# Patient Record
Sex: FEMALE | Race: WHITE | NOT HISPANIC OR LATINO | Employment: OTHER | URBAN - METROPOLITAN AREA
[De-identification: names, ages, dates, MRNs, and addresses within clinical notes are randomized per-mention and may not be internally consistent; named-entity substitution may affect disease eponyms.]

---

## 2023-03-06 DIAGNOSIS — K04.7 ABSCESSED TOOTH: Primary | ICD-10-CM

## 2023-03-07 RX ORDER — AMOXICILLIN 875 MG/1
875 TABLET, FILM COATED ORAL 2 TIMES DAILY
Qty: 20 TABLET | Refills: 0 | Status: SHIPPED | OUTPATIENT
Start: 2023-03-07 | End: 2023-03-17

## 2023-03-13 ENCOUNTER — TELEPHONE (OUTPATIENT)
Dept: PRIMARY CARE | Facility: CLINIC | Age: 73
End: 2023-03-13
Payer: MEDICARE

## 2023-03-13 PROBLEM — L20.9 ATOPIC DERMATITIS: Status: ACTIVE | Noted: 2023-03-13

## 2023-03-13 PROBLEM — E11.42 DIABETIC POLYNEUROPATHY ASSOCIATED WITH TYPE 2 DIABETES MELLITUS (MULTI): Status: ACTIVE | Noted: 2023-03-13

## 2023-03-13 PROBLEM — E11.51 DIABETES MELLITUS WITH PERIPHERAL VASCULAR DISEASE (MULTI): Status: RESOLVED | Noted: 2023-03-13 | Resolved: 2023-03-13

## 2023-03-13 PROBLEM — J30.9 ALLERGIC RHINITIS: Status: ACTIVE | Noted: 2023-03-13

## 2023-03-13 PROBLEM — J44.9 COPD (CHRONIC OBSTRUCTIVE PULMONARY DISEASE) (MULTI): Status: ACTIVE | Noted: 2023-03-13

## 2023-03-13 PROBLEM — E66.3 OVERWEIGHT: Status: ACTIVE | Noted: 2023-03-13

## 2023-03-13 PROBLEM — H40.9 GLAUCOMA: Status: ACTIVE | Noted: 2023-03-13

## 2023-03-13 PROBLEM — R60.9 EDEMA: Status: ACTIVE | Noted: 2023-03-13

## 2023-03-13 PROBLEM — M79.89 RIGHT LEG SWELLING: Status: ACTIVE | Noted: 2023-03-13

## 2023-03-13 PROBLEM — I45.10 RBBB: Status: RESOLVED | Noted: 2023-03-13 | Resolved: 2023-03-13

## 2023-03-13 PROBLEM — E78.2 MIXED HYPERLIPIDEMIA: Status: ACTIVE | Noted: 2023-03-13

## 2023-03-13 PROBLEM — E78.2 MIXED HYPERLIPIDEMIA: Status: RESOLVED | Noted: 2023-03-13 | Resolved: 2023-03-13

## 2023-03-13 PROBLEM — N18.31 STAGE 3A CHRONIC KIDNEY DISEASE (MULTI): Status: ACTIVE | Noted: 2023-03-13

## 2023-03-13 PROBLEM — R94.39 ABNORMAL STRESS ECG WITH TREADMILL: Status: ACTIVE | Noted: 2023-03-13

## 2023-03-13 PROBLEM — I65.23 BILATERAL CAROTID ARTERY STENOSIS: Status: ACTIVE | Noted: 2023-03-13

## 2023-03-13 PROBLEM — F17.200 CURRENT EVERY DAY SMOKER: Status: ACTIVE | Noted: 2023-03-13

## 2023-03-13 PROBLEM — N89.8 VAGINAL ITCHING: Status: ACTIVE | Noted: 2023-03-13

## 2023-03-13 PROBLEM — E11.29 DIABETES MELLITUS WITH KIDNEY COMPLICATION (MULTI): Status: ACTIVE | Noted: 2023-03-13

## 2023-03-13 PROBLEM — R91.8 PULMONARY NODULES: Status: ACTIVE | Noted: 2023-03-13

## 2023-03-13 PROBLEM — L29.9 PRURITUS: Status: ACTIVE | Noted: 2023-03-13

## 2023-03-13 PROBLEM — I10 ESSENTIAL HYPERTENSION: Status: ACTIVE | Noted: 2023-03-13

## 2023-03-13 PROBLEM — R32 FEMALE INCONTINENCE: Status: ACTIVE | Noted: 2023-03-13

## 2023-03-13 PROBLEM — I25.10 CAD (CORONARY ARTERY DISEASE): Status: ACTIVE | Noted: 2023-03-13

## 2023-03-13 PROBLEM — I73.9 PERIPHERAL VASCULAR DISEASE (CMS-HCC): Status: ACTIVE | Noted: 2023-03-13

## 2023-03-13 PROBLEM — I48.91 ATRIAL FIBRILLATION (MULTI): Status: ACTIVE | Noted: 2023-03-13

## 2023-03-13 PROBLEM — I70.219 EXTREMITY ATHEROSCLEROSIS WITH INTERMITTENT CLAUDICATION (CMS-HCC): Status: ACTIVE | Noted: 2023-03-13

## 2023-03-13 PROBLEM — I45.10 RBBB: Status: ACTIVE | Noted: 2023-03-13

## 2023-03-13 PROBLEM — L85.3 XEROSIS OF SKIN: Status: ACTIVE | Noted: 2023-03-13

## 2023-03-13 PROBLEM — K21.9 GERD (GASTROESOPHAGEAL REFLUX DISEASE): Status: ACTIVE | Noted: 2023-03-13

## 2023-03-13 PROBLEM — I77.9 CAROTID ARTERY DISEASE (CMS-HCC): Status: ACTIVE | Noted: 2023-03-13

## 2023-03-13 PROBLEM — E78.5 HYPERLIPIDEMIA: Status: ACTIVE | Noted: 2023-03-13

## 2023-03-13 PROBLEM — E55.9 VITAMIN D DEFICIENCY: Status: ACTIVE | Noted: 2023-03-13

## 2023-03-13 PROBLEM — E11.65 TYPE 2 DIABETES MELLITUS WITH HYPERGLYCEMIA, WITHOUT LONG-TERM CURRENT USE OF INSULIN (MULTI): Status: ACTIVE | Noted: 2023-03-13

## 2023-03-13 PROBLEM — E11.51 DIABETES MELLITUS WITH PERIPHERAL VASCULAR DISEASE (MULTI): Status: ACTIVE | Noted: 2023-03-13

## 2023-03-13 PROBLEM — L82.0 SEBORRHEIC KERATOSES, INFLAMED: Status: ACTIVE | Noted: 2023-03-13

## 2023-03-13 PROBLEM — I44.7 LBBB (LEFT BUNDLE BRANCH BLOCK): Status: ACTIVE | Noted: 2023-03-13

## 2023-03-13 PROBLEM — I10 HYPERTENSION: Status: ACTIVE | Noted: 2023-03-13

## 2023-03-13 PROBLEM — R31.9 HEMATURIA: Status: ACTIVE | Noted: 2023-03-13

## 2023-03-13 RX ORDER — CILOSTAZOL 100 MG/1
100 TABLET ORAL 2 TIMES DAILY
COMMUNITY
End: 2023-11-03 | Stop reason: SDUPTHER

## 2023-03-13 RX ORDER — OXYBUTYNIN CHLORIDE 10 MG/1
1 TABLET, EXTENDED RELEASE ORAL DAILY
COMMUNITY
Start: 2019-02-28 | End: 2023-04-26 | Stop reason: SDUPTHER

## 2023-03-13 RX ORDER — ICOSAPENT ETHYL 1 G/1
2 CAPSULE ORAL 2 TIMES DAILY
COMMUNITY
Start: 2020-02-07 | End: 2023-12-19 | Stop reason: SDUPTHER

## 2023-03-13 RX ORDER — APIXABAN 5 MG/1
5 TABLET, FILM COATED ORAL 2 TIMES DAILY
COMMUNITY
End: 2023-11-03 | Stop reason: SDUPTHER

## 2023-03-13 RX ORDER — FLUTICASONE PROPIONATE 50 MCG
1 SPRAY, SUSPENSION (ML) NASAL DAILY PRN
COMMUNITY
End: 2024-05-30 | Stop reason: HOSPADM

## 2023-03-13 RX ORDER — ALBUTEROL SULFATE 90 UG/1
AEROSOL, METERED RESPIRATORY (INHALATION)
COMMUNITY
End: 2023-04-26 | Stop reason: SDUPTHER

## 2023-03-13 RX ORDER — UMECLIDINIUM BROMIDE AND VILANTEROL TRIFENATATE 62.5; 25 UG/1; UG/1
1 POWDER RESPIRATORY (INHALATION) DAILY
COMMUNITY
End: 2023-08-07 | Stop reason: SDUPTHER

## 2023-03-13 RX ORDER — QUINAPRIL 20 MG/1
20 TABLET ORAL DAILY
COMMUNITY
Start: 2017-11-20 | End: 2023-04-26

## 2023-03-13 RX ORDER — PIOGLITAZONEHYDROCHLORIDE 30 MG/1
1 TABLET ORAL DAILY
COMMUNITY
Start: 2020-05-08 | End: 2023-11-03 | Stop reason: SDUPTHER

## 2023-03-13 RX ORDER — ACETAMINOPHEN 500 MG
1 TABLET ORAL DAILY
COMMUNITY
Start: 2017-11-20 | End: 2024-05-29 | Stop reason: HOSPADM

## 2023-03-13 RX ORDER — ATORVASTATIN CALCIUM 40 MG/1
1 TABLET, FILM COATED ORAL DAILY
COMMUNITY
Start: 2023-02-09 | End: 2023-04-26 | Stop reason: SDUPTHER

## 2023-03-13 RX ORDER — SPIRONOLACTONE 25 MG/1
0.5 TABLET ORAL DAILY
COMMUNITY
Start: 2017-11-20 | End: 2023-10-06

## 2023-03-13 RX ORDER — GLYBURIDE 5 MG/1
2 TABLET ORAL 2 TIMES DAILY
COMMUNITY
Start: 2017-11-20 | End: 2023-04-26 | Stop reason: SDUPTHER

## 2023-03-13 RX ORDER — FUROSEMIDE 20 MG/1
20 TABLET ORAL DAILY PRN
COMMUNITY
Start: 2023-02-05 | End: 2023-11-03 | Stop reason: SDUPTHER

## 2023-03-13 RX ORDER — METOPROLOL SUCCINATE 50 MG/1
1 TABLET, EXTENDED RELEASE ORAL 2 TIMES DAILY
COMMUNITY
Start: 2023-01-24 | End: 2023-07-20 | Stop reason: SDUPTHER

## 2023-03-13 NOTE — TELEPHONE ENCOUNTER
----- Message from Alona Corea MD sent at 3/7/2023  8:25 AM EST -----  Prescription was sent to the pharmacy.   ----- Message -----  From: Alona Oliver LPN  Sent: 3/6/2023   3:11 PM EST  To: Alona Corea MD    States has an abscessed tooth she would like an antibiotic called in

## 2023-03-22 ENCOUNTER — OFFICE VISIT (OUTPATIENT)
Dept: PRIMARY CARE | Facility: CLINIC | Age: 73
End: 2023-03-22
Payer: MEDICARE

## 2023-03-22 VITALS
BODY MASS INDEX: 29.43 KG/M2 | WEIGHT: 172.4 LBS | DIASTOLIC BLOOD PRESSURE: 76 MMHG | SYSTOLIC BLOOD PRESSURE: 130 MMHG | HEART RATE: 84 BPM | HEIGHT: 64 IN | RESPIRATION RATE: 18 BRPM | TEMPERATURE: 98 F

## 2023-03-22 DIAGNOSIS — E11.22 TYPE 2 DIABETES MELLITUS WITH STAGE 3B CHRONIC KIDNEY DISEASE, WITHOUT LONG-TERM CURRENT USE OF INSULIN (MULTI): ICD-10-CM

## 2023-03-22 DIAGNOSIS — N18.31 STAGE 3A CHRONIC KIDNEY DISEASE (MULTI): ICD-10-CM

## 2023-03-22 DIAGNOSIS — N18.32 TYPE 2 DIABETES MELLITUS WITH STAGE 3B CHRONIC KIDNEY DISEASE, WITHOUT LONG-TERM CURRENT USE OF INSULIN (MULTI): ICD-10-CM

## 2023-03-22 DIAGNOSIS — E11.51 TYPE 2 DIABETES MELLITUS WITH DIABETIC PERIPHERAL ANGIOPATHY WITHOUT GANGRENE, WITHOUT LONG-TERM CURRENT USE OF INSULIN (MULTI): ICD-10-CM

## 2023-03-22 DIAGNOSIS — I48.92 ATRIAL FLUTTER, UNSPECIFIED TYPE (MULTI): ICD-10-CM

## 2023-03-22 DIAGNOSIS — I50.42 CHRONIC COMBINED SYSTOLIC (CONGESTIVE) AND DIASTOLIC (CONGESTIVE) HEART FAILURE (MULTI): ICD-10-CM

## 2023-03-22 DIAGNOSIS — J44.1 CHRONIC OBSTRUCTIVE PULMONARY DISEASE WITH (ACUTE) EXACERBATION (MULTI): ICD-10-CM

## 2023-03-22 DIAGNOSIS — N95.0 POSTMENOPAUSAL VAGINAL BLEEDING: Primary | ICD-10-CM

## 2023-03-22 DIAGNOSIS — J96.22 ACUTE AND CHRONIC RESPIRATORY FAILURE WITH HYPERCAPNIA (MULTI): ICD-10-CM

## 2023-03-22 PROCEDURE — 1160F RVW MEDS BY RX/DR IN RCRD: CPT | Performed by: FAMILY MEDICINE

## 2023-03-22 PROCEDURE — 3078F DIAST BP <80 MM HG: CPT | Performed by: FAMILY MEDICINE

## 2023-03-22 PROCEDURE — 3075F SYST BP GE 130 - 139MM HG: CPT | Performed by: FAMILY MEDICINE

## 2023-03-22 PROCEDURE — 99213 OFFICE O/P EST LOW 20 MIN: CPT | Performed by: FAMILY MEDICINE

## 2023-03-22 PROCEDURE — 4010F ACE/ARB THERAPY RXD/TAKEN: CPT | Performed by: FAMILY MEDICINE

## 2023-03-22 PROCEDURE — 3066F NEPHROPATHY DOC TX: CPT | Performed by: FAMILY MEDICINE

## 2023-03-22 PROCEDURE — 1157F ADVNC CARE PLAN IN RCRD: CPT | Performed by: FAMILY MEDICINE

## 2023-03-22 PROCEDURE — 1159F MED LIST DOCD IN RCRD: CPT | Performed by: FAMILY MEDICINE

## 2023-03-22 ASSESSMENT — PATIENT HEALTH QUESTIONNAIRE - PHQ9
2. FEELING DOWN, DEPRESSED OR HOPELESS: NOT AT ALL
1. LITTLE INTEREST OR PLEASURE IN DOING THINGS: NOT AT ALL
SUM OF ALL RESPONSES TO PHQ9 QUESTIONS 1 & 2: 0

## 2023-03-22 ASSESSMENT — ENCOUNTER SYMPTOMS: ABDOMINAL PAIN: 0

## 2023-03-22 NOTE — PROGRESS NOTES
"Subjective   Patient ID: Mitzi Pro is a 72 y.o. female who presents for Vaginal Bleeding (Has been going on for a couple weeks, intermittent spot during the day and heavier at night with some clots. No abdominal pain or bloating.   ).    Notices more bleeding at night when lays down    Vaginal Bleeding  This is a new problem. The current episode started 1 to 4 weeks ago. The problem occurs 2 to 4 times per day. The problem has been unchanged. Pertinent negatives include no abdominal pain or rash.        Review of Systems   Gastrointestinal:  Negative for abdominal pain.   Genitourinary:  Positive for vaginal bleeding.   Skin:  Negative for rash.       Objective   Pulse 84   Temp 36.7 °C (98 °F)   Resp 18   Ht 1.632 m (5' 4.25\")   Wt 78.2 kg (172 lb 6.4 oz)   BMI 29.36 kg/m²     Physical Exam  Genitourinary:     General: Normal vulva.      Labia:         Left: No lesion.       Vagina: Bleeding present.      Comments: Uanble to see cervix due to bleeding      Assessment/Plan          "

## 2023-03-27 ENCOUNTER — TELEPHONE (OUTPATIENT)
Dept: PRIMARY CARE | Facility: CLINIC | Age: 73
End: 2023-03-27
Payer: MEDICARE

## 2023-03-27 NOTE — TELEPHONE ENCOUNTER
Left message would like to stop taking eliquis and restart aspirin. They think that is what is causing her to bleed.  Please advise

## 2023-04-26 ENCOUNTER — OFFICE VISIT (OUTPATIENT)
Dept: PRIMARY CARE | Facility: CLINIC | Age: 73
End: 2023-04-26
Payer: MEDICARE

## 2023-04-26 VITALS
SYSTOLIC BLOOD PRESSURE: 92 MMHG | OXYGEN SATURATION: 87 % | DIASTOLIC BLOOD PRESSURE: 38 MMHG | RESPIRATION RATE: 20 BRPM | HEART RATE: 80 BPM | HEIGHT: 64 IN | WEIGHT: 172.8 LBS | TEMPERATURE: 97.3 F | BODY MASS INDEX: 29.5 KG/M2

## 2023-04-26 DIAGNOSIS — I48.91 ATRIAL FIBRILLATION, UNSPECIFIED TYPE (MULTI): ICD-10-CM

## 2023-04-26 DIAGNOSIS — D50.0 IRON DEFICIENCY ANEMIA DUE TO CHRONIC BLOOD LOSS: ICD-10-CM

## 2023-04-26 DIAGNOSIS — D68.318: ICD-10-CM

## 2023-04-26 DIAGNOSIS — E11.42 DIABETIC POLYNEUROPATHY ASSOCIATED WITH TYPE 2 DIABETES MELLITUS (MULTI): ICD-10-CM

## 2023-04-26 DIAGNOSIS — R32 URINARY INCONTINENCE, UNSPECIFIED TYPE: ICD-10-CM

## 2023-04-26 DIAGNOSIS — J44.9 CHRONIC OBSTRUCTIVE PULMONARY DISEASE, UNSPECIFIED COPD TYPE (MULTI): Primary | ICD-10-CM

## 2023-04-26 DIAGNOSIS — J96.22 ACUTE AND CHRONIC RESPIRATORY FAILURE WITH HYPERCAPNIA (MULTI): ICD-10-CM

## 2023-04-26 DIAGNOSIS — I10 ESSENTIAL HYPERTENSION: ICD-10-CM

## 2023-04-26 DIAGNOSIS — E11.51 DIABETES MELLITUS WITH PERIPHERAL VASCULAR DISEASE (MULTI): ICD-10-CM

## 2023-04-26 DIAGNOSIS — I70.219 EXTREMITY ATHEROSCLEROSIS WITH INTERMITTENT CLAUDICATION (CMS-HCC): ICD-10-CM

## 2023-04-26 DIAGNOSIS — I73.9 PERIPHERAL VASCULAR DISEASE (CMS-HCC): ICD-10-CM

## 2023-04-26 PROCEDURE — 3078F DIAST BP <80 MM HG: CPT | Performed by: FAMILY MEDICINE

## 2023-04-26 PROCEDURE — 1157F ADVNC CARE PLAN IN RCRD: CPT | Performed by: FAMILY MEDICINE

## 2023-04-26 PROCEDURE — 99214 OFFICE O/P EST MOD 30 MIN: CPT | Performed by: FAMILY MEDICINE

## 2023-04-26 PROCEDURE — 1159F MED LIST DOCD IN RCRD: CPT | Performed by: FAMILY MEDICINE

## 2023-04-26 PROCEDURE — 1160F RVW MEDS BY RX/DR IN RCRD: CPT | Performed by: FAMILY MEDICINE

## 2023-04-26 PROCEDURE — 3066F NEPHROPATHY DOC TX: CPT | Performed by: FAMILY MEDICINE

## 2023-04-26 PROCEDURE — 3074F SYST BP LT 130 MM HG: CPT | Performed by: FAMILY MEDICINE

## 2023-04-26 PROCEDURE — 4010F ACE/ARB THERAPY RXD/TAKEN: CPT | Performed by: FAMILY MEDICINE

## 2023-04-26 RX ORDER — ATORVASTATIN CALCIUM 40 MG/1
40 TABLET, FILM COATED ORAL DAILY
Qty: 90 TABLET | Refills: 1 | Status: SHIPPED | OUTPATIENT
Start: 2023-04-26 | End: 2023-11-03 | Stop reason: SDUPTHER

## 2023-04-26 RX ORDER — LISINOPRIL 10 MG/1
20 TABLET ORAL DAILY
Qty: 180 TABLET | Refills: 1 | Status: SHIPPED | OUTPATIENT
Start: 2023-04-26 | End: 2023-11-03 | Stop reason: SDUPTHER

## 2023-04-26 RX ORDER — ALBUTEROL SULFATE 90 UG/1
2 AEROSOL, METERED RESPIRATORY (INHALATION) EVERY 4 HOURS PRN
Qty: 54 G | Refills: 3 | Status: SHIPPED | OUTPATIENT
Start: 2023-04-26 | End: 2023-12-19 | Stop reason: SDUPTHER

## 2023-04-26 RX ORDER — GLYBURIDE 5 MG/1
10 TABLET ORAL 2 TIMES DAILY
Qty: 360 TABLET | Refills: 1 | Status: SHIPPED | OUTPATIENT
Start: 2023-04-26 | End: 2023-12-15 | Stop reason: SDUPTHER

## 2023-04-26 RX ORDER — OXYBUTYNIN CHLORIDE 10 MG/1
10 TABLET, EXTENDED RELEASE ORAL DAILY
Qty: 90 TABLET | Refills: 3 | Status: SHIPPED | OUTPATIENT
Start: 2023-04-26 | End: 2023-11-03 | Stop reason: SDUPTHER

## 2023-04-26 NOTE — PROGRESS NOTES
"Subjective   Patient ID: Mitzi Pro is a 72 y.o. female who presents for Follow-up (Legs feel weak, still going to rehab.  Wants portable oxygen.).    Has oxygen at home but needs portable oxygen to be under 10 pounds   Has a heart monitor on   Not bleeding anymore   incontinence improved some since hysterectomy         Review of Systems    Objective   BP (!) 92/38 (BP Location: Right arm, Patient Position: Sitting, BP Cuff Size: Adult)   Pulse 80   Temp 36.3 °C (97.3 °F) (Temporal)   Resp 20   Ht 1.626 m (5' 4\")   Wt 78.4 kg (172 lb 12.8 oz)   SpO2 (!) 87%   BMI 29.66 kg/m²     Physical Exam  Vitals and nursing note reviewed.   Constitutional:       General: She is not in acute distress.     Appearance: She is not ill-appearing.   HENT:      Head: Normocephalic and atraumatic.      Mouth/Throat:      Mouth: Mucous membranes are moist.   Eyes:      Conjunctiva/sclera: Conjunctivae normal.   Cardiovascular:      Rate and Rhythm: Normal rate and regular rhythm.      Heart sounds: Normal heart sounds.   Pulmonary:      Effort: Pulmonary effort is normal.      Breath sounds: Normal breath sounds.   Skin:     General: Skin is warm.   Neurological:      Mental Status: She is alert.   Psychiatric:         Mood and Affect: Mood normal.         Thought Content: Thought content normal.         Judgment: Judgment normal.         Assessment/Plan   Problem List Items Addressed This Visit          Nervous    Diabetic polyneuropathy associated with type 2 diabetes mellitus (CMS/HCC)       Respiratory    COPD (chronic obstructive pulmonary disease) (CMS/[facility]) - Primary    Relevant Medications    albuterol 90 mcg/actuation inhaler    Acute and chronic respiratory failure with hypercapnia (CMS/HCC)       Circulatory    Atrial fibrillation (CMS/[facility])    Essential hypertension    Relevant Medications    lisinopril 10 mg tablet    Extremity atherosclerosis with intermittent claudication (CMS/HCC)    Peripheral vascular " disease (CMS/HCC)    Diabetes mellitus with peripheral vascular disease (CMS/HCC)    Relevant Medications    SITagliptin phosphate (Januvia) 100 mg tablet    glyBURIDE (Diabeta) 5 mg tablet    atorvastatin (Lipitor) 40 mg tablet       Hematologic    Hemorrhagic disorder due to circulating anticoagulants (CMS/HCC)     better          Other Visit Diagnoses       Urinary incontinence, unspecified type        Relevant Medications    oxybutynin XL (Ditropan-XL) 10 mg 24 hr tablet    Iron deficiency anemia due to chronic blood loss        Relevant Orders    Follow Up In [facility] - PCP

## 2023-05-31 ENCOUNTER — OFFICE VISIT (OUTPATIENT)
Dept: PRIMARY CARE | Facility: CLINIC | Age: 73
End: 2023-05-31
Payer: MEDICARE

## 2023-05-31 ENCOUNTER — LAB (OUTPATIENT)
Dept: LAB | Facility: LAB | Age: 73
End: 2023-05-31
Payer: MEDICARE

## 2023-05-31 VITALS
TEMPERATURE: 97.4 F | RESPIRATION RATE: 20 BRPM | OXYGEN SATURATION: 92 % | HEART RATE: 82 BPM | SYSTOLIC BLOOD PRESSURE: 108 MMHG | DIASTOLIC BLOOD PRESSURE: 40 MMHG | WEIGHT: 169.1 LBS | BODY MASS INDEX: 29.03 KG/M2

## 2023-05-31 DIAGNOSIS — E11.65 TYPE 2 DIABETES MELLITUS WITH HYPERGLYCEMIA, WITHOUT LONG-TERM CURRENT USE OF INSULIN (MULTI): ICD-10-CM

## 2023-05-31 DIAGNOSIS — I10 ESSENTIAL HYPERTENSION: ICD-10-CM

## 2023-05-31 DIAGNOSIS — Z00.00 ROUTINE GENERAL MEDICAL EXAMINATION AT A HEALTH CARE FACILITY: ICD-10-CM

## 2023-05-31 DIAGNOSIS — D50.0 IRON DEFICIENCY ANEMIA DUE TO CHRONIC BLOOD LOSS: ICD-10-CM

## 2023-05-31 DIAGNOSIS — I10 ESSENTIAL HYPERTENSION: Primary | ICD-10-CM

## 2023-05-31 LAB
ALANINE AMINOTRANSFERASE (SGPT) (U/L) IN SER/PLAS: 9 U/L (ref 7–45)
ALBUMIN (G/DL) IN SER/PLAS: 3.9 G/DL (ref 3.4–5)
ALKALINE PHOSPHATASE (U/L) IN SER/PLAS: 51 U/L (ref 33–136)
ANION GAP IN SER/PLAS: 13 MMOL/L (ref 10–20)
ASPARTATE AMINOTRANSFERASE (SGOT) (U/L) IN SER/PLAS: 14 U/L (ref 9–39)
BILIRUBIN TOTAL (MG/DL) IN SER/PLAS: 0.5 MG/DL (ref 0–1.2)
CALCIUM (MG/DL) IN SER/PLAS: 9.1 MG/DL (ref 8.6–10.3)
CARBON DIOXIDE, TOTAL (MMOL/L) IN SER/PLAS: 28 MMOL/L (ref 21–32)
CHLORIDE (MMOL/L) IN SER/PLAS: 99 MMOL/L (ref 98–107)
CREATININE (MG/DL) IN SER/PLAS: 1.26 MG/DL (ref 0.5–1.05)
ERYTHROCYTE DISTRIBUTION WIDTH (RATIO) BY AUTOMATED COUNT: 17.1 % (ref 11.5–14.5)
ERYTHROCYTE MEAN CORPUSCULAR HEMOGLOBIN CONCENTRATION (G/DL) BY AUTOMATED: 30.1 G/DL (ref 32–36)
ERYTHROCYTE MEAN CORPUSCULAR VOLUME (FL) BY AUTOMATED COUNT: 99 FL (ref 80–100)
ERYTHROCYTES (10*6/UL) IN BLOOD BY AUTOMATED COUNT: 3.32 X10E12/L (ref 4–5.2)
ESTIMATED AVERAGE GLUCOSE FOR HBA1C: 131 MG/DL
GFR FEMALE: 45 ML/MIN/1.73M2
GLUCOSE (MG/DL) IN SER/PLAS: 238 MG/DL (ref 74–99)
HEMATOCRIT (%) IN BLOOD BY AUTOMATED COUNT: 32.9 % (ref 36–46)
HEMOGLOBIN (G/DL) IN BLOOD: 9.9 G/DL (ref 12–16)
HEMOGLOBIN (PG) IN RETICULOCYTES: 34 PG (ref 28–38)
HEMOGLOBIN A1C/HEMOGLOBIN TOTAL IN BLOOD: 6.2 %
IRON (UG/DL) IN SER/PLAS: 58 UG/DL (ref 35–150)
IRON BINDING CAPACITY (UG/DL) IN SER/PLAS: 384 UG/DL (ref 240–445)
IRON SATURATION (%) IN SER/PLAS: 15 % (ref 25–45)
LEUKOCYTES (10*3/UL) IN BLOOD BY AUTOMATED COUNT: 6.3 X10E9/L (ref 4.4–11.3)
PLATELETS (10*3/UL) IN BLOOD AUTOMATED COUNT: 238 X10E9/L (ref 150–450)
POTASSIUM (MMOL/L) IN SER/PLAS: 4.1 MMOL/L (ref 3.5–5.3)
PROTEIN TOTAL: 6.6 G/DL (ref 6.4–8.2)
RETICULOCYTES (10*3/UL) IN BLOOD: 0.08 X10E12/L (ref 0.02–0.11)
RETICULOCYTES/100 ERYTHROCYTES IN BLOOD BY AUTOMATED COUNT: 2.4 % (ref 0.5–2)
SODIUM (MMOL/L) IN SER/PLAS: 136 MMOL/L (ref 136–145)
UREA NITROGEN (MG/DL) IN SER/PLAS: 19 MG/DL (ref 6–23)

## 2023-05-31 PROCEDURE — 85027 COMPLETE CBC AUTOMATED: CPT

## 2023-05-31 PROCEDURE — 82728 ASSAY OF FERRITIN: CPT

## 2023-05-31 PROCEDURE — 1160F RVW MEDS BY RX/DR IN RCRD: CPT | Performed by: FAMILY MEDICINE

## 2023-05-31 PROCEDURE — 3074F SYST BP LT 130 MM HG: CPT | Performed by: FAMILY MEDICINE

## 2023-05-31 PROCEDURE — 83036 HEMOGLOBIN GLYCOSYLATED A1C: CPT

## 2023-05-31 PROCEDURE — 85045 AUTOMATED RETICULOCYTE COUNT: CPT

## 2023-05-31 PROCEDURE — 36415 COLL VENOUS BLD VENIPUNCTURE: CPT

## 2023-05-31 PROCEDURE — 4010F ACE/ARB THERAPY RXD/TAKEN: CPT | Performed by: FAMILY MEDICINE

## 2023-05-31 PROCEDURE — 80053 COMPREHEN METABOLIC PANEL: CPT

## 2023-05-31 PROCEDURE — 1157F ADVNC CARE PLAN IN RCRD: CPT | Performed by: FAMILY MEDICINE

## 2023-05-31 PROCEDURE — 99214 OFFICE O/P EST MOD 30 MIN: CPT | Performed by: FAMILY MEDICINE

## 2023-05-31 PROCEDURE — 83540 ASSAY OF IRON: CPT

## 2023-05-31 PROCEDURE — 3078F DIAST BP <80 MM HG: CPT | Performed by: FAMILY MEDICINE

## 2023-05-31 PROCEDURE — 3066F NEPHROPATHY DOC TX: CPT | Performed by: FAMILY MEDICINE

## 2023-05-31 PROCEDURE — 1159F MED LIST DOCD IN RCRD: CPT | Performed by: FAMILY MEDICINE

## 2023-05-31 PROCEDURE — 83550 IRON BINDING TEST: CPT

## 2023-05-31 RX ORDER — SIMVASTATIN 80 MG/1
80 TABLET, FILM COATED ORAL NIGHTLY
COMMUNITY
End: 2024-03-14 | Stop reason: WASHOUT

## 2023-05-31 ASSESSMENT — PATIENT HEALTH QUESTIONNAIRE - PHQ9: 1. LITTLE INTEREST OR PLEASURE IN DOING THINGS: NOT AT ALL

## 2023-05-31 NOTE — PROGRESS NOTES
Subjective   Patient ID: Mitzi Pro is a 72 y.o. female who presents for Follow-up (Feeling better).    Been itching more recently feeling better   Needs to see optometrist     Diabetes  She presents for her follow-up diabetic visit. She has type 2 diabetes mellitus. Eye exam is not current.        Review of Systems    Objective   BP (!) 108/40 (BP Location: Right arm, Patient Position: Sitting, BP Cuff Size: Adult)   Pulse 82   Temp 36.3 °C (97.4 °F) (Temporal)   Resp 20   Wt 76.7 kg (169 lb 1.6 oz)   SpO2 92%   BMI 29.03 kg/m²     Physical Exam  Vitals and nursing note reviewed.   Constitutional:       General: She is not in acute distress.     Appearance: She is not ill-appearing.   HENT:      Head: Normocephalic and atraumatic.      Mouth/Throat:      Mouth: Mucous membranes are moist.   Eyes:      Conjunctiva/sclera: Conjunctivae normal.   Cardiovascular:      Rate and Rhythm: Normal rate and regular rhythm.      Heart sounds: Normal heart sounds.   Pulmonary:      Effort: Pulmonary effort is normal.      Breath sounds: Normal breath sounds.   Skin:     General: Skin is warm.   Neurological:      Mental Status: She is alert.   Psychiatric:         Mood and Affect: Mood normal.         Thought Content: Thought content normal.         Judgment: Judgment normal.         Assessment/Plan   Problem List Items Addressed This Visit          Circulatory    Essential hypertension - Primary    Relevant Orders    Comprehensive Metabolic Panel       Endocrine/Metabolic    Type 2 diabetes mellitus with hyperglycemia, without long-term current use of insulin (CMS/[facility])    Relevant Orders    Hemoglobin A1C     Other Visit Diagnoses       Iron deficiency anemia due to chronic blood loss        Relevant Orders    CBC    Ferritin    Reticulocytes    Iron and TIBC

## 2023-06-01 LAB — FERRITIN (UG/LL) IN SER/PLAS: 32 UG/L (ref 8–150)

## 2023-07-19 RX ORDER — METOPROLOL SUCCINATE 50 MG/1
TABLET, EXTENDED RELEASE ORAL
Qty: 180 TABLET | OUTPATIENT
Start: 2023-07-19

## 2023-07-20 DIAGNOSIS — I10 ESSENTIAL HYPERTENSION: Primary | ICD-10-CM

## 2023-07-20 RX ORDER — METOPROLOL SUCCINATE 50 MG/1
50 TABLET, EXTENDED RELEASE ORAL 2 TIMES DAILY
Qty: 180 TABLET | Refills: 1 | Status: SHIPPED | OUTPATIENT
Start: 2023-07-20 | End: 2023-10-04 | Stop reason: ALTCHOICE

## 2023-08-07 DIAGNOSIS — J44.9 CHRONIC OBSTRUCTIVE PULMONARY DISEASE, UNSPECIFIED COPD TYPE (MULTI): Primary | ICD-10-CM

## 2023-08-07 RX ORDER — UMECLIDINIUM BROMIDE AND VILANTEROL TRIFENATATE 62.5; 25 UG/1; UG/1
1 POWDER RESPIRATORY (INHALATION) DAILY
Qty: 14 EACH | Refills: 3 | Status: SHIPPED | OUTPATIENT
Start: 2023-08-07 | End: 2023-11-03 | Stop reason: SDUPTHER

## 2023-10-04 ENCOUNTER — OFFICE VISIT (OUTPATIENT)
Dept: PRIMARY CARE | Facility: CLINIC | Age: 73
End: 2023-10-04
Payer: MEDICARE

## 2023-10-04 ENCOUNTER — LAB (OUTPATIENT)
Dept: LAB | Facility: LAB | Age: 73
End: 2023-10-04
Payer: MEDICARE

## 2023-10-04 VITALS
TEMPERATURE: 97.2 F | HEIGHT: 64 IN | WEIGHT: 173.3 LBS | DIASTOLIC BLOOD PRESSURE: 46 MMHG | SYSTOLIC BLOOD PRESSURE: 116 MMHG | RESPIRATION RATE: 22 BRPM | HEART RATE: 88 BPM | BODY MASS INDEX: 29.59 KG/M2

## 2023-10-04 DIAGNOSIS — Z00.00 ROUTINE GENERAL MEDICAL EXAMINATION AT A HEALTH CARE FACILITY: ICD-10-CM

## 2023-10-04 DIAGNOSIS — Z23 IMMUNIZATION DUE: Primary | ICD-10-CM

## 2023-10-04 DIAGNOSIS — E11.42 DIABETIC POLYNEUROPATHY ASSOCIATED WITH TYPE 2 DIABETES MELLITUS (MULTI): ICD-10-CM

## 2023-10-04 DIAGNOSIS — D64.9 ANEMIA, UNSPECIFIED TYPE: ICD-10-CM

## 2023-10-04 DIAGNOSIS — Z12.11 COLON CANCER SCREENING: ICD-10-CM

## 2023-10-04 DIAGNOSIS — N18.31 STAGE 3A CHRONIC KIDNEY DISEASE (MULTI): ICD-10-CM

## 2023-10-04 DIAGNOSIS — Z00.00 ROUTINE GENERAL MEDICAL EXAMINATION AT HEALTH CARE FACILITY: ICD-10-CM

## 2023-10-04 LAB
25(OH)D3 SERPL-MCNC: 56 NG/ML (ref 30–100)
ALBUMIN SERPL BCP-MCNC: 3.8 G/DL (ref 3.4–5)
ALP SERPL-CCNC: 52 U/L (ref 33–136)
ALT SERPL W P-5'-P-CCNC: 11 U/L (ref 7–45)
ANION GAP SERPL CALC-SCNC: 11 MMOL/L (ref 10–20)
AST SERPL W P-5'-P-CCNC: 17 U/L (ref 9–39)
BILIRUB SERPL-MCNC: 0.5 MG/DL (ref 0–1.2)
BUN SERPL-MCNC: 22 MG/DL (ref 6–23)
CALCIUM SERPL-MCNC: 9.1 MG/DL (ref 8.6–10.3)
CHLORIDE SERPL-SCNC: 99 MMOL/L (ref 98–107)
CO2 SERPL-SCNC: 30 MMOL/L (ref 21–32)
CREAT SERPL-MCNC: 1.29 MG/DL (ref 0.5–1.05)
ERYTHROCYTE [DISTWIDTH] IN BLOOD BY AUTOMATED COUNT: 14.4 % (ref 11.5–14.5)
GFR SERPL CREATININE-BSD FRML MDRD: 43 ML/MIN/1.73M*2
GLUCOSE SERPL-MCNC: 193 MG/DL (ref 74–99)
HCT VFR BLD AUTO: 39.5 % (ref 36–46)
HGB BLD-MCNC: 12.1 G/DL (ref 12–16)
IRON SATN MFR SERPL: 31 % (ref 25–45)
IRON SERPL-MCNC: 112 UG/DL (ref 35–150)
MCH RBC QN AUTO: 31.2 PG (ref 26–34)
MCHC RBC AUTO-ENTMCNC: 30.6 G/DL (ref 32–36)
MCV RBC AUTO: 102 FL (ref 80–100)
NRBC BLD-RTO: 0 /100 WBCS (ref 0–0)
PLATELET # BLD AUTO: 216 X10*3/UL (ref 150–450)
PMV BLD AUTO: 11.3 FL (ref 7.5–11.5)
POC HEMOGLOBIN A1C: 6.5 % (ref 4.2–6.5)
POTASSIUM SERPL-SCNC: 4.2 MMOL/L (ref 3.5–5.3)
PROT SERPL-MCNC: 6.5 G/DL (ref 6.4–8.2)
RBC # BLD AUTO: 3.88 X10*6/UL (ref 4–5.2)
SODIUM SERPL-SCNC: 136 MMOL/L (ref 136–145)
TIBC SERPL-MCNC: 360 UG/DL (ref 240–445)
UIBC SERPL-MCNC: 248 UG/DL (ref 110–370)
VIT B12 SERPL-MCNC: 221 PG/ML (ref 211–911)
WBC # BLD AUTO: 5.5 X10*3/UL (ref 4.4–11.3)

## 2023-10-04 PROCEDURE — 83036 HEMOGLOBIN GLYCOSYLATED A1C: CPT | Performed by: FAMILY MEDICINE

## 2023-10-04 PROCEDURE — 1160F RVW MEDS BY RX/DR IN RCRD: CPT | Performed by: FAMILY MEDICINE

## 2023-10-04 PROCEDURE — 3074F SYST BP LT 130 MM HG: CPT | Performed by: FAMILY MEDICINE

## 2023-10-04 PROCEDURE — 80053 COMPREHEN METABOLIC PANEL: CPT

## 2023-10-04 PROCEDURE — 90662 IIV NO PRSV INCREASED AG IM: CPT | Performed by: FAMILY MEDICINE

## 2023-10-04 PROCEDURE — G0439 PPPS, SUBSEQ VISIT: HCPCS | Performed by: FAMILY MEDICINE

## 2023-10-04 PROCEDURE — 1159F MED LIST DOCD IN RCRD: CPT | Performed by: FAMILY MEDICINE

## 2023-10-04 PROCEDURE — 3044F HG A1C LEVEL LT 7.0%: CPT | Performed by: FAMILY MEDICINE

## 2023-10-04 PROCEDURE — 3066F NEPHROPATHY DOC TX: CPT | Performed by: FAMILY MEDICINE

## 2023-10-04 PROCEDURE — 3078F DIAST BP <80 MM HG: CPT | Performed by: FAMILY MEDICINE

## 2023-10-04 PROCEDURE — G0008 ADMIN INFLUENZA VIRUS VAC: HCPCS | Performed by: FAMILY MEDICINE

## 2023-10-04 PROCEDURE — 82728 ASSAY OF FERRITIN: CPT

## 2023-10-04 PROCEDURE — 36415 COLL VENOUS BLD VENIPUNCTURE: CPT

## 2023-10-04 PROCEDURE — 83540 ASSAY OF IRON: CPT

## 2023-10-04 PROCEDURE — 85027 COMPLETE CBC AUTOMATED: CPT

## 2023-10-04 PROCEDURE — 83550 IRON BINDING TEST: CPT

## 2023-10-04 PROCEDURE — 82306 VITAMIN D 25 HYDROXY: CPT

## 2023-10-04 PROCEDURE — 82607 VITAMIN B-12: CPT

## 2023-10-04 PROCEDURE — 4010F ACE/ARB THERAPY RXD/TAKEN: CPT | Performed by: FAMILY MEDICINE

## 2023-10-04 PROCEDURE — 1170F FXNL STATUS ASSESSED: CPT | Performed by: FAMILY MEDICINE

## 2023-10-04 ASSESSMENT — ACTIVITIES OF DAILY LIVING (ADL)
TAKING_MEDICATION: INDEPENDENT
DOING_HOUSEWORK: INDEPENDENT
BATHING: INDEPENDENT
MANAGING_FINANCES: INDEPENDENT
DRESSING: INDEPENDENT
GROCERY_SHOPPING: INDEPENDENT

## 2023-10-04 ASSESSMENT — PATIENT HEALTH QUESTIONNAIRE - PHQ9
SUM OF ALL RESPONSES TO PHQ9 QUESTIONS 1 & 2: 0
1. LITTLE INTEREST OR PLEASURE IN DOING THINGS: NOT AT ALL
1. LITTLE INTEREST OR PLEASURE IN DOING THINGS: NOT AT ALL
SUM OF ALL RESPONSES TO PHQ9 QUESTIONS 1 AND 2: 0
2. FEELING DOWN, DEPRESSED OR HOPELESS: NOT AT ALL
2. FEELING DOWN, DEPRESSED OR HOPELESS: NOT AT ALL

## 2023-10-04 ASSESSMENT — ENCOUNTER SYMPTOMS
LOSS OF SENSATION IN FEET: 0
OCCASIONAL FEELINGS OF UNSTEADINESS: 0
DEPRESSION: 0

## 2023-10-04 NOTE — PROGRESS NOTES
"Subjective   Patient ID: Mitzi Pro is a 73 y.o. female who presents for Annual Exam (medicare).    Subjective  Reason for Visit: Mitzi Pro is an 73 y.o. female here for a Medicare Wellness visit.     Past Medical, Surgical, and Family History reviewed and updated in chart.    Reviewed all medications by prescribing practitioner or clinical pharmacist (such as prescriptions, OTCs, herbal therapies and supplements) and documented in the medical record.  Declines pneumonia shot, mammogram and lung cancer screening     Patient Care Team:  Alona Corea MD as PCP - General (Family Medicine)  Alona Corea MD as PCP - [employer] PCP             Review of Systems    Objective   BP (!) 116/46   Pulse 88   Temp 36.2 °C (97.2 °F) (Temporal)   Resp 22   Ht 1.626 m (5' 4\")   Wt 78.6 kg (173 lb 4.8 oz)   BMI 29.75 kg/m²     Physical Exam  Vitals and nursing note reviewed.   Constitutional:       General: She is not in acute distress.     Appearance: She is not ill-appearing.   HENT:      Head: Normocephalic and atraumatic.      Mouth/Throat:      Mouth: Mucous membranes are moist.   Eyes:      Conjunctiva/sclera: Conjunctivae normal.   Cardiovascular:      Rate and Rhythm: Normal rate and regular rhythm.      Heart sounds: Normal heart sounds.   Pulmonary:      Effort: Pulmonary effort is normal.      Breath sounds: Normal breath sounds.   Skin:     General: Skin is warm.   Neurological:      Mental Status: She is alert.   Psychiatric:         Mood and Affect: Mood normal.         Thought Content: Thought content normal.         Judgment: Judgment normal.         Assessment/Plan   Problem List Items Addressed This Visit             ICD-10-CM    Diabetic polyneuropathy associated with type 2 diabetes mellitus (CMS/[facility]) E11.42    Relevant Orders    POCT glycosylated hemoglobin (Hb A1C) manually resulted (Completed)    Stage 3a chronic kidney disease (CMS/[facility]) N18.31    Relevant Orders "    Comprehensive Metabolic Panel    Vitamin D 25-Hydroxy,Total (for eval of Vitamin D levels)     Other Visit Diagnoses         Codes    Immunization due    -  Primary Z23    Relevant Orders    Flu vaccine, high dose seasonal, preservative free (Completed)    Routine general medical examination at a health care facility     Z00.00    Relevant Orders    Follow Up In [facility] - PCP - Medicare Annual    Anemia, unspecified type     D64.9    Relevant Orders    CBC    Iron and TIBC    Ferritin    Vitamin B12    Colon cancer screening     Z12.11    Relevant Orders    Cologuard® colon cancer screening    Routine general medical examination at health care facility     Z00.00

## 2023-10-05 DIAGNOSIS — I10 ESSENTIAL HYPERTENSION: Primary | ICD-10-CM

## 2023-10-05 LAB — FERRITIN SERPL-MCNC: 43 NG/ML (ref 8–150)

## 2023-10-06 RX ORDER — SPIRONOLACTONE 25 MG/1
TABLET ORAL DAILY
Qty: 45 TABLET | Refills: 5 | Status: SHIPPED | OUTPATIENT
Start: 2023-10-06 | End: 2024-05-06 | Stop reason: HOSPADM

## 2023-10-11 LAB — NONINV COLON CA DNA+OCC BLD SCRN STL QL: NORMAL

## 2023-10-23 ENCOUNTER — TELEPHONE (OUTPATIENT)
Dept: PRIMARY CARE | Facility: CLINIC | Age: 73
End: 2023-10-23
Payer: MEDICARE

## 2023-10-23 DIAGNOSIS — R19.5 POSITIVE COLORECTAL CANCER SCREENING USING COLOGUARD TEST: Primary | ICD-10-CM

## 2023-10-23 LAB — NONINV COLON CA DNA+OCC BLD SCRN STL QL: POSITIVE

## 2023-10-27 RX ORDER — METOPROLOL SUCCINATE 50 MG/1
50 TABLET, EXTENDED RELEASE ORAL 2 TIMES DAILY
Qty: 180 TABLET | OUTPATIENT
Start: 2023-10-27

## 2023-11-02 DIAGNOSIS — I10 ESSENTIAL HYPERTENSION: Primary | ICD-10-CM

## 2023-11-02 RX ORDER — METOPROLOL SUCCINATE 50 MG/1
50 TABLET, EXTENDED RELEASE ORAL 2 TIMES DAILY
COMMUNITY
Start: 2023-01-24 | End: 2023-11-02 | Stop reason: SDUPTHER

## 2023-11-03 DIAGNOSIS — I73.9 PERIPHERAL VASCULAR DISEASE (CMS-HCC): ICD-10-CM

## 2023-11-03 DIAGNOSIS — I48.91 ATRIAL FIBRILLATION, UNSPECIFIED TYPE (MULTI): Primary | ICD-10-CM

## 2023-11-03 DIAGNOSIS — R32 URINARY INCONTINENCE, UNSPECIFIED TYPE: ICD-10-CM

## 2023-11-03 DIAGNOSIS — J44.9 CHRONIC OBSTRUCTIVE PULMONARY DISEASE, UNSPECIFIED COPD TYPE (MULTI): ICD-10-CM

## 2023-11-03 DIAGNOSIS — I10 ESSENTIAL HYPERTENSION: ICD-10-CM

## 2023-11-03 DIAGNOSIS — E11.51 DIABETES MELLITUS WITH PERIPHERAL VASCULAR DISEASE (MULTI): ICD-10-CM

## 2023-11-03 RX ORDER — PIOGLITAZONEHYDROCHLORIDE 30 MG/1
30 TABLET ORAL DAILY
Qty: 90 TABLET | Refills: 3 | Status: SHIPPED | OUTPATIENT
Start: 2023-11-03 | End: 2024-02-16 | Stop reason: SDUPTHER

## 2023-11-03 RX ORDER — ATORVASTATIN CALCIUM 40 MG/1
40 TABLET, FILM COATED ORAL DAILY
Qty: 90 TABLET | Refills: 3 | Status: SHIPPED | OUTPATIENT
Start: 2023-11-03 | End: 2024-05-29 | Stop reason: HOSPADM

## 2023-11-03 RX ORDER — LISINOPRIL 10 MG/1
20 TABLET ORAL DAILY
Qty: 90 TABLET | Refills: 3 | Status: SHIPPED | OUTPATIENT
Start: 2023-11-03 | End: 2024-05-06 | Stop reason: HOSPADM

## 2023-11-03 RX ORDER — METOPROLOL SUCCINATE 50 MG/1
50 TABLET, EXTENDED RELEASE ORAL 2 TIMES DAILY
Qty: 180 TABLET | Refills: 3 | Status: SHIPPED | OUTPATIENT
Start: 2023-11-03 | End: 2023-11-15 | Stop reason: SDUPTHER

## 2023-11-03 RX ORDER — UMECLIDINIUM BROMIDE AND VILANTEROL TRIFENATATE 62.5; 25 UG/1; UG/1
1 POWDER RESPIRATORY (INHALATION) DAILY
Qty: 90 EACH | Refills: 3 | Status: SHIPPED | OUTPATIENT
Start: 2023-11-03 | End: 2024-05-30 | Stop reason: HOSPADM

## 2023-11-03 RX ORDER — METOPROLOL SUCCINATE 50 MG/1
50 TABLET, EXTENDED RELEASE ORAL 2 TIMES DAILY
Qty: 180 TABLET | Refills: 1 | Status: SHIPPED | OUTPATIENT
Start: 2023-11-03 | End: 2023-11-03 | Stop reason: SDUPTHER

## 2023-11-03 RX ORDER — APIXABAN 5 MG/1
5 TABLET, FILM COATED ORAL 2 TIMES DAILY
Qty: 180 TABLET | Refills: 3 | Status: SHIPPED | OUTPATIENT
Start: 2023-11-03

## 2023-11-03 RX ORDER — OXYBUTYNIN CHLORIDE 10 MG/1
10 TABLET, EXTENDED RELEASE ORAL DAILY
Qty: 90 TABLET | Refills: 3 | Status: SHIPPED | OUTPATIENT
Start: 2023-11-03 | End: 2024-11-02

## 2023-11-03 RX ORDER — FUROSEMIDE 20 MG/1
20 TABLET ORAL DAILY PRN
Qty: 90 TABLET | Refills: 3 | Status: SHIPPED | OUTPATIENT
Start: 2023-11-03 | End: 2024-05-29 | Stop reason: HOSPADM

## 2023-11-03 RX ORDER — CILOSTAZOL 100 MG/1
100 TABLET ORAL 2 TIMES DAILY
Qty: 180 TABLET | Refills: 3 | Status: SHIPPED | OUTPATIENT
Start: 2023-11-03 | End: 2024-05-30 | Stop reason: HOSPADM

## 2023-11-15 ENCOUNTER — TELEPHONE (OUTPATIENT)
Dept: PRIMARY CARE | Facility: CLINIC | Age: 73
End: 2023-11-15
Payer: MEDICARE

## 2023-11-15 DIAGNOSIS — I10 ESSENTIAL HYPERTENSION: ICD-10-CM

## 2023-11-15 RX ORDER — METOPROLOL SUCCINATE 50 MG/1
50 TABLET, EXTENDED RELEASE ORAL 2 TIMES DAILY
Qty: 180 TABLET | Refills: 3 | Status: SHIPPED | OUTPATIENT
Start: 2023-11-15 | End: 2024-05-06 | Stop reason: HOSPADM

## 2023-11-27 DIAGNOSIS — I73.9 PERIPHERAL VASCULAR DISEASE (CMS-HCC): Primary | ICD-10-CM

## 2023-12-06 ENCOUNTER — TELEPHONE (OUTPATIENT)
Dept: GASTROENTEROLOGY | Facility: CLINIC | Age: 73
End: 2023-12-06
Payer: MEDICARE

## 2023-12-06 NOTE — TELEPHONE ENCOUNTER
HARRIS FOR PATIENT. SHE WAS SCHEDULED VERITO DIXON FOR A COLON ON 12/29/23. SHE IS 73 AND REQUIRES AN OFFICE VISIT. SHE HAS CHF, COPD AND IS ON A BLOOD THINNER. CANCELED COLON AND ASK HER TO SCHEDULE AN APPT

## 2023-12-15 DIAGNOSIS — E11.51 DIABETES MELLITUS WITH PERIPHERAL VASCULAR DISEASE (MULTI): ICD-10-CM

## 2023-12-15 RX ORDER — GLYBURIDE 5 MG/1
10 TABLET ORAL 2 TIMES DAILY
Qty: 120 TABLET | Refills: 1 | Status: SHIPPED | OUTPATIENT
Start: 2023-12-15 | End: 2023-12-19 | Stop reason: SDUPTHER

## 2023-12-19 DIAGNOSIS — J44.9 CHRONIC OBSTRUCTIVE PULMONARY DISEASE, UNSPECIFIED COPD TYPE (MULTI): ICD-10-CM

## 2023-12-19 DIAGNOSIS — E11.51 DIABETES MELLITUS WITH PERIPHERAL VASCULAR DISEASE (MULTI): ICD-10-CM

## 2023-12-19 DIAGNOSIS — E78.5 DYSLIPIDEMIA: ICD-10-CM

## 2023-12-19 RX ORDER — ICOSAPENT ETHYL 1 G/1
2 CAPSULE ORAL 2 TIMES DAILY
Qty: 540 CAPSULE | Refills: 3 | Status: SHIPPED | OUTPATIENT
Start: 2023-12-19 | End: 2024-05-29 | Stop reason: HOSPADM

## 2023-12-19 RX ORDER — GLYBURIDE 5 MG/1
10 TABLET ORAL 2 TIMES DAILY
Qty: 540 TABLET | Refills: 3 | Status: SHIPPED | OUTPATIENT
Start: 2023-12-19 | End: 2024-05-29 | Stop reason: HOSPADM

## 2023-12-19 RX ORDER — ALBUTEROL SULFATE 90 UG/1
2 AEROSOL, METERED RESPIRATORY (INHALATION) EVERY 4 HOURS PRN
Qty: 54 G | Refills: 3 | Status: SHIPPED | OUTPATIENT
Start: 2023-12-19 | End: 2023-12-29

## 2023-12-28 DIAGNOSIS — J44.9 CHRONIC OBSTRUCTIVE PULMONARY DISEASE, UNSPECIFIED COPD TYPE (MULTI): ICD-10-CM

## 2023-12-29 RX ORDER — ALBUTEROL SULFATE 90 UG/1
AEROSOL, METERED RESPIRATORY (INHALATION)
Qty: 54 G | Refills: 3 | Status: SHIPPED | OUTPATIENT
Start: 2023-12-29 | End: 2024-05-30 | Stop reason: HOSPADM

## 2024-01-24 ENCOUNTER — TELEPHONE (OUTPATIENT)
Dept: PRIMARY CARE | Facility: CLINIC | Age: 74
End: 2024-01-24
Payer: MEDICARE

## 2024-01-24 NOTE — TELEPHONE ENCOUNTER
Pt left  stating she was in the hospital for 5 days.  She is out now and is now eligible for oxygen- she wants you to call [facility] today to order her oxygen to be delivered today.

## 2024-01-25 NOTE — TELEPHONE ENCOUNTER
Pt left vm in the afternoon stating that the process has already been started for the oxygen, it could take 2 weeks and she is not sure if she can wait that long.

## 2024-01-25 NOTE — TELEPHONE ENCOUNTER
1/25/24: Pt is requesting Rx's be sent to new pharmacy- other pharmacy is telling her she is not covered. Pt is requesting a phone call back.

## 2024-01-29 DIAGNOSIS — J44.9 CHRONIC OBSTRUCTIVE PULMONARY DISEASE, UNSPECIFIED COPD TYPE (MULTI): Primary | ICD-10-CM

## 2024-01-29 DIAGNOSIS — E11.51 DIABETES MELLITUS WITH PERIPHERAL VASCULAR DISEASE (MULTI): ICD-10-CM

## 2024-02-08 ENCOUNTER — TELEPHONE (OUTPATIENT)
Dept: CARDIOLOGY | Facility: CLINIC | Age: 74
End: 2024-02-08
Payer: MEDICARE

## 2024-02-08 NOTE — TELEPHONE ENCOUNTER
Called pt to schedule apt with DR. Duncan. Thee soonest apt is in April as of now but we can see If pt is willing to go to [place] for sooner OV if needed.

## 2024-02-08 NOTE — TELEPHONE ENCOUNTER
"PT calls to report complaint of A-fib \"acting up\"  PT reports that beginning today she has had 3 episodes of a-fib AEB by c/o weakness of yuly arms and heart pounding. Episodes last approx 1-2 minutes at most.  She called her insurance nurse who directed her to call cardiology.  She has taken all her medications today as prescribed.  She is asking if she needs to adjust her medication or what she should do.  She reports that she currently feels fine as these are just sporadic.   "

## 2024-02-09 ENCOUNTER — TELEPHONE (OUTPATIENT)
Dept: PRIMARY CARE | Facility: CLINIC | Age: 74
End: 2024-02-09
Payer: MEDICARE

## 2024-02-09 DIAGNOSIS — E11.51 DIABETES MELLITUS WITH PERIPHERAL VASCULAR DISEASE (MULTI): ICD-10-CM

## 2024-02-16 RX ORDER — PIOGLITAZONEHYDROCHLORIDE 30 MG/1
30 TABLET ORAL DAILY
Qty: 90 TABLET | Refills: 1 | Status: SHIPPED | OUTPATIENT
Start: 2024-02-16 | End: 2024-05-29 | Stop reason: HOSPADM

## 2024-02-21 ENCOUNTER — TELEPHONE (OUTPATIENT)
Dept: PRIMARY CARE | Facility: CLINIC | Age: 74
End: 2024-02-21
Payer: MEDICARE

## 2024-02-21 DIAGNOSIS — E11.65 TYPE 2 DIABETES MELLITUS WITH HYPERGLYCEMIA, WITHOUT LONG-TERM CURRENT USE OF INSULIN (MULTI): ICD-10-CM

## 2024-02-22 DIAGNOSIS — E11.65 TYPE 2 DIABETES MELLITUS WITH HYPERGLYCEMIA, WITHOUT LONG-TERM CURRENT USE OF INSULIN (MULTI): ICD-10-CM

## 2024-02-22 RX ORDER — INSULIN PUMP SYRINGE, 3 ML
1 EACH MISCELLANEOUS AS NEEDED
Qty: 1 EACH | Refills: 0 | Status: SHIPPED | OUTPATIENT
Start: 2024-02-22 | End: 2024-02-22

## 2024-02-22 RX ORDER — LANCETS 28 GAUGE
EACH MISCELLANEOUS
Qty: 100 EACH | Refills: 3 | Status: SHIPPED | OUTPATIENT
Start: 2024-02-22 | End: 2025-02-21

## 2024-02-22 RX ORDER — LANCETS 30 GAUGE
EACH MISCELLANEOUS
Qty: 1 EACH | Refills: 0 | Status: SHIPPED | OUTPATIENT
Start: 2024-02-22

## 2024-02-27 DIAGNOSIS — I50.42 CHRONIC COMBINED SYSTOLIC (CONGESTIVE) AND DIASTOLIC (CONGESTIVE) HEART FAILURE (MULTI): Primary | ICD-10-CM

## 2024-03-13 NOTE — PROGRESS NOTES
Patient ID: Mitzi Pro is a 73 y.o. female who presents for Congestive Heart Failure    Referring Provider: Ge Zacarias DO  PCP: CLAIRE Vasquez-CNP    Pt is here for First appointment. Last visit with PCP: 10.4.23, next visit with new PCP on 4-16-24    Verbal consent to manage patient's drug therapy was obtained from patient. They were informed they may decline to participate or withdraw from participation in pharmacy services at any time. Patient is agreeable to detailed voice messages if unable to be reached.     Does patient see cardiology? Yes, appt 4.11.24  Who are the patient's medications managed by? Cardiology       Subjective   Treatment Adherence:   Patient did take medications today.     Preferred pharmacy: [facility]  Can patient afford prescribed medications: Yes, doesn't pay anything for medications       HPI  CHF ASSESSMENT  Symptom/Staging:  -Most recent ejection fraction: 35-40%  -NYHA Stage: III  -Weight changes?: No  -Home BP? Does not check       This appointment   SOB with rest None   SOB with ADL's None   SOB with walking All the time   LE swelling None   Tires easily Sometimes   Sleeps on 3 pillows   PND None   Recent chest discomfort None   Dizziness with standing None   Syncope since last visit No   Recent palpitations Yes - off and on, go away after 30 seconds, occurs randomly       Guideline-Directed Medical Therapy:  -ARNI: No   -If no, then ACEi/ARB?: Yes, describe: lisinopril 20mg daily  -Beta Blocker: Yes, describe: metoprolol succinate 50mg BID  -MRA: Yes, describe: spironolactone 25mg daily  -SGLT2i: No  -furosemide 20mg PRN     Secondary Prevention:  -The ASCVD Risk score (Donis EDOUARD, et al., 2019) failed to calculate for the following reasons:    The patient has a prior MI or stroke diagnosis   -Aspirin 81mg? no  -Statin?: Yes, describe: atorvastatin 40mg daily      Review of Systems    Objective     BP Readings from Last 4 Encounters:   10/04/23 (!)  116/46   05/31/23 (!) 108/40   04/26/23 (!) 92/38   03/24/23 120/60      .[id number][14      Labs  Lab Results   Component Value Date    BILITOT 0.5 10/04/2023    CALCIUM 9.1 10/04/2023    CO2 30 10/04/2023    CL 99 10/04/2023    CREATININE 1.29 (H) 10/04/2023    GLUCOSE 193 (H) 10/04/2023    ALKPHOS 52 10/04/2023    K 4.2 10/04/2023    PROT 6.5 10/04/2023     10/04/2023    AST 17 10/04/2023    ALT 11 10/04/2023    BUN 22 10/04/2023    ANIONGAP 11 10/04/2023    MG 2.01 04/11/2023    PHOS 3.4 04/07/2023     11/30/2022    ALBUMIN 3.8 10/04/2023    GFRF 45 (A) 05/31/2023    GFRMALE CANCELED 03/31/2023     Lab Results   Component Value Date    TRIG 147 04/06/2022    CHOL 129 04/06/2022    HDL 47.0 04/06/2022     Lab Results   Component Value Date    HGBA1C 6.5 10/04/2023       Current Outpatient Medications   Medication Instructions    albuterol 90 mcg/actuation inhaler INHALE 2 PUFFS EVERY 4 HOURS AS NEEDED FOR WHEEZING (FILL WITH THESE DIRECTIONS.)    atorvastatin (LIPITOR) 40 mg, oral, Daily    blood sugar diagnostic strip 1 strip, miscellaneous, Daily    cholecalciferol (Vitamin D-3) 5,000 Units tablet 1 tablet, oral, Daily    cilostazol (PLETAL) 100 mg, oral, 2 times daily    Eliquis 5 mg, oral, 2 times daily    fluticasone (Flonase) 50 mcg/actuation nasal spray 2 sprays, Each Nostril, Daily    FreeStyle lancets 28 gauge 1 daily    furosemide (LASIX) 20 mg, oral, Daily PRN    glyBURIDE (DIABETA) 10 mg, oral, 2 times daily    icosapent ethyL (VASCEPA) 2 g, oral, 2 times daily    lisinopril 20 mg, oral, Daily    metoprolol succinate XL (TOPROL-XL) 50 mg, oral, 2 times daily    OneTouch Verio Flex meter misc 1 EACH IF NEED TO TEST GLUCOSE ONCE DAILY    oxybutynin XL (DITROPAN-XL) 10 mg, oral, Daily    pioglitazone (ACTOS) 30 mg, oral, Daily    simvastatin (ZOCOR) 80 mg, oral, Nightly    SITagliptin phosphate (JANUVIA) 100 mg, oral, Daily    spironolactone (Aldactone) 25 mg tablet oral, Daily     "umeclidinium-vilanteroL (Anoro Ellipta) 62.5-25 mcg/actuation blister with device 1 puff, inhalation, Daily        Assessment/Plan   Problem List Items Addressed This Visit             ICD-10-CM    Chronic combined systolic (congestive) and diastolic (congestive) heart failure (CMS/HCC) I50.42     Managed by cardiology. Appears to be stable on current regimen as she denies weight gain and LE edema. Does endorse HUGHES.     Long discussion with patient regarding benefits of adding 4th \"pillar' of Farxiga or Jardiance to her regimen in addition to changing Lisinopril to Entresto. Patient does note she has a history of recurrent UTI - should be considered when deciding to use SGLT2.    Patient does not have any cost concerns - says all medication are covered with $0 coapy at this time.'    Patient would like to discuss these medication options with PCP and cardiologist. No changes made today.     CONTINUE:  Lisinopril 20mg daily  Metoprolol succinate 50mg BID  Spironolactone 25mg daily  Furosemide 20mg PRN            Discussed [facility]. Declines at this time [facility]    Labs ordered:  none     Referrals:  none     Follow-up: declined by patient     Patient was provided with PharmD phone number and encouraged to reach out with any questions or concerns in the future. Also instructed she could ask PCP for another referral in the future.    Time spent with pt: Total length of time 25 (minutes) of the encounter and more than 50% was spent counseling the patient.      Judy Mcclelland PharmD, MARIOLA  Clinical Pharmacist  Pharmacy Services  [phone]    Continue all meds under the continuation of care with the referring provider and clinical pharmacy team.    Verbal consent to manage patient's drug therapy was obtained from the patient. They were informed they may decline to participate or withdraw from participation in pharmacy services at any time.  "

## 2024-03-14 ENCOUNTER — TELEMEDICINE (OUTPATIENT)
Dept: PHARMACY | Facility: HOSPITAL | Age: 74
End: 2024-03-14
Payer: MEDICARE

## 2024-03-14 DIAGNOSIS — I50.42 CHRONIC COMBINED SYSTOLIC (CONGESTIVE) AND DIASTOLIC (CONGESTIVE) HEART FAILURE (MULTI): ICD-10-CM

## 2024-03-15 NOTE — ASSESSMENT & PLAN NOTE
"Managed by cardiology. Appears to be stable on current regimen as she denies weight gain and LE edema. Does endorse HUGHES.     Long discussion with patient regarding benefits of adding 4th \"pillar' of Farxiga or Jardiance to her regimen in addition to changing Lisinopril to Entresto. Patient does note she has a history of recurrent UTI - should be considered when deciding to use SGLT2.    Patient does not have any cost concerns - says all medication are covered with $0 coapy at this time.'    Patient would like to discuss these medication options with PCP and cardiologist. No changes made today.     CONTINUE:  Lisinopril 20mg daily  Metoprolol succinate 50mg BID  Spironolactone 25mg daily  Furosemide 20mg PRN  "

## 2024-03-20 NOTE — PROGRESS NOTES
I reviewed the progress note and agree with the resident’s findings and plans as written. Case discussed with resident.    Trevor Fisher, PharmD         12

## 2024-04-10 ENCOUNTER — APPOINTMENT (OUTPATIENT)
Dept: PRIMARY CARE | Facility: CLINIC | Age: 74
End: 2024-04-10
Payer: MEDICARE

## 2024-04-11 ENCOUNTER — APPOINTMENT (OUTPATIENT)
Dept: CARDIOLOGY | Facility: CLINIC | Age: 74
End: 2024-04-11
Payer: MEDICARE

## 2024-04-16 ENCOUNTER — OFFICE VISIT (OUTPATIENT)
Dept: PRIMARY CARE | Facility: CLINIC | Age: 74
End: 2024-04-16
Payer: MEDICARE

## 2024-04-16 VITALS
RESPIRATION RATE: 18 BRPM | OXYGEN SATURATION: 98 % | BODY MASS INDEX: 29.09 KG/M2 | HEIGHT: 64 IN | DIASTOLIC BLOOD PRESSURE: 62 MMHG | SYSTOLIC BLOOD PRESSURE: 118 MMHG | WEIGHT: 170.4 LBS | HEART RATE: 78 BPM

## 2024-04-16 DIAGNOSIS — I10 ESSENTIAL HYPERTENSION: ICD-10-CM

## 2024-04-16 DIAGNOSIS — E78.5 HYPERLIPIDEMIA, UNSPECIFIED HYPERLIPIDEMIA TYPE: ICD-10-CM

## 2024-04-16 DIAGNOSIS — E11.65 TYPE 2 DIABETES MELLITUS WITH HYPERGLYCEMIA, WITHOUT LONG-TERM CURRENT USE OF INSULIN (MULTI): ICD-10-CM

## 2024-04-16 DIAGNOSIS — J44.9 CHRONIC OBSTRUCTIVE PULMONARY DISEASE, UNSPECIFIED COPD TYPE (MULTI): Primary | ICD-10-CM

## 2024-04-16 DIAGNOSIS — Z00.00 ROUTINE GENERAL MEDICAL EXAMINATION AT A HEALTH CARE FACILITY: ICD-10-CM

## 2024-04-16 DIAGNOSIS — I50.42 CHRONIC COMBINED SYSTOLIC (CONGESTIVE) AND DIASTOLIC (CONGESTIVE) HEART FAILURE (MULTI): ICD-10-CM

## 2024-04-16 PROCEDURE — 1123F ACP DISCUSS/DSCN MKR DOCD: CPT | Performed by: NURSE PRACTITIONER

## 2024-04-16 PROCEDURE — 3078F DIAST BP <80 MM HG: CPT | Performed by: NURSE PRACTITIONER

## 2024-04-16 PROCEDURE — 1157F ADVNC CARE PLAN IN RCRD: CPT | Performed by: NURSE PRACTITIONER

## 2024-04-16 PROCEDURE — 3074F SYST BP LT 130 MM HG: CPT | Performed by: NURSE PRACTITIONER

## 2024-04-16 PROCEDURE — 1170F FXNL STATUS ASSESSED: CPT | Performed by: NURSE PRACTITIONER

## 2024-04-16 PROCEDURE — 1159F MED LIST DOCD IN RCRD: CPT | Performed by: NURSE PRACTITIONER

## 2024-04-16 PROCEDURE — 4010F ACE/ARB THERAPY RXD/TAKEN: CPT | Performed by: NURSE PRACTITIONER

## 2024-04-16 PROCEDURE — G0439 PPPS, SUBSEQ VISIT: HCPCS | Performed by: NURSE PRACTITIONER

## 2024-04-16 PROCEDURE — 1158F ADVNC CARE PLAN TLK DOCD: CPT | Performed by: NURSE PRACTITIONER

## 2024-04-16 RX ORDER — BLOOD-GLUCOSE,RECEIVER,CONT
EACH MISCELLANEOUS
Qty: 1 EACH | Refills: 0 | Status: SHIPPED | OUTPATIENT
Start: 2024-04-16

## 2024-04-16 RX ORDER — PREDNISONE 20 MG/1
20 TABLET ORAL
Status: ON HOLD | COMMUNITY
Start: 2024-01-26 | End: 2024-04-30 | Stop reason: ENTERED-IN-ERROR

## 2024-04-16 RX ORDER — BLOOD-GLUCOSE SENSOR
EACH MISCELLANEOUS
Qty: 3 EACH | Refills: 3 | Status: SHIPPED | OUTPATIENT
Start: 2024-04-16

## 2024-04-16 ASSESSMENT — ACTIVITIES OF DAILY LIVING (ADL)
DOING_HOUSEWORK: INDEPENDENT
MANAGING_FINANCES: INDEPENDENT
DRESSING: INDEPENDENT
BATHING: INDEPENDENT
TAKING_MEDICATION: INDEPENDENT
GROCERY_SHOPPING: INDEPENDENT

## 2024-04-16 ASSESSMENT — ENCOUNTER SYMPTOMS
AGITATION: 0
NAUSEA: 0
NERVOUS/ANXIOUS: 0
SLEEP DISTURBANCE: 0
WEAKNESS: 0
SHORTNESS OF BREATH: 0
DIARRHEA: 0
VOMITING: 0
CONSTIPATION: 0
FEVER: 0
FATIGUE: 0
COUGH: 0

## 2024-04-16 ASSESSMENT — PATIENT HEALTH QUESTIONNAIRE - PHQ9
1. LITTLE INTEREST OR PLEASURE IN DOING THINGS: NOT AT ALL
2. FEELING DOWN, DEPRESSED OR HOPELESS: NOT AT ALL
SUM OF ALL RESPONSES TO PHQ9 QUESTIONS 1 AND 2: 0

## 2024-04-16 NOTE — PROGRESS NOTES
"Subjective   Patient ID: Mitzi Pro is a 73 y.o. female who presents for Medicare Annual Wellness Visit Initial.    She had a referral to the pharmacist, recommended Entresto instead of just lisinopril, also recommended adding a SGLT-2. She follows with Cardiology and will defer to him for those orders. She is currently refusing any blood work.  She is a current smoker and does not want to quit, she has tried 9 times  She uses a wheeled walker because of her SOB secondary to COPD and HF, she is requesting a light weight wheeled walker that folds for travel.         Review of Systems   Constitutional:  Negative for fatigue and fever.   Respiratory:  Negative for cough and shortness of breath.    Cardiovascular:  Negative for chest pain and leg swelling.   Gastrointestinal:  Negative for constipation, diarrhea, nausea and vomiting.   Skin:  Negative for pallor and rash.   Neurological:  Negative for weakness.   Psychiatric/Behavioral:  Negative for agitation, behavioral problems and sleep disturbance. The patient is not nervous/anxious.        Objective   /62   Pulse 78   Resp 18   Ht 1.626 m (5' 4\")   Wt 77.3 kg (170 lb 6.4 oz)   SpO2 98%   BMI 29.25 kg/m²     Physical Exam  Vitals and nursing note reviewed.   Constitutional:       General: She is not in acute distress.  HENT:      Head: Normocephalic and atraumatic.   Eyes:      Pupils: Pupils are equal, round, and reactive to light.   Cardiovascular:      Rate and Rhythm: Normal rate and regular rhythm.   Pulmonary:      Effort: Pulmonary effort is normal.      Breath sounds: Normal breath sounds.   Skin:     General: Skin is warm and dry.   Neurological:      Mental Status: She is alert.   Psychiatric:         Mood and Affect: Mood normal.         Assessment/Plan   Problem List Items Addressed This Visit             ICD-10-CM    COPD (chronic obstructive pulmonary disease) (Multi) - Primary J44.9    Relevant Orders    Wheeled Folding Walker    " Essential hypertension I10    Type 2 diabetes mellitus with hyperglycemia, without long-term current use of insulin (Multi) E11.65    Relevant Medications    Dexcom G7 Sensor device    Dexcom G7  misc    Other Relevant Orders    Comprehensive Metabolic Panel    Hemoglobin A1C    Hyperlipidemia E78.5    Relevant Orders    Lipid Panel    Chronic combined systolic (congestive) and diastolic (congestive) heart failure (Multi) I50.42    Relevant Orders    Wheeled Folding Walker     Other Visit Diagnoses         Codes    Routine general medical examination at a health care facility     Z00.00

## 2024-04-30 ENCOUNTER — APPOINTMENT (OUTPATIENT)
Dept: RADIOLOGY | Facility: HOSPITAL | Age: 74
DRG: 280 | End: 2024-04-30
Payer: MEDICARE

## 2024-04-30 ENCOUNTER — APPOINTMENT (OUTPATIENT)
Dept: CARDIOLOGY | Facility: HOSPITAL | Age: 74
DRG: 280 | End: 2024-04-30
Payer: MEDICARE

## 2024-04-30 ENCOUNTER — HOSPITAL ENCOUNTER (INPATIENT)
Facility: HOSPITAL | Age: 74
LOS: 6 days | Discharge: HOME HEALTH | DRG: 280 | End: 2024-05-06
Attending: EMERGENCY MEDICINE | Admitting: INTERNAL MEDICINE
Payer: MEDICARE

## 2024-04-30 DIAGNOSIS — R94.39 ABNORMAL STRESS ECG WITH TREADMILL: ICD-10-CM

## 2024-04-30 DIAGNOSIS — E11.51 DIABETES MELLITUS WITH PERIPHERAL VASCULAR DISEASE (MULTI): ICD-10-CM

## 2024-04-30 DIAGNOSIS — N30.01 ACUTE CYSTITIS WITH HEMATURIA: ICD-10-CM

## 2024-04-30 DIAGNOSIS — I48.91 ATRIAL FIBRILLATION WITH RVR (MULTI): Primary | ICD-10-CM

## 2024-04-30 DIAGNOSIS — J44.9 CHRONIC OBSTRUCTIVE PULMONARY DISEASE, UNSPECIFIED COPD TYPE (MULTI): ICD-10-CM

## 2024-04-30 DIAGNOSIS — R79.89 ELEVATED TROPONIN: ICD-10-CM

## 2024-04-30 DIAGNOSIS — I95.0 IDIOPATHIC HYPOTENSION: ICD-10-CM

## 2024-04-30 DIAGNOSIS — I50.42 CHRONIC COMBINED SYSTOLIC (CONGESTIVE) AND DIASTOLIC (CONGESTIVE) HEART FAILURE (MULTI): ICD-10-CM

## 2024-04-30 PROBLEM — N30.90 CYSTITIS: Status: ACTIVE | Noted: 2024-04-30

## 2024-04-30 LAB
ABO GROUP (TYPE) IN BLOOD: NORMAL
ALBUMIN SERPL BCP-MCNC: 3.2 G/DL (ref 3.4–5)
ALBUMIN SERPL BCP-MCNC: 3.7 G/DL (ref 3.4–5)
ALP SERPL-CCNC: 49 U/L (ref 33–136)
ALP SERPL-CCNC: 53 U/L (ref 33–136)
ALT SERPL W P-5'-P-CCNC: 10 U/L (ref 7–45)
ALT SERPL W P-5'-P-CCNC: 9 U/L (ref 7–45)
ANION GAP SERPL CALC-SCNC: 11 MMOL/L (ref 10–20)
ANION GAP SERPL CALC-SCNC: 14 MMOL/L (ref 10–20)
ANTIBODY SCREEN: NORMAL
AORTIC VALVE PEAK VELOCITY: 1.38 M/S
APPEARANCE UR: ABNORMAL
AST SERPL W P-5'-P-CCNC: 15 U/L (ref 9–39)
AST SERPL W P-5'-P-CCNC: 18 U/L (ref 9–39)
AV PEAK GRADIENT: 7.6 MMHG
AVA (PEAK VEL): 1.47 CM2
BACTERIA #/AREA URNS AUTO: ABNORMAL /HPF
BASOPHILS # BLD AUTO: 0.03 X10*3/UL (ref 0–0.1)
BASOPHILS NFR BLD AUTO: 0.3 %
BILIRUB SERPL-MCNC: 0.5 MG/DL (ref 0–1.2)
BILIRUB SERPL-MCNC: 0.8 MG/DL (ref 0–1.2)
BILIRUB UR STRIP.AUTO-MCNC: NEGATIVE MG/DL
BUN SERPL-MCNC: 31 MG/DL (ref 6–23)
BUN SERPL-MCNC: 34 MG/DL (ref 6–23)
CALCIUM SERPL-MCNC: 8.1 MG/DL (ref 8.6–10.3)
CALCIUM SERPL-MCNC: 8.9 MG/DL (ref 8.6–10.3)
CARDIAC TROPONIN I PNL SERPL HS: 322 NG/L (ref 0–13)
CARDIAC TROPONIN I PNL SERPL HS: 354 NG/L (ref 0–13)
CHLORIDE SERPL-SCNC: 103 MMOL/L (ref 98–107)
CHLORIDE SERPL-SCNC: 97 MMOL/L (ref 98–107)
CO2 SERPL-SCNC: 28 MMOL/L (ref 21–32)
CO2 SERPL-SCNC: 29 MMOL/L (ref 21–32)
COLOR UR: ABNORMAL
CREAT SERPL-MCNC: 1.43 MG/DL (ref 0.5–1.05)
CREAT SERPL-MCNC: 1.63 MG/DL (ref 0.5–1.05)
EGFRCR SERPLBLD CKD-EPI 2021: 33 ML/MIN/1.73M*2
EGFRCR SERPLBLD CKD-EPI 2021: 38 ML/MIN/1.73M*2
EJECTION FRACTION APICAL 4 CHAMBER: 39.4
EOSINOPHIL # BLD AUTO: 0.08 X10*3/UL (ref 0–0.4)
EOSINOPHIL NFR BLD AUTO: 0.7 %
ERYTHROCYTE [DISTWIDTH] IN BLOOD BY AUTOMATED COUNT: 14.4 % (ref 11.5–14.5)
ERYTHROCYTE [DISTWIDTH] IN BLOOD BY AUTOMATED COUNT: 14.5 % (ref 11.5–14.5)
GLUCOSE SERPL-MCNC: 132 MG/DL (ref 74–99)
GLUCOSE SERPL-MCNC: 175 MG/DL (ref 74–99)
GLUCOSE UR STRIP.AUTO-MCNC: NORMAL MG/DL
HCT VFR BLD AUTO: 35.9 % (ref 36–46)
HCT VFR BLD AUTO: 39.5 % (ref 36–46)
HGB BLD-MCNC: 11.3 G/DL (ref 12–16)
HGB BLD-MCNC: 12.6 G/DL (ref 12–16)
HOLD SPECIMEN: NORMAL
IMM GRANULOCYTES # BLD AUTO: 0.04 X10*3/UL (ref 0–0.5)
IMM GRANULOCYTES NFR BLD AUTO: 0.3 % (ref 0–0.9)
INR PPP: 1.8 (ref 0.9–1.1)
KETONES UR STRIP.AUTO-MCNC: NEGATIVE MG/DL
LEFT VENTRICLE INTERNAL DIMENSION DIASTOLE: 6.5 CM (ref 3.5–6)
LEFT VENTRICULAR OUTFLOW TRACT DIAMETER: 1.7 CM
LEUKOCYTE ESTERASE UR QL STRIP.AUTO: ABNORMAL
LV EJECTION FRACTION BIPLANE: 34 %
LYMPHOCYTES # BLD AUTO: 2.17 X10*3/UL (ref 0.8–3)
LYMPHOCYTES NFR BLD AUTO: 18.7 %
MCH RBC QN AUTO: 31.6 PG (ref 26–34)
MCH RBC QN AUTO: 31.8 PG (ref 26–34)
MCHC RBC AUTO-ENTMCNC: 31.5 G/DL (ref 32–36)
MCHC RBC AUTO-ENTMCNC: 31.9 G/DL (ref 32–36)
MCV RBC AUTO: 101 FL (ref 80–100)
MCV RBC AUTO: 99 FL (ref 80–100)
MITRAL VALVE E/A RATIO: 1.54
MITRAL VALVE E/E' RATIO: 23
MONOCYTES # BLD AUTO: 0.92 X10*3/UL (ref 0.05–0.8)
MONOCYTES NFR BLD AUTO: 7.9 %
NEUTROPHILS # BLD AUTO: 8.39 X10*3/UL (ref 1.6–5.5)
NEUTROPHILS NFR BLD AUTO: 72.1 %
NITRITE UR QL STRIP.AUTO: NEGATIVE
NRBC BLD-RTO: 0 /100 WBCS (ref 0–0)
NRBC BLD-RTO: 0 /100 WBCS (ref 0–0)
PH UR STRIP.AUTO: 6.5 [PH]
PLATELET # BLD AUTO: 219 X10*3/UL (ref 150–450)
PLATELET # BLD AUTO: 244 X10*3/UL (ref 150–450)
POTASSIUM SERPL-SCNC: 4 MMOL/L (ref 3.5–5.3)
POTASSIUM SERPL-SCNC: 4.4 MMOL/L (ref 3.5–5.3)
PROT SERPL-MCNC: 5.5 G/DL (ref 6.4–8.2)
PROT SERPL-MCNC: 6.2 G/DL (ref 6.4–8.2)
PROT UR STRIP.AUTO-MCNC: NEGATIVE MG/DL
PROTHROMBIN TIME: 19.9 SECONDS (ref 9.8–12.8)
RBC # BLD AUTO: 3.55 X10*6/UL (ref 4–5.2)
RBC # BLD AUTO: 3.99 X10*6/UL (ref 4–5.2)
RBC # UR STRIP.AUTO: ABNORMAL /UL
RBC #/AREA URNS AUTO: >20 /HPF
RH FACTOR (ANTIGEN D): NORMAL
RIGHT VENTRICLE FREE WALL PEAK S': 8.73 CM/S
RIGHT VENTRICLE PEAK SYSTOLIC PRESSURE: 43.7 MMHG
SODIUM SERPL-SCNC: 135 MMOL/L (ref 136–145)
SODIUM SERPL-SCNC: 139 MMOL/L (ref 136–145)
SP GR UR STRIP.AUTO: 1.01
SQUAMOUS #/AREA URNS AUTO: ABNORMAL /HPF
TRICUSPID ANNULAR PLANE SYSTOLIC EXCURSION: 1.4 CM
UROBILINOGEN UR STRIP.AUTO-MCNC: NORMAL MG/DL
WBC # BLD AUTO: 11.6 X10*3/UL (ref 4.4–11.3)
WBC # BLD AUTO: 9.5 X10*3/UL (ref 4.4–11.3)
WBC #/AREA URNS AUTO: >50 /HPF

## 2024-04-30 PROCEDURE — 2500000005 HC RX 250 GENERAL PHARMACY W/O HCPCS

## 2024-04-30 PROCEDURE — 87086 URINE CULTURE/COLONY COUNT: CPT | Mod: STJLAB | Performed by: INTERNAL MEDICINE

## 2024-04-30 PROCEDURE — 84484 ASSAY OF TROPONIN QUANT: CPT | Performed by: EMERGENCY MEDICINE

## 2024-04-30 PROCEDURE — 93005 ELECTROCARDIOGRAM TRACING: CPT

## 2024-04-30 PROCEDURE — 80053 COMPREHEN METABOLIC PANEL: CPT | Performed by: EMERGENCY MEDICINE

## 2024-04-30 PROCEDURE — 71045 X-RAY EXAM CHEST 1 VIEW: CPT | Mod: FOREIGN READ | Performed by: RADIOLOGY

## 2024-04-30 PROCEDURE — 2060000001 HC INTERMEDIATE ICU ROOM DAILY

## 2024-04-30 PROCEDURE — 2500000001 HC RX 250 WO HCPCS SELF ADMINISTERED DRUGS (ALT 637 FOR MEDICARE OP): Performed by: INTERNAL MEDICINE

## 2024-04-30 PROCEDURE — 71045 X-RAY EXAM CHEST 1 VIEW: CPT

## 2024-04-30 PROCEDURE — 2500000004 HC RX 250 GENERAL PHARMACY W/ HCPCS (ALT 636 FOR OP/ED)

## 2024-04-30 PROCEDURE — 36415 COLL VENOUS BLD VENIPUNCTURE: CPT | Performed by: EMERGENCY MEDICINE

## 2024-04-30 PROCEDURE — 80053 COMPREHEN METABOLIC PANEL: CPT | Mod: 91,MUE | Performed by: INTERNAL MEDICINE

## 2024-04-30 PROCEDURE — 2500000004 HC RX 250 GENERAL PHARMACY W/ HCPCS (ALT 636 FOR OP/ED): Performed by: EMERGENCY MEDICINE

## 2024-04-30 PROCEDURE — 2500000002 HC RX 250 W HCPCS SELF ADMINISTERED DRUGS (ALT 637 FOR MEDICARE OP, ALT 636 FOR OP/ED): Performed by: INTERNAL MEDICINE

## 2024-04-30 PROCEDURE — 85610 PROTHROMBIN TIME: CPT | Performed by: EMERGENCY MEDICINE

## 2024-04-30 PROCEDURE — 96365 THER/PROPH/DIAG IV INF INIT: CPT

## 2024-04-30 PROCEDURE — 85027 COMPLETE CBC AUTOMATED: CPT | Performed by: INTERNAL MEDICINE

## 2024-04-30 PROCEDURE — 94640 AIRWAY INHALATION TREATMENT: CPT

## 2024-04-30 PROCEDURE — 93306 TTE W/DOPPLER COMPLETE: CPT

## 2024-04-30 PROCEDURE — 97161 PT EVAL LOW COMPLEX 20 MIN: CPT | Mod: GP

## 2024-04-30 PROCEDURE — 86901 BLOOD TYPING SEROLOGIC RH(D): CPT | Performed by: EMERGENCY MEDICINE

## 2024-04-30 PROCEDURE — 2500000006 HC RX 250 W HCPCS SELF ADMINISTERED DRUGS (ALT 637 FOR ALL PAYERS): Performed by: INTERNAL MEDICINE

## 2024-04-30 PROCEDURE — 97165 OT EVAL LOW COMPLEX 30 MIN: CPT | Mod: GO

## 2024-04-30 PROCEDURE — 99223 1ST HOSP IP/OBS HIGH 75: CPT | Performed by: INTERNAL MEDICINE

## 2024-04-30 PROCEDURE — 81001 URINALYSIS AUTO W/SCOPE: CPT | Performed by: EMERGENCY MEDICINE

## 2024-04-30 PROCEDURE — 99285 EMERGENCY DEPT VISIT HI MDM: CPT | Performed by: EMERGENCY MEDICINE

## 2024-04-30 PROCEDURE — 2500000005 HC RX 250 GENERAL PHARMACY W/O HCPCS: Performed by: EMERGENCY MEDICINE

## 2024-04-30 PROCEDURE — 2500000004 HC RX 250 GENERAL PHARMACY W/ HCPCS (ALT 636 FOR OP/ED): Performed by: INTERNAL MEDICINE

## 2024-04-30 PROCEDURE — 85025 COMPLETE CBC W/AUTO DIFF WBC: CPT | Performed by: EMERGENCY MEDICINE

## 2024-04-30 PROCEDURE — 96375 TX/PRO/DX INJ NEW DRUG ADDON: CPT

## 2024-04-30 PROCEDURE — 99285 EMERGENCY DEPT VISIT HI MDM: CPT | Mod: 25

## 2024-04-30 PROCEDURE — 93010 ELECTROCARDIOGRAM REPORT: CPT | Performed by: EMERGENCY MEDICINE

## 2024-04-30 PROCEDURE — 2500000005 HC RX 250 GENERAL PHARMACY W/O HCPCS: Performed by: INTERNAL MEDICINE

## 2024-04-30 RX ORDER — METOPROLOL SUCCINATE 50 MG/1
50 TABLET, EXTENDED RELEASE ORAL 2 TIMES DAILY
Status: DISCONTINUED | OUTPATIENT
Start: 2024-04-30 | End: 2024-05-02

## 2024-04-30 RX ORDER — PANTOPRAZOLE SODIUM 40 MG/1
40 TABLET, DELAYED RELEASE ORAL
Status: DISCONTINUED | OUTPATIENT
Start: 2024-04-30 | End: 2024-05-06 | Stop reason: HOSPADM

## 2024-04-30 RX ORDER — ONDANSETRON 4 MG/1
4 TABLET, ORALLY DISINTEGRATING ORAL EVERY 8 HOURS PRN
Status: DISCONTINUED | OUTPATIENT
Start: 2024-04-30 | End: 2024-05-06 | Stop reason: HOSPADM

## 2024-04-30 RX ORDER — METOCLOPRAMIDE HYDROCHLORIDE 5 MG/ML
5 INJECTION INTRAMUSCULAR; INTRAVENOUS EVERY 6 HOURS PRN
Status: DISCONTINUED | OUTPATIENT
Start: 2024-04-30 | End: 2024-05-06 | Stop reason: HOSPADM

## 2024-04-30 RX ORDER — GLIPIZIDE 10 MG/1
10 TABLET ORAL
Status: DISCONTINUED | OUTPATIENT
Start: 2024-04-30 | End: 2024-05-06 | Stop reason: HOSPADM

## 2024-04-30 RX ORDER — ACETAMINOPHEN 325 MG/1
650 TABLET ORAL EVERY 4 HOURS PRN
Status: DISCONTINUED | OUTPATIENT
Start: 2024-04-30 | End: 2024-05-06 | Stop reason: HOSPADM

## 2024-04-30 RX ORDER — ACETAMINOPHEN 160 MG/5ML
650 SOLUTION ORAL EVERY 4 HOURS PRN
Status: DISCONTINUED | OUTPATIENT
Start: 2024-04-30 | End: 2024-05-06 | Stop reason: HOSPADM

## 2024-04-30 RX ORDER — ACETAMINOPHEN 650 MG/1
650 SUPPOSITORY RECTAL EVERY 4 HOURS PRN
Status: DISCONTINUED | OUTPATIENT
Start: 2024-04-30 | End: 2024-05-06 | Stop reason: HOSPADM

## 2024-04-30 RX ORDER — OXYBUTYNIN CHLORIDE 5 MG/1
5 TABLET ORAL 2 TIMES DAILY
Status: DISCONTINUED | OUTPATIENT
Start: 2024-04-30 | End: 2024-05-06 | Stop reason: HOSPADM

## 2024-04-30 RX ORDER — DILTIAZEM HYDROCHLORIDE 5 MG/ML
10 INJECTION INTRAVENOUS ONCE
Status: COMPLETED | OUTPATIENT
Start: 2024-04-30 | End: 2024-04-30

## 2024-04-30 RX ORDER — PANTOPRAZOLE SODIUM 40 MG/10ML
40 INJECTION, POWDER, LYOPHILIZED, FOR SOLUTION INTRAVENOUS
Status: DISCONTINUED | OUTPATIENT
Start: 2024-04-30 | End: 2024-05-06 | Stop reason: HOSPADM

## 2024-04-30 RX ORDER — CEFTRIAXONE 1 G/50ML
1 INJECTION, SOLUTION INTRAVENOUS ONCE
Status: COMPLETED | OUTPATIENT
Start: 2024-04-30 | End: 2024-04-30

## 2024-04-30 RX ORDER — CILOSTAZOL 100 MG/1
100 TABLET ORAL 2 TIMES DAILY
Status: DISCONTINUED | OUTPATIENT
Start: 2024-04-30 | End: 2024-05-06 | Stop reason: HOSPADM

## 2024-04-30 RX ORDER — ICOSAPENT ETHYL 1 G/1
2 CAPSULE ORAL 2 TIMES DAILY
Status: DISCONTINUED | OUTPATIENT
Start: 2024-04-30 | End: 2024-05-06 | Stop reason: HOSPADM

## 2024-04-30 RX ORDER — IPRATROPIUM BROMIDE 0.5 MG/2.5ML
0.5 SOLUTION RESPIRATORY (INHALATION) 3 TIMES DAILY
Status: DISCONTINUED | OUTPATIENT
Start: 2024-05-01 | End: 2024-05-01

## 2024-04-30 RX ORDER — FORMOTEROL FUMARATE DIHYDRATE 20 UG/2ML
20 SOLUTION RESPIRATORY (INHALATION)
Status: DISCONTINUED | OUTPATIENT
Start: 2024-04-30 | End: 2024-05-06 | Stop reason: HOSPADM

## 2024-04-30 RX ORDER — ATORVASTATIN CALCIUM 40 MG/1
40 TABLET, FILM COATED ORAL NIGHTLY
Status: DISCONTINUED | OUTPATIENT
Start: 2024-04-30 | End: 2024-05-06 | Stop reason: HOSPADM

## 2024-04-30 RX ORDER — FORMOTEROL FUMARATE DIHYDRATE 20 UG/2ML
20 SOLUTION RESPIRATORY (INHALATION)
Status: DISCONTINUED | OUTPATIENT
Start: 2024-04-30 | End: 2024-04-30

## 2024-04-30 RX ORDER — IPRATROPIUM BROMIDE 0.5 MG/2.5ML
0.5 SOLUTION RESPIRATORY (INHALATION) 4 TIMES DAILY
Status: DISCONTINUED | OUTPATIENT
Start: 2024-04-30 | End: 2024-04-30

## 2024-04-30 RX ORDER — ACETAMINOPHEN 500 MG
5000 TABLET ORAL DAILY
Status: DISCONTINUED | OUTPATIENT
Start: 2024-04-30 | End: 2024-05-06 | Stop reason: HOSPADM

## 2024-04-30 RX ORDER — ALBUTEROL SULFATE 0.83 MG/ML
2.5 SOLUTION RESPIRATORY (INHALATION) EVERY 6 HOURS PRN
Status: DISCONTINUED | OUTPATIENT
Start: 2024-04-30 | End: 2024-04-30

## 2024-04-30 RX ORDER — DILTIAZEM HYDROCHLORIDE 5 MG/ML
INJECTION INTRAVENOUS
Status: COMPLETED
Start: 2024-04-30 | End: 2024-04-30

## 2024-04-30 RX ORDER — TALC
3 POWDER (GRAM) TOPICAL NIGHTLY PRN
Status: DISCONTINUED | OUTPATIENT
Start: 2024-04-30 | End: 2024-05-06 | Stop reason: HOSPADM

## 2024-04-30 RX ORDER — ALBUTEROL SULFATE 90 UG/1
2 AEROSOL, METERED RESPIRATORY (INHALATION) EVERY 6 HOURS PRN
Status: DISCONTINUED | OUTPATIENT
Start: 2024-04-30 | End: 2024-04-30 | Stop reason: ALTCHOICE

## 2024-04-30 RX ORDER — METOCLOPRAMIDE 10 MG/1
5 TABLET ORAL EVERY 6 HOURS PRN
Status: DISCONTINUED | OUTPATIENT
Start: 2024-04-30 | End: 2024-05-06 | Stop reason: HOSPADM

## 2024-04-30 RX ORDER — SODIUM CHLORIDE 9 MG/ML
125 INJECTION, SOLUTION INTRAVENOUS CONTINUOUS
Status: DISCONTINUED | OUTPATIENT
Start: 2024-04-30 | End: 2024-04-30

## 2024-04-30 RX ORDER — POLYETHYLENE GLYCOL 3350 17 G/17G
17 POWDER, FOR SOLUTION ORAL DAILY PRN
Status: DISCONTINUED | OUTPATIENT
Start: 2024-04-30 | End: 2024-05-06 | Stop reason: HOSPADM

## 2024-04-30 RX ORDER — ONDANSETRON HYDROCHLORIDE 2 MG/ML
4 INJECTION, SOLUTION INTRAVENOUS EVERY 8 HOURS PRN
Status: DISCONTINUED | OUTPATIENT
Start: 2024-04-30 | End: 2024-05-06 | Stop reason: HOSPADM

## 2024-04-30 RX ORDER — IPRATROPIUM BROMIDE 0.5 MG/2.5ML
0.5 SOLUTION RESPIRATORY (INHALATION)
Status: DISCONTINUED | OUTPATIENT
Start: 2024-04-30 | End: 2024-04-30

## 2024-04-30 RX ADMIN — DILTIAZEM HYDROCHLORIDE 10 MG: 5 INJECTION INTRAVENOUS at 00:56

## 2024-04-30 RX ADMIN — GLIPIZIDE 10 MG: 10 TABLET ORAL at 18:38

## 2024-04-30 RX ADMIN — ALBUTEROL SULFATE 2 PUFF: 90 POWDER, METERED RESPIRATORY (INHALATION) at 22:16

## 2024-04-30 RX ADMIN — CEFTRIAXONE SODIUM 1 G: 1 INJECTION, SOLUTION INTRAVENOUS at 21:26

## 2024-04-30 RX ADMIN — CILOSTAZOL 100 MG: 100 TABLET ORAL at 21:25

## 2024-04-30 RX ADMIN — Medication 2 L/MIN: at 08:00

## 2024-04-30 RX ADMIN — DILTIAZEM HYDROCHLORIDE 10 MG: 5 INJECTION, SOLUTION INTRAVENOUS at 00:56

## 2024-04-30 RX ADMIN — ICOSAPENT ETHYL 2 G: 1 CAPSULE ORAL at 09:59

## 2024-04-30 RX ADMIN — OXYBUTYNIN CHLORIDE 5 MG: 5 TABLET ORAL at 09:59

## 2024-04-30 RX ADMIN — OXYBUTYNIN CHLORIDE 5 MG: 5 TABLET ORAL at 21:25

## 2024-04-30 RX ADMIN — Medication 2 L/MIN: at 05:15

## 2024-04-30 RX ADMIN — FORMOTEROL FUMARATE 20 MCG: 20 SOLUTION RESPIRATORY (INHALATION) at 21:06

## 2024-04-30 RX ADMIN — APIXABAN 5 MG: 5 TABLET, FILM COATED ORAL at 21:25

## 2024-04-30 RX ADMIN — IPRATROPIUM BROMIDE 0.5 MG: 0.5 SOLUTION RESPIRATORY (INHALATION) at 21:06

## 2024-04-30 RX ADMIN — CILOSTAZOL 100 MG: 100 TABLET ORAL at 09:59

## 2024-04-30 RX ADMIN — Medication 2 L/MIN: at 01:05

## 2024-04-30 RX ADMIN — PERFLUTREN 10 ML OF DILUTION: 6.52 INJECTION, SUSPENSION INTRAVENOUS at 15:20

## 2024-04-30 RX ADMIN — ATORVASTATIN CALCIUM 40 MG: 40 TABLET, FILM COATED ORAL at 21:25

## 2024-04-30 RX ADMIN — ICOSAPENT ETHYL 2 G: 1 CAPSULE ORAL at 21:25

## 2024-04-30 RX ADMIN — PANTOPRAZOLE SODIUM 40 MG: 40 TABLET, DELAYED RELEASE ORAL at 10:05

## 2024-04-30 RX ADMIN — GLIPIZIDE 10 MG: 10 TABLET ORAL at 10:07

## 2024-04-30 RX ADMIN — CEFTRIAXONE SODIUM 1 G: 1 INJECTION, SOLUTION INTRAVENOUS at 02:06

## 2024-04-30 RX ADMIN — IPRATROPIUM BROMIDE 0.5 MG: 0.5 SOLUTION RESPIRATORY (INHALATION) at 16:50

## 2024-04-30 RX ADMIN — SODIUM CHLORIDE 1000 ML: 9 INJECTION, SOLUTION INTRAVENOUS at 03:38

## 2024-04-30 RX ADMIN — CHOLECALCIFEROL TAB 125 MCG (5000 UNIT) 5000 UNITS: 125 TAB at 09:59

## 2024-04-30 RX ADMIN — SODIUM CHLORIDE 1000 ML: 9 INJECTION, SOLUTION INTRAVENOUS at 00:57

## 2024-04-30 RX ADMIN — APIXABAN 5 MG: 5 TABLET, FILM COATED ORAL at 09:59

## 2024-04-30 RX ADMIN — Medication 2 L/MIN: at 20:00

## 2024-04-30 SDOH — SOCIAL STABILITY: SOCIAL INSECURITY: DO YOU FEEL ANYONE HAS EXPLOITED OR TAKEN ADVANTAGE OF YOU FINANCIALLY OR OF YOUR PERSONAL PROPERTY?: NO

## 2024-04-30 SDOH — SOCIAL STABILITY: SOCIAL INSECURITY: DOES ANYONE TRY TO KEEP YOU FROM HAVING/CONTACTING OTHER FRIENDS OR DOING THINGS OUTSIDE YOUR HOME?: NO

## 2024-04-30 SDOH — SOCIAL STABILITY: SOCIAL INSECURITY: HAVE YOU HAD THOUGHTS OF HARMING ANYONE ELSE?: NO

## 2024-04-30 SDOH — SOCIAL STABILITY: SOCIAL INSECURITY: HAS ANYONE EVER THREATENED TO HURT YOUR FAMILY OR YOUR PETS?: NO

## 2024-04-30 SDOH — SOCIAL STABILITY: SOCIAL INSECURITY: DO YOU FEEL UNSAFE GOING BACK TO THE PLACE WHERE YOU ARE LIVING?: NO

## 2024-04-30 SDOH — SOCIAL STABILITY: SOCIAL INSECURITY: ABUSE: ADULT

## 2024-04-30 SDOH — SOCIAL STABILITY: SOCIAL INSECURITY: ARE YOU OR HAVE YOU BEEN THREATENED OR ABUSED PHYSICALLY, EMOTIONALLY, OR SEXUALLY BY ANYONE?: NO

## 2024-04-30 SDOH — SOCIAL STABILITY: SOCIAL INSECURITY: ARE THERE ANY APPARENT SIGNS OF INJURIES/BEHAVIORS THAT COULD BE RELATED TO ABUSE/NEGLECT?: NO

## 2024-04-30 SDOH — SOCIAL STABILITY: SOCIAL INSECURITY: WERE YOU ABLE TO COMPLETE ALL THE BEHAVIORAL HEALTH SCREENINGS?: YES

## 2024-04-30 SDOH — SOCIAL STABILITY: SOCIAL INSECURITY: HAVE YOU HAD ANY THOUGHTS OF HARMING ANYONE ELSE?: NO

## 2024-04-30 ASSESSMENT — COGNITIVE AND FUNCTIONAL STATUS - GENERAL
CLIMB 3 TO 5 STEPS WITH RAILING: A LITTLE
DRESSING REGULAR UPPER BODY CLOTHING: A LITTLE
DAILY ACTIVITIY SCORE: 21
TOILETING: A LITTLE
WALKING IN HOSPITAL ROOM: A LITTLE
MOBILITY SCORE: 20
DRESSING REGULAR LOWER BODY CLOTHING: A LITTLE
DRESSING REGULAR LOWER BODY CLOTHING: A LITTLE
MOBILITY SCORE: 21
PERSONAL GROOMING: A LITTLE
TOILETING: A LITTLE
STANDING UP FROM CHAIR USING ARMS: A LITTLE
MOVING TO AND FROM BED TO CHAIR: A LITTLE
MOVING TO AND FROM BED TO CHAIR: A LITTLE
CLIMB 3 TO 5 STEPS WITH RAILING: A LITTLE
DAILY ACTIVITIY SCORE: 24
WALKING IN HOSPITAL ROOM: A LITTLE
HELP NEEDED FOR BATHING: A LITTLE
PATIENT BASELINE BEDBOUND: NO
DAILY ACTIVITIY SCORE: 19
MOBILITY SCORE: 23
CLIMB 3 TO 5 STEPS WITH RAILING: A LITTLE
HELP NEEDED FOR BATHING: A LITTLE

## 2024-04-30 ASSESSMENT — PAIN SCALES - GENERAL

## 2024-04-30 ASSESSMENT — LIFESTYLE VARIABLES
HOW OFTEN DO YOU HAVE A DRINK CONTAINING ALCOHOL: NEVER
HOW OFTEN DO YOU HAVE 6 OR MORE DRINKS ON ONE OCCASION: NEVER
AUDIT-C TOTAL SCORE: 0
EVER FELT BAD OR GUILTY ABOUT YOUR DRINKING: NO
HAVE PEOPLE ANNOYED YOU BY CRITICIZING YOUR DRINKING: NO
AUDIT-C TOTAL SCORE: 0
TOTAL SCORE: 0
EVER HAD A DRINK FIRST THING IN THE MORNING TO STEADY YOUR NERVES TO GET RID OF A HANGOVER: NO
HOW MANY STANDARD DRINKS CONTAINING ALCOHOL DO YOU HAVE ON A TYPICAL DAY: PATIENT DOES NOT DRINK
SKIP TO QUESTIONS 9-10: 1
HAVE YOU EVER FELT YOU SHOULD CUT DOWN ON YOUR DRINKING: NO

## 2024-04-30 ASSESSMENT — COLUMBIA-SUICIDE SEVERITY RATING SCALE - C-SSRS
1. IN THE PAST MONTH, HAVE YOU WISHED YOU WERE DEAD OR WISHED YOU COULD GO TO SLEEP AND NOT WAKE UP?: NO
6. HAVE YOU EVER DONE ANYTHING, STARTED TO DO ANYTHING, OR PREPARED TO DO ANYTHING TO END YOUR LIFE?: NO
2. HAVE YOU ACTUALLY HAD ANY THOUGHTS OF KILLING YOURSELF?: NO

## 2024-04-30 ASSESSMENT — PATIENT HEALTH QUESTIONNAIRE - PHQ9
SUM OF ALL RESPONSES TO PHQ9 QUESTIONS 1 & 2: 0
1. LITTLE INTEREST OR PLEASURE IN DOING THINGS: NOT AT ALL
2. FEELING DOWN, DEPRESSED OR HOPELESS: NOT AT ALL

## 2024-04-30 ASSESSMENT — ACTIVITIES OF DAILY LIVING (ADL)
ADL_ASSISTANCE: INDEPENDENT
DRESSING YOURSELF: INDEPENDENT
GROOMING: INDEPENDENT
BATHING: INDEPENDENT
ADEQUATE_TO_COMPLETE_ADL: YES
BATHING_ASSISTANCE: STAND BY
PATIENT'S MEMORY ADEQUATE TO SAFELY COMPLETE DAILY ACTIVITIES?: YES
TOILETING: INDEPENDENT
WALKS IN HOME: INDEPENDENT
HEARING - RIGHT EAR: FUNCTIONAL
HEARING - LEFT EAR: FUNCTIONAL
LACK_OF_TRANSPORTATION: NO
FEEDING YOURSELF: INDEPENDENT
JUDGMENT_ADEQUATE_SAFELY_COMPLETE_DAILY_ACTIVITIES: YES
ASSISTIVE_DEVICE: DENTURES LOWER;EYEGLASSES

## 2024-04-30 ASSESSMENT — PAIN - FUNCTIONAL ASSESSMENT
PAIN_FUNCTIONAL_ASSESSMENT: 0-10

## 2024-04-30 ASSESSMENT — ENCOUNTER SYMPTOMS: WEAKNESS: 1

## 2024-04-30 NOTE — PROGRESS NOTES
Physical Therapy    Physical Therapy Evaluation    Patient Name: Mitzi Pro  MRN: [id number]  Today's Date: 4/30/2024   Time Calculation  Start Time: 1204  Stop Time: 1215  Time Calculation (min): 11 min    Assessment/Plan   PT Assessment  PT Assessment Results: Decreased strength, Decreased endurance, Impaired balance, Decreased mobility  Rehab Prognosis: Good  Evaluation/Treatment Tolerance: Patient tolerated treatment well  Medical Staff Made Aware: Yes  End of Session Communication: Bedside nurse  Assessment Comment: Pt presents today below baseline level of function and requires continued PT during hospital stay. Pt requires PT at a low intensity level at discharge to maximize functional mobility and safety.    End of Session Patient Position: Bed, 3 rail up  IP OR SWING BED PT PLAN  Inpatient or Swing Bed: Inpatient  PT Plan  Treatment/Interventions: Bed mobility, Transfer training, Gait training, Balance training, Neuromuscular re-education, Strengthening, Endurance training, Range of motion, Therapeutic exercise, Therapeutic activity, Home exercise program  PT Plan: Skilled PT  PT Frequency: 2 times per week  PT Discharge Recommendations: Low intensity level of continued care  PT Recommended Transfer Status: Assist x1  PT - OK to Discharge: Yes (To next level of care when cleared by medical team   )    Subjective         General Visit Information:  General  Reason for Referral: impaired mobility  Referred By: Rom Valadez MD  Past Medical History Relevant to Rehab: admitted with hematuria, fatigue, and shortness of breath. PM: COPD on baseline supplemental oxygen 2 to 4 L, CAD, CHF, Afib, DM, HTN, idipathic hypotension  Family/Caregiver Present: No  Co-Treatment: OT  Co-Treatment Reason: for safety and discharge planning  Prior to Session Communication: Bedside nurse  Patient Position Received: Bed, 3 rail up  Preferred Learning Style: verbal  General Comment: Pt agreeable to PT, nursing cleared for  treatment.    Home Living:  Home Living  Type of Home: House  Lives With:  (roommate)  Home Adaptive Equipment:  (rollator)  Home Layout: One level, Laundry in basement (13 stairs with rail to BM laundry)  Home Access: Stairs to enter with rails  Entrance Stairs-Number of Steps: 6  Bathroom Shower/Tub: Tub/shower unit    Prior Level of Function:  Prior Function Per Pt/Caregiver Report  ADL Assistance: Independent  Homemaking Assistance:  (independent except has hired assist for cleaning)  Ambulatory Assistance:  (I, mod I with rollator for long distances)  Prior Function Comments: denies any falls in the past 6 months    Precautions:  Precautions  Medical Precautions: Fall precautions, Oxygen therapy device and L/min    Vital Signs:     Objective     Pain:  Pain Assessment  Pain Assessment: 0-10  Pain Score: 0 - No pain    Cognition:  Cognition  Overall Cognitive Status: Within Functional Limits    General Assessments:      Activity Tolerance  Endurance: Endurance does not limit participation in activity                 Static Sitting Balance  Static Sitting-Comment/Number of Minutes: good  Dynamic Sitting Balance  Dynamic Sitting-Comments: good  Static Standing Balance  Static Standing-Comment/Number of Minutes: good  Dynamic Standing Balance  Dynamic Standing-Comments: good-    Functional Assessments:     Bed Mobility  Bed Mobility: Yes  Bed Mobility 1  Bed Mobility 1: Supine to sitting  Level of Assistance 1: Moderate assistance  Bed Mobility 2  Bed Mobility  2: Sitting to supine  Level of Assistance 2: Modified independent  Transfers  Transfer: Yes  Transfer 1  Transfer From 1: Sit to  Transfer to 1: Stand  Transfer Level of Assistance 1: Close supervision  Transfers 2  Transfer From 2: Stand to  Transfer to 2: Sit  Transfer Level of Assistance 2: Close supervision  Ambulation/Gait Training  Ambulation/Gait Training Performed: Yes  Ambulation/Gait Training 1  Assistance 1: Close supervision  Quality of Gait 1:  Inconsistent stride length, Decreased step length  Comments/Distance (ft) 1: 10 feet x 2, pt declines further ambulation          Extremity/Trunk Assessments:        RLE   RLE : Within Functional Limits  LLE   LLE : Within Functional Limits    Outcome Measures:  [facility] Basic Mobility  Turning from your back to your side while in a flat bed without using bedrails: None  Moving from lying on your back to sitting on the side of a flat bed without using bedrails: None  Moving to and from bed to chair (including a wheelchair): A little  Standing up from a chair using your arms (e.g. wheelchair or bedside chair): A little  To walk in hospital room: A little  Climbing 3-5 steps with railing: A little  Basic Mobility - Total Score: 20                            Goals:  Encounter Problems       Encounter Problems (Active)       PT Problem       Pt will ambulate 300 feet mod I using LRAD with no significant gait deviations.         Start:  04/30/24    Expected End:  05/14/24            Pt will tolerate at least 5 minutes of activity maintaining oxygen saturation >90% and with no report of SOB.         Start:  04/30/24    Expected End:  05/14/24            Pt will ascend/descend at least 6 stairs using rail SBA in order to navigate home environment.         Start:  04/30/24    Expected End:  05/14/24            Pt will progress to completing 3 x 20 supine/seated exercises in order to increase strength and improve gait mechanics.         Start:  04/30/24                 Education Documentation  Precautions, taught by Jessica Phelan PT at 4/30/2024  3:40 PM.  Learner: Patient  Readiness: Acceptance  Method: Explanation  Response: Verbalizes Understanding    Body Mechanics, taught by Jessica Phelan PT at 4/30/2024  3:40 PM.  Learner: Patient  Readiness: Acceptance  Method: Explanation  Response: Verbalizes Understanding    Mobility Training, taught by Jessica Phelan PT at 4/30/2024  3:40 PM.  Learner:  Patient  Readiness: Acceptance  Method: Explanation  Response: Verbalizes Understanding    Education Comments  No comments found.

## 2024-04-30 NOTE — ED PROVIDER NOTES
HPI   Chief Complaint   Patient presents with    Blood in Urine     Pt c/o hematuria, fatigue and Sob x 2days, and a sharp pain with urination. Pt states she has a hx of urinary retention. During this triage, pt's hr was in the 160s and pt's triage was stopped so she could be rushed back to a room. MD bedside at this time.        73-year-old female presents with what she describes as 2 days of feeling just very weak and tired.  She has a history of A-fib and was found to have A-fib with RVR with a pulse of around 150.  Blood pressure was in the 80s.  She has been taking her medications.  She took a nap and when she woke up she noticed a couple spots of blood on her pad.  She has history of incontinence.  She denies any pelvic pain or dysuria.  She was admitted in the past for uterine tear.  She denies any symptoms like this.  She reports her blood pressure is usually no better than a little over 100.  She is a little hypoxic here at 88%.  She uses oxygen at home as needed.      History provided by:  Patient                      [place] Coma Scale Score: 15                     Patient History   Past Medical History:   Diagnosis Date    Personal history of malignant neoplasm of ovary     History of ovarian cancer     Past Surgical History:   Procedure Laterality Date    CORONARY ARTERY BYPASS GRAFT  11/20/2017    Coronary Artery Surgery    CT AORTA AND BILATERAL ILIOFEMORAL RUNOFF ANGIOGRAM W AND/OR WO IV CONTRAST  12/21/2021    CT AORTA AND BILATERAL ILIOFEMORAL RUNOFF ANGIOGRAM W AND/OR WO IV CONTRAST 12/21/2021 [facility]    OTHER SURGICAL HISTORY  05/17/2022    Coronary artery bypass graft    OTHER SURGICAL HISTORY  05/17/2022    Salpingo-oophorectomy    OTHER SURGICAL HISTORY  11/08/2019    Bypass     Family History   Problem Relation Name Age of Onset    Other (arteriosclerotic cardiovascular disease) Mother      Other (arteriosclerotic cardiovascular disease) Father      Colon cancer Sister        Social History     Tobacco Use    Smoking status: Every Day     Types: Cigarettes    Smokeless tobacco: Never   Substance Use Topics    Alcohol use: Not Currently    Drug use: Never       Physical Exam   ED Triage Vitals [04/30/24 0034]   Temperature Heart Rate Respirations BP   36.4 °C (97.5 °F) (!) 159 18 105/58      Pulse Ox Temp Source Heart Rate Source Patient Position   94 % Temporal Monitor Sitting      BP Location FiO2 (%)     Right arm --       Physical Exam  Vitals and nursing note reviewed.   Constitutional:       General: She is not in acute distress.     Appearance: Normal appearance. She is not ill-appearing, toxic-appearing or diaphoretic.   HENT:      Head: Normocephalic and atraumatic.   Cardiovascular:      Rate and Rhythm: Tachycardia present. Rhythm irregular.   Pulmonary:      Effort: Pulmonary effort is normal.      Breath sounds: Normal breath sounds.   Abdominal:      Palpations: Abdomen is soft.      Tenderness: There is no abdominal tenderness. There is no guarding or rebound.   Skin:     General: Skin is warm and dry.   Neurological:      General: No focal deficit present.      Mental Status: She is alert and oriented to person, place, and time.      Motor: No weakness.   Psychiatric:         Mood and Affect: Mood normal.         Behavior: Behavior normal.         ED Course & MDM   Diagnoses as of 04/30/24 0310   Atrial fibrillation with RVR (Multi)   Elevated troponin   Acute cystitis with hematuria       Medical Decision Making  73-year-old female presented with complaint of feeling unwell and tired.  Had little blood on her panty liner.  Found to be in A-fib with RVR with hypotension.  10 mg of IV Cardizem corrected her heart rate under 100 and improve her blood pressure.  She felt better.  She was found to have extremely elevated troponin.  She is without chest pain.  Does have a history of coronary disease and CHF.  She has some acute kidney injury.  Found UTI also.  I feel  patient needs to be monitored at least overnight if not observed to ensure her condition is improving.  She is on Eliquis daily and took her medications.  I have not given her an anticoagulation yet.  Will discuss case with admitting hospital group.    Amount and/or Complexity of Data Reviewed  Labs: ordered. Decision-making details documented in ED Course.  Radiology: ordered. Decision-making details documented in ED Course.     Details: Chest x-ray unremarkable.  ECG/medicine tests: ordered and independent interpretation performed. Decision-making details documented in ED Course.     Details: Sinus tachycardia with a heart rate of 158, SD 90, , QTc 398, left axis.  Incomplete right bundle robby block.  No concerning ST changes.    EKG #2-a flutter 2-1, occasional PVC.  No concerning ST changes.  Heart rate 95, , , QTc 467, axis normal.  Discussion of management or test interpretation with external provider(s): Discussed case with our hospitalist.    Risk  Decision regarding hospitalization.        Procedure  Procedures     Obie Fu MD  04/30/24 0310

## 2024-04-30 NOTE — CONSULTS
Consults    I25.1 CAD remote CABG, stent  I42.9 ischemic cardiomyopathy  F17.10 cigarette smoking  I48.91 transient AF in setting of uterine acute bleeding resolved    History Of Present Illness:    Mitzi Pro is a 73 y.o. female presenting with Known ischemic cardiomyopathy sees Dr. Zachary SUTHERLAND.  Remote coronary bypass remote inferior wall myocardial infarction coronary stent ischemic cardiomyopathy EF reported 25% in past currently 34% on current echo cardiology is consulted.  Patient is seen Dr. Valentin here in the past but requested second opinion.    Cardiac bypass 1990s late occlusion both vein grafts percutaneous intervention ramus intermediate vessel risk factors of hypertension dyslipidemia diabetes smoking chronic kidney disease intermittent lower extremity claudication history of atrial fibrillation and typical flutter no history of stroke or TIA past history of bilateral salpingo-oophorectomy for ovarian cancer    Currently admitted with chief complaint of hematuria fatigue dyspnea for the past 2 days complaining of abnormal uterine bleeding with uterine prolapse underwent surgical procedure 1 year ago with uterine mass resected woke up with vaginal bleeding under garments.  On arrival to [facility] was in A-fib RVR but given for 10 mg of IV Cardizem and converted to sinus rhythm with occasional PVCs PACs EKG showed no acute change echocardiogram as described above elevated troponin but negative delta and denied active chest pain    Comorbidities include obesity, COPD on home oxygen, ischemic cardiomyopathy 1 year ago 35 to 40%  Atrial fibrillation and typical flutter previously on anticoagulation before uterine mass known to Dr. Garcia from EP also seen by Dr. Gurdeep Guaman cardiology and has seen Dr. Santoyo in the past was on lisinopril and spironolactone Aldactone Eliquis    Remote history of old anterior MI CABG cardiac cath subsequently showed occluded vein graft to the LAD as well as  RCA in  EF 40-45 in 2022 admitted with atrial fibrillation converting with IV amiodarone.  Intermittently on BiPAP      Family history  1 son uncertain medical history 2 sisters  1 pneumonia 1 from cancer 1 brother alive both biologic parents  father with heart attack history colon cancer and 1 sister      PMH:  CAD - prior CABG. Last cardiac cath  with occluded LAD, widely patent ramus stent, minimal disease of the LCx, occluded RCA with right-to-right collaterals, occluded SVG to RCA, occluded and thrombotic SVG to LAD. LIMA not used as graft. Last stress  with fixed anterior infarct without ischemia  CHF - LVEF 45-50% by echo , 35-40% here.   COPD / home oxygen therapy  Atrial fibrillation  Carotid vascular disease  DM with CKD, PAD, and peripheral neruopathy  Edema  HTN  GERD  Glaucoma  LBBB and RBBB seen in past  Mixed HLD  Vitamin D deficiency  Ovarian cancer?  - little details     PSH: CABG, BSO  SH: former smoker  FH: CAD in father, sister, colon cancer in sister     Past Medical/Surgical History:        Medical History:         Atherosclerosis of right lower extremity with intermittent claudication : Onset Date: 2021         Atherosclerosis of left lower extremity with intermittent claudication : Onset Date: 2021         Peripheral arterial disease: Onset Date: 2021         Right leg swelling: Onset Date: 04-Oct-2021         Left leg swelling: Onset Date: 04-Oct-2021         Ovarian low malignant potential tumor:          Medically complex patient:          Hypertension:          Diabetes:          History of myocardial infarction:          History of anterolateral myocardial infarction:      Assessment and plan  1. Ischemic cardiomyopathy: With a previous EF of about 45 to 50% as noted above and recent admission for possible respiratory failure as well as a CHF exacerbation. At this time will recommend repeat ischemic evaluation with a repeat  cardiac catheterization to rule out any significant obstructive CAD. In the meantime, we will continue with current heart failure medication and make further recommendations based on the recent results of the cardiac catheterization.  2. Hypertension: Blood pressures well controlled on current medications which we will continue.  3. CAD s/p CABG as noted above with occluded grafts and old anterior MI as noted above, will proceed with repeat cardiac catheterization as noted above.  4. Continued smoking: Patient is encouraged to stop smoking      Active Problems  Problems    · Abnormal stress ECG with treadmill (794.39) (R94.39)   · Allergic rhinitis (477.9) (J30.9)   · Atopic dermatitis (691.8) (L20.9)   · Atrial fibrillation (427.31) (I48.91)   · Bilateral carotid artery stenosis (433.10,433.30) (I65.23)   · BMI 29.0-29.9,adult (V85.25) (Z68.29)   · CAD (coronary artery disease) (414.00) (I25.10)   · Carotid artery disease (447.9) (I77.9)   · Colon cancer screening (V76.51) (Z12.11)   · COPD (chronic obstructive pulmonary disease) (496) (J44.9)   · Diabetes mellitus with kidney complication (250.40) (E11.29)   · Diabetes mellitus with peripheral vascular disease (250.70,443.81) (E11.51)   · Diabetic polyneuropathy associated with type 2 diabetes mellitus (250.60,357.2)  (E11.42)   · Edema (782.3) (R60.9)   · Encounter for immunization (V03.89) (Z23)   · Essential hypertension (401.9) (I10)   · Extremity atherosclerosis with intermittent claudication (440.21) (I70.219)   · Female incontinence (625.6) (R32)   · GERD (gastroesophageal reflux disease) (530.81) (K21.9)   · Glaucoma (365.9) (H40.9)   · Hematuria (599.70) (R31.9)   · HTN (hypertension) (401.9) (I10)   · Hyperlipidemia (272.4) (E78.5)   · LBBB (left bundle branch block) (426.3) (I44.7)   · Mixed hyperlipidemia (272.2) (E78.2)   · Overweight (278.02) (E66.3)   · Peripheral vascular disease (443.9) (I73.9)   · Pulmonary nodules (793.19) (R91.8)   · RBBB  (426.4) (I45.10)   · Stage 3a chronic kidney disease (585.3) (N18.31)   · Type 2 diabetes mellitus with hyperglycemia, without long-term current use of insulin  (250.00,790.29) (E11.65)   · Vaginal itching (698.1) (N89.8)   · Vitamin D deficiency (268.9) (E55.9)     Surgical History  Problems    · History of Bypass   · History of Coronary artery bypass graft   · History of Coronary Artery Surgery   · History of Salpingo-oophorectomy     Past Medical History  Problems    · History of ovarian cancer (V10.43) (Z85.43)        Echocardiogram:    ONCLUSIONS:   1. Left ventricular systolic function is moderately decreased with a 34 % estimated ejection fraction.   2. Multiple segmental abnormalities exist. See findings.   3. Spectral Doppler shows an impaired relaxation pattern of left ventricular diastolic filling.   4. There is an elevated left ventricular end diastolic pressure.   5. There is mildly reduced right ventricular systolic function.   6. Moderate mitral valve regurgitation.   7. Mildly elevated RVSP.   8. Aortic valve sclerosis.   9. There are multiple wall motion abnormalities.     QUANTITATIVE DATA SUMMARY:  2D MEASUREMENTS:                            Normal Ranges:  LAs:           4.50 cm    (2.7-4.0cm)  IVSd:          1.00 cm    (0.6-1.1cm)  LVPWd:         0.90 cm    (0.6-1.1cm)  LVIDd:         6.50 cm    (3.9-5.9cm)  LVIDs:         5.40 cm  LV Mass Index: 144.5 g/m2  LV % FS        16.9 %     LA VOLUME:                              Normal Ranges:  LA Area A4C:     21.0 cm2  LA Area A2C:     21.0 cm2  LA Volume Index: 37.0 ml/m2     M-MODE MEASUREMENTS:                   Normal Ranges:  Ao Root: 2.60 cm (2.0-3.7cm)  AoV Exc: 1.40 cm (1.5-2.5cm)     AORTA MEASUREMENTS:                     Normal Ranges:  AoV Exc:   1.40 cm (1.5-2.5cm)  Asc Ao, d: 3.30 cm (2.1-3.4cm)     LV SYSTOLIC FUNCTION BY 2D PLANIMETRY (MOD):                      Normal Ranges:  EF-A4C View: 39.4 % (>=55%)  EF-A2C View: 28.7  %  EF-Biplane:  34.0 %     LV DIASTOLIC FUNCTION:                         Normal Ranges:  MV Peak E:    1.38 m/s (0.7-1.2 m/s)  MV Peak A:    0.90 m/s (0.42-0.7 m/s)  E/A Ratio:    1.54     (1.0-2.2)  MV e'         0.06 m/s (>8.0)  MV lateral e' 0.08 m/s  MV medial e'  0.04 m/s  E/e' Ratio:   23.00    (<8.0)     MITRAL VALVE:                  Normal Ranges:  MV DT: 177 msec (150-240msec)     MITRAL INSUFFICIENCY:                            Normal Ranges:  PISA Radius:  0.4 cm  MR VTI:       142.00 cm  MR Vmax:      407.00 cm/s  MR Alias Subhash: 84.7 cm/s  MR Volume:    29.71 ml  MR Flow Rt:   85.15 ml/s  MR EROA:      0.21 cm2     AORTIC VALVE:                          Normal Ranges:  AoV Vmax:      1.38 m/s (<=1.7m/s)  AoV Peak P.6 mmHg (<20mmHg)  LVOT Max Subhash:  0.89 m/s (<=1.1m/s)  LVOT VTI:      20.00 cm  LVOT Diameter: 1.70 cm  (1.8-2.4cm)  AoV Area,Vmax: 1.47 cm2 (2.5-4.5cm2)        RIGHT VENTRICLE:  RV Basal 3.40 cm  RV Mid   1.80 cm  RV Major 7.6 cm  TAPSE:   14.1 mm  RV s'    0.09 m/s     TRICUSPID VALVE/RVSP:                              Normal Ranges:  Peak TR Velocity: 3.19 m/s  RV Syst Pressure: 43.7 mmHg (< 30mmHg)     PULMONIC VALVE:                         Normal Ranges:  PV Accel Time: 74 msec (>120ms)        [id number] Horacio Armijo MD  Electronically signed on 2024 at 4:34:01 PM      Past Medical History:   Diagnosis Date    Personal history of malignant neoplasm of ovary     History of ovarian cancer       Patient Active Problem List    Diagnosis Date Noted    Atrial fibrillation with RVR (Multi) 2024    Cystitis 2024    Idiopathic hypotension 2024    Hemorrhagic disorder due to circulating anticoagulants (Multi) 2023    Chronic combined systolic (congestive) and diastolic (congestive) heart failure (Multi) 2023    Acute and chronic respiratory failure with hypercapnia (Multi) 2023    Abnormal stress ECG with treadmill 2023    Atopic dermatitis  03/13/2023    Atrial fibrillation (Multi) 03/13/2023    Bilateral carotid artery stenosis 03/13/2023    CAD (coronary artery disease) 03/13/2023    Carotid artery disease (CMS-HCC) 03/13/2023    COPD (chronic obstructive pulmonary disease) (Multi) 03/13/2023    Diabetic polyneuropathy associated with type 2 diabetes mellitus (Multi) 03/13/2023    Edema 03/13/2023    Essential hypertension 03/13/2023    Extremity atherosclerosis with intermittent claudication (CMS-HCC) 03/13/2023    Female incontinence 03/13/2023    GERD (gastroesophageal reflux disease) 03/13/2023    Glaucoma 03/13/2023    Hematuria 03/13/2023    Mixed hyperlipidemia 03/13/2023    RBBB 03/13/2023    Overweight 03/13/2023    Peripheral vascular disease (CMS-HCC) 03/13/2023    Pulmonary nodules 03/13/2023    Right leg swelling 03/13/2023    Stage 3a chronic kidney disease (Multi) 03/13/2023    Diabetes mellitus with peripheral vascular disease (Multi) 03/13/2023    Type 2 diabetes mellitus with hyperglycemia, without long-term current use of insulin (Multi) 03/13/2023    Vaginal itching 03/13/2023    Vitamin D deficiency 03/13/2023    Allergic rhinitis 03/13/2023    Current every day smoker 03/13/2023    Diabetes mellitus with kidney complication (Multi) 03/13/2023    Hypertension 03/13/2023    LBBB (left bundle branch block) 03/13/2023    Hyperlipidemia 03/13/2023    Pruritus 03/13/2023    Seborrheic keratoses, inflamed 03/13/2023    Xerosis of skin 03/13/2023       Family History   Problem Relation Name Age of Onset    Other (arteriosclerotic cardiovascular disease) Mother      Other (arteriosclerotic cardiovascular disease) Father      Colon cancer Sister               Last Recorded Vitals:  Vitals:    04/30/24 1001 04/30/24 1153 04/30/24 1205 04/30/24 1259   BP: 99/56   102/52   BP Location:    Right arm   Patient Position:    Lying   Pulse: 74  66 64   Resp: (!) 31   21   Temp:    36 °C (96.8 °F)   TempSrc:    Temporal   SpO2:    95%   Weight:   76.5 kg (168 lb 10.4 oz)     Height:           Last Labs:  CBC - 4/30/2024:  4:53 AM  9.5 11.3 219    35.9      CMP - 4/30/2024:  4:53 AM  8.1 5.5 15 --- 0.5   _ 3.2 9 49      PTT - No results in last year.  1.8   19.9 _     Troponin I, High Sensitivity   Date/Time Value Ref Range Status   04/30/2024 02:03  (HH) 0 - 13 ng/L Final     Comment:     Previous result verified on 4/30/2024 0129 on specimen/case [id number] called with component [facility] for procedure Troponin I, High Sensitivity, Initial with value 354 ng/L.   04/30/2024 12:54  (HH) 0 - 13 ng/L Final     Troponin I   Date/Time Value Ref Range Status   04/07/2023 11:36 AM 12 0 - 34 ng/L Final     Comment:     .  Less than 99th percentile of normal range cutoff-  Female and children under 18 years old <35 ng/L; Male <54 ng/L: Negative  Repeat testing should be performed if clinically indicated.   .  Female and children under 18 years old  ng/L; Male  ng/L:  Consistent with possible cardiac damage and possible increased clinical   risk. Serial measurements may help to assess extent of myocardial damage.   .  >120 ng/L: Consistent with cardiac damage, increased clinical risk and  myocardial infarction. Serial measurements may help assess extent of   myocardial damage.   .   NOTE: Children less than 1 year old may have higher baseline troponin   levels and results should be interpreted in conjunction with the overall   clinical context.  .  NOTE: Troponin I testing is performed using a different   testing methodology at [facility] than at other   [facility]. Direct result comparisons should only   be made within the same method.     12/06/2022 09:31  (HH) 0 - 13 ng/L Final     Comment:     .  Less than 99th percentile of normal range cutoff-  Female and children under 18 years old <14 ng/L; Male <21 ng/L: Negative  Repeat testing should be performed if clinically indicated.   .  Female and children under 18  years old 14-50 ng/L; Male 21-50 ng/L:  Consistent with possible cardiac damage and possible increased clinical   risk. Serial measurements may help to assess extent of myocardial damage.   .  >50 ng/L: Consistent with cardiac damage, increased clinical risk and  myocardial infarction. Serial measurements may help assess extent of   myocardial damage.   .   NOTE: Children less than 1 year old may have higher baseline troponin   levels and results should be interpreted in conjunction with the overall   clinical context.   .  NOTE: Troponin I testing is performed using a different   testing methodology at [facility] than at other   system hospitals. Direct result comparisons should only   be made within the same method.   22:51 12/06/2022.    Called- RB to Pavithra Mahad, 12/06/2022 22:51     11/29/2022 06:42 PM 26 (H) 0 - 13 ng/L Final     Comment:     .  Less than 99th percentile of normal range cutoff-  Female and children under 18 years old <14 ng/L; Male <21 ng/L: Negative  Repeat testing should be performed if clinically indicated.   .  Female and children under 18 years old 14-50 ng/L; Male 21-50 ng/L:  Consistent with possible cardiac damage and possible increased clinical   risk. Serial measurements may help to assess extent of myocardial damage.   .  >50 ng/L: Consistent with cardiac damage, increased clinical risk and  myocardial infarction. Serial measurements may help assess extent of   myocardial damage.   .   NOTE: Children less than 1 year old may have higher baseline troponin   levels and results should be interpreted in conjunction with the overall   clinical context.   .  NOTE: Troponin I testing is performed using a different   testing methodology at [facility] than at other   system hospitals. Direct result comparisons should only   be made within the same method.       BNP   Date/Time Value Ref Range Status   11/29/2022 01:09  (H) 0 - 99 pg/mL Final     Comment:      .  <100 pg/mL - Heart failure unlikely  100-299 pg/mL - Intermediate probability of acute heart  .               failure exacerbation. Correlate with clinical  .               context and patient history.    >=300 pg/mL - Heart Failure likely. Correlate with clinical  .               context and patient history.  BNP testing is performed using different testing   methodology at [facility] than at other   [facility]. Direct result comparisons should   only be made within the same method.     12/05/2021 01:53  (H) 0 - 99 pg/mL Final     Comment:     .  <100 pg/mL - Heart failure unlikely  100-299 pg/mL - Intermediate probability of acute heart  .               failure exacerbation. Correlate with clinical  .               context and patient history.    >=300 pg/mL - Heart Failure likely. Correlate with clinical  .               context and patient history.  BNP testing is performed using different testing   methodology at [facility] than at other   [facility] hospitals. Direct result comparisons should   only be made within the same method.       POC HEMOGLOBIN A1c   Date/Time Value Ref Range Status   10/04/2023 02:20 PM 6.5 4.2 - 6.5 % Final     Hemoglobin A1C   Date/Time Value Ref Range Status   05/31/2023 01:44 PM 6.2 (A) % Final     Comment:          Diagnosis of Diabetes-Adults   Non-Diabetic: < or = 5.6%   Increased risk for developing diabetes: 5.7-6.4%   Diagnostic of diabetes: > or = 6.5%  .       Monitoring of Diabetes                Age (y)     Therapeutic Goal (%)   Adults:          >18           <7.0   Pediatrics:    13-18           <7.5                   7-12           <8.0                   0- 6            7.5-8.5   American Diabetes Association. Diabetes Care 33(S1), Jan 2010.   03/03/2021 09:28 AM 7.7 % Final     Comment:          Diagnosis of Diabetes-Adults   Non-Diabetic: < or = 5.6%   Increased risk for developing diabetes: 5.7-6.4%   Diagnostic of  "diabetes: > or = 6.5%  .       Monitoring of Diabetes                Age (y)     Therapeutic Goal (%)   Adults:          >18           <7.0   Pediatrics:    13-18           <7.5                   7-12           <8.0                   0- 6            7.5-8.5   American Diabetes Association. Diabetes Care 33(S1), Jan 2010.       VLDL   Date/Time Value Ref Range Status   04/06/2022 10:04 AM 29 0 - 40 mg/dL Final   03/03/2021 09:28 AM 44 (H) 0 - 40 mg/dL Final   05/04/2020 09:14 AM 55 (H) 0 - 40 mg/dL Final      Last I/O:  I/O last 3 completed shifts:  In: 2050 (26.8 mL/kg) [IV Piggyback:2050]  Out: - (0 mL/kg)   Weight: 76.5 kg     Past Cardiology Tests (Last 3 Years):  EKG:  ECG 12 lead 04/30/2024 (Preliminary)      ECG 12 lead 04/30/2024 (Preliminary)    Echo:  Transthoracic Echo (TTE) Complete 04/30/2024    Ejection Fractions:  No results found for: \"EF\"  Cath:  No results found for this or any previous visit from the past 1095 days.    Stress Test:  No results found for this or any previous visit from the past 1095 days.    Cardiac Imaging:  No results found for this or any previous visit from the past 1095 days.      Past Medical History:  She has a past medical history of Personal history of malignant neoplasm of ovary.    Past Surgical History:  She has a past surgical history that includes Coronary artery bypass graft (11/20/2017); Other surgical history (05/17/2022); Other surgical history (05/17/2022); Other surgical history (11/08/2019); and CT angio aorta and bilateral iliofemoral runoff w and or wo IV contrast (12/21/2021).      Social History:  She reports that she has been smoking cigarettes. She has never used smokeless tobacco. She reports that she does not currently use alcohol. She reports that she does not use drugs.    Family History:  Family History   Problem Relation Name Age of Onset    Other (arteriosclerotic cardiovascular disease) Mother      Other (arteriosclerotic cardiovascular disease) " Father      Colon cancer Sister          Allergies:  Metformin and Nitrofurantoin    Inpatient Medications:  Scheduled medications   Medication Dose Route Frequency    apixaban  5 mg oral BID    atorvastatin  40 mg oral Nightly    cefTRIAXone  1 g intravenous Once    cholecalciferol  5,000 Units oral Daily    cilostazol  100 mg oral BID    formoterol  20 mcg nebulization BID    glipiZIDE  10 mg oral BID AC    icosapent ethyL  2 g oral BID    ipratropium  0.5 mg nebulization 4x daily    metoprolol succinate XL  50 mg oral BID    oxybutynin  5 mg oral BID    oxygen   inhalation Continuous - Inhalation    pantoprazole  40 mg oral Daily before breakfast    Or    pantoprazole  40 mg intravenous Daily before breakfast     PRN medications   Medication    acetaminophen    Or    acetaminophen    Or    acetaminophen    acetaminophen    Or    acetaminophen    Or    acetaminophen    albuterol    melatonin    metoclopramide    Or    metoclopramide    ondansetron ODT    Or    ondansetron    polyethylene glycol     Continuous Medications   Medication Dose Last Rate     Outpatient Medications:  Current Outpatient Medications   Medication Instructions    albuterol 90 mcg/actuation inhaler INHALE 2 PUFFS EVERY 4 HOURS AS NEEDED FOR WHEEZING (FILL WITH THESE DIRECTIONS.)    atorvastatin (LIPITOR) 40 mg, oral, Daily    blood sugar diagnostic strip 1 strip, miscellaneous, Daily    cholecalciferol (Vitamin D-3) 5,000 Units tablet 1 tablet, oral, Daily    cilostazol (PLETAL) 100 mg, oral, 2 times daily    Dexcom G7  misc Use as instructed    Dexcom G7 Sensor device As directed, 1 kit    Eliquis 5 mg, oral, 2 times daily    fluticasone (Flonase) 50 mcg/actuation nasal spray 1 spray, Each Nostril, Daily PRN    FreeStyle lancets 28 gauge 1 daily    furosemide (LASIX) 20 mg, oral, Daily PRN    glyBURIDE (DIABETA) 10 mg, oral, 2 times daily    icosapent ethyL (VASCEPA) 2 g, oral, 2 times daily    lisinopril 20 mg, oral, Daily     metoprolol succinate XL (TOPROL-XL) 50 mg, oral, 2 times daily    OneTouch Verio Flex meter misc 1 EACH IF NEED TO TEST GLUCOSE ONCE DAILY    oxybutynin XL (DITROPAN-XL) 10 mg, oral, Daily    pioglitazone (ACTOS) 30 mg, oral, Daily    SITagliptin phosphate (JANUVIA) 100 mg, oral, Daily    spironolactone (Aldactone) 25 mg tablet oral, Daily    umeclidinium-vilanteroL (Anoro Ellipta) 62.5-25 mcg/actuation blister with device 1 puff, inhalation, Daily     Results for orders placed or performed during the hospital encounter of 04/30/24 (from the past 96 hour(s))   ECG 12 lead   Result Value Ref Range    Ventricular Rate 158 BPM    Atrial Rate 158 BPM    UT Interval 90 ms    QRS Duration 116 ms    QT Interval 244 ms    QTC Calculation(Bazett) 395 ms    P Axis 91 degrees    R Axis -67 degrees    T Axis 206 degrees    QRS Count 26 beats    Q Onset 195 ms    P Onset 150 ms    P Offset 187 ms    T Offset 317 ms    QTC Fredericia 337 ms   Comprehensive Metabolic Panel   Result Value Ref Range    Glucose 175 (H) 74 - 99 mg/dL    Sodium 135 (L) 136 - 145 mmol/L    Potassium 4.4 3.5 - 5.3 mmol/L    Chloride 97 (L) 98 - 107 mmol/L    Bicarbonate 28 21 - 32 mmol/L    Anion Gap 14 10 - 20 mmol/L    Urea Nitrogen 34 (H) 6 - 23 mg/dL    Creatinine 1.63 (H) 0.50 - 1.05 mg/dL    eGFR 33 (L) >60 mL/min/1.73m*2    Calcium 8.9 8.6 - 10.3 mg/dL    Albumin 3.7 3.4 - 5.0 g/dL    Alkaline Phosphatase 53 33 - 136 U/L    Total Protein 6.2 (L) 6.4 - 8.2 g/dL    AST 18 9 - 39 U/L    Bilirubin, Total 0.8 0.0 - 1.2 mg/dL    ALT 10 7 - 45 U/L   Protime-INR   Result Value Ref Range    Protime 19.9 (H) 9.8 - 12.8 seconds    INR 1.8 (H) 0.9 - 1.1   CBC with Differential   Result Value Ref Range    WBC 11.6 (H) 4.4 - 11.3 x10*3/uL    nRBC 0.0 0.0 - 0.0 /100 WBCs    RBC 3.99 (L) 4.00 - 5.20 x10*6/uL    Hemoglobin 12.6 12.0 - 16.0 g/dL    Hematocrit 39.5 36.0 - 46.0 %    MCV 99 80 - 100 fL    MCH 31.6 26.0 - 34.0 pg    MCHC 31.9 (L) 32.0 - 36.0 g/dL     RDW 14.5 11.5 - 14.5 %    Platelets 244 150 - 450 x10*3/uL    Neutrophils % 72.1 40.0 - 80.0 %    Immature Granulocytes %, Automated 0.3 0.0 - 0.9 %    Lymphocytes % 18.7 13.0 - 44.0 %    Monocytes % 7.9 2.0 - 10.0 %    Eosinophils % 0.7 0.0 - 6.0 %    Basophils % 0.3 0.0 - 2.0 %    Neutrophils Absolute 8.39 (H) 1.60 - 5.50 x10*3/uL    Immature Granulocytes Absolute, Automated 0.04 0.00 - 0.50 x10*3/uL    Lymphocytes Absolute 2.17 0.80 - 3.00 x10*3/uL    Monocytes Absolute 0.92 (H) 0.05 - 0.80 x10*3/uL    Eosinophils Absolute 0.08 0.00 - 0.40 x10*3/uL    Basophils Absolute 0.03 0.00 - 0.10 x10*3/uL   Type And Screen   Result Value Ref Range    ABO TYPE A     Rh TYPE POS     ANTIBODY SCREEN NEG    Troponin I, High Sensitivity, Initial   Result Value Ref Range    Troponin I, High Sensitivity 354 (HH) 0 - 13 ng/L   Gray Top   Result Value Ref Range    Extra Tube Hold for add-ons.    Urinalysis with Reflex Microscopic   Result Value Ref Range    Color, Urine Light-Brown (N) Light-Yellow, Yellow, Dark-Yellow    Appearance, Urine Turbid (N) Clear    Specific Gravity, Urine 1.006 1.005 - 1.035    pH, Urine 6.5 5.0, 5.5, 6.0, 6.5, 7.0, 7.5, 8.0    Protein, Urine NEGATIVE NEGATIVE, 10 (TRACE), 20 (TRACE) mg/dL    Glucose, Urine Normal Normal mg/dL    Blood, Urine OVER (3+) (A) NEGATIVE    Ketones, Urine NEGATIVE NEGATIVE mg/dL    Bilirubin, Urine NEGATIVE NEGATIVE    Urobilinogen, Urine Normal Normal mg/dL    Nitrite, Urine NEGATIVE NEGATIVE    Leukocyte Esterase, Urine 500 Ian/µL (A) NEGATIVE   Microscopic Only, Urine   Result Value Ref Range    WBC, Urine >50 (A) 1-5, NONE /HPF    RBC, Urine >20 (A) NONE, 1-2, 3-5 /HPF    Squamous Epithelial Cells, Urine 1-9 (SPARSE) Reference range not established. /HPF    Bacteria, Urine 1+ (A) NONE SEEN /HPF   ECG 12 lead   Result Value Ref Range    Ventricular Rate 95 BPM    Atrial Rate 95 BPM    SD Interval 144 ms    QRS Duration 108 ms    QT Interval 372 ms    QTC Calculation(Bazett)  467 ms    P Axis 102 degrees    R Axis 42 degrees    T Axis 116 degrees    QRS Count 15 beats    Q Onset 216 ms    P Onset 144 ms    P Offset 210 ms    T Offset 402 ms    QTC Fredericia 433 ms   Troponin, High Sensitivity, 1 Hour   Result Value Ref Range    Troponin I, High Sensitivity 322 (HH) 0 - 13 ng/L   CBC   Result Value Ref Range    WBC 9.5 4.4 - 11.3 x10*3/uL    nRBC 0.0 0.0 - 0.0 /100 WBCs    RBC 3.55 (L) 4.00 - 5.20 x10*6/uL    Hemoglobin 11.3 (L) 12.0 - 16.0 g/dL    Hematocrit 35.9 (L) 36.0 - 46.0 %     (H) 80 - 100 fL    MCH 31.8 26.0 - 34.0 pg    MCHC 31.5 (L) 32.0 - 36.0 g/dL    RDW 14.4 11.5 - 14.5 %    Platelets 219 150 - 450 x10*3/uL   Comprehensive metabolic panel   Result Value Ref Range    Glucose 132 (H) 74 - 99 mg/dL    Sodium 139 136 - 145 mmol/L    Potassium 4.0 3.5 - 5.3 mmol/L    Chloride 103 98 - 107 mmol/L    Bicarbonate 29 21 - 32 mmol/L    Anion Gap 11 10 - 20 mmol/L    Urea Nitrogen 31 (H) 6 - 23 mg/dL    Creatinine 1.43 (H) 0.50 - 1.05 mg/dL    eGFR 38 (L) >60 mL/min/1.73m*2    Calcium 8.1 (L) 8.6 - 10.3 mg/dL    Albumin 3.2 (L) 3.4 - 5.0 g/dL    Alkaline Phosphatase 49 33 - 136 U/L    Total Protein 5.5 (L) 6.4 - 8.2 g/dL    AST 15 9 - 39 U/L    Bilirubin, Total 0.5 0.0 - 1.2 mg/dL    ALT 9 7 - 45 U/L   Transthoracic Echo (TTE) Complete   Result Value Ref Range    AV pk snehal 1.38 m/s    LV Biplane EF 34 %    LVOT diam 1.70 cm    MV E/A ratio 1.54     MV avg E/e' ratio 23.00     Tricuspid annular plane systolic excursion 1.4 cm    RV free wall pk S' 8.73 cm/s    RVSP 43.7 mmHg    LVIDd 6.50 cm    Aortic Valve Area by Continuity of Peak Velocity 1.47 cm2    AV pk grad 7.6 mmHg    LV A4C EF 39.4              Physical Exam:  Subjective:   Examination:   General Examination:   General Appearance: Well developed, well nourished, in no acute distress.   Head: normocephalic, atraumatic   Eyes: Anicteric sclera. Pupils are equally round and reactive to light.  Extraocular movements are  intact.    Ears: normal   Oral: Cavity: mucosa moist.   Throat: clear.   Neck/Thyroid: neck supple, full range of motion, no cervical lymphadenopathy.   Skin: warm and dry, no suspicious lesions.    Heart: regular rate and rhythm, S1, S2 normal, no murmurs.  Occasional PACs  Lungs: copd but distant breath sounds with no audible wheeze  Abdomen: soft, non-tender, non-distended, bowl sound present, normal.   Extremities: no clubbing, no cyanosis, no edema.   Neuro: non-focal, motor strength normal upper lower extremities, sensory exam intact.       Assessment/Plan     I25.1 CAD remote CABG, stent  I42.9 ischemic cardiomyopathy  F17.10 cigarette smoking  I48.91 transient AF in setting of uterine acute bleeding resolved    Code Status:  Full Code    I spent 45 minutes in the professional and overall care of this patient.        Horacio Armijo MD

## 2024-04-30 NOTE — H&P
History Of Present Illness  Mitzi Pro is a 73 y.o. female presenting with chief complaints of hematuria, fatigue, and shortness of breath over the last 2 days.  Patient does have a notable history for abnormal uterine bleeding with uterine prolapse.  Approximately 1 year ago, she underwent resection for prolapsed uterus, resection of a uterine mass suspected to be fibroids.  States that she laid down to take a brief nap and when she woke up she noticed several spots of blood on her undergarment as well as a rapid heart rate.  Upon arrival to this facility, patient was found to be in atrial fibrillation with RVR.  Was given 10 mg of IV Cardizem with improved ventricular rate control.  Patient also noted to be hypoxic but has baseline O2 requirements of 2 L.  Currently requiring 2 to 4 L of supplemental oxygen to maintain appropriate SpO2 levels.  In addition, patient also noted to have markedly elevated troponins however a negative delta value.  She is denying any chest pain or anginal equivalents.  EKG is without any ischemic changes.  Patient admitted for further management.     Past Medical History  Past Medical History:   Diagnosis Date    Personal history of malignant neoplasm of ovary     History of ovarian cancer       Surgical History  Past Surgical History:   Procedure Laterality Date    CORONARY ARTERY BYPASS GRAFT  11/20/2017    Coronary Artery Surgery    CT AORTA AND BILATERAL ILIOFEMORAL RUNOFF ANGIOGRAM W AND/OR WO IV CONTRAST  12/21/2021    CT AORTA AND BILATERAL ILIOFEMORAL RUNOFF ANGIOGRAM W AND/OR WO IV CONTRAST 12/21/2021 [facility]    OTHER SURGICAL HISTORY  05/17/2022    Coronary artery bypass graft    OTHER SURGICAL HISTORY  05/17/2022    Salpingo-oophorectomy    OTHER SURGICAL HISTORY  11/08/2019    Bypass        Social History  She reports that she has been smoking cigarettes. She has never used smokeless tobacco. She reports that she does not currently use alcohol. She  "reports that she does not use drugs.    Family History  Family History   Problem Relation Name Age of Onset    Other (arteriosclerotic cardiovascular disease) Mother      Other (arteriosclerotic cardiovascular disease) Father      Colon cancer Sister          Allergies  Metformin, Milk, and Nitrofurantoin    Review of Systems   Neurological:  Positive for weakness.        Physical Exam  Vitals and nursing note reviewed.   Constitutional:       Appearance: Normal appearance.   HENT:      Head: Normocephalic and atraumatic.   Eyes:      Extraocular Movements: Extraocular movements intact.      Conjunctiva/sclera: Conjunctivae normal.   Cardiovascular:      Rate and Rhythm: Normal rate and regular rhythm.      Pulses: Normal pulses.   Pulmonary:      Effort: Pulmonary effort is normal.      Breath sounds: Normal breath sounds and air entry.   Abdominal:      General: Bowel sounds are normal.      Palpations: Abdomen is soft.   Musculoskeletal:      Cervical back: Normal range of motion and neck supple.   Skin:     General: Skin is warm and dry.   Neurological:      General: No focal deficit present.      Mental Status: She is alert. Mental status is at baseline.   Psychiatric:         Mood and Affect: Mood normal.         Behavior: Behavior normal. Behavior is cooperative.          Last Recorded Vitals  Blood pressure 101/61, pulse 72, temperature 36.4 °C (97.5 °F), temperature source Temporal, resp. rate 25, height 1.651 m (5' 5\"), weight 76.5 kg (168 lb 10.4 oz), SpO2 97%.    Relevant Results  Scheduled medications  cefTRIAXone, 1 g, intravenous, Once  oxygen, , inhalation, Continuous - Inhalation  pantoprazole, 40 mg, oral, Daily before breakfast   Or  pantoprazole, 40 mg, intravenous, Daily before breakfast      Continuous medications     PRN medications  PRN medications: acetaminophen **OR** acetaminophen **OR** acetaminophen, acetaminophen **OR** acetaminophen **OR** acetaminophen, melatonin, metoclopramide " **OR** metoclopramide, ondansetron ODT **OR** ondansetron, polyethylene glycol  XR chest 1 view    Result Date: 4/30/2024  STUDY: Chest Radiograph;  4/30/2024 at 1:13 AM INDICATION: Chest pain. COMPARISON: XR chest 11/28/2022, XR chest 12/5/2021, XR chest 7/14/2021, XR chest 3/1/2019. ACCESSION NUMBER(S): [id number] ORDERING CLINICIAN: ISIDRO MARSHALL TECHNIQUE:  Frontal chest was obtained at 0113 hours. FINDINGS: CARDIOMEDIASTINAL SILHOUETTE: Cardiomediastinal silhouette is mildly enlarged in size and configuration.  Calcified plaque is seen in the aorta.  LUNGS: Lungs are clear.  Flattening of hemidiaphragms suggests chronic changes of COPD.  ABDOMEN: No remarkable upper abdominal findings.  BONES: No acute osseous changes.    No acute cardia pulmonary disease. Cardiomegaly with suspected mild chronic changes of COPD. Signed by Rojas Lawrence   Results for orders placed or performed during the hospital encounter of 04/30/24 (from the past 24 hour(s))   Comprehensive Metabolic Panel   Result Value Ref Range    Glucose 175 (H) 74 - 99 mg/dL    Sodium 135 (L) 136 - 145 mmol/L    Potassium 4.4 3.5 - 5.3 mmol/L    Chloride 97 (L) 98 - 107 mmol/L    Bicarbonate 28 21 - 32 mmol/L    Anion Gap 14 10 - 20 mmol/L    Urea Nitrogen 34 (H) 6 - 23 mg/dL    Creatinine 1.63 (H) 0.50 - 1.05 mg/dL    eGFR 33 (L) >60 mL/min/1.73m*2    Calcium 8.9 8.6 - 10.3 mg/dL    Albumin 3.7 3.4 - 5.0 g/dL    Alkaline Phosphatase 53 33 - 136 U/L    Total Protein 6.2 (L) 6.4 - 8.2 g/dL    AST 18 9 - 39 U/L    Bilirubin, Total 0.8 0.0 - 1.2 mg/dL    ALT 10 7 - 45 U/L   Protime-INR   Result Value Ref Range    Protime 19.9 (H) 9.8 - 12.8 seconds    INR 1.8 (H) 0.9 - 1.1   CBC with Differential   Result Value Ref Range    WBC 11.6 (H) 4.4 - 11.3 x10*3/uL    nRBC 0.0 0.0 - 0.0 /100 WBCs    RBC 3.99 (L) 4.00 - 5.20 x10*6/uL    Hemoglobin 12.6 12.0 - 16.0 g/dL    Hematocrit 39.5 36.0 - 46.0 %    MCV 99 80 - 100 fL    MCH 31.6 26.0 - 34.0 pg    MCHC 31.9 (L)  32.0 - 36.0 g/dL    RDW 14.5 11.5 - 14.5 %    Platelets 244 150 - 450 x10*3/uL    Neutrophils % 72.1 40.0 - 80.0 %    Immature Granulocytes %, Automated 0.3 0.0 - 0.9 %    Lymphocytes % 18.7 13.0 - 44.0 %    Monocytes % 7.9 2.0 - 10.0 %    Eosinophils % 0.7 0.0 - 6.0 %    Basophils % 0.3 0.0 - 2.0 %    Neutrophils Absolute 8.39 (H) 1.60 - 5.50 x10*3/uL    Immature Granulocytes Absolute, Automated 0.04 0.00 - 0.50 x10*3/uL    Lymphocytes Absolute 2.17 0.80 - 3.00 x10*3/uL    Monocytes Absolute 0.92 (H) 0.05 - 0.80 x10*3/uL    Eosinophils Absolute 0.08 0.00 - 0.40 x10*3/uL    Basophils Absolute 0.03 0.00 - 0.10 x10*3/uL   Type And Screen   Result Value Ref Range    ABO TYPE A     Rh TYPE POS     ANTIBODY SCREEN NEG    Troponin I, High Sensitivity, Initial   Result Value Ref Range    Troponin I, High Sensitivity 354 (HH) 0 - 13 ng/L   Gray Top   Result Value Ref Range    Extra Tube Hold for add-ons.    Urinalysis with Reflex Microscopic   Result Value Ref Range    Color, Urine Light-Brown (N) Light-Yellow, Yellow, Dark-Yellow    Appearance, Urine Turbid (N) Clear    Specific Gravity, Urine 1.006 1.005 - 1.035    pH, Urine 6.5 5.0, 5.5, 6.0, 6.5, 7.0, 7.5, 8.0    Protein, Urine NEGATIVE NEGATIVE, 10 (TRACE), 20 (TRACE) mg/dL    Glucose, Urine Normal Normal mg/dL    Blood, Urine OVER (3+) (A) NEGATIVE    Ketones, Urine NEGATIVE NEGATIVE mg/dL    Bilirubin, Urine NEGATIVE NEGATIVE    Urobilinogen, Urine Normal Normal mg/dL    Nitrite, Urine NEGATIVE NEGATIVE    Leukocyte Esterase, Urine 500 Ian/µL (A) NEGATIVE   Microscopic Only, Urine   Result Value Ref Range    WBC, Urine >50 (A) 1-5, NONE /HPF    RBC, Urine >20 (A) NONE, 1-2, 3-5 /HPF    Squamous Epithelial Cells, Urine 1-9 (SPARSE) Reference range not established. /HPF    Bacteria, Urine 1+ (A) NONE SEEN /HPF   Troponin, High Sensitivity, 1 Hour   Result Value Ref Range    Troponin I, High Sensitivity 322 (HH) 0 - 13 ng/L   CBC   Result Value Ref Range    WBC 9.5 4.4  - 11.3 x10*3/uL    nRBC 0.0 0.0 - 0.0 /100 WBCs    RBC 3.55 (L) 4.00 - 5.20 x10*6/uL    Hemoglobin 11.3 (L) 12.0 - 16.0 g/dL    Hematocrit 35.9 (L) 36.0 - 46.0 %     (H) 80 - 100 fL    MCH 31.8 26.0 - 34.0 pg    MCHC 31.5 (L) 32.0 - 36.0 g/dL    RDW 14.4 11.5 - 14.5 %    Platelets 219 150 - 450 x10*3/uL   Comprehensive metabolic panel   Result Value Ref Range    Glucose 132 (H) 74 - 99 mg/dL    Sodium 139 136 - 145 mmol/L    Potassium 4.0 3.5 - 5.3 mmol/L    Chloride 103 98 - 107 mmol/L    Bicarbonate 29 21 - 32 mmol/L    Anion Gap 11 10 - 20 mmol/L    Urea Nitrogen 31 (H) 6 - 23 mg/dL    Creatinine 1.43 (H) 0.50 - 1.05 mg/dL    eGFR 38 (L) >60 mL/min/1.73m*2    Calcium 8.1 (L) 8.6 - 10.3 mg/dL    Albumin 3.2 (L) 3.4 - 5.0 g/dL    Alkaline Phosphatase 49 33 - 136 U/L    Total Protein 5.5 (L) 6.4 - 8.2 g/dL    AST 15 9 - 39 U/L    Bilirubin, Total 0.5 0.0 - 1.2 mg/dL    ALT 9 7 - 45 U/L          Assessment/Plan   Principal Problem:    Atrial fibrillation with RVR (Multi)  Active Problems:    Type 2 diabetes mellitus with hyperglycemia, without long-term current use of insulin (Multi)    Chronic combined systolic (congestive) and diastolic (congestive) heart failure (Multi)    Cystitis    Idiopathic hypotension      Patient presents to this facility with complaints of increased weakness and shortness of breath.  Found to be hypoxic and in atrial fibrillation with RVR at the time of admission.  Does require chronic oxygen use and is utilizing anywhere from 2 to 4 L greater than her baseline level.  Troponin levels markedly elevated however downtrending and without associated EKG changes.    Patient initially noted to be in atrial fibrillation with rapid ventricular response.  Following 10 mg of IV Cardizem push, patient has had improved rate control however was transiently hypotensive prior to admission.  Patient reports baseline systolic blood pressures approximately 100 and after following a normal saline  fluid bolus, she has been maintaining these pressures regularly.    Will defer additional IV fluids at this time given her history of heart failure and most recent echocardiogram noting 35 to 40% EF.    In the setting of A-fib with RVR as well as patient's troponin elevation, consultation has been placed to the cardiology service.  Appreciate recommendations.    UA also consistent with acute cystitis.  1 g of IV Rocephin every 24 hours pending culture data.    Prolonged conversation regarding consultation with gynecology.  Patient has a history of ovarian cancer.  She has a history of subsequent uterine prolapse and has underwent resection of uterine fibroids.  She is anticoagulated currently.  Although it is very likely that patient's acute cystitis in combination with her blood thinner has produced this hematuria, I did discuss with her concern for at least ruling out uterine bleeding given her history.  Patient verbalized understanding of these recommendations but is declining gynecology consultation at this time.  We did discuss if her bleeding does persist especially after treatment of her UTI, it would be prudent to be seen as quickly as possible by her outpatient physicians.    Home medications to be reviewed and resumed as indicated    Maintain telemetry    PT/OT    Diabetic/low-salt diet with insulin sliding scale coverage     I spent >75 minutes in the professional and overall care of this patient.      Anival Link, DO

## 2024-04-30 NOTE — PROGRESS NOTES
Occupational Therapy    Evaluation    Patient Name: Mitzi Pro  MRN: [id number]  Today's Date: 4/30/2024  Time Calculation  Start Time: 1205  Stop Time: 1214  Time Calculation (min): 9 min    Assessment  IP OT Assessment  End of Session Communication: Bedside nurse  End of Session Patient Position: Bed, 3 rail up, Alarm off, not on at start of session  Plan:  Treatment Interventions: ADL retraining, Functional transfer training, Endurance training, Compensatory technique education  OT Frequency: 2 times per week  OT Discharge Recommendations: Low intensity level of continued care  OT - OK to Discharge: Yes (when medically appropriate)    Subjective   Current Problem:  1. Atrial fibrillation with RVR (Multi)        2. Elevated troponin        3. Acute cystitis with hematuria        4. Chronic combined systolic (congestive) and diastolic (congestive) heart failure (Multi)  Transthoracic Echo (TTE) Complete    Transthoracic Echo (TTE) Complete        General:  General  Reason for Referral: ADL  Referred By: Fariba  Past Medical History Relevant to Rehab: Includes: Ovarian CA, CABG, CAD, CHF, Afib with RVR, Eliquis, resection of a uterine mass suspected to be fibroids, DM2  Co-Treatment: PT  Prior to Session Communication: Bedside nurse  Patient Position Received: Bed, 3 rail up, Alarm off, not on at start of session  General Comment: To  ED 4/30 with hematuria, sharp pain with urination,  fatigue and Sob x 2days. HR in 150s and hypotensive in ED.  Found to have Afib with RVR (corrected with Cardizem), [place], UTI, cystitis and elevated troponin, but no chest pain and EKG is without any ischemic changes.  Precautions:  Medical Precautions: Fall precautions, Oxygen therapy device and L/min  Vital Signs:  Heart Rate: 66  Pain:  Pain Assessment  Pain Assessment: 0-10  Pain Score: 0 - No pain    Objective   Cognition:  Overall Cognitive Status: Within Functional Limits           Home Living:  Home Living Comments: Pt.  lives with a roommate who is 78.  7 steps to enter with handrail.  1st floor set up.  Uses rollator only for long distances, otherwise, no AD use. Independent with ADLs.  Pays for assistance with cleaning.  Does own laundry just 1 time a month, as washer and dryer are in the basement, 13 steps down with no handrails.  Pt. makes or buys her own meals. Tub/shower with no seat or safety bars.  Pt. is interested in safety bars in tub and a handrail to her basement.  No falls.  Drives.        ADL:  Eating Assistance: Independent  Grooming Assistance: Stand by  Bathing Assistance: Stand by  UE Dressing Assistance: Stand by  LE Dressing Assistance: Stand by  Toileting Assistance with Device: Stand by  Functional Assistance: Stand by       Bed Mobility/Transfers: Bed Mobility  Bed Mobility:  (Sup to sit and sit to sup, mod I)    Transfers  Transfer: Yes  Transfer 1  Trials/Comments 1: Sit<>stand at EOB without AD, SBA      Functional Mobility:  Functional Mobility  Functional Mobility Performed:  (Ambulated in room without AD, ~ 12' at SBA level)  Sitting Balance:  Static Sitting Balance  Static Sitting-Comment/Number of Minutes: Normal  Dynamic Sitting Balance  Dynamic Sitting-Comments: Normal  Standing Balance:  Static Standing Balance  Static Standing-Comment/Number of Minutes: Good (-)  Dynamic Standing Balance  Dynamic Standing-Comments: Good (-)/fair (+)       Extremities: RUE   RUE : Within Functional Limits and LUE   LUE: Within Functional Limits    Outcome Measures: [facility] Daily Activity  Putting on and taking off regular lower body clothing: A little  Bathing (including washing, rinsing, drying): A little  Putting on and taking off regular upper body clothing: A little  Toileting, which includes using toilet, bedpan or urinal: A little  Taking care of personal grooming such as brushing teeth: A little  Eating Meals: None  Daily Activity - Total Score: 19      Education Documentation  ADL Training, taught by  Giselle Balderas, OT at 4/30/2024  4:04 PM.  Learner: Patient  Readiness: Acceptance  Method: Explanation  Response: Verbalizes Understanding    Education Comments  No comments found.      Goals:   Encounter Problems       Encounter Problems (Active)       OT Goals       Mod I ADL/IADL functional mobility.       Start:  04/30/24    Expected End:  05/14/24            Mod I LB ADL.        Start:  04/30/24    Expected End:  05/14/24            Mod I sit/stand, bed/chair/commode.       Start:  04/30/24    Expected End:  05/14/24            Good dynamic standing balance for ADL.        Start:  04/30/24    Expected End:  05/14/24

## 2024-04-30 NOTE — CARE PLAN
The clinical goals for the shift include maintain pulse ox >92. The patient met this goal    S: Patient admitted 4/30 for afib rvr   B: History of: CAD, CHF, afib, T2DM, HTN, COPD  A: Patient admitted to room 2115 this shift. Patient A&Ox4, denies complaints of chest pain but does endorse shortness of breath which is baseline due to COPD. Remains in NSR and on 2 L NC. No new complaints this shift   R: Patient to be seen by cardiologist today

## 2024-04-30 NOTE — ED NOTES
Dr. Fu aware of bp dropping again. Fluids ordered and administered.     Kyleigh Pillai RN  04/30/24 [id number]

## 2024-05-01 ENCOUNTER — APPOINTMENT (OUTPATIENT)
Dept: CARDIOLOGY | Facility: HOSPITAL | Age: 74
DRG: 280 | End: 2024-05-01
Payer: MEDICARE

## 2024-05-01 LAB
ALBUMIN SERPL BCP-MCNC: 3.1 G/DL (ref 3.4–5)
ANION GAP SERPL CALC-SCNC: 9 MMOL/L (ref 10–20)
BNP SERPL-MCNC: 490 PG/ML (ref 0–99)
BUN SERPL-MCNC: 26 MG/DL (ref 6–23)
CALCIUM SERPL-MCNC: 8.5 MG/DL (ref 8.6–10.3)
CHLORIDE SERPL-SCNC: 104 MMOL/L (ref 98–107)
CO2 SERPL-SCNC: 31 MMOL/L (ref 21–32)
CREAT SERPL-MCNC: 0.98 MG/DL (ref 0.5–1.05)
EGFRCR SERPLBLD CKD-EPI 2021: 60 ML/MIN/1.73M*2
ERYTHROCYTE [DISTWIDTH] IN BLOOD BY AUTOMATED COUNT: 14.3 % (ref 11.5–14.5)
GLUCOSE BLD MANUAL STRIP-MCNC: 164 MG/DL (ref 74–99)
GLUCOSE BLD MANUAL STRIP-MCNC: 174 MG/DL (ref 74–99)
GLUCOSE BLD MANUAL STRIP-MCNC: 239 MG/DL (ref 74–99)
GLUCOSE SERPL-MCNC: 48 MG/DL (ref 74–99)
HCT VFR BLD AUTO: 33.6 % (ref 36–46)
HGB BLD-MCNC: 10.3 G/DL (ref 12–16)
MAGNESIUM SERPL-MCNC: 1.71 MG/DL (ref 1.6–2.4)
MCH RBC QN AUTO: 31.3 PG (ref 26–34)
MCHC RBC AUTO-ENTMCNC: 30.7 G/DL (ref 32–36)
MCV RBC AUTO: 102 FL (ref 80–100)
NRBC BLD-RTO: 0 /100 WBCS (ref 0–0)
PHOSPHATE SERPL-MCNC: 3.5 MG/DL (ref 2.5–4.9)
PLATELET # BLD AUTO: 203 X10*3/UL (ref 150–450)
POTASSIUM SERPL-SCNC: 3.9 MMOL/L (ref 3.5–5.3)
RBC # BLD AUTO: 3.29 X10*6/UL (ref 4–5.2)
SODIUM SERPL-SCNC: 140 MMOL/L (ref 136–145)
WBC # BLD AUTO: 8.4 X10*3/UL (ref 4.4–11.3)

## 2024-05-01 PROCEDURE — 94640 AIRWAY INHALATION TREATMENT: CPT

## 2024-05-01 PROCEDURE — 2500000005 HC RX 250 GENERAL PHARMACY W/O HCPCS

## 2024-05-01 PROCEDURE — 82947 ASSAY GLUCOSE BLOOD QUANT: CPT

## 2024-05-01 PROCEDURE — 2500000004 HC RX 250 GENERAL PHARMACY W/ HCPCS (ALT 636 FOR OP/ED): Performed by: INTERNAL MEDICINE

## 2024-05-01 PROCEDURE — 2500000006 HC RX 250 W HCPCS SELF ADMINISTERED DRUGS (ALT 637 FOR ALL PAYERS): Performed by: INTERNAL MEDICINE

## 2024-05-01 PROCEDURE — 2500000005 HC RX 250 GENERAL PHARMACY W/O HCPCS: Performed by: INTERNAL MEDICINE

## 2024-05-01 PROCEDURE — 2500000002 HC RX 250 W HCPCS SELF ADMINISTERED DRUGS (ALT 637 FOR MEDICARE OP, ALT 636 FOR OP/ED): Mod: MUE | Performed by: INTERNAL MEDICINE

## 2024-05-01 PROCEDURE — 2500000004 HC RX 250 GENERAL PHARMACY W/ HCPCS (ALT 636 FOR OP/ED)

## 2024-05-01 PROCEDURE — 2500000001 HC RX 250 WO HCPCS SELF ADMINISTERED DRUGS (ALT 637 FOR MEDICARE OP): Performed by: INTERNAL MEDICINE

## 2024-05-01 PROCEDURE — 85027 COMPLETE CBC AUTOMATED: CPT

## 2024-05-01 PROCEDURE — 36415 COLL VENOUS BLD VENIPUNCTURE: CPT

## 2024-05-01 PROCEDURE — 83880 ASSAY OF NATRIURETIC PEPTIDE: CPT

## 2024-05-01 PROCEDURE — 83735 ASSAY OF MAGNESIUM: CPT

## 2024-05-01 PROCEDURE — 80069 RENAL FUNCTION PANEL: CPT

## 2024-05-01 PROCEDURE — 93005 ELECTROCARDIOGRAM TRACING: CPT

## 2024-05-01 PROCEDURE — 2060000001 HC INTERMEDIATE ICU ROOM DAILY

## 2024-05-01 PROCEDURE — 99233 SBSQ HOSP IP/OBS HIGH 50: CPT

## 2024-05-01 RX ORDER — METOPROLOL TARTRATE 1 MG/ML
INJECTION, SOLUTION INTRAVENOUS
Status: COMPLETED
Start: 2024-05-01 | End: 2024-05-01

## 2024-05-01 RX ORDER — IPRATROPIUM BROMIDE AND ALBUTEROL SULFATE 2.5; .5 MG/3ML; MG/3ML
3 SOLUTION RESPIRATORY (INHALATION)
Status: DISCONTINUED | OUTPATIENT
Start: 2024-05-01 | End: 2024-05-04

## 2024-05-01 RX ORDER — DILTIAZEM HCL/D5W 125 MG/125
5-15 PLASTIC BAG, INJECTION (ML) INTRAVENOUS CONTINUOUS
Status: DISCONTINUED | OUTPATIENT
Start: 2024-05-01 | End: 2024-05-04

## 2024-05-01 RX ORDER — DILTIAZEM HCL/D5W 125 MG/125
5-15 PLASTIC BAG, INJECTION (ML) INTRAVENOUS CONTINUOUS
Status: DISCONTINUED | OUTPATIENT
Start: 2024-05-01 | End: 2024-05-01

## 2024-05-01 RX ORDER — DEXTROSE 25 % IN WATER 25 %
25 SYRINGE (ML) INTRAVENOUS ONCE
Status: DISCONTINUED | OUTPATIENT
Start: 2024-05-01 | End: 2024-05-01

## 2024-05-01 RX ORDER — DILTIAZEM HYDROCHLORIDE 5 MG/ML
10 INJECTION INTRAVENOUS ONCE
Status: COMPLETED | OUTPATIENT
Start: 2024-05-01 | End: 2024-05-01

## 2024-05-01 RX ORDER — CEFTRIAXONE 1 G/50ML
1 INJECTION, SOLUTION INTRAVENOUS EVERY 24 HOURS
Status: DISCONTINUED | OUTPATIENT
Start: 2024-05-01 | End: 2024-05-02

## 2024-05-01 RX ORDER — DEXTROSE MONOHYDRATE AND SODIUM CHLORIDE 5; .45 G/100ML; G/100ML
75 INJECTION, SOLUTION INTRAVENOUS CONTINUOUS
Status: DISCONTINUED | OUTPATIENT
Start: 2024-05-01 | End: 2024-05-01

## 2024-05-01 RX ORDER — MAGNESIUM SULFATE HEPTAHYDRATE 40 MG/ML
2 INJECTION, SOLUTION INTRAVENOUS ONCE
Status: COMPLETED | OUTPATIENT
Start: 2024-05-01 | End: 2024-05-01

## 2024-05-01 RX ORDER — METOPROLOL SUCCINATE 50 MG/1
50 TABLET, EXTENDED RELEASE ORAL DAILY
Status: DISCONTINUED | OUTPATIENT
Start: 2024-05-01 | End: 2024-05-04

## 2024-05-01 RX ORDER — DEXTROSE 25 % IN WATER 25 %
2.5 SYRINGE (ML) INTRAVENOUS ONCE
Status: DISCONTINUED | OUTPATIENT
Start: 2024-05-01 | End: 2024-05-01

## 2024-05-01 RX ORDER — DEXTROSE 50 % IN WATER (D50W) INTRAVENOUS SYRINGE
25 ONCE
Status: COMPLETED | OUTPATIENT
Start: 2024-05-01 | End: 2024-05-01

## 2024-05-01 RX ORDER — DEXTROSE 50 % IN WATER (D50W) INTRAVENOUS SYRINGE
Status: COMPLETED
Start: 2024-05-01 | End: 2024-05-01

## 2024-05-01 RX ORDER — DIGOXIN 0.25 MG/ML
500 INJECTION INTRAMUSCULAR; INTRAVENOUS DAILY
Status: DISCONTINUED | OUTPATIENT
Start: 2024-05-01 | End: 2024-05-04

## 2024-05-01 RX ORDER — IBUPROFEN 600 MG/1
600 TABLET ORAL ONCE
Status: COMPLETED | OUTPATIENT
Start: 2024-05-01 | End: 2024-05-01

## 2024-05-01 RX ORDER — METOPROLOL TARTRATE 1 MG/ML
5 INJECTION, SOLUTION INTRAVENOUS ONCE
Status: COMPLETED | OUTPATIENT
Start: 2024-05-01 | End: 2024-05-01

## 2024-05-01 RX ADMIN — Medication 2 L/MIN: at 08:00

## 2024-05-01 RX ADMIN — CHOLECALCIFEROL TAB 125 MCG (5000 UNIT) 5000 UNITS: 125 TAB at 11:55

## 2024-05-01 RX ADMIN — ATORVASTATIN CALCIUM 40 MG: 40 TABLET, FILM COATED ORAL at 20:52

## 2024-05-01 RX ADMIN — PANTOPRAZOLE SODIUM 40 MG: 40 TABLET, DELAYED RELEASE ORAL at 05:16

## 2024-05-01 RX ADMIN — DILTIAZEM HYDROCHLORIDE 10 MG: 5 INJECTION INTRAVENOUS at 05:53

## 2024-05-01 RX ADMIN — OXYBUTYNIN CHLORIDE 5 MG: 5 TABLET ORAL at 11:55

## 2024-05-01 RX ADMIN — OXYBUTYNIN CHLORIDE 5 MG: 5 TABLET ORAL at 20:37

## 2024-05-01 RX ADMIN — DEXTROSE MONOHYDRATE 50 ML: 25 INJECTION, SOLUTION INTRAVENOUS at 05:53

## 2024-05-01 RX ADMIN — IPRATROPIUM BROMIDE 0.5 MG: 0.5 SOLUTION RESPIRATORY (INHALATION) at 09:25

## 2024-05-01 RX ADMIN — CILOSTAZOL 100 MG: 100 TABLET ORAL at 20:37

## 2024-05-01 RX ADMIN — METOPROLOL TARTRATE 5 MG: 1 INJECTION, SOLUTION INTRAVENOUS at 05:43

## 2024-05-01 RX ADMIN — IPRATROPIUM BROMIDE AND ALBUTEROL SULFATE 3 ML: 2.5; .5 SOLUTION RESPIRATORY (INHALATION) at 23:22

## 2024-05-01 RX ADMIN — Medication 15 MG/HR: at 16:59

## 2024-05-01 RX ADMIN — METOPROLOL TARTRATE 5 MG: 5 INJECTION INTRAVENOUS at 05:43

## 2024-05-01 RX ADMIN — Medication 5 MG/HR: at 13:45

## 2024-05-01 RX ADMIN — GLIPIZIDE 10 MG: 10 TABLET ORAL at 05:16

## 2024-05-01 RX ADMIN — FORMOTEROL FUMARATE 20 MCG: 20 SOLUTION RESPIRATORY (INHALATION) at 20:10

## 2024-05-01 RX ADMIN — ICOSAPENT ETHYL 2 G: 1 CAPSULE ORAL at 20:37

## 2024-05-01 RX ADMIN — DEXTROSE 50 % IN WATER (D50W) INTRAVENOUS SYRINGE 50 ML: at 05:53

## 2024-05-01 RX ADMIN — CEFTRIAXONE SODIUM 1 G: 1 INJECTION, SOLUTION INTRAVENOUS at 11:54

## 2024-05-01 RX ADMIN — CILOSTAZOL 100 MG: 100 TABLET ORAL at 11:54

## 2024-05-01 RX ADMIN — IPRATROPIUM BROMIDE 0.5 MG: 0.5 SOLUTION RESPIRATORY (INHALATION) at 20:10

## 2024-05-01 RX ADMIN — IPRATROPIUM BROMIDE 0.5 MG: 0.5 SOLUTION RESPIRATORY (INHALATION) at 14:26

## 2024-05-01 RX ADMIN — Medication 2 L/MIN: at 20:00

## 2024-05-01 RX ADMIN — METOPROLOL SUCCINATE 50 MG: 50 TABLET, EXTENDED RELEASE ORAL at 09:01

## 2024-05-01 RX ADMIN — Medication 5 MG/HR: at 06:04

## 2024-05-01 RX ADMIN — APIXABAN 5 MG: 5 TABLET, FILM COATED ORAL at 11:55

## 2024-05-01 RX ADMIN — ICOSAPENT ETHYL 2 G: 1 CAPSULE ORAL at 11:55

## 2024-05-01 RX ADMIN — METOPROLOL SUCCINATE 50 MG: 50 TABLET, EXTENDED RELEASE ORAL at 20:37

## 2024-05-01 RX ADMIN — Medication 10 MG/HR: at 21:59

## 2024-05-01 RX ADMIN — MAGNESIUM SULFATE HEPTAHYDRATE 2 G: 40 INJECTION, SOLUTION INTRAVENOUS at 06:19

## 2024-05-01 RX ADMIN — FORMOTEROL FUMARATE 20 MCG: 20 SOLUTION RESPIRATORY (INHALATION) at 09:25

## 2024-05-01 RX ADMIN — DIGOXIN 500 MCG: 0.25 INJECTION INTRAMUSCULAR; INTRAVENOUS at 08:39

## 2024-05-01 RX ADMIN — APIXABAN 5 MG: 5 TABLET, FILM COATED ORAL at 20:37

## 2024-05-01 RX ADMIN — IBUPROFEN 600 MG: 600 TABLET, FILM COATED ORAL at 05:16

## 2024-05-01 SDOH — ECONOMIC STABILITY: FOOD INSECURITY: WITHIN THE PAST 12 MONTHS, YOU WORRIED THAT YOUR FOOD WOULD RUN OUT BEFORE YOU GOT MONEY TO BUY MORE.: SOMETIMES TRUE

## 2024-05-01 SDOH — ECONOMIC STABILITY: FOOD INSECURITY: WITHIN THE PAST 12 MONTHS, THE FOOD YOU BOUGHT JUST DIDN'T LAST AND YOU DIDN'T HAVE MONEY TO GET MORE.: SOMETIMES TRUE

## 2024-05-01 SDOH — ECONOMIC STABILITY: INCOME INSECURITY: HOW HARD IS IT FOR YOU TO PAY FOR THE VERY BASICS LIKE FOOD, HOUSING, MEDICAL CARE, AND HEATING?: NOT VERY HARD

## 2024-05-01 SDOH — ECONOMIC STABILITY: INCOME INSECURITY: IN THE PAST 12 MONTHS, HAS THE ELECTRIC, GAS, OIL, OR WATER COMPANY THREATENED TO SHUT OFF SERVICE IN YOUR HOME?: NO

## 2024-05-01 ASSESSMENT — COGNITIVE AND FUNCTIONAL STATUS - GENERAL
STANDING UP FROM CHAIR USING ARMS: A LITTLE
DRESSING REGULAR LOWER BODY CLOTHING: A LITTLE
TOILETING: A LITTLE
TOILETING: A LITTLE
CLIMB 3 TO 5 STEPS WITH RAILING: A LOT
CLIMB 3 TO 5 STEPS WITH RAILING: A LITTLE
MOBILITY SCORE: 18
DAILY ACTIVITIY SCORE: 21
MOVING TO AND FROM BED TO CHAIR: A LITTLE
HELP NEEDED FOR BATHING: A LITTLE
DRESSING REGULAR UPPER BODY CLOTHING: A LITTLE
PERSONAL GROOMING: A LITTLE
MOVING TO AND FROM BED TO CHAIR: A LITTLE
DRESSING REGULAR LOWER BODY CLOTHING: A LITTLE
HELP NEEDED FOR BATHING: A LITTLE
DAILY ACTIVITIY SCORE: 19
MOBILITY SCORE: 21
WALKING IN HOSPITAL ROOM: A LITTLE
WALKING IN HOSPITAL ROOM: A LOT

## 2024-05-01 ASSESSMENT — PAIN SCALES - GENERAL
PAINLEVEL_OUTOF10: 0 - NO PAIN
PAINLEVEL_OUTOF10: 2
PAINLEVEL_OUTOF10: 0 - NO PAIN

## 2024-05-01 ASSESSMENT — PAIN - FUNCTIONAL ASSESSMENT
PAIN_FUNCTIONAL_ASSESSMENT: 0-10

## 2024-05-01 NOTE — PROGRESS NOTES
Mitzi Pro is a 73 y.o. female on day 1 of admission presenting with Atrial fibrillation with RVR (Multi).      Subjective   Patient seen and examined this morning.  Patient went to [facility] RVR early this morning.  Diltiazem drip started.  Gave 1 dose of 500 mcg digoxin.  Patient asymptomatic, notes that she would just feels little bit tired.       Objective     Last Recorded Vitals  BP 90/53   Pulse (!) 112   Temp 36.7 °C (98.1 °F)   Resp (!) 29   Wt 77.1 kg (169 lb 15.6 oz)   SpO2 95%   Intake/Output last 3 Shifts:    Intake/Output Summary (Last 24 hours) at 5/1/2024 1117  Last data filed at 5/1/2024 0521  Gross per 24 hour   Intake 240 ml   Output 350 ml   Net -110 ml       Admission Weight  Weight: 77.1 kg (170 lb) (04/30/24 0034)    Daily Weight  05/01/24 : 77.1 kg (169 lb 15.6 oz)    Image Results  ECG 12 lead  Undetermined rhythm  Septal infarct , age undetermined  ST & T wave abnormality, consider lateral ischemia  Abnormal ECG  When compared with ECG of 30-APR-2024 00:46, (unconfirmed)  Current undetermined rhythm precludes rhythm comparison, needs review  Incomplete right bundle branch block is no longer Present  Septal infarct is now Present  ECG 12 lead  Sinus tachycardia with short W[place] with Fusion complexes  Left axis deviation  Incomplete right bundle branch block  Nonspecific ST and T wave abnormality  Abnormal ECG  When compared with ECG of 08-APR-2023 13:22,  Fusion complexes are now Present  Incomplete right bundle branch block is now Present      Physical Exam    Constitutional: Slightly fatigued, although resting comfortably  CV: Irregularly irregular, tachycardic, no murmurs or gallops appreciated, distal pulses intact   Pulmonary: Breath sounds clear to auscultation bilaterally, no rales, rhonchi, wheezing  GI: Abdomen soft, nondistended, nontender to palpation.  No guarding or rebound tenderness noted.  MSK: Range of motion intact in all 4 extremities  Extremities: No edema  noted  Neuro: ANO x3, no focal neurologic deficits  Psych: Slightly fatigued skin: Warm and dry, no erythema skin lesions noted.     Relevant Results    Results for orders placed or performed during the hospital encounter of 04/30/24 (from the past 24 hour(s))   Transthoracic Echo (TTE) Complete   Result Value Ref Range    AV pk snehal 1.38 m/s    LV Biplane EF 34 %    LVOT diam 1.70 cm    MV E/A ratio 1.54     MV avg E/e' ratio 23.00     Tricuspid annular plane systolic excursion 1.4 cm    RV free wall pk S' 8.73 cm/s    RVSP 43.7 mmHg    LVIDd 6.50 cm    Aortic Valve Area by Continuity of Peak Velocity 1.47 cm2    AV pk grad 7.6 mmHg    LV A4C EF 39.4    Renal function panel   Result Value Ref Range    Glucose 48 (LL) 74 - 99 mg/dL    Sodium 140 136 - 145 mmol/L    Potassium 3.9 3.5 - 5.3 mmol/L    Chloride 104 98 - 107 mmol/L    Bicarbonate 31 21 - 32 mmol/L    Anion Gap 9 (L) 10 - 20 mmol/L    Urea Nitrogen 26 (H) 6 - 23 mg/dL    Creatinine 0.98 0.50 - 1.05 mg/dL    eGFR 60 (L) >60 mL/min/1.73m*2    Calcium 8.5 (L) 8.6 - 10.3 mg/dL    Phosphorus 3.5 2.5 - 4.9 mg/dL    Albumin 3.1 (L) 3.4 - 5.0 g/dL   CBC   Result Value Ref Range    WBC 8.4 4.4 - 11.3 x10*3/uL    nRBC 0.0 0.0 - 0.0 /100 WBCs    RBC 3.29 (L) 4.00 - 5.20 x10*6/uL    Hemoglobin 10.3 (L) 12.0 - 16.0 g/dL    Hematocrit 33.6 (L) 36.0 - 46.0 %     (H) 80 - 100 fL    MCH 31.3 26.0 - 34.0 pg    MCHC 30.7 (L) 32.0 - 36.0 g/dL    RDW 14.3 11.5 - 14.5 %    Platelets 203 150 - 450 x10*3/uL   Magnesium   Result Value Ref Range    Magnesium 1.71 1.60 - 2.40 mg/dL   POCT GLUCOSE   Result Value Ref Range    POCT Glucose 164 (H) 74 - 99 mg/dL   POCT GLUCOSE   Result Value Ref Range    POCT Glucose 174 (H) 74 - 99 mg/dL      ECG 12 lead    Result Date: 5/1/2024  Sinus tachycardia with short VT with Fusion complexes Left axis deviation Incomplete right bundle branch block Nonspecific ST and T wave abnormality Abnormal ECG When compared with ECG of 08-APR-2023  13:22, Fusion complexes are now Present Incomplete right bundle branch block is now Present    ECG 12 lead    Result Date: 5/1/2024  Undetermined rhythm Septal infarct , age undetermined ST & T wave abnormality, consider lateral ischemia Abnormal ECG When compared with ECG of 30-APR-2024 00:46, (unconfirmed) Current undetermined rhythm precludes rhythm comparison, needs review Incomplete right bundle branch block is no longer Present Septal infarct is now Present    Transthoracic Echo (TTE) Complete    Result Date: 4/30/2024            [facility] [id number] [facility] [place] [address]    Tel [phone] Fax [phone] TRANSTHORACIC ECHOCARDIOGRAM REPORT  Patient Name:      MILVIA ZEINAB Garces Physician:    [id number] Horacio Armijo MD Study Date:        4/30/2024             Ordering Provider:    [id number] YANIRA BAXTER MRN/PID:           [id number]              Fellow: Accession#:        [id number]          Nurse:                Leena AMATO Date of Birth/Age: 1950 / 73 years  Sonographer:          Amalia Brewster                                                                RD Gender:            F                     Additional Staff: Height:            165.00 cm             Admit Date: Weight:            76.20 kg              Admission Status:     Inpatient -                                                                Priority                                                                discharge BSA / BMI:         1.84 m2 / 27.99 kg/m2 Department Location:  [facility] CCU Blood Pressure: 99 /56 mmHg Study Type:    TRANSTHORACIC ECHO (TTE) COMPLETE Diagnosis/ICD: 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 Diabetes:          Yes Pertinent History:  HTN, Hyperlipidemia and COPD. Study Detail: The following Echo studies were performed: 2D, M-Mode, Doppler and               color flow. Technically challenging study due to patient lying in               supine position and prominent lung artifact. Definity used as a               contrast agent for endocardial border definition. Total contrast               used for this procedure was 2 mL via IV push.  PHYSICIAN INTERPRETATION: Left Ventricle: Left ventricular systolic function is moderately decreased, with an estimated ejection fraction of 34 %. There are multiple wall motion abnormalities. The left ventricular cavity size is mild to moderately dilated. The left ventricular septal wall thickness is decreased. There is normal left ventricular posterior wall thickness. Spectral Doppler shows an impaired relaxation pattern of left ventricular diastolic filling. There is an elevated left ventricular end diastolic pressure. LV Wall Scoring: The mid and apical anterior septum, mid and apical inferior septum, apical lateral segment, apical anterior segment, and apex are akinetic. The entire inferior wall, basal and mid anterior wall, basal and mid anterolateral wall, basal and mid inferolateral wall, basal anteroseptal segment, and basal inferoseptal segment are hypokinetic. Left Atrium: The left atrium is mildly dilated. Right Ventricle: The right ventricle is normal in size. There is mildly reduced right ventricular systolic function. Right Atrium: The right atrium is normal in size. Aortic Valve: The aortic valve is trileaflet. There is evidence of mildly elevated transaortic gradients consistent with sclerosis of the aortic valve. There is no evidence of aortic valve regurgitation. The peak instantaneous gradient of the aortic valve is 7.6 mmHg. Mitral Valve: The mitral valve is normal in structure. There is mild to moderate mitral annular calcification. There is moderate mitral valve regurgitation. MERNA by LAKISHA  0.2 moderate, posterior MR skyler type IIIA apical tethering. Tricuspid Valve: The tricuspid valve is structurally normal. There is mild tricuspid regurgitation. The Doppler estimated RVSP is mildly elevated at 43.7 mmHg. Pulmonic Valve: The pulmonic valve is structurally normal. There is trace pulmonic valve regurgitation. Pericardium: There is no pericardial effusion noted. Aorta: The aortic root is normal. There is no dilatation of the ascending aorta. There is no dilatation of the aortic root. Pulmonary Artery: The main pulmonary artery is normal in size, and position, with normal bifurcation into the left and right pulmonary arteries. Systemic Veins: The inferior vena cava appears mildly dilated. The hepatic vein appears dilated. There is less than 50% IVC collapse with inspiration.  CONCLUSIONS:  1. Left ventricular systolic function is moderately decreased with a 34 % estimated ejection fraction.  2. Multiple segmental abnormalities exist. See findings.  3. Spectral Doppler shows an impaired relaxation pattern of left ventricular diastolic filling.  4. There is an elevated left ventricular end diastolic pressure.  5. There is mildly reduced right ventricular systolic function.  6. Moderate mitral valve regurgitation.  7. Mildly elevated RVSP.  8. Aortic valve sclerosis.  9. There are multiple wall motion abnormalities. QUANTITATIVE DATA SUMMARY: 2D MEASUREMENTS:                           Normal Ranges: LAs:           4.50 cm    (2.7-4.0cm) IVSd:          1.00 cm    (0.6-1.1cm) LVPWd:         0.90 cm    (0.6-1.1cm) LVIDd:         6.50 cm    (3.9-5.9cm) LVIDs:         5.40 cm LV Mass Index: 144.5 g/m2 LV % FS        16.9 % LA VOLUME:                             Normal Ranges: LA Area A4C:     21.0 cm2 LA Area A2C:     21.0 cm2 LA Volume Index: 37.0 ml/m2 M-MODE MEASUREMENTS:                  Normal Ranges: Ao Root: 2.60 cm (2.0-3.7cm) AoV Exc: 1.40 cm (1.5-2.5cm) AORTA MEASUREMENTS:                     Normal Ranges: AoV Exc:   1.40 cm (1.5-2.5cm) Asc Ao, d: 3.30 cm (2.1-3.4cm) LV SYSTOLIC FUNCTION BY 2D PLANIMETRY (MOD):                     Normal Ranges: EF-A4C View: 39.4 % (>=55%) EF-A2C View: 28.7 % EF-Biplane:  34.0 % LV DIASTOLIC FUNCTION:                        Normal Ranges: MV Peak E:    1.38 m/s (0.7-1.2 m/s) MV Peak A:    0.90 m/s (0.42-0.7 m/s) E/A Ratio:    1.54     (1.0-2.2) MV e'         0.06 m/s (>8.0) MV lateral e' 0.08 m/s MV medial e'  0.04 m/s E/e' Ratio:   23.00    (<8.0) MITRAL VALVE:                 Normal Ranges: MV DT: 177 msec (150-240msec) MITRAL INSUFFICIENCY:                           Normal Ranges: PISA Radius:  0.4 cm MR VTI:       142.00 cm MR Vmax:      407.00 cm/s MR Alias Subhash: 84.7 cm/s MR Volume:    29.71 ml MR Flow Rt:   85.15 ml/s MR EROA:      0.21 cm2 AORTIC VALVE:                         Normal Ranges: AoV Vmax:      1.38 m/s (<=1.7m/s) AoV Peak P.6 mmHg (<20mmHg) LVOT Max Subhash:  0.89 m/s (<=1.1m/s) LVOT VTI:      20.00 cm LVOT Diameter: 1.70 cm  (1.8-2.4cm) AoV Area,Vmax: 1.47 cm2 (2.5-4.5cm2)  RIGHT VENTRICLE: RV Basal 3.40 cm RV Mid   1.80 cm RV Major 7.6 cm TAPSE:   14.1 mm RV s'    0.09 m/s TRICUSPID VALVE/RVSP:                             Normal Ranges: Peak TR Velocity: 3.19 m/s RV Syst Pressure: 43.7 mmHg (< 30mmHg) PULMONIC VALVE:                        Normal Ranges: PV Accel Time: 74 msec (>120ms)  [id number] Horacio Armijo MD Electronically signed on 2024 at 4:34:01 PM  Wall Scoring  ** Final **     XR chest 1 view    Result Date: 2024  STUDY: Chest Radiograph;  2024 at 1:13 AM INDICATION: Chest pain. COMPARISON: XR chest 2022, XR chest 2021, XR chest 2021, XR chest 3/1/2019. ACCESSION NUMBER(S): [id number] ORDERING CLINICIAN: ISIDRO MARSHALL TECHNIQUE:  Frontal chest was obtained at 0113 hours. FINDINGS: CARDIOMEDIASTINAL SILHOUETTE: Cardiomediastinal silhouette is mildly enlarged in size and configuration.  Calcified plaque is seen  in the aorta.  LUNGS: Lungs are clear.  Flattening of hemidiaphragms suggests chronic changes of COPD.  ABDOMEN: No remarkable upper abdominal findings.  BONES: No acute osseous changes.    No acute cardia pulmonary disease. Cardiomegaly with suspected mild chronic changes of COPD. Signed by Rojas Lawrence       Assessment/Plan     Principal Problem:    Atrial fibrillation with RVR (Multi)  Active Problems:    Type 2 diabetes mellitus with hyperglycemia, without long-term current use of insulin (Multi)    Chronic combined systolic (congestive) and diastolic (congestive) heart failure (Multi)    Cystitis    Idiopathic hypotension    73-year-old female with a history of combined systolic/diastolic heart failure (EF 34%), non-insulin-dependent type 2 diabetes, CAD with prior CABG, atrial fibrillation on Eliquis, carotid artery disease, hypertension, GERD, hyperlipidemia, ovarian cancer s/p bilateral oophorectomy, uterine fibroid resections, presented to the hospital with weakness and was admitted for UTI and A-fib RVR.    Atrial fibrillation with RVR  Complaint systolic/diastolic heart failure (EF 34%), ischemic cardiomyopathy  CAD s/p CABG  Troponinemia  -A-fib RVR likely exacerbated by underlying UTI, however given elevated troponin history significant cardiac history and echocardiogram during this admission shows multiple wall motion abnormalities and decreased EF to 34%, further cardiac workup is warranted  -Cardiology consulted, patient n.p.o. and plan for  cardiac catheterization today  -A-fib RVR, heart rates 120-150 this morning.  Currently on diltiazem drip, 1 dose of 500 mcg digoxin given.  Continue to monitor rates.  Can give additional doses of digoxin as needed  Per loading dose.  -Continue home Eliquis, metoprolol succinate 50 mg    Uncomplicated cystitis  -Urine cultures growing greater than 100,000 enteric bacilli  -Continue IV Rocephin (4/30 - present)    Acute kidney injury, prerenal versus ischemic  ATN  -Creatinine improved.  0.98 today, 1.43 yesterday  -Holding home lisinopril and spironolactone  -Serial RFP's    Non-insulin-dependent type 2 diabetes  -Patient hypoglycemic this morning, holding home glyburide  -Hypoglycemic protocol in place  -Continue monitor sugars    Hypertension  -Holding home lisinopril and spironolactone    Hyperlipidemia  -Continue home atorvastatin    Diet: N.p.o.  DVT prophylaxis: Eliquis  Consults: Cardiology  CODE STATUS: Full    Disposition: Admitted for A-fib RVR and UTI.  N.p.o., patient on echocardiac catheterization.  Anticipate patient to remain in the hospital for the next 24-48 hours.    To be discussed with attending,    Renita Landry DO  Internal Medicine, PGY-3

## 2024-05-01 NOTE — NURSING NOTE
5/1/24    0537: Pt in Afib RVR rates 140-160s. Rapid response called. Critical value glucose 48 called in.  5mg IV metop pushed with no improvement in HR. -110s. IV dextrose pushed. Repeat glucose 164. 10mg IV cardizem ordered. HR improved after caerdizem bouls the increased back to 150s. Cardizem gtt started at 5ml/hr and shortly stopped after SBP<100. Instructed per Dr. Link to keep cardizem gtt at 5ml/hr and hold if MAP<65. Glucose 174 at 0630

## 2024-05-01 NOTE — PROGRESS NOTES
05/01/24 1213   Discharge Planning   Living Arrangements Friends  (room mate)   Support Systems None   Assistance Needed uses a rollator   Type of Residence Private residence   Number of Stairs to Enter Residence 7   Number of Stairs Within Residence 0   Do you have animals or pets at home? No   Who is requesting discharge planning? Provider   Home or Post Acute Services None   Patient expects to be discharged to: home   Does the patient need discharge transport arranged? Yes   RoundTrip coordination needed? Yes   Has discharge transport been arranged? No     Met with patient at bedside. Verified address and PCP. Uses [facility] in [place] for medications and [facility] and has no issues obtaining them, takes as directed and knows reasons for taking them. Uses rollator to ambulate, still drives, does get her own groceries but tends to eat out more than eats at home. Does wear O2 continuously at home at 2 liters and uses [employer] for supply. Patient does have some social needs for safety within the home. Will alert SW to patient . Patient states does have a son that lives in area but is no help to her. Patient would prefer to go home on discharge but therapy is pending. Will follow patient for all discharge needs.

## 2024-05-01 NOTE — NURSING NOTE
Cardiac Rehab: Pt seen for qualifying diagnosis to cardiac rehab program, Heart Failure. Explained purpose of cardiac rehab. Pt states she cannot breathe with any activity. Pt already has a [facility] book, does not desire another one. States she will not do cardiac rehab and has refused program in the past. Pt reports smoking 10 cigarettes per day.

## 2024-05-01 NOTE — SIGNIFICANT EVENT
Rapid response called at 5:39 AM for tachycardia with ventricular rates noted to be in excess of 150.  Findings consistent with atrial flutter.  Similar presentation noted at time of admission.  At the time of initial assessment, patient is lying comfortably in bed and denying any significant adverse symptoms.  She states that she does appreciate that her heart is beating faster and states that she can feel it primarily in her arms.  Describes the weakness when this event occurs.    Heart rate: 152  Blood pressure: 107/64  Respiratory rate: 18  SpO2: 97% on 2 L by nasal cannula    On physical exam, patient is resting comfortably and in no acute distress.  Normal HEENT exam findings.  Heart rate is irregular and tachycardic.  Lungs are clear to auscultation bilaterally.  Abdomen is soft, nontender, nondistended.    Findings consistent with patient's known history of atrial fibrillation/flutter and consistent with her presentation at time of admission.  10 mg of IV Cardizem push initially administered with rate reduction into the mid 80s.  However this reduction was not sustained and patient's heart rate slowly trended back upwards.  Cardizem infusion has been started.  Titrate to MAP greater than 65 with goal heart rate between 80 and 100.    2 g of IV magnesium given for magnesium level of 1.71    Patient hypoglycemic on a.m. blood draw.  Blood glucose of 48.  Home sulfonylureas have been held.  25 g of dextrose administered with subsequent blood glucose of 164.  There was concern about further decline given the prolonged half-life of her medication however repeat blood glucose 30 minutes later noted to be 174.    Heart rate while on Cardizem infusion has been ranging from 70-1 20.  Systolic blood pressure of approximately 100 at this time.  Rapid response concluded.  All questions addressed with patient as well as questions addressed with rapid response team.

## 2024-05-02 ENCOUNTER — APPOINTMENT (OUTPATIENT)
Dept: CARDIOLOGY | Facility: HOSPITAL | Age: 74
DRG: 280 | End: 2024-05-02
Payer: MEDICARE

## 2024-05-02 ENCOUNTER — APPOINTMENT (OUTPATIENT)
Dept: RADIOLOGY | Facility: HOSPITAL | Age: 74
DRG: 280 | End: 2024-05-02
Payer: MEDICARE

## 2024-05-02 LAB
ALBUMIN SERPL BCP-MCNC: 3.4 G/DL (ref 3.4–5)
ANION GAP SERPL CALC-SCNC: 11 MMOL/L (ref 10–20)
ATRIAL RATE: 158 BPM
ATRIAL RATE: 328 BPM
ATRIAL RATE: 95 BPM
BACTERIA UR CULT: ABNORMAL
BACTERIA UR CULT: ABNORMAL
BASOPHILS # BLD AUTO: 0.02 X10*3/UL (ref 0–0.1)
BASOPHILS NFR BLD AUTO: 0.1 %
BUN SERPL-MCNC: 20 MG/DL (ref 6–23)
CALCIUM SERPL-MCNC: 8.9 MG/DL (ref 8.6–10.3)
CARDIAC TROPONIN I PNL SERPL HS: 119 NG/L (ref 0–13)
CHLORIDE SERPL-SCNC: 101 MMOL/L (ref 98–107)
CO2 SERPL-SCNC: 29 MMOL/L (ref 21–32)
CREAT SERPL-MCNC: 0.98 MG/DL (ref 0.5–1.05)
EGFRCR SERPLBLD CKD-EPI 2021: 60 ML/MIN/1.73M*2
EOSINOPHIL # BLD AUTO: 0.08 X10*3/UL (ref 0–0.4)
EOSINOPHIL NFR BLD AUTO: 0.6 %
ERYTHROCYTE [DISTWIDTH] IN BLOOD BY AUTOMATED COUNT: 14.1 % (ref 11.5–14.5)
GLUCOSE BLD MANUAL STRIP-MCNC: 160 MG/DL (ref 74–99)
GLUCOSE SERPL-MCNC: 191 MG/DL (ref 74–99)
HCT VFR BLD AUTO: 40 % (ref 36–46)
HGB BLD-MCNC: 12.1 G/DL (ref 12–16)
IMM GRANULOCYTES # BLD AUTO: 0.08 X10*3/UL (ref 0–0.5)
IMM GRANULOCYTES NFR BLD AUTO: 0.6 % (ref 0–0.9)
LYMPHOCYTES # BLD AUTO: 1.03 X10*3/UL (ref 0.8–3)
LYMPHOCYTES NFR BLD AUTO: 7.6 %
MAGNESIUM SERPL-MCNC: 1.91 MG/DL (ref 1.6–2.4)
MCH RBC QN AUTO: 31.2 PG (ref 26–34)
MCHC RBC AUTO-ENTMCNC: 30.3 G/DL (ref 32–36)
MCV RBC AUTO: 103 FL (ref 80–100)
MONOCYTES # BLD AUTO: 1.37 X10*3/UL (ref 0.05–0.8)
MONOCYTES NFR BLD AUTO: 10.1 %
NEUTROPHILS # BLD AUTO: 11.01 X10*3/UL (ref 1.6–5.5)
NEUTROPHILS NFR BLD AUTO: 81 %
NRBC BLD-RTO: 0 /100 WBCS (ref 0–0)
P AXIS: -84 DEGREES
P AXIS: 102 DEGREES
P AXIS: 91 DEGREES
P OFFSET: 187 MS
P OFFSET: 204 MS
P OFFSET: 210 MS
P ONSET: 144 MS
P ONSET: 150 MS
P ONSET: 160 MS
PHOSPHATE SERPL-MCNC: 3.5 MG/DL (ref 2.5–4.9)
PLATELET # BLD AUTO: 255 X10*3/UL (ref 150–450)
POTASSIUM SERPL-SCNC: 4.2 MMOL/L (ref 3.5–5.3)
PR INTERVAL: 144 MS
PR INTERVAL: 90 MS
Q ONSET: 195 MS
Q ONSET: 216 MS
Q ONSET: 216 MS
QRS COUNT: 15 BEATS
QRS COUNT: 26 BEATS
QRS COUNT: 27 BEATS
QRS DURATION: 102 MS
QRS DURATION: 108 MS
QRS DURATION: 116 MS
QT INTERVAL: 244 MS
QT INTERVAL: 250 MS
QT INTERVAL: 372 MS
QTC CALCULATION(BAZETT): 395 MS
QTC CALCULATION(BAZETT): 412 MS
QTC CALCULATION(BAZETT): 467 MS
QTC FREDERICIA: 337 MS
QTC FREDERICIA: 349 MS
QTC FREDERICIA: 433 MS
R AXIS: -67 DEGREES
R AXIS: 130 DEGREES
R AXIS: 42 DEGREES
RBC # BLD AUTO: 3.88 X10*6/UL (ref 4–5.2)
SODIUM SERPL-SCNC: 137 MMOL/L (ref 136–145)
T AXIS: 116 DEGREES
T AXIS: 206 DEGREES
T AXIS: 258 DEGREES
T OFFSET: 317 MS
T OFFSET: 341 MS
T OFFSET: 402 MS
VENTRICULAR RATE: 158 BPM
VENTRICULAR RATE: 164 BPM
VENTRICULAR RATE: 95 BPM
WBC # BLD AUTO: 13.6 X10*3/UL (ref 4.4–11.3)

## 2024-05-02 PROCEDURE — 99233 SBSQ HOSP IP/OBS HIGH 50: CPT

## 2024-05-02 PROCEDURE — 2500000004 HC RX 250 GENERAL PHARMACY W/ HCPCS (ALT 636 FOR OP/ED)

## 2024-05-02 PROCEDURE — 93005 ELECTROCARDIOGRAM TRACING: CPT

## 2024-05-02 PROCEDURE — 71045 X-RAY EXAM CHEST 1 VIEW: CPT

## 2024-05-02 PROCEDURE — 2500000001 HC RX 250 WO HCPCS SELF ADMINISTERED DRUGS (ALT 637 FOR MEDICARE OP): Performed by: INTERNAL MEDICINE

## 2024-05-02 PROCEDURE — 84484 ASSAY OF TROPONIN QUANT: CPT

## 2024-05-02 PROCEDURE — 3430000001 HC RX 343 DIAGNOSTIC RADIOPHARMACEUTICALS: Performed by: INTERNAL MEDICINE

## 2024-05-02 PROCEDURE — 83735 ASSAY OF MAGNESIUM: CPT

## 2024-05-02 PROCEDURE — 36415 COLL VENOUS BLD VENIPUNCTURE: CPT

## 2024-05-02 PROCEDURE — 2500000004 HC RX 250 GENERAL PHARMACY W/ HCPCS (ALT 636 FOR OP/ED): Performed by: INTERNAL MEDICINE

## 2024-05-02 PROCEDURE — 82947 ASSAY GLUCOSE BLOOD QUANT: CPT

## 2024-05-02 PROCEDURE — 2500000005 HC RX 250 GENERAL PHARMACY W/O HCPCS: Performed by: INTERNAL MEDICINE

## 2024-05-02 PROCEDURE — 2500000006 HC RX 250 W HCPCS SELF ADMINISTERED DRUGS (ALT 637 FOR ALL PAYERS): Performed by: INTERNAL MEDICINE

## 2024-05-02 PROCEDURE — 38792 RA TRACER ID OF SENTINL NODE: CPT

## 2024-05-02 PROCEDURE — 93010 ELECTROCARDIOGRAM REPORT: CPT | Performed by: INTERNAL MEDICINE

## 2024-05-02 PROCEDURE — A9502 TC99M TETROFOSMIN: HCPCS | Performed by: INTERNAL MEDICINE

## 2024-05-02 PROCEDURE — 85025 COMPLETE CBC W/AUTO DIFF WBC: CPT | Performed by: INTERNAL MEDICINE

## 2024-05-02 PROCEDURE — 2500000002 HC RX 250 W HCPCS SELF ADMINISTERED DRUGS (ALT 637 FOR MEDICARE OP, ALT 636 FOR OP/ED): Mod: MUE | Performed by: INTERNAL MEDICINE

## 2024-05-02 PROCEDURE — 71045 X-RAY EXAM CHEST 1 VIEW: CPT | Performed by: RADIOLOGY

## 2024-05-02 PROCEDURE — 94640 AIRWAY INHALATION TREATMENT: CPT

## 2024-05-02 PROCEDURE — 2060000001 HC INTERMEDIATE ICU ROOM DAILY

## 2024-05-02 PROCEDURE — 36415 COLL VENOUS BLD VENIPUNCTURE: CPT | Performed by: INTERNAL MEDICINE

## 2024-05-02 PROCEDURE — 84100 ASSAY OF PHOSPHORUS: CPT

## 2024-05-02 PROCEDURE — 2500000002 HC RX 250 W HCPCS SELF ADMINISTERED DRUGS (ALT 637 FOR MEDICARE OP, ALT 636 FOR OP/ED)

## 2024-05-02 RX ORDER — METOPROLOL TARTRATE 50 MG/1
50 TABLET ORAL ONCE
Status: COMPLETED | OUTPATIENT
Start: 2024-05-02 | End: 2024-05-02

## 2024-05-02 RX ORDER — MAGNESIUM SULFATE HEPTAHYDRATE 40 MG/ML
2 INJECTION, SOLUTION INTRAVENOUS ONCE
Status: COMPLETED | OUTPATIENT
Start: 2024-05-02 | End: 2024-05-02

## 2024-05-02 RX ORDER — CEPHALEXIN 500 MG/1
500 CAPSULE ORAL EVERY 8 HOURS SCHEDULED
Status: DISCONTINUED | OUTPATIENT
Start: 2024-05-02 | End: 2024-05-06 | Stop reason: HOSPADM

## 2024-05-02 RX ORDER — FLUTICASONE PROPIONATE 50 MCG
2 SPRAY, SUSPENSION (ML) NASAL DAILY
Status: DISCONTINUED | OUTPATIENT
Start: 2024-05-02 | End: 2024-05-06 | Stop reason: HOSPADM

## 2024-05-02 RX ORDER — LORAZEPAM 0.5 MG/1
0.5 TABLET ORAL EVERY 12 HOURS PRN
Status: DISCONTINUED | OUTPATIENT
Start: 2024-05-02 | End: 2024-05-06 | Stop reason: HOSPADM

## 2024-05-02 RX ORDER — MORPHINE SULFATE 2 MG/ML
2 INJECTION, SOLUTION INTRAMUSCULAR; INTRAVENOUS ONCE
Status: COMPLETED | OUTPATIENT
Start: 2024-05-02 | End: 2024-05-02

## 2024-05-02 RX ORDER — LORAZEPAM 2 MG/ML
0.25 INJECTION INTRAMUSCULAR ONCE
Status: COMPLETED | OUTPATIENT
Start: 2024-05-02 | End: 2024-05-02

## 2024-05-02 RX ORDER — FUROSEMIDE 10 MG/ML
20 INJECTION INTRAMUSCULAR; INTRAVENOUS ONCE
Status: COMPLETED | OUTPATIENT
Start: 2024-05-02 | End: 2024-05-02

## 2024-05-02 RX ORDER — DILTIAZEM HYDROCHLORIDE 5 MG/ML
5 INJECTION INTRAVENOUS ONCE
Status: COMPLETED | OUTPATIENT
Start: 2024-05-02 | End: 2024-05-02

## 2024-05-02 RX ORDER — REGADENOSON 0.08 MG/ML
0.4 INJECTION, SOLUTION INTRAVENOUS
Status: DISCONTINUED | OUTPATIENT
Start: 2024-05-02 | End: 2024-05-06 | Stop reason: HOSPADM

## 2024-05-02 RX ADMIN — FORMOTEROL FUMARATE 20 MCG: 20 SOLUTION RESPIRATORY (INHALATION) at 21:28

## 2024-05-02 RX ADMIN — ICOSAPENT ETHYL 2 G: 1 CAPSULE ORAL at 10:42

## 2024-05-02 RX ADMIN — CEPHALEXIN 500 MG: 500 CAPSULE ORAL at 14:51

## 2024-05-02 RX ADMIN — METHYLPREDNISOLONE SODIUM SUCCINATE 40 MG: 40 INJECTION, POWDER, FOR SOLUTION INTRAMUSCULAR; INTRAVENOUS at 18:34

## 2024-05-02 RX ADMIN — LORAZEPAM 0.25 MG: 2 INJECTION, SOLUTION INTRAMUSCULAR; INTRAVENOUS at 03:24

## 2024-05-02 RX ADMIN — Medication 4 L/MIN: at 08:00

## 2024-05-02 RX ADMIN — CILOSTAZOL 100 MG: 100 TABLET ORAL at 20:10

## 2024-05-02 RX ADMIN — IPRATROPIUM BROMIDE AND ALBUTEROL SULFATE 3 ML: 2.5; .5 SOLUTION RESPIRATORY (INHALATION) at 18:06

## 2024-05-02 RX ADMIN — OXYBUTYNIN CHLORIDE 5 MG: 5 TABLET ORAL at 10:42

## 2024-05-02 RX ADMIN — IPRATROPIUM BROMIDE AND ALBUTEROL SULFATE 3 ML: 2.5; .5 SOLUTION RESPIRATORY (INHALATION) at 13:44

## 2024-05-02 RX ADMIN — Medication 3 L/MIN: at 17:15

## 2024-05-02 RX ADMIN — MORPHINE SULFATE 2 MG: 2 INJECTION, SOLUTION INTRAMUSCULAR; INTRAVENOUS at 18:34

## 2024-05-02 RX ADMIN — CILOSTAZOL 100 MG: 100 TABLET ORAL at 10:43

## 2024-05-02 RX ADMIN — ICOSAPENT ETHYL 2 G: 1 CAPSULE ORAL at 20:10

## 2024-05-02 RX ADMIN — DIGOXIN 500 MCG: 0.25 INJECTION INTRAMUSCULAR; INTRAVENOUS at 10:42

## 2024-05-02 RX ADMIN — MAGNESIUM SULFATE HEPTAHYDRATE 2 G: 40 INJECTION, SOLUTION INTRAVENOUS at 10:43

## 2024-05-02 RX ADMIN — TETROFOSMIN 9.17 MILLICURIE: 0.23 INJECTION, POWDER, LYOPHILIZED, FOR SOLUTION INTRAVENOUS at 08:45

## 2024-05-02 RX ADMIN — Medication 10 MG/HR: at 01:45

## 2024-05-02 RX ADMIN — Medication 5 MG/HR: at 22:20

## 2024-05-02 RX ADMIN — DILTIAZEM HYDROCHLORIDE 5 MG: 5 INJECTION INTRAVENOUS at 22:15

## 2024-05-02 RX ADMIN — CHOLECALCIFEROL TAB 125 MCG (5000 UNIT) 5000 UNITS: 125 TAB at 10:42

## 2024-05-02 RX ADMIN — FUROSEMIDE 20 MG: 10 INJECTION, SOLUTION INTRAMUSCULAR; INTRAVENOUS at 20:09

## 2024-05-02 RX ADMIN — IPRATROPIUM BROMIDE AND ALBUTEROL SULFATE 3 ML: 2.5; .5 SOLUTION RESPIRATORY (INHALATION) at 03:06

## 2024-05-02 RX ADMIN — APIXABAN 5 MG: 5 TABLET, FILM COATED ORAL at 20:10

## 2024-05-02 RX ADMIN — Medication 3 L/MIN: at 20:00

## 2024-05-02 RX ADMIN — FLUTICASONE PROPIONATE 2 SPRAY: 50 SPRAY, METERED NASAL at 14:51

## 2024-05-02 RX ADMIN — CEPHALEXIN 500 MG: 500 CAPSULE ORAL at 21:16

## 2024-05-02 RX ADMIN — APIXABAN 5 MG: 5 TABLET, FILM COATED ORAL at 10:42

## 2024-05-02 RX ADMIN — OXYBUTYNIN CHLORIDE 5 MG: 5 TABLET ORAL at 20:09

## 2024-05-02 RX ADMIN — PANTOPRAZOLE SODIUM 40 MG: 40 TABLET, DELAYED RELEASE ORAL at 05:40

## 2024-05-02 RX ADMIN — ATORVASTATIN CALCIUM 40 MG: 40 TABLET, FILM COATED ORAL at 20:10

## 2024-05-02 RX ADMIN — LORAZEPAM 0.5 MG: 0.5 TABLET ORAL at 17:53

## 2024-05-02 RX ADMIN — Medication 15 MG/HR: at 22:55

## 2024-05-02 RX ADMIN — CEPHALEXIN 500 MG: 500 CAPSULE ORAL at 10:48

## 2024-05-02 RX ADMIN — MAGNESIUM SULFATE HEPTAHYDRATE 2 G: 40 INJECTION, SOLUTION INTRAVENOUS at 18:35

## 2024-05-02 RX ADMIN — METOPROLOL SUCCINATE 50 MG: 50 TABLET, EXTENDED RELEASE ORAL at 06:31

## 2024-05-02 RX ADMIN — Medication 10 MG/HR: at 22:39

## 2024-05-02 RX ADMIN — METOPROLOL TARTRATE 50 MG: 50 TABLET, FILM COATED ORAL at 21:16

## 2024-05-02 RX ADMIN — IPRATROPIUM BROMIDE AND ALBUTEROL SULFATE 3 ML: 2.5; .5 SOLUTION RESPIRATORY (INHALATION) at 21:28

## 2024-05-02 ASSESSMENT — PAIN SCALES - GENERAL
PAINLEVEL_OUTOF10: 0 - NO PAIN

## 2024-05-02 ASSESSMENT — COGNITIVE AND FUNCTIONAL STATUS - GENERAL
MOVING FROM LYING ON BACK TO SITTING ON SIDE OF FLAT BED WITH BEDRAILS: A LITTLE
DAILY ACTIVITIY SCORE: 22
DRESSING REGULAR UPPER BODY CLOTHING: A LITTLE
HELP NEEDED FOR BATHING: A LITTLE
TOILETING: A LITTLE
HELP NEEDED FOR BATHING: A LITTLE
WALKING IN HOSPITAL ROOM: A LITTLE
DRESSING REGULAR LOWER BODY CLOTHING: A LITTLE
PERSONAL GROOMING: A LITTLE
TOILETING: A LITTLE
MOVING TO AND FROM BED TO CHAIR: A LITTLE
MOVING TO AND FROM BED TO CHAIR: A LITTLE
MOBILITY SCORE: 21
MOBILITY SCORE: 17
TURNING FROM BACK TO SIDE WHILE IN FLAT BAD: A LITTLE
WALKING IN HOSPITAL ROOM: A LITTLE
STANDING UP FROM CHAIR USING ARMS: A LITTLE
DAILY ACTIVITIY SCORE: 19
CLIMB 3 TO 5 STEPS WITH RAILING: A LITTLE
CLIMB 3 TO 5 STEPS WITH RAILING: A LOT

## 2024-05-02 ASSESSMENT — PAIN - FUNCTIONAL ASSESSMENT
PAIN_FUNCTIONAL_ASSESSMENT: 0-10

## 2024-05-02 NOTE — DOCUMENTATION CLARIFICATION NOTE
"    PATIENT:               MILVIA ARRIOLA  ACCT #:                  [id number]  MRN:                       [id number]  :                       1950  ADMIT DATE:       2024 12:43 AM  DISCH DATE:  RESPONDING PROVIDER #:        [id number]          PROVIDER RESPONSE TEXT:    Acute Hypoxemic Respiratory Failure    CDI QUERY TEXT:    Clarification    Instruction:    Based on your assessment of the patient and the clinical information, please provide the requested documentation by clicking on the appropriate radio button and enter any additional information if prompted.    Question: Is there a diagnosis indicative of the clinical information    When answering this query, please exercise your independent professional judgment. The fact that a question is being asked, does not imply that any particular answer is desired or expected.    The patient's clinical indicators include:  Clinical Information: 74 y/o F admitted with A.Fib RVR    Clinical Indicators:    90-97 2L, 92-93 3L    ED note  Dr. Fu: \"fatigue and Sob x 2days, and a sharp pain with urination.  found to have A-fib with RVR with a pulse of around 150.  Blood pressure was in the 80s.  She is a little hypoxic here at 88%.  She uses oxygen at home as needed\"    H/P  Dr. Link: \"Patient also noted to be hypoxic but has baseline O2 requirements of 2 L.  Currently requiring 2 to 4 L of supplemental oxygen to maintain appropriate SpO2 levels.  Normal breath sounds\"    Treatment: supplemental oxygen, albuterol inhaler Q6 PRN, perforomist BID, atrovent QID, duoneb QID    Risk Factors: chronic oxygen use, h/o COPD, CHF, ischemic cardiomyopathy  Options provided:  -- Acute Hypoxemic Respiratory Failure  -- Acute on chronic Hypoxemic Respiratory Failure  -- chronic Hypoxemic Respiratory Failure  -- No acute respiratory failure  -- Other - I will add my own diagnosis  -- Refer to Clinical Documentation Reviewer    Query created by: Tanya Degroot on " Pt ambulatory to washroom, gait steady, voided.    5/2/2024 8:12 AM      Electronically signed by:  YANIRA BAXTER MD 5/2/2024 8:36 AM

## 2024-05-02 NOTE — PROGRESS NOTES
Occupational Therapy                 Therapy Communication Note    Patient Name: Mitzi Pro  MRN: [id number]  Today's Date: 5/2/2024     Discipline: Occupational Therapy    Missed Visit Reason: Missed Visit Reason: Patient refused    Missed Time: Attempt    Comment: Pt declined at this time as is not feeling well. Will continue to follow and attempt at next opportunity.

## 2024-05-02 NOTE — NURSING NOTE
Pt remained safe throughout shift. Cardizem gtt turned off dt HR in 50s. Pt converted to NSR near end of shift and PO metoprolol given early per Dr. Link. Pt had two episodes of SOB, orthopnea. O2 increased from 2L to 3L NC. Duonebs given. SOB improved with ativan.

## 2024-05-02 NOTE — PROGRESS NOTES
Physical Therapy                 Therapy Communication Note    Patient Name: Mitzi Pro  MRN: [id number]  Today's Date: 5/2/2024     Discipline: Physical Therapy    Missed Visit Reason: Missed Visit Reason: Patient refused (patient in bed, spo2 at rest is 89% on 3L.  declined therapy stating she feels terrible.  possible Heart cath per nurse and could not tolerate stress test.)    Missed Time: Attempt    Comment:

## 2024-05-02 NOTE — DOCUMENTATION CLARIFICATION NOTE
"    PATIENT:               MILVIA ARRIOLA  ACCT #:                  [id number]  MRN:                       [id number]  :                       1950  ADMIT DATE:       2024 12:43 AM  DISCH DATE:  RESPONDING PROVIDER #:        [id number]          PROVIDER RESPONSE TEXT:    Acute kidney injury with acute tubular necrosis    CDI QUERY TEXT:    Clarification    Instruction:    Based on your assessment of the patient and the clinical information, please provide the requested documentation by clicking on the appropriate radio button and enter any additional information if prompted.    Question: Please further clarify the diagnosis of acute kidney injury as    When answering this query, please exercise your independent professional judgment. The fact that a question is being asked, does not imply that any particular answer is desired or expected.    The patient's clinical indicators include:  Clinical Information: 74 y/o F admitted with A.Fib RVR    Clinical Indicators:    24 00:54: 1.63  24 04:53: 1.43  24 04:21:  0.98  24 04:30:  0.98    ED note  Dr. Fu: \"hematuria, fatigue and Sob x 2days, and a sharp pain with urination.  found to have A-fib with RVR with a pulse of around 150.  Blood pressure was in the 80s.  She is a little hypoxic here at 88%.  She has some acute kidney injury\"    H/P  Dr. Link:\" afib w/rvr, DM2, chronic combined HF, cystitis, idiopathic hypotension\"    MD PN  Dr. Landry/Allyson: \"Acute kidney injury, prerenal versus ischemic ATN-Creatinine improved.  0.98 today, 1.43 yesterday\"    Treatment: daily labs, trend creatinine level, 2L NS bolus, hold lisinopril and spironolactone    Risk Factors: A.Fib RVR, h/o CHF, CKD, IV antibiotics  Options provided:  -- Acute kidney injury with acute tubular necrosis  -- Acute kidney injury without acute tubular necrosis  -- Other - I will add my own diagnosis  -- Refer to Clinical Documentation Reviewer    Query created " by: Tanya Degroot on 5/2/2024 8:00 AM      Electronically signed by:  YANIRA BAXTER MD 5/2/2024 8:36 AM

## 2024-05-02 NOTE — PROGRESS NOTES
Mitzi Pro is a 73 y.o. female on day 2 of admission presenting with Atrial fibrillation with RVR (Multi).      Subjective   Patient seen and examined at bedside's morning.  Patient notes that she was feeling short of breath last night, O2 requirements ranging from 3 L (baseline) to 4 L.  Patient went for stress test this morning however unable to complete due to inability to lay flat due to back pain.  Awaiting further recommendation from cardiology.       Objective     Last Recorded Vitals  /68   Pulse 80   Temp 36.1 °C (97 °F) (Temporal)   Resp (!) 40   Wt 77.8 kg (171 lb 8.3 oz)   SpO2 95%   Intake/Output last 3 Shifts:    Intake/Output Summary (Last 24 hours) at 5/2/2024 1111  Last data filed at 5/2/2024 0406  Gross per 24 hour   Intake 720 ml   Output 200 ml   Net 520 ml       Admission Weight  Weight: 77.1 kg (170 lb) (04/30/24 0034)    Daily Weight  05/02/24 : 77.8 kg (171 lb 8.3 oz)    Image Results  ECG 12 Lead  Atrial flutter with 2:1 AV conduction  Right axis deviation  Anteroseptal infarct (cited on or before 30-APR-2024)  ST & T wave abnormality, consider inferior ischemia  Abnormal ECG  When compared with ECG of 30-APR-2024 01:56, (unconfirmed)  Significant changes have occurred  ECG 12 lead  Undetermined rhythm  Septal infarct , age undetermined  ST & T wave abnormality, consider lateral ischemia  Abnormal ECG  When compared with ECG of 30-APR-2024 00:46, (unconfirmed)  Current undetermined rhythm precludes rhythm comparison, needs review  Incomplete right bundle branch block is no longer Present  Septal infarct is now Present  ECG 12 lead  Sinus tachycardia with short TN with Fusion complexes  Left axis deviation  Incomplete right bundle branch block  Nonspecific ST and T wave abnormality  Abnormal ECG  When compared with ECG of 08-APR-2023 13:22,  Fusion complexes are now Present  Incomplete right bundle branch block is now Present      Physical Exam    Constitutional: Slightly  fatigued, although resting comfortably  CV: Irregularly irregular, tachycardic, no murmurs or gallops appreciated, distal pulses intact   Pulmonary: Breath sounds clear to auscultation bilaterally, no rales, rhonchi, wheezing  GI: Abdomen soft, nondistended, nontender to palpation.  No guarding or rebound tenderness noted.  MSK: Range of motion intact in all 4 extremities  Extremities: No edema noted  Neuro: ANO x3, no focal neurologic deficits  Psych: Slightly fatigued skin: Warm and dry, no erythema skin lesions noted.     Relevant Results    Results for orders placed or performed during the hospital encounter of 04/30/24 (from the past 24 hour(s))   POCT GLUCOSE   Result Value Ref Range    POCT Glucose 239 (H) 74 - 99 mg/dL   Renal function panel   Result Value Ref Range    Glucose 191 (H) 74 - 99 mg/dL    Sodium 137 136 - 145 mmol/L    Potassium 4.2 3.5 - 5.3 mmol/L    Chloride 101 98 - 107 mmol/L    Bicarbonate 29 21 - 32 mmol/L    Anion Gap 11 10 - 20 mmol/L    Urea Nitrogen 20 6 - 23 mg/dL    Creatinine 0.98 0.50 - 1.05 mg/dL    eGFR 60 (L) >60 mL/min/1.73m*2    Calcium 8.9 8.6 - 10.3 mg/dL    Phosphorus 3.5 2.5 - 4.9 mg/dL    Albumin 3.4 3.4 - 5.0 g/dL   Magnesium   Result Value Ref Range    Magnesium 1.91 1.60 - 2.40 mg/dL   CBC and Auto Differential   Result Value Ref Range    WBC 13.6 (H) 4.4 - 11.3 x10*3/uL    nRBC 0.0 0.0 - 0.0 /100 WBCs    RBC 3.88 (L) 4.00 - 5.20 x10*6/uL    Hemoglobin 12.1 12.0 - 16.0 g/dL    Hematocrit 40.0 36.0 - 46.0 %     (H) 80 - 100 fL    MCH 31.2 26.0 - 34.0 pg    MCHC 30.3 (L) 32.0 - 36.0 g/dL    RDW 14.1 11.5 - 14.5 %    Platelets 255 150 - 450 x10*3/uL    Neutrophils % 81.0 40.0 - 80.0 %    Immature Granulocytes %, Automated 0.6 0.0 - 0.9 %    Lymphocytes % 7.6 13.0 - 44.0 %    Monocytes % 10.1 2.0 - 10.0 %    Eosinophils % 0.6 0.0 - 6.0 %    Basophils % 0.1 0.0 - 2.0 %    Neutrophils Absolute 11.01 (H) 1.60 - 5.50 x10*3/uL    Immature Granulocytes Absolute,  Automated 0.08 0.00 - 0.50 x10*3/uL    Lymphocytes Absolute 1.03 0.80 - 3.00 x10*3/uL    Monocytes Absolute 1.37 (H) 0.05 - 0.80 x10*3/uL    Eosinophils Absolute 0.08 0.00 - 0.40 x10*3/uL    Basophils Absolute 0.02 0.00 - 0.10 x10*3/uL      ECG 12 Lead    Result Date: 5/1/2024  Atrial flutter with 2:1 AV conduction Right axis deviation Anteroseptal infarct (cited on or before 30-APR-2024) ST & T wave abnormality, consider inferior ischemia Abnormal ECG When compared with ECG of 30-APR-2024 01:56, (unconfirmed) Significant changes have occurred    Transthoracic Echo (TTE) Complete    Result Date: 4/30/2024            [facility] [place] [address] [facility] [id number]    Tel [phone] Fax [phone] TRANSTHORACIC ECHOCARDIOGRAM REPORT  Patient Name:      MILVIA Garces Physician:    [id number] Horacio Armijo MD Study Date:        4/30/2024             Ordering Provider:    [id number] YANIRA BAXTER MRN/PID:           [id number]              Fellow: Accession#:        [id number]          Nurse:                Leena AMATO Date of Birth/Age: 1950 / 73 years  Sonographer:          Amalia Brewster RDCS Gender:            F                     Additional Staff: Height:            165.00 cm             Admit Date: Weight:            76.20 kg              Admission Status:     Inpatient -                                                                Priority                                                                discharge BSA / BMI:         1.84 m2 / 27.99 kg/m2 Department Location:  [facility] CCU Blood Pressure: 99 /56 mmHg Study Type:    TRANSTHORACIC ECHO (TTE) COMPLETE Diagnosis/ICD: Chronic combined systolic (congestive) and diastolic (congestive)                heart failure (CHF)-I50.42 Indication:    Hx of HFrEF, SOB; Afib RVR with troponin elevation Patient History:  Valve Disorders:   Mitral Regurgitation. Diabetes:          Yes Pertinent History: HTN, Hyperlipidemia and COPD. Study Detail: The following Echo studies were performed: 2D, M-Mode, Doppler and               color flow. Technically challenging study due to patient lying in               supine position and prominent lung artifact. Definity used as a               contrast agent for endocardial border definition. Total contrast               used for this procedure was 2 mL via IV push.  PHYSICIAN INTERPRETATION: Left Ventricle: Left ventricular systolic function is moderately decreased, with an estimated ejection fraction of 34 %. There are multiple wall motion abnormalities. The left ventricular cavity size is mild to moderately dilated. The left ventricular septal wall thickness is decreased. There is normal left ventricular posterior wall thickness. Spectral Doppler shows an impaired relaxation pattern of left ventricular diastolic filling. There is an elevated left ventricular end diastolic pressure. LV Wall Scoring: The mid and apical anterior septum, mid and apical inferior septum, apical lateral segment, apical anterior segment, and apex are akinetic. The entire inferior wall, basal and mid anterior wall, basal and mid anterolateral wall, basal and mid inferolateral wall, basal anteroseptal segment, and basal inferoseptal segment are hypokinetic. Left Atrium: The left atrium is mildly dilated. Right Ventricle: The right ventricle is normal in size. There is mildly reduced right ventricular systolic function. Right Atrium: The right atrium is normal in size. Aortic Valve: The aortic valve is trileaflet. There is evidence of mildly elevated transaortic gradients consistent with sclerosis of the aortic valve. There is no evidence of aortic valve regurgitation. The peak instantaneous gradient of the aortic valve is 7.6 mmHg. Mitral Valve: The mitral valve is normal in structure. There is mild to moderate mitral  annular calcification. There is moderate mitral valve regurgitation. EROA by PISA 0.2 moderate, posterior MR skyler type IIIA apical tethering. Tricuspid Valve: The tricuspid valve is structurally normal. There is mild tricuspid regurgitation. The Doppler estimated RVSP is mildly elevated at 43.7 mmHg. Pulmonic Valve: The pulmonic valve is structurally normal. There is trace pulmonic valve regurgitation. Pericardium: There is no pericardial effusion noted. Aorta: The aortic root is normal. There is no dilatation of the ascending aorta. There is no dilatation of the aortic root. Pulmonary Artery: The main pulmonary artery is normal in size, and position, with normal bifurcation into the left and right pulmonary arteries. Systemic Veins: The inferior vena cava appears mildly dilated. The hepatic vein appears dilated. There is less than 50% IVC collapse with inspiration.  CONCLUSIONS:  1. Left ventricular systolic function is moderately decreased with a 34 % estimated ejection fraction.  2. Multiple segmental abnormalities exist. See findings.  3. Spectral Doppler shows an impaired relaxation pattern of left ventricular diastolic filling.  4. There is an elevated left ventricular end diastolic pressure.  5. There is mildly reduced right ventricular systolic function.  6. Moderate mitral valve regurgitation.  7. Mildly elevated RVSP.  8. Aortic valve sclerosis.  9. There are multiple wall motion abnormalities. QUANTITATIVE DATA SUMMARY: 2D MEASUREMENTS:                           Normal Ranges: LAs:           4.50 cm    (2.7-4.0cm) IVSd:          1.00 cm    (0.6-1.1cm) LVPWd:         0.90 cm    (0.6-1.1cm) LVIDd:         6.50 cm    (3.9-5.9cm) LVIDs:         5.40 cm LV Mass Index: 144.5 g/m2 LV % FS        16.9 % LA VOLUME:                             Normal Ranges: LA Area A4C:     21.0 cm2 LA Area A2C:     21.0 cm2 LA Volume Index: 37.0 ml/m2 M-MODE MEASUREMENTS:                  Normal Ranges: Ao Root: 2.60 cm  (2.0-3.7cm) AoV Exc: 1.40 cm (1.5-2.5cm) AORTA MEASUREMENTS:                    Normal Ranges: AoV Exc:   1.40 cm (1.5-2.5cm) Asc Ao, d: 3.30 cm (2.1-3.4cm) LV SYSTOLIC FUNCTION BY 2D PLANIMETRY (MOD):                     Normal Ranges: EF-A4C View: 39.4 % (>=55%) EF-A2C View: 28.7 % EF-Biplane:  34.0 % LV DIASTOLIC FUNCTION:                        Normal Ranges: MV Peak E:    1.38 m/s (0.7-1.2 m/s) MV Peak A:    0.90 m/s (0.42-0.7 m/s) E/A Ratio:    1.54     (1.0-2.2) MV e'         0.06 m/s (>8.0) MV lateral e' 0.08 m/s MV medial e'  0.04 m/s E/e' Ratio:   23.00    (<8.0) MITRAL VALVE:                 Normal Ranges: MV DT: 177 msec (150-240msec) MITRAL INSUFFICIENCY:                           Normal Ranges: PISA Radius:  0.4 cm MR VTI:       142.00 cm MR Vmax:      407.00 cm/s MR Alias Subhash: 84.7 cm/s MR Volume:    29.71 ml MR Flow Rt:   85.15 ml/s MR EROA:      0.21 cm2 AORTIC VALVE:                         Normal Ranges: AoV Vmax:      1.38 m/s (<=1.7m/s) AoV Peak P.6 mmHg (<20mmHg) LVOT Max Sbuhash:  0.89 m/s (<=1.1m/s) LVOT VTI:      20.00 cm LVOT Diameter: 1.70 cm  (1.8-2.4cm) AoV Area,Vmax: 1.47 cm2 (2.5-4.5cm2)  RIGHT VENTRICLE: RV Basal 3.40 cm RV Mid   1.80 cm RV Major 7.6 cm TAPSE:   14.1 mm RV s'    0.09 m/s TRICUSPID VALVE/RVSP:                             Normal Ranges: Peak TR Velocity: 3.19 m/s RV Syst Pressure: 43.7 mmHg (< 30mmHg) PULMONIC VALVE:                        Normal Ranges: PV Accel Time: 74 msec (>120ms)  [id number] Horacio Armijo MD Electronically signed on 2024 at 4:34:01 PM  Wall Scoring  ** Final **        Assessment/Plan     Principal Problem:    Atrial fibrillation with RVR (Multi)  Active Problems:    Type 2 diabetes mellitus with hyperglycemia, without long-term current use of insulin (Multi)    Chronic combined systolic (congestive) and diastolic (congestive) heart failure (Multi)    Cystitis    Idiopathic hypotension    73-year-old female with a history of combined  systolic/diastolic heart failure (EF 34%), non-insulin-dependent type 2 diabetes, CAD with prior CABG, atrial fibrillation on Eliquis, carotid artery disease, hypertension, GERD, hyperlipidemia, ovarian cancer s/p bilateral oophorectomy, uterine fibroid resections, presented to the hospital with weakness and was admitted for UTI and A-fib RVR.    Atrial fibrillation with RVR  Complaint systolic/diastolic heart failure (EF 34%), ischemic cardiomyopathy  CAD s/p CABG  Troponinemia  -  Rates improved this morning, diltiazem drip stopped.  EP recommended continuing AV curtis blocking agents.  Will continue metoprolol succinate 50 mg daily at this time.  Can consider ablation in the future per EP.  -Patient unable to tolerate stress test this morning.  Awaiting further recommendations from cardiology regarding possible cardiac catheterization later today.  -Patient to remain n.p.o.  -Repeat troponin ordered and pending  -Continue home Eliquis    Uncomplicated cystitis  -Urine cultures growing  Citrobacter and E. coli, susceptibilities given  - switch to oral Keflex today    Acute kidney injury, prerenal versus ischemic ATN-resolved  -creatinine improving (0.98 today)  -Holding home lisinopril and spironolactone, can consider restarting tomorrow if rates are controlled and BP tolerates.  -Serial RFP's    Non-insulin-dependent type 2 diabetes  -Patient hypoglycemic this morning, holding home glyburide  -Hypoglycemic protocol in place  -Continue monitor sugars    Hypertension  -Holding home lisinopril and spironolactone    Hyperlipidemia  -Continue home atorvastatin    Diet: N.p.o.  DVT prophylaxis: Eliquis  Consults: Cardiology,  EP  CODE STATUS: Full    Disposition:  patient to remain in the hospital for the next 24-48 hours, n.p.o.n.p.o.  Pending cardiology recommendations for possible catheterization later today.    To be discussed with attending,    Renita Landry,   Internal Medicine, PGY-3

## 2024-05-02 NOTE — PROGRESS NOTES
Subjective Data:  I25.1 CAD remote CABG, stent  I42.9 ischemic cardiomyopathy  F17.10 cigarette smoking  I48.91 transient AF in setting of uterine acute bleeding resolved    Overnight Events:  rincipal Problem:    Atrial fibrillation with RVR (Multi)  Active Problems:    Type 2 diabetes mellitus with hyperglycemia, without long-term current use of insulin (Multi)    Chronic combined systolic (congestive) and diastolic (congestive) heart failure (Multi)    Cystitis    Idiopathic hypotension       Advanced COPD still actively smoking-minimal improvement in severe dyspnea despite breathing treatments may need pulmonary involvement  Extensive coronary disease remote bypass occluded vein grafts LAD coronary interventions  Paroxysmal A-fib seen by EP at my request converted to sinus rhythm  Dyspnea attempted myocardial perfusion for restratification but unable to lie flat  Regarding coronary angiogram also unable to lie flat no EKG or troponin changes to justify    Presents with late stage chronic obstructive pulmonary disease  Ischemic cardiomyopathy known occluded right coronary artery recent PCI to ramus intermedius  Paroxysmal atrial flutter in the setting of hypertension COPD on beta-blockers  [id number] score of 7 CAD CHF female diabetes  Known coronary disease prevents certain antiarrhythmic use  Heart failure prevents Multaq from being considered high risk for Amio because of lung disease    Ovarian cancer status post bilateral oophorectomy  Uterine fibroid resections  Presented with vaginal bleeding UTI hematuria  Troponin likely supply demand      amily history  1 son uncertain medical history 2 sisters  1 pneumonia 1 from cancer 1 brother alive both biologic parents  father with heart attack history colon cancer and 1 sister        PMH:  CAD - prior CABG. Last cardiac cath  with occluded LAD, widely patent ramus stent, minimal disease of the LCx, occluded RCA with right-to-right  collaterals, occluded SVG to RCA, occluded and thrombotic SVG to LAD. LIMA not used as graft. Last stress  with fixed anterior infarct without ischemia  CHF - LVEF 45-50% by echo 2015, 35-40% here.   COPD / home oxygen therapy  Atrial fibrillation  Carotid vascular disease  DM with CKD, PAD, and peripheral neruopathy  Edema  HTN  GERD  Glaucoma  LBBB and RBBB seen in past  Mixed HLD  Vitamin D deficiency  Ovarian cancer?  - little details     PSH: CABG, BSO  SH: former smoker  FH: CAD in father, sister, colon cancer in sister     Objective Data:  Last Recorded Vitals:  Vitals:    05/02/24 0350 05/02/24 0830 05/02/24 1110 05/02/24 1715   BP: 134/58 141/68 147/69 149/64   BP Location:    Right arm   Pulse: 70 80 78 76   Resp: 26 (!) 40 (!) 33 (!) 33   Temp: 36.2 °C (97.2 °F) 36.1 °C (97 °F) 36.4 °C (97.5 °F) 36.5 °C (97.7 °F)   TempSrc: Temporal Temporal Temporal Temporal   SpO2: 93% 95% 95% 94%   Weight: 77.8 kg (171 lb 8.3 oz)  77.8 kg (171 lb 8.3 oz)    Height:           Last Labs:  CBC - 5/2/2024:  4:30 AM  13.6 12.1 255    40.0      CMP - 5/2/2024:  4:30 AM  8.9 5.5 15 --- 0.5   3.5 3.4 9 49      PTT - No results in last year.  1.8   19.9 _     TROPHS   Date/Time Value Ref Range Status   05/02/2024 11:08  0 - 13 ng/L Final   04/30/2024 02:03  0 - 13 ng/L Final     Comment:     Previous result verified on 4/30/2024 0129 on specimen/case [id number] called with component [facility] for procedure Troponin I, High Sensitivity, Initial with value 354 ng/L.   04/30/2024 12:54  0 - 13 ng/L Final     BNP   Date/Time Value Ref Range Status   05/01/2024 04:21  0 - 99 pg/mL Final   11/29/2022 01:09  0 - 99 pg/mL Final     Comment:     .  <100 pg/mL - Heart failure unlikely  100-299 pg/mL - Intermediate probability of acute heart  .               failure exacerbation. Correlate with clinical  .               context and patient history.    >=300 pg/mL - Heart Failure likely. Correlate with  clinical  .               context and patient history.  BNP testing is performed using different testing   methodology at [facility] than at other   system hospitals. Direct result comparisons should   only be made within the same method.     12/05/2021 01:53  0 - 99 pg/mL Final     Comment:     .  <100 pg/mL - Heart failure unlikely  100-299 pg/mL - Intermediate probability of acute heart  .               failure exacerbation. Correlate with clinical  .               context and patient history.    >=300 pg/mL - Heart Failure likely. Correlate with clinical  .               context and patient history.  BNP testing is performed using different testing   methodology at [facility] than at other   system hospitals. Direct result comparisons should   only be made within the same method.       HGBA1C   Date/Time Value Ref Range Status   10/04/2023 02:20 PM 6.5 4.2 - 6.5 % Final   05/31/2023 01:44 PM 6.2 % Final     Comment:          Diagnosis of Diabetes-Adults   Non-Diabetic: < or = 5.6%   Increased risk for developing diabetes: 5.7-6.4%   Diagnostic of diabetes: > or = 6.5%  .       Monitoring of Diabetes                Age (y)     Therapeutic Goal (%)   Adults:          >18           <7.0   Pediatrics:    13-18           <7.5                   7-12           <8.0                   0- 6            7.5-8.5   American Diabetes Association. Diabetes Care 33(S1), Jan 2010.   03/03/2021 09:28 AM 7.7 % Final     Comment:          Diagnosis of Diabetes-Adults   Non-Diabetic: < or = 5.6%   Increased risk for developing diabetes: 5.7-6.4%   Diagnostic of diabetes: > or = 6.5%  .       Monitoring of Diabetes                Age (y)     Therapeutic Goal (%)   Adults:          >18           <7.0   Pediatrics:    13-18           <7.5                   7-12           <8.0                   0- 6            7.5-8.5   American Diabetes Association. Diabetes Care 33(S1), Jan 2010.       VLDL   Date/Time  "Value Ref Range Status   04/06/2022 10:04 AM 29 0 - 40 mg/dL Final   03/03/2021 09:28 AM 44 0 - 40 mg/dL Final   05/04/2020 09:14 AM 55 0 - 40 mg/dL Final      Last I/O:  I/O last 3 completed shifts:  In: 960 (12.3 mL/kg) [P.O.:960]  Out: 550 (7.1 mL/kg) [Urine:550 (0.2 mL/kg/hr)]  Weight: 77.8 kg     Past Cardiology Tests (Last 3 Years):  EKG:  ECG 12 Lead 05/02/2024 (Preliminary)      ECG 12 Lead 05/01/2024      ECG 12 lead 04/30/2024      ECG 12 lead 04/30/2024    Echo:  Transthoracic Echo (TTE) Complete 04/30/2024    Ejection Fractions:  No results found for: \"EF\"  Cath:  No results found for this or any previous visit from the past 1095 days.    Stress Test:  No results found for this or any previous visit from the past 1095 days.    Cardiac Imaging:  No results found for this or any previous visit from the past 1095 days.      Inpatient Medications:  Scheduled medications   Medication Dose Route Frequency    apixaban  5 mg oral BID    atorvastatin  40 mg oral Nightly    cephalexin  500 mg oral q8h CHAYO    cholecalciferol  5,000 Units oral Daily    cilostazol  100 mg oral BID    digoxin  500 mcg intravenous Daily    fluticasone  2 spray Each Nostril Daily    formoterol  20 mcg nebulization BID    [Held by provider] glipiZIDE  10 mg oral BID AC    icosapent ethyL  2 g oral BID    ipratropium-albuteroL  3 mL nebulization q4h    metoprolol succinate XL  50 mg oral Daily    oxybutynin  5 mg oral BID    oxygen   inhalation Continuous - Inhalation    pantoprazole  40 mg oral Daily before breakfast    Or    pantoprazole  40 mg intravenous Daily before breakfast    regadenoson  0.4 mg intravenous Once in imaging     PRN medications   Medication    acetaminophen    Or    acetaminophen    Or    acetaminophen    acetaminophen    Or    acetaminophen    Or    acetaminophen    LORazepam    melatonin    metoclopramide    Or    metoclopramide    ondansetron ODT    Or    ondansetron    polyethylene glycol     Continuous " Medications   Medication Dose Last Rate    dilTIAZem  5-15 mg/hr Stopped (05/02/24 5587)     Results for orders placed or performed during the hospital encounter of 04/30/24 (from the past 96 hour(s))   ECG 12 lead   Result Value Ref Range    Ventricular Rate 158 BPM    Atrial Rate 158 BPM    VA Interval 90 ms    QRS Duration 116 ms    QT Interval 244 ms    QTC Calculation(Bazett) 395 ms    P Axis 91 degrees    R Axis -67 degrees    T Axis 206 degrees    QRS Count 26 beats    Q Onset 195 ms    P Onset 150 ms    P Offset 187 ms    T Offset 317 ms    QTC Fredericia 337 ms   Comprehensive Metabolic Panel   Result Value Ref Range    Glucose 175 (H) 74 - 99 mg/dL    Sodium 135 (L) 136 - 145 mmol/L    Potassium 4.4 3.5 - 5.3 mmol/L    Chloride 97 (L) 98 - 107 mmol/L    Bicarbonate 28 21 - 32 mmol/L    Anion Gap 14 10 - 20 mmol/L    Urea Nitrogen 34 (H) 6 - 23 mg/dL    Creatinine 1.63 (H) 0.50 - 1.05 mg/dL    eGFR 33 (L) >60 mL/min/1.73m*2    Calcium 8.9 8.6 - 10.3 mg/dL    Albumin 3.7 3.4 - 5.0 g/dL    Alkaline Phosphatase 53 33 - 136 U/L    Total Protein 6.2 (L) 6.4 - 8.2 g/dL    AST 18 9 - 39 U/L    Bilirubin, Total 0.8 0.0 - 1.2 mg/dL    ALT 10 7 - 45 U/L   Protime-INR   Result Value Ref Range    Protime 19.9 (H) 9.8 - 12.8 seconds    INR 1.8 (H) 0.9 - 1.1   CBC with Differential   Result Value Ref Range    WBC 11.6 (H) 4.4 - 11.3 x10*3/uL    nRBC 0.0 0.0 - 0.0 /100 WBCs    RBC 3.99 (L) 4.00 - 5.20 x10*6/uL    Hemoglobin 12.6 12.0 - 16.0 g/dL    Hematocrit 39.5 36.0 - 46.0 %    MCV 99 80 - 100 fL    MCH 31.6 26.0 - 34.0 pg    MCHC 31.9 (L) 32.0 - 36.0 g/dL    RDW 14.5 11.5 - 14.5 %    Platelets 244 150 - 450 x10*3/uL    Neutrophils % 72.1 40.0 - 80.0 %    Immature Granulocytes %, Automated 0.3 0.0 - 0.9 %    Lymphocytes % 18.7 13.0 - 44.0 %    Monocytes % 7.9 2.0 - 10.0 %    Eosinophils % 0.7 0.0 - 6.0 %    Basophils % 0.3 0.0 - 2.0 %    Neutrophils Absolute 8.39 (H) 1.60 - 5.50 x10*3/uL    Immature Granulocytes  Absolute, Automated 0.04 0.00 - 0.50 x10*3/uL    Lymphocytes Absolute 2.17 0.80 - 3.00 x10*3/uL    Monocytes Absolute 0.92 (H) 0.05 - 0.80 x10*3/uL    Eosinophils Absolute 0.08 0.00 - 0.40 x10*3/uL    Basophils Absolute 0.03 0.00 - 0.10 x10*3/uL   Type And Screen   Result Value Ref Range    ABO TYPE A     Rh TYPE POS     ANTIBODY SCREEN NEG    Troponin I, High Sensitivity, Initial   Result Value Ref Range    Troponin I, High Sensitivity 354 (HH) 0 - 13 ng/L   Gray Top   Result Value Ref Range    Extra Tube Hold for add-ons.    Urinalysis with Reflex Microscopic   Result Value Ref Range    Color, Urine Light-Brown (N) Light-Yellow, Yellow, Dark-Yellow    Appearance, Urine Turbid (N) Clear    Specific Gravity, Urine 1.006 1.005 - 1.035    pH, Urine 6.5 5.0, 5.5, 6.0, 6.5, 7.0, 7.5, 8.0    Protein, Urine NEGATIVE NEGATIVE, 10 (TRACE), 20 (TRACE) mg/dL    Glucose, Urine Normal Normal mg/dL    Blood, Urine OVER (3+) (A) NEGATIVE    Ketones, Urine NEGATIVE NEGATIVE mg/dL    Bilirubin, Urine NEGATIVE NEGATIVE    Urobilinogen, Urine Normal Normal mg/dL    Nitrite, Urine NEGATIVE NEGATIVE    Leukocyte Esterase, Urine 500 Ian/µL (A) NEGATIVE   Microscopic Only, Urine   Result Value Ref Range    WBC, Urine >50 (A) 1-5, NONE /HPF    RBC, Urine >20 (A) NONE, 1-2, 3-5 /HPF    Squamous Epithelial Cells, Urine 1-9 (SPARSE) Reference range not established. /HPF    Bacteria, Urine 1+ (A) NONE SEEN /HPF   Urine culture    Specimen: Clean Catch/Voided; Urine   Result Value Ref Range    Urine Culture >100,000 Escherichia coli (A)     Urine Culture >100,000 Citrobacter koseri (A)        Susceptibility    Citrobacter koseri - MICROSCAN     Ampicillin  Resistant      Amoxicillin/Clavulanate  Susceptible      Ampicillin/Sulbactam  Susceptible      Cefazolin  Susceptible      Ciprofloxacin  Susceptible      Gentamicin  Susceptible      Nitrofurantoin  Intermediate      Piperacillin/Tazobactam  Susceptible      Trimethoprim/Sulfamethoxazole   Susceptible     Escherichia coli - MICROSCAN     Ampicillin  Susceptible      Cefazolin  Susceptible      Cefazolin (uncomplicated UTIs only)  Susceptible      Ciprofloxacin  Susceptible      Gentamicin  Susceptible      Nitrofurantoin  Susceptible      Piperacillin/Tazobactam  Susceptible      Trimethoprim/Sulfamethoxazole  Susceptible    ECG 12 lead   Result Value Ref Range    Ventricular Rate 95 BPM    Atrial Rate 95 BPM    TX Interval 144 ms    QRS Duration 108 ms    QT Interval 372 ms    QTC Calculation(Bazett) 467 ms    P Axis 102 degrees    R Axis 42 degrees    T Axis 116 degrees    QRS Count 15 beats    Q Onset 216 ms    P Onset 144 ms    P Offset 210 ms    T Offset 402 ms    QTC Fredericia 433 ms   Troponin, High Sensitivity, 1 Hour   Result Value Ref Range    Troponin I, High Sensitivity 322 (HH) 0 - 13 ng/L   CBC   Result Value Ref Range    WBC 9.5 4.4 - 11.3 x10*3/uL    nRBC 0.0 0.0 - 0.0 /100 WBCs    RBC 3.55 (L) 4.00 - 5.20 x10*6/uL    Hemoglobin 11.3 (L) 12.0 - 16.0 g/dL    Hematocrit 35.9 (L) 36.0 - 46.0 %     (H) 80 - 100 fL    MCH 31.8 26.0 - 34.0 pg    MCHC 31.5 (L) 32.0 - 36.0 g/dL    RDW 14.4 11.5 - 14.5 %    Platelets 219 150 - 450 x10*3/uL   Comprehensive metabolic panel   Result Value Ref Range    Glucose 132 (H) 74 - 99 mg/dL    Sodium 139 136 - 145 mmol/L    Potassium 4.0 3.5 - 5.3 mmol/L    Chloride 103 98 - 107 mmol/L    Bicarbonate 29 21 - 32 mmol/L    Anion Gap 11 10 - 20 mmol/L    Urea Nitrogen 31 (H) 6 - 23 mg/dL    Creatinine 1.43 (H) 0.50 - 1.05 mg/dL    eGFR 38 (L) >60 mL/min/1.73m*2    Calcium 8.1 (L) 8.6 - 10.3 mg/dL    Albumin 3.2 (L) 3.4 - 5.0 g/dL    Alkaline Phosphatase 49 33 - 136 U/L    Total Protein 5.5 (L) 6.4 - 8.2 g/dL    AST 15 9 - 39 U/L    Bilirubin, Total 0.5 0.0 - 1.2 mg/dL    ALT 9 7 - 45 U/L   Transthoracic Echo (TTE) Complete   Result Value Ref Range    AV pk snehal 1.38 m/s    LV Biplane EF 34 %    LVOT diam 1.70 cm    MV E/A ratio 1.54     MV avg E/e'  ratio 23.00     Tricuspid annular plane systolic excursion 1.4 cm    RV free wall pk S' 8.73 cm/s    RVSP 43.7 mmHg    LVIDd 6.50 cm    Aortic Valve Area by Continuity of Peak Velocity 1.47 cm2    AV pk grad 7.6 mmHg    LV A4C EF 39.4    Renal function panel   Result Value Ref Range    Glucose 48 (LL) 74 - 99 mg/dL    Sodium 140 136 - 145 mmol/L    Potassium 3.9 3.5 - 5.3 mmol/L    Chloride 104 98 - 107 mmol/L    Bicarbonate 31 21 - 32 mmol/L    Anion Gap 9 (L) 10 - 20 mmol/L    Urea Nitrogen 26 (H) 6 - 23 mg/dL    Creatinine 0.98 0.50 - 1.05 mg/dL    eGFR 60 (L) >60 mL/min/1.73m*2    Calcium 8.5 (L) 8.6 - 10.3 mg/dL    Phosphorus 3.5 2.5 - 4.9 mg/dL    Albumin 3.1 (L) 3.4 - 5.0 g/dL   CBC   Result Value Ref Range    WBC 8.4 4.4 - 11.3 x10*3/uL    nRBC 0.0 0.0 - 0.0 /100 WBCs    RBC 3.29 (L) 4.00 - 5.20 x10*6/uL    Hemoglobin 10.3 (L) 12.0 - 16.0 g/dL    Hematocrit 33.6 (L) 36.0 - 46.0 %     (H) 80 - 100 fL    MCH 31.3 26.0 - 34.0 pg    MCHC 30.7 (L) 32.0 - 36.0 g/dL    RDW 14.3 11.5 - 14.5 %    Platelets 203 150 - 450 x10*3/uL   Magnesium   Result Value Ref Range    Magnesium 1.71 1.60 - 2.40 mg/dL   B-type natriuretic peptide   Result Value Ref Range     (H) 0 - 99 pg/mL   ECG 12 Lead   Result Value Ref Range    Ventricular Rate 164 BPM    Atrial Rate 328 BPM    QRS Duration 102 ms    QT Interval 250 ms    QTC Calculation(Bazett) 412 ms    P Axis -84 degrees    R Axis 130 degrees    T Axis 258 degrees    QRS Count 27 beats    Q Onset 216 ms    P Onset 160 ms    P Offset 204 ms    T Offset 341 ms    QTC Fredericia 349 ms   POCT GLUCOSE   Result Value Ref Range    POCT Glucose 164 (H) 74 - 99 mg/dL   POCT GLUCOSE   Result Value Ref Range    POCT Glucose 174 (H) 74 - 99 mg/dL   POCT GLUCOSE   Result Value Ref Range    POCT Glucose 239 (H) 74 - 99 mg/dL   Renal function panel   Result Value Ref Range    Glucose 191 (H) 74 - 99 mg/dL    Sodium 137 136 - 145 mmol/L    Potassium 4.2 3.5 - 5.3 mmol/L     Chloride 101 98 - 107 mmol/L    Bicarbonate 29 21 - 32 mmol/L    Anion Gap 11 10 - 20 mmol/L    Urea Nitrogen 20 6 - 23 mg/dL    Creatinine 0.98 0.50 - 1.05 mg/dL    eGFR 60 (L) >60 mL/min/1.73m*2    Calcium 8.9 8.6 - 10.3 mg/dL    Phosphorus 3.5 2.5 - 4.9 mg/dL    Albumin 3.4 3.4 - 5.0 g/dL   Magnesium   Result Value Ref Range    Magnesium 1.91 1.60 - 2.40 mg/dL   CBC and Auto Differential   Result Value Ref Range    WBC 13.6 (H) 4.4 - 11.3 x10*3/uL    nRBC 0.0 0.0 - 0.0 /100 WBCs    RBC 3.88 (L) 4.00 - 5.20 x10*6/uL    Hemoglobin 12.1 12.0 - 16.0 g/dL    Hematocrit 40.0 36.0 - 46.0 %     (H) 80 - 100 fL    MCH 31.2 26.0 - 34.0 pg    MCHC 30.3 (L) 32.0 - 36.0 g/dL    RDW 14.1 11.5 - 14.5 %    Platelets 255 150 - 450 x10*3/uL    Neutrophils % 81.0 40.0 - 80.0 %    Immature Granulocytes %, Automated 0.6 0.0 - 0.9 %    Lymphocytes % 7.6 13.0 - 44.0 %    Monocytes % 10.1 2.0 - 10.0 %    Eosinophils % 0.6 0.0 - 6.0 %    Basophils % 0.1 0.0 - 2.0 %    Neutrophils Absolute 11.01 (H) 1.60 - 5.50 x10*3/uL    Immature Granulocytes Absolute, Automated 0.08 0.00 - 0.50 x10*3/uL    Lymphocytes Absolute 1.03 0.80 - 3.00 x10*3/uL    Monocytes Absolute 1.37 (H) 0.05 - 0.80 x10*3/uL    Eosinophils Absolute 0.08 0.00 - 0.40 x10*3/uL    Basophils Absolute 0.02 0.00 - 0.10 x10*3/uL   Troponin I, High Sensitivity   Result Value Ref Range    Troponin I, High Sensitivity 119 (HH) 0 - 13 ng/L   ECG 12 Lead   Result Value Ref Range    Ventricular Rate 85 BPM    Atrial Rate 85 BPM    DC Interval 162 ms    QRS Duration 104 ms    QT Interval 350 ms    QTC Calculation(Bazett) 416 ms    P Axis 75 degrees    R Axis 104 degrees    T Axis -16 degrees    QRS Count 14 beats    Q Onset 215 ms    P Onset 134 ms    P Offset 191 ms    T Offset 390 ms    QTC Fredericia 393 ms         Physical Exam:  Subjective:   Examination:   General Examination:   General Appearance: Well developed, well nourished, in no acute distress.   Head: normocephalic,  atraumatic   Eyes: Anicteric sclera. Pupils are equally round and reactive to light.  Extraocular movements are intact.    Ears: normal   Oral: Cavity: mucosa moist.   Throat: clear.   Neck/Thyroid: neck supple, full range of motion, no cervical lymphadenopathy.   Skin: warm and dry, no suspicious lesions.    Heart: regular rate and rhythm, S1, S2 normal, no murmurs.  Occasional PACs  Lungs: copd but distant breath sounds with no audible wheeze  Abdomen: soft, non-tender, non-distended, bowl sound present, normal.   Extremities: no clubbing, no cyanosis, no edema.   Neuro: non-focal, motor strength normal upper lower extremities, sensory exam intact.       Assessment/Plan   I25.1 CAD remote CABG, stent  I42.9 ischemic cardiomyopathy  F17.10 cigarette smoking  I48.91 transient AF in setting of uterine acute bleeding resolved      Code Status:  Full Code    I spent 40 minutes in the professional and overall care of this patient.        Horacio Armijo MD

## 2024-05-02 NOTE — CARE PLAN
Problem: Safety - Adult  Goal: Free from fall injury  Outcome: Not Progressing     Problem: Discharge Planning  Goal: Discharge to home or other facility with appropriate resources  Outcome: Not Progressing     Problem: Chronic Conditions and Co-morbidities  Goal: Patient's chronic conditions and co-morbidity symptoms are monitored and maintained or improved  Outcome: Not Progressing     Problem: Diabetes  Goal: Maintain glucose levels >70mg/dl to <250mg/dl throughout shift  Outcome: Not Progressing   The patient's goals for the shift include      The clinical goals for the shift include Pt will maintain NSR throughout shift      Problem: Safety - Adult  Goal: Free from fall injury  Outcome: Not Progressing     Problem: Discharge Planning  Goal: Discharge to home or other facility with appropriate resources  Outcome: Not Progressing     Problem: Chronic Conditions and Co-morbidities  Goal: Patient's chronic conditions and co-morbidity symptoms are monitored and maintained or improved  Outcome: Not Progressing     Problem: Diabetes  Goal: Maintain glucose levels >70mg/dl to <250mg/dl throughout shift  Outcome: Not Progressing

## 2024-05-02 NOTE — CONSULTS
"CARDIOLOGY/ELECTROPHYSIOLOGY CONSULTATION    PATIENT NAME: Mitzi Pro  PATIENT MRN: [id number]  SERVICE DATE:  5/2/2024  SERVICE TIME: 9:37 AM    CONSULTANT: Frank Garcia MD  PRIMARY CARE PHYSICIAN: [facility]  ATTENDING PHYSICIAN: Rom Valadez MD      IMPRESSIONS:  1.  Late stage chronic obstructive pulmonary disease, on home oxygen therapy.  2.  Coronary artery disease, status post remote CABG in the 1990s, with occlusion of both vein grafts.  She is known to have an occluded right coronary artery with good collateralization, and had a more recent percutaneous intervention of her ramus intermedius artery.  She went for a Lexiscan study today, but could not lie flat for imaging.  3.  Paroxysmal atrial flutter, with remote paroxysmal atrial fibrillation as well.  The likely etiology of the flutter is a combination of hypertension, age, COPD, and CAD.  The patient has been on a simple \"rate control strategy\" with beta-blockers.  She has a [id number] score of at least 7 (hypertension, diabetes, CAD, CHF, female gender, 2 points for age of 73), and has been appropriately anticoagulated with Eliquis.  She is unfortunately a poor candidate for most antiarrhythmic agents (flecainide and propafenone contraindicated due to coronary disease, sotalol and amiodarone contraindicated due to COPD, dronedarone contraindicated due to heart failure, and dofetilide relatively contraindicated due to chronic kidney disease).  4.  Recent hematuria with cystitis, on antibiotic therapy.  5.  Multiple other medical problems, including hypertension, type 2 diabetes, hyperlipidemia, remote ovarian cancer, glaucoma, and incomplete left bundle branch block.    RECOMMENDATIONS:  1.  The patient will continue her AV curtis blocking drugs along with Eliquis anticoagulation.  2.  If her respiratory status improves and returns to baseline, the patient may be a candidate for additional therapies for her atrial arrhythmias.  " Since her dominant rhythm appears to be atrial flutter, she would be a reasonable candidate for an attempt at cavotricuspid isthmus ablation from a right-sided approach.  Her late stage lung disease represents a contraindication for an attempt at pulmonary vein isolation, in my opinion.  3.  Other potential options for this patient would include dofetilide therapy, with careful monitoring of her renal function and QT interval, or simple AV junction ablation with permanent pacemaker placement, which would at least eliminate her tachycardia spells.    Thank you for this consultation.  I will plan to discuss the patient's situation with Dr. Duncan, her regular cardiologist.      SIGNATURE: Frank Garcia MD   OFFICE: [phone]    ================================================================    REASON FOR CONSULTATION: Paroxysmal atrial flutter with rapid ventricular responses    HISTORY: Mitzi Pro is a 73 y.o. white female with a complex past cardiac history who presented on 4/30/2024 because of fatigue, worsening dyspnea, and some hematuria.  She was diagnosed with cystitis and is on antibiotics for this.  She was also found to be in atrial flutter with 2:1 AV conduction on admission, briefly converting to sinus rhythm in the emergency department after intravenous diltiazem, but then going back into atrial flutter for much of 5/1/2024.  She is back in sinus rhythm as of the morning of 5/2/2024.  Electrophysiology input was requested regarding management of her atrial arrhythmia problem.    PAST MEDICAL HISTORY: The patient has a history of coronary artery disease, status post remote CABG in the 1990s, with occlusion of both vein grafts to the LAD and RCA.  She reportedly has good collaterals to the RCA from the left coronary system.  She has undergone more recent stenting of a ramus intermedius branch.  She is followed by cardiologist Dr. Ksenia Dnucan and is noted to have a chronic ischemic  cardiomyopathy with an LVEF around 35 to 40%.  She presented in late November 2022 with atrial flutter and 2:1 AV conduction.  We elected to treat her with amiodarone for just 1 month, and she was back in sinus rhythm as of 1/18/2023, and her amiodarone discontinued.  She has a history of hypertension, diabetes, hyperlipidemia, chronic obstructive pulmonary disease (on home oxygen at 2 L/min by nasal cannula nightly), peripheral vascular disease with lower extremity claudication, chronic kidney disease, remote ovarian cancer, glaucoma, and an incomplete left bundle branch block pattern on her EKGs.  When I saw her in late 2022, she was felt to be a poor candidate for virtually all of our standard antiarrhythmic drugs, and I recommended consideration of cavotricuspid isthmus ablation and/for pulmonary vein isolation once her respiratory status had improved back to baseline.  She has not followed up with me on an outpatient basis, however.  There is no reported history of transient ischemic attacks or strokes.  The patient has continued on Eliquis anticoagulation, along with AV curtis blocking drugs but no specific antiarrhythmic agents.    PAST SURGICAL HISTORY: Prior surgeries have included CABG in the 1990s (SVG to LAD, SVG to RCA), and remote bilateral salpingo-oophorectomy for ovarian cancer.  More recently, she had some uterine surgery for fibroids and postmenopausal bleeding.    OUTPATIENT MEDICATIONS:  Medication Sig    albuterol 90 mcg/actuation inhaler INHALE 2 PUFFS EVERY 4 HOURS AS NEEDED FOR WHEEZING (FILL WITH THESE DIRECTIONS.)    atorvastatin (Lipitor) 40 mg tablet Take 1 tablet (40 mg) by mouth once daily.    cholecalciferol (Vitamin D-3) 5,000 Units tablet Take 1 tablet (5,000 Units) by mouth once daily.    cilostazol (Pletal) 100 mg tablet Take 1 tablet (100 mg) by mouth 2 times a day.    Eliquis 5 mg tablet Take 1 tablet (5 mg) by mouth 2 times a day.    fluticasone (Flonase) 50 mcg/actuation nasal  spray Administer 1 spray into each nostril once daily as needed for allergies.    furosemide (Lasix) 20 mg tablet Take 1 tablet (20 mg) by mouth once daily as needed (swelling).    glyBURIDE (Diabeta) 5 mg tablet Take 2 tablets (10 mg) by mouth 2 times a day.    icosapent ethyL (Vascepa) 1 gram capsule Take 2 capsules (2 g) by mouth 2 times a day.    lisinopril 10 mg tablet Take 2 tablets (20 mg) by mouth once daily.    metoprolol succinate XL (Toprol-XL) 50 mg 24 hr tablet Take 1 tablet (50 mg) by mouth 2 times a day.    oxybutynin XL (Ditropan-XL) 10 mg 24 hr tablet Take 1 tablet (10 mg) by mouth once daily.    pioglitazone (Actos) 30 mg tablet Take 1 tablet (30 mg) by mouth once daily.    SITagliptin phosphate (Januvia) 100 mg tablet Take 1 tablet (100 mg) by mouth once daily.    spironolactone (Aldactone) 25 mg tablet take 1/2 tablet by mouth once daily    umeclidinium-vilanteroL (Anoro Ellipta) 62.5-25 mcg/actuation blister with device Inhale 1 puff once daily.     ALLERGIES AND DRUG INTOLERANCES:   Allergen Reactions    Metformin Diarrhea    Nitrofurantoin Myalgia     FAMILY HISTORY: Not contributory    SOCIAL HISTORY: The patient is , but lives with an 80-year-old gentleman who is reportedly in good health.  She has 1 son who lives locally.  She has been a 1 pack/day smoker for over 50 years.  She does not drink alcohol.  She previously worked in the [employer] industry.    COMPLETE REVIEW OF SYSTEMS:  GENERAL: Negative for weight gain or loss  HEENT: Negative for vision or hearing problems  RESPIRATORY: Positive for chronic dyspnea at rest  CARDIAC: Negative for exertional chest discomfort; positive for recent increase in tachypalpitations  VASCULAR: Positive for chronic lower extremity claudication with ambulation  GI: Negative for nausea, vomiting, hematemesis, melena, or hematochezia  MUSCULOSKELETAL: Negative for major arthralgias  ENDOCRINE: Negative for heat or cold intolerance, polyuria or  "polydipsia  HEMATOLOGIC: Negative for bleeding problems  CUTANEOUS: Negative for rashes  NEURO: Negative for seizures; negative for focal neurologic symptoms; negative for near-syncope or syncope    PHYSICAL EXAM:  /58   Pulse 70   Temp 36.2 °C (97.2 °F) (Temporal)   Resp 26   Ht 1.651 m (5' 5\")   Wt 77.8 kg (171 lb 8.3 oz)   SpO2 93%   BMI 28.54 kg/m²   Gen: Chronically ill-appearing elderly woman in no distress; dyspneic at rest; alert and oriented but poor long-term memory  HEENT: Unremarkable  Neck: No jugular venous distention noted  Cardiac: Regular rhythm with distant heart sounds but no murmurs appreciated  Resp: Markedly diminished breath sounds throughout, with no wheezes or rales appreciated  Abd: Non-distended, with no tenderness, no masses, and no organomegaly noted  Ext: Peripheral pulses intact; no peripheral edema  Neuro: Normal gross motor and sensory function; no focal deficits noted  Skin: Leathery skin, particularly on head and neck    EKG (4/30/2024 at 00:46): Atrial flutter with 2:1 AV conduction, left anterior fascicular block, incomplete left bundle branch block  EKG (4/20/2024 at 01:56): Classic counterclockwise atrial flutter with variable AV conduction averaging in the 90s, with PVCs, in the same QRS configuration    ECHOCARDIOGRAM: Anterior scar, with LVEF now 34%    LABS:  Lab Results   Component Value Date    GLUCOSE 191 (H) 05/02/2024    CALCIUM 8.9 05/02/2024     05/02/2024    K 4.2 05/02/2024    CO2 29 05/02/2024     05/02/2024    BUN 20 05/02/2024    CREATININE 0.98 05/02/2024      Lab Results   Component Value Date    WBC 13.6 (H) 05/02/2024    HGB 12.1 05/02/2024    HCT 40.0 05/02/2024     (H) 05/02/2024     05/02/2024        "

## 2024-05-03 ENCOUNTER — APPOINTMENT (OUTPATIENT)
Dept: CARDIOLOGY | Facility: HOSPITAL | Age: 74
DRG: 280 | End: 2024-05-03
Payer: MEDICARE

## 2024-05-03 LAB
ALBUMIN SERPL BCP-MCNC: 3.2 G/DL (ref 3.4–5)
ANION GAP SERPL CALC-SCNC: 12 MMOL/L (ref 10–20)
ATRIAL RATE: 300 BPM
ATRIAL RATE: 308 BPM
BUN SERPL-MCNC: 19 MG/DL (ref 6–23)
CALCIUM SERPL-MCNC: 8.7 MG/DL (ref 8.6–10.3)
CHLORIDE SERPL-SCNC: 99 MMOL/L (ref 98–107)
CO2 SERPL-SCNC: 30 MMOL/L (ref 21–32)
CREAT SERPL-MCNC: 0.86 MG/DL (ref 0.5–1.05)
EGFRCR SERPLBLD CKD-EPI 2021: 71 ML/MIN/1.73M*2
ERYTHROCYTE [DISTWIDTH] IN BLOOD BY AUTOMATED COUNT: 13.5 % (ref 11.5–14.5)
GLUCOSE SERPL-MCNC: 201 MG/DL (ref 74–99)
HCT VFR BLD AUTO: 35.7 % (ref 36–46)
HGB BLD-MCNC: 11 G/DL (ref 12–16)
MAGNESIUM SERPL-MCNC: 2.55 MG/DL (ref 1.6–2.4)
MCH RBC QN AUTO: 31.3 PG (ref 26–34)
MCHC RBC AUTO-ENTMCNC: 30.8 G/DL (ref 32–36)
MCV RBC AUTO: 101 FL (ref 80–100)
NRBC BLD-RTO: 0 /100 WBCS (ref 0–0)
P OFFSET: 197 MS
P OFFSET: 208 MS
P ONSET: 152 MS
P ONSET: 155 MS
PHOSPHATE SERPL-MCNC: 3.8 MG/DL (ref 2.5–4.9)
PLATELET # BLD AUTO: 230 X10*3/UL (ref 150–450)
POTASSIUM SERPL-SCNC: 4.7 MMOL/L (ref 3.5–5.3)
Q ONSET: 215 MS
Q ONSET: 216 MS
QRS COUNT: 18 BEATS
QRS COUNT: 25 BEATS
QRS DURATION: 108 MS
QRS DURATION: 114 MS
QT INTERVAL: 246 MS
QT INTERVAL: 342 MS
QTC CALCULATION(BAZETT): 394 MS
QTC CALCULATION(BAZETT): 458 MS
QTC FREDERICIA: 337 MS
QTC FREDERICIA: 416 MS
R AXIS: 106 DEGREES
R AXIS: 91 DEGREES
RBC # BLD AUTO: 3.52 X10*6/UL (ref 4–5.2)
SODIUM SERPL-SCNC: 136 MMOL/L (ref 136–145)
T AXIS: 264 DEGREES
T AXIS: 270 DEGREES
T OFFSET: 339 MS
T OFFSET: 386 MS
VENTRICULAR RATE: 108 BPM
VENTRICULAR RATE: 154 BPM
WBC # BLD AUTO: 8.4 X10*3/UL (ref 4.4–11.3)

## 2024-05-03 PROCEDURE — 94640 AIRWAY INHALATION TREATMENT: CPT

## 2024-05-03 PROCEDURE — 2060000001 HC INTERMEDIATE ICU ROOM DAILY

## 2024-05-03 PROCEDURE — 85027 COMPLETE CBC AUTOMATED: CPT

## 2024-05-03 PROCEDURE — 2500000006 HC RX 250 W HCPCS SELF ADMINISTERED DRUGS (ALT 637 FOR ALL PAYERS): Performed by: INTERNAL MEDICINE

## 2024-05-03 PROCEDURE — 2500000002 HC RX 250 W HCPCS SELF ADMINISTERED DRUGS (ALT 637 FOR MEDICARE OP, ALT 636 FOR OP/ED): Mod: MUE | Performed by: INTERNAL MEDICINE

## 2024-05-03 PROCEDURE — 2500000004 HC RX 250 GENERAL PHARMACY W/ HCPCS (ALT 636 FOR OP/ED): Performed by: INTERNAL MEDICINE

## 2024-05-03 PROCEDURE — 80069 RENAL FUNCTION PANEL: CPT

## 2024-05-03 PROCEDURE — 93005 ELECTROCARDIOGRAM TRACING: CPT

## 2024-05-03 PROCEDURE — 93010 ELECTROCARDIOGRAM REPORT: CPT | Performed by: INTERNAL MEDICINE

## 2024-05-03 PROCEDURE — 2500000002 HC RX 250 W HCPCS SELF ADMINISTERED DRUGS (ALT 637 FOR MEDICARE OP, ALT 636 FOR OP/ED): Performed by: INTERNAL MEDICINE

## 2024-05-03 PROCEDURE — 99233 SBSQ HOSP IP/OBS HIGH 50: CPT

## 2024-05-03 PROCEDURE — 2500000004 HC RX 250 GENERAL PHARMACY W/ HCPCS (ALT 636 FOR OP/ED)

## 2024-05-03 PROCEDURE — 2500000001 HC RX 250 WO HCPCS SELF ADMINISTERED DRUGS (ALT 637 FOR MEDICARE OP): Performed by: INTERNAL MEDICINE

## 2024-05-03 PROCEDURE — 2500000005 HC RX 250 GENERAL PHARMACY W/O HCPCS: Performed by: INTERNAL MEDICINE

## 2024-05-03 PROCEDURE — 36415 COLL VENOUS BLD VENIPUNCTURE: CPT

## 2024-05-03 PROCEDURE — 83735 ASSAY OF MAGNESIUM: CPT

## 2024-05-03 RX ORDER — LORATADINE 10 MG/1
10 TABLET ORAL DAILY
Status: DISCONTINUED | OUTPATIENT
Start: 2024-05-03 | End: 2024-05-06 | Stop reason: HOSPADM

## 2024-05-03 RX ORDER — FUROSEMIDE 10 MG/ML
20 INJECTION INTRAMUSCULAR; INTRAVENOUS ONCE
Status: COMPLETED | OUTPATIENT
Start: 2024-05-03 | End: 2024-05-03

## 2024-05-03 RX ORDER — PREDNISONE 20 MG/1
40 TABLET ORAL DAILY
Status: DISCONTINUED | OUTPATIENT
Start: 2024-05-04 | End: 2024-05-06 | Stop reason: HOSPADM

## 2024-05-03 RX ADMIN — IPRATROPIUM BROMIDE AND ALBUTEROL SULFATE 3 ML: 2.5; .5 SOLUTION RESPIRATORY (INHALATION) at 21:44

## 2024-05-03 RX ADMIN — FUROSEMIDE 20 MG: 10 INJECTION, SOLUTION INTRAMUSCULAR; INTRAVENOUS at 10:54

## 2024-05-03 RX ADMIN — CILOSTAZOL 100 MG: 100 TABLET ORAL at 12:07

## 2024-05-03 RX ADMIN — Medication 15 MG/HR: at 05:08

## 2024-05-03 RX ADMIN — METHYLPREDNISOLONE SODIUM SUCCINATE 40 MG: 40 INJECTION, POWDER, FOR SOLUTION INTRAMUSCULAR; INTRAVENOUS at 02:04

## 2024-05-03 RX ADMIN — FORMOTEROL FUMARATE 20 MCG: 20 SOLUTION RESPIRATORY (INHALATION) at 21:44

## 2024-05-03 RX ADMIN — APIXABAN 5 MG: 5 TABLET, FILM COATED ORAL at 20:37

## 2024-05-03 RX ADMIN — Medication 3 L/MIN: at 12:09

## 2024-05-03 RX ADMIN — ICOSAPENT ETHYL 2 G: 1 CAPSULE ORAL at 20:37

## 2024-05-03 RX ADMIN — CHOLECALCIFEROL TAB 125 MCG (5000 UNIT) 5000 UNITS: 125 TAB at 10:54

## 2024-05-03 RX ADMIN — DIGOXIN 500 MCG: 0.25 INJECTION INTRAMUSCULAR; INTRAVENOUS at 10:53

## 2024-05-03 RX ADMIN — APIXABAN 5 MG: 5 TABLET, FILM COATED ORAL at 10:54

## 2024-05-03 RX ADMIN — CEPHALEXIN 500 MG: 500 CAPSULE ORAL at 13:24

## 2024-05-03 RX ADMIN — OXYBUTYNIN CHLORIDE 5 MG: 5 TABLET ORAL at 20:37

## 2024-05-03 RX ADMIN — CILOSTAZOL 100 MG: 100 TABLET ORAL at 20:38

## 2024-05-03 RX ADMIN — ATORVASTATIN CALCIUM 40 MG: 40 TABLET, FILM COATED ORAL at 20:37

## 2024-05-03 RX ADMIN — CEPHALEXIN 500 MG: 500 CAPSULE ORAL at 20:38

## 2024-05-03 RX ADMIN — Medication 3 L/MIN: at 21:46

## 2024-05-03 RX ADMIN — SALINE NASAL SPRAY 1 SPRAY: 1.5 SOLUTION NASAL at 13:24

## 2024-05-03 RX ADMIN — PANTOPRAZOLE SODIUM 40 MG: 40 TABLET, DELAYED RELEASE ORAL at 05:42

## 2024-05-03 RX ADMIN — CEPHALEXIN 500 MG: 500 CAPSULE ORAL at 05:42

## 2024-05-03 RX ADMIN — IPRATROPIUM BROMIDE AND ALBUTEROL SULFATE 3 ML: 2.5; .5 SOLUTION RESPIRATORY (INHALATION) at 05:41

## 2024-05-03 RX ADMIN — METHYLPREDNISOLONE SODIUM SUCCINATE 40 MG: 40 INJECTION, POWDER, FOR SOLUTION INTRAMUSCULAR; INTRAVENOUS at 10:53

## 2024-05-03 RX ADMIN — Medication 15 MG/HR: at 12:08

## 2024-05-03 RX ADMIN — ICOSAPENT ETHYL 2 G: 1 CAPSULE ORAL at 10:53

## 2024-05-03 RX ADMIN — OXYBUTYNIN CHLORIDE 5 MG: 5 TABLET ORAL at 12:07

## 2024-05-03 RX ADMIN — IPRATROPIUM BROMIDE AND ALBUTEROL SULFATE 3 ML: 2.5; .5 SOLUTION RESPIRATORY (INHALATION) at 12:08

## 2024-05-03 RX ADMIN — IPRATROPIUM BROMIDE AND ALBUTEROL SULFATE 3 ML: 2.5; .5 SOLUTION RESPIRATORY (INHALATION) at 16:15

## 2024-05-03 RX ADMIN — Medication 3 L/MIN: at 05:41

## 2024-05-03 RX ADMIN — LORATADINE 10 MG: 10 TABLET ORAL at 13:24

## 2024-05-03 RX ADMIN — METOPROLOL SUCCINATE 50 MG: 50 TABLET, EXTENDED RELEASE ORAL at 10:54

## 2024-05-03 ASSESSMENT — COGNITIVE AND FUNCTIONAL STATUS - GENERAL
HELP NEEDED FOR BATHING: A LITTLE
HELP NEEDED FOR BATHING: A LITTLE
DAILY ACTIVITIY SCORE: 22
TOILETING: A LITTLE
MOVING TO AND FROM BED TO CHAIR: A LITTLE
MOVING TO AND FROM BED TO CHAIR: A LITTLE
WALKING IN HOSPITAL ROOM: A LITTLE
CLIMB 3 TO 5 STEPS WITH RAILING: A LITTLE
STANDING UP FROM CHAIR USING ARMS: A LITTLE
CLIMB 3 TO 5 STEPS WITH RAILING: A LITTLE
MOBILITY SCORE: 20
TOILETING: A LITTLE
MOBILITY SCORE: 21
WALKING IN HOSPITAL ROOM: A LITTLE
DAILY ACTIVITIY SCORE: 22

## 2024-05-03 ASSESSMENT — PAIN SCALES - GENERAL
PAINLEVEL_OUTOF10: 0 - NO PAIN

## 2024-05-03 ASSESSMENT — PAIN - FUNCTIONAL ASSESSMENT
PAIN_FUNCTIONAL_ASSESSMENT: 0-10

## 2024-05-03 NOTE — PROGRESS NOTES
Subjective Data:  I25.1 CAD remote CABG, stent  I42.9 ischemic cardiomyopathy  F17.10 cigarette smoking  I48.91 transient AF in setting of uterine acute bleeding resolved    Overnight Events:  Principal Problem:    Atrial fibrillation with RVR (Multi)  Active Problems:    Type 2 diabetes mellitus with hyperglycemia, without long-term current use of insulin (Multi)    Chronic combined systolic (congestive) and diastolic (congestive) heart failure (Multi)    Cystitis    Idiopathic hypotension      This weekend Dr. Arsen SUTHERLAND is on-call for the cardiology section and will be seeing the patient      Started on IV Solu-Medrol and given IV Lasix  Diltiazem drip restarted at 15 EP following  Advanced ischemic cardiomyopathy 34% unable to lie flat for coronary angiogram or nuclear stress test medical management follows with Dr. Pulido in [facility]  Again was seen by me Wednesday evening on call for cardiology I offered to change to Dr. Valentin but she had had an issue in the past and requested second opinion       Advanced COPD still actively smoking-minimal improvement in severe dyspnea despite breathing treatments may need pulmonary involvement  Extensive coronary disease remote bypass occluded vein grafts LAD coronary interventions  Paroxysmal A-fib seen by EP at my request converted to sinus rhythm  Dyspnea attempted myocardial perfusion for restratification but unable to lie flat  Regarding coronary angiogram also unable to lie flat no EKG or troponin changes to justify    Presents with late stage chronic obstructive pulmonary disease  Ischemic cardiomyopathy known occluded right coronary artery recent PCI to ramus intermedius  Paroxysmal atrial flutter in the setting of hypertension COPD on beta-blockers  [id number] score of 7 CAD CHF female diabetes  Known coronary disease prevents certain antiarrhythmic use  Heart failure prevents Multaq from being considered high risk for Amio because of lung disease    Ovarian  cancer status post bilateral oophorectomy  Uterine fibroid resections  Presented with vaginal bleeding UTI hematuria  Troponin likely supply demand      amily history  1 son uncertain medical history 2 sisters  1 pneumonia 1 from cancer 1 brother alive both biologic parents  father with heart attack history colon cancer and 1 sister        PMH:  CAD - prior CABG. Last cardiac cath  with occluded LAD, widely patent ramus stent, minimal disease of the LCx, occluded RCA with right-to-right collaterals, occluded SVG to RCA, occluded and thrombotic SVG to LAD. LIMA not used as graft. Last stress  with fixed anterior infarct without ischemia  CHF - LVEF 45-50% by echo , 35-40% here.   COPD / home oxygen therapy  Atrial fibrillation  Carotid vascular disease  DM with CKD, PAD, and peripheral neruopathy  Edema  HTN  GERD  Glaucoma  LBBB and RBBB seen in past  Mixed HLD  Vitamin D deficiency  Ovarian cancer?  - little details     PSH: CABG, BSO  SH: former smoker  FH: CAD in father, sister, colon cancer in sister     Objective Data:  Last Recorded Vitals:  Vitals:    24 1447 24 1449 24 1451 24 1615   BP: 109/55 111/63 (!) 110/42 (!) 90/49   BP Location: Right arm Right arm Right arm Right arm   Patient Position: Sitting Sitting Sitting Sitting   Pulse: 58 64 60 54   Resp: 25 23 25 23   Temp:    36.5 °C (97.7 °F)   TempSrc:    Temporal   SpO2: (!) 89% 94% 93% 94%   Weight:       Height:           Last Labs:  CBC - 5/3/2024:  4:22 AM  8.4 11.0 230    35.7      CMP - 5/3/2024:  4:22 AM  8.7 5.5 15 --- 0.5   3.8 3.2 9 49      PTT - No results in last year.  1.8   19.9 _     TROPHS   Date/Time Value Ref Range Status   2024 11:08  0 - 13 ng/L Final   2024 02:03  0 - 13 ng/L Final     Comment:     Previous result verified on 2024 0129 on specimen/case [id number] called with component [facility] for procedure Troponin I, High Sensitivity, Initial with value  354 ng/L.   04/30/2024 12:54  0 - 13 ng/L Final     BNP   Date/Time Value Ref Range Status   05/01/2024 04:21  0 - 99 pg/mL Final   11/29/2022 01:09  0 - 99 pg/mL Final     Comment:     .  <100 pg/mL - Heart failure unlikely  100-299 pg/mL - Intermediate probability of acute heart  .               failure exacerbation. Correlate with clinical  .               context and patient history.    >=300 pg/mL - Heart Failure likely. Correlate with clinical  .               context and patient history.  BNP testing is performed using different testing   methodology at [facility] than at other   [facility]. Direct result comparisons should   only be made within the same method.     12/05/2021 01:53  0 - 99 pg/mL Final     Comment:     .  <100 pg/mL - Heart failure unlikely  100-299 pg/mL - Intermediate probability of acute heart  .               failure exacerbation. Correlate with clinical  .               context and patient history.    >=300 pg/mL - Heart Failure likely. Correlate with clinical  .               context and patient history.  BNP testing is performed using different testing   methodology at [facility] than at other   [facility] hospitals. Direct result comparisons should   only be made within the same method.       HGBA1C   Date/Time Value Ref Range Status   10/04/2023 02:20 PM 6.5 4.2 - 6.5 % Final   05/31/2023 01:44 PM 6.2 % Final     Comment:          Diagnosis of Diabetes-Adults   Non-Diabetic: < or = 5.6%   Increased risk for developing diabetes: 5.7-6.4%   Diagnostic of diabetes: > or = 6.5%  .       Monitoring of Diabetes                Age (y)     Therapeutic Goal (%)   Adults:          >18           <7.0   Pediatrics:    13-18           <7.5                   7-12           <8.0                   0- 6            7.5-8.5   American Diabetes Association. Diabetes Care 33(S1), Jan 2010.   03/03/2021 09:28 AM 7.7 % Final     Comment:           "Diagnosis of Diabetes-Adults   Non-Diabetic: < or = 5.6%   Increased risk for developing diabetes: 5.7-6.4%   Diagnostic of diabetes: > or = 6.5%  .       Monitoring of Diabetes                Age (y)     Therapeutic Goal (%)   Adults:          >18           <7.0   Pediatrics:    13-18           <7.5                   7-12           <8.0                   0- 6            7.5-8.5   American Diabetes Association. Diabetes Care 33(S1), Jan 2010.       VLDL   Date/Time Value Ref Range Status   04/06/2022 10:04 AM 29 0 - 40 mg/dL Final   03/03/2021 09:28 AM 44 0 - 40 mg/dL Final   05/04/2020 09:14 AM 55 0 - 40 mg/dL Final      Last I/O:  I/O last 3 completed shifts:  In: 830.4 (10.8 mL/kg) [P.O.:600; I.V.:230.4 (3 mL/kg)]  Out: 1700 (22.1 mL/kg) [Urine:1700 (0.6 mL/kg/hr)]  Weight: 76.8 kg     Past Cardiology Tests (Last 3 Years):  EKG:  ECG 12 Lead 05/02/2024 (Preliminary)      ECG 12 Lead 05/01/2024      ECG 12 lead 04/30/2024      ECG 12 lead 04/30/2024    Echo:  Transthoracic Echo (TTE) Complete 04/30/2024    Ejection Fractions:  No results found for: \"EF\"  Cath:  No results found for this or any previous visit from the past 1095 days.    Stress Test:  No results found for this or any previous visit from the past 1095 days.    Cardiac Imaging:  No results found for this or any previous visit from the past 1095 days.      Inpatient Medications:  Scheduled medications   Medication Dose Route Frequency    apixaban  5 mg oral BID    atorvastatin  40 mg oral Nightly    cephalexin  500 mg oral q8h CHAYO    cholecalciferol  5,000 Units oral Daily    cilostazol  100 mg oral BID    digoxin  500 mcg intravenous Daily    fluticasone  2 spray Each Nostril Daily    formoterol  20 mcg nebulization BID    [Held by provider] glipiZIDE  10 mg oral BID AC    icosapent ethyL  2 g oral BID    ipratropium-albuteroL  3 mL nebulization q4h    loratadine  10 mg oral Daily    metoprolol succinate XL  50 mg oral Daily    oxybutynin  5 mg oral " BID    pantoprazole  40 mg oral Daily before breakfast    Or    pantoprazole  40 mg intravenous Daily before breakfast    [START ON 5/4/2024] predniSONE  40 mg oral Daily    regadenoson  0.4 mg intravenous Once in imaging     PRN medications   Medication    acetaminophen    Or    acetaminophen    Or    acetaminophen    acetaminophen    Or    acetaminophen    Or    acetaminophen    LORazepam    melatonin    metoclopramide    Or    metoclopramide    ondansetron ODT    Or    ondansetron    oxygen    polyethylene glycol    sodium chloride     Continuous Medications   Medication Dose Last Rate    dilTIAZem  5-15 mg/hr 5 mg/hr (05/03/24 1449)     Results for orders placed or performed during the hospital encounter of 04/30/24 (from the past 96 hour(s))   ECG 12 lead   Result Value Ref Range    Ventricular Rate 158 BPM    Atrial Rate 158 BPM    MN Interval 90 ms    QRS Duration 116 ms    QT Interval 244 ms    QTC Calculation(Bazett) 395 ms    P Axis 91 degrees    R Axis -67 degrees    T Axis 206 degrees    QRS Count 26 beats    Q Onset 195 ms    P Onset 150 ms    P Offset 187 ms    T Offset 317 ms    QTC Fredericia 337 ms   Comprehensive Metabolic Panel   Result Value Ref Range    Glucose 175 (H) 74 - 99 mg/dL    Sodium 135 (L) 136 - 145 mmol/L    Potassium 4.4 3.5 - 5.3 mmol/L    Chloride 97 (L) 98 - 107 mmol/L    Bicarbonate 28 21 - 32 mmol/L    Anion Gap 14 10 - 20 mmol/L    Urea Nitrogen 34 (H) 6 - 23 mg/dL    Creatinine 1.63 (H) 0.50 - 1.05 mg/dL    eGFR 33 (L) >60 mL/min/1.73m*2    Calcium 8.9 8.6 - 10.3 mg/dL    Albumin 3.7 3.4 - 5.0 g/dL    Alkaline Phosphatase 53 33 - 136 U/L    Total Protein 6.2 (L) 6.4 - 8.2 g/dL    AST 18 9 - 39 U/L    Bilirubin, Total 0.8 0.0 - 1.2 mg/dL    ALT 10 7 - 45 U/L   Protime-INR   Result Value Ref Range    Protime 19.9 (H) 9.8 - 12.8 seconds    INR 1.8 (H) 0.9 - 1.1   CBC with Differential   Result Value Ref Range    WBC 11.6 (H) 4.4 - 11.3 x10*3/uL    nRBC 0.0 0.0 - 0.0 /100 WBCs     RBC 3.99 (L) 4.00 - 5.20 x10*6/uL    Hemoglobin 12.6 12.0 - 16.0 g/dL    Hematocrit 39.5 36.0 - 46.0 %    MCV 99 80 - 100 fL    MCH 31.6 26.0 - 34.0 pg    MCHC 31.9 (L) 32.0 - 36.0 g/dL    RDW 14.5 11.5 - 14.5 %    Platelets 244 150 - 450 x10*3/uL    Neutrophils % 72.1 40.0 - 80.0 %    Immature Granulocytes %, Automated 0.3 0.0 - 0.9 %    Lymphocytes % 18.7 13.0 - 44.0 %    Monocytes % 7.9 2.0 - 10.0 %    Eosinophils % 0.7 0.0 - 6.0 %    Basophils % 0.3 0.0 - 2.0 %    Neutrophils Absolute 8.39 (H) 1.60 - 5.50 x10*3/uL    Immature Granulocytes Absolute, Automated 0.04 0.00 - 0.50 x10*3/uL    Lymphocytes Absolute 2.17 0.80 - 3.00 x10*3/uL    Monocytes Absolute 0.92 (H) 0.05 - 0.80 x10*3/uL    Eosinophils Absolute 0.08 0.00 - 0.40 x10*3/uL    Basophils Absolute 0.03 0.00 - 0.10 x10*3/uL   Type And Screen   Result Value Ref Range    ABO TYPE A     Rh TYPE POS     ANTIBODY SCREEN NEG    Troponin I, High Sensitivity, Initial   Result Value Ref Range    Troponin I, High Sensitivity 354 (HH) 0 - 13 ng/L   Gray Top   Result Value Ref Range    Extra Tube Hold for add-ons.    Urinalysis with Reflex Microscopic   Result Value Ref Range    Color, Urine Light-Brown (N) Light-Yellow, Yellow, Dark-Yellow    Appearance, Urine Turbid (N) Clear    Specific Gravity, Urine 1.006 1.005 - 1.035    pH, Urine 6.5 5.0, 5.5, 6.0, 6.5, 7.0, 7.5, 8.0    Protein, Urine NEGATIVE NEGATIVE, 10 (TRACE), 20 (TRACE) mg/dL    Glucose, Urine Normal Normal mg/dL    Blood, Urine OVER (3+) (A) NEGATIVE    Ketones, Urine NEGATIVE NEGATIVE mg/dL    Bilirubin, Urine NEGATIVE NEGATIVE    Urobilinogen, Urine Normal Normal mg/dL    Nitrite, Urine NEGATIVE NEGATIVE    Leukocyte Esterase, Urine 500 Ian/µL (A) NEGATIVE   Microscopic Only, Urine   Result Value Ref Range    WBC, Urine >50 (A) 1-5, NONE /HPF    RBC, Urine >20 (A) NONE, 1-2, 3-5 /HPF    Squamous Epithelial Cells, Urine 1-9 (SPARSE) Reference range not established. /HPF    Bacteria, Urine 1+ (A) NONE  SEEN /HPF   Urine culture    Specimen: Clean Catch/Voided; Urine   Result Value Ref Range    Urine Culture >100,000 Escherichia coli (A)     Urine Culture >100,000 Citrobacter koseri (A)        Susceptibility    Citrobacter koseri - MICROSCAN     Ampicillin  Resistant      Amoxicillin/Clavulanate  Susceptible      Ampicillin/Sulbactam  Susceptible      Cefazolin  Susceptible      Ciprofloxacin  Susceptible      Gentamicin  Susceptible      Nitrofurantoin  Intermediate      Piperacillin/Tazobactam  Susceptible      Trimethoprim/Sulfamethoxazole  Susceptible     Escherichia coli - MICROSCAN     Ampicillin  Susceptible      Cefazolin  Susceptible      Cefazolin (uncomplicated UTIs only)  Susceptible      Ciprofloxacin  Susceptible      Gentamicin  Susceptible      Nitrofurantoin  Susceptible      Piperacillin/Tazobactam  Susceptible      Trimethoprim/Sulfamethoxazole  Susceptible    ECG 12 lead   Result Value Ref Range    Ventricular Rate 95 BPM    Atrial Rate 95 BPM    ME Interval 144 ms    QRS Duration 108 ms    QT Interval 372 ms    QTC Calculation(Bazett) 467 ms    P Axis 102 degrees    R Axis 42 degrees    T Axis 116 degrees    QRS Count 15 beats    Q Onset 216 ms    P Onset 144 ms    P Offset 210 ms    T Offset 402 ms    QTC Fredericia 433 ms   Troponin, High Sensitivity, 1 Hour   Result Value Ref Range    Troponin I, High Sensitivity 322 (HH) 0 - 13 ng/L   CBC   Result Value Ref Range    WBC 9.5 4.4 - 11.3 x10*3/uL    nRBC 0.0 0.0 - 0.0 /100 WBCs    RBC 3.55 (L) 4.00 - 5.20 x10*6/uL    Hemoglobin 11.3 (L) 12.0 - 16.0 g/dL    Hematocrit 35.9 (L) 36.0 - 46.0 %     (H) 80 - 100 fL    MCH 31.8 26.0 - 34.0 pg    MCHC 31.5 (L) 32.0 - 36.0 g/dL    RDW 14.4 11.5 - 14.5 %    Platelets 219 150 - 450 x10*3/uL   Comprehensive metabolic panel   Result Value Ref Range    Glucose 132 (H) 74 - 99 mg/dL    Sodium 139 136 - 145 mmol/L    Potassium 4.0 3.5 - 5.3 mmol/L    Chloride 103 98 - 107 mmol/L    Bicarbonate 29 21 -  32 mmol/L    Anion Gap 11 10 - 20 mmol/L    Urea Nitrogen 31 (H) 6 - 23 mg/dL    Creatinine 1.43 (H) 0.50 - 1.05 mg/dL    eGFR 38 (L) >60 mL/min/1.73m*2    Calcium 8.1 (L) 8.6 - 10.3 mg/dL    Albumin 3.2 (L) 3.4 - 5.0 g/dL    Alkaline Phosphatase 49 33 - 136 U/L    Total Protein 5.5 (L) 6.4 - 8.2 g/dL    AST 15 9 - 39 U/L    Bilirubin, Total 0.5 0.0 - 1.2 mg/dL    ALT 9 7 - 45 U/L   Transthoracic Echo (TTE) Complete   Result Value Ref Range    AV pk snehal 1.38 m/s    LV Biplane EF 34 %    LVOT diam 1.70 cm    MV E/A ratio 1.54     MV avg E/e' ratio 23.00     Tricuspid annular plane systolic excursion 1.4 cm    RV free wall pk S' 8.73 cm/s    RVSP 43.7 mmHg    LVIDd 6.50 cm    Aortic Valve Area by Continuity of Peak Velocity 1.47 cm2    AV pk grad 7.6 mmHg    LV A4C EF 39.4    Renal function panel   Result Value Ref Range    Glucose 48 (LL) 74 - 99 mg/dL    Sodium 140 136 - 145 mmol/L    Potassium 3.9 3.5 - 5.3 mmol/L    Chloride 104 98 - 107 mmol/L    Bicarbonate 31 21 - 32 mmol/L    Anion Gap 9 (L) 10 - 20 mmol/L    Urea Nitrogen 26 (H) 6 - 23 mg/dL    Creatinine 0.98 0.50 - 1.05 mg/dL    eGFR 60 (L) >60 mL/min/1.73m*2    Calcium 8.5 (L) 8.6 - 10.3 mg/dL    Phosphorus 3.5 2.5 - 4.9 mg/dL    Albumin 3.1 (L) 3.4 - 5.0 g/dL   CBC   Result Value Ref Range    WBC 8.4 4.4 - 11.3 x10*3/uL    nRBC 0.0 0.0 - 0.0 /100 WBCs    RBC 3.29 (L) 4.00 - 5.20 x10*6/uL    Hemoglobin 10.3 (L) 12.0 - 16.0 g/dL    Hematocrit 33.6 (L) 36.0 - 46.0 %     (H) 80 - 100 fL    MCH 31.3 26.0 - 34.0 pg    MCHC 30.7 (L) 32.0 - 36.0 g/dL    RDW 14.3 11.5 - 14.5 %    Platelets 203 150 - 450 x10*3/uL   Magnesium   Result Value Ref Range    Magnesium 1.71 1.60 - 2.40 mg/dL   B-type natriuretic peptide   Result Value Ref Range     (H) 0 - 99 pg/mL   ECG 12 Lead   Result Value Ref Range    Ventricular Rate 164 BPM    Atrial Rate 328 BPM    QRS Duration 102 ms    QT Interval 250 ms    QTC Calculation(Bazett) 412 ms    P Axis -84 degrees    R  Axis 130 degrees    T Axis 258 degrees    QRS Count 27 beats    Q Onset 216 ms    P Onset 160 ms    P Offset 204 ms    T Offset 341 ms    QTC Fredericia 349 ms   POCT GLUCOSE   Result Value Ref Range    POCT Glucose 164 (H) 74 - 99 mg/dL   POCT GLUCOSE   Result Value Ref Range    POCT Glucose 174 (H) 74 - 99 mg/dL   POCT GLUCOSE   Result Value Ref Range    POCT Glucose 239 (H) 74 - 99 mg/dL   Renal function panel   Result Value Ref Range    Glucose 191 (H) 74 - 99 mg/dL    Sodium 137 136 - 145 mmol/L    Potassium 4.2 3.5 - 5.3 mmol/L    Chloride 101 98 - 107 mmol/L    Bicarbonate 29 21 - 32 mmol/L    Anion Gap 11 10 - 20 mmol/L    Urea Nitrogen 20 6 - 23 mg/dL    Creatinine 0.98 0.50 - 1.05 mg/dL    eGFR 60 (L) >60 mL/min/1.73m*2    Calcium 8.9 8.6 - 10.3 mg/dL    Phosphorus 3.5 2.5 - 4.9 mg/dL    Albumin 3.4 3.4 - 5.0 g/dL   Magnesium   Result Value Ref Range    Magnesium 1.91 1.60 - 2.40 mg/dL   CBC and Auto Differential   Result Value Ref Range    WBC 13.6 (H) 4.4 - 11.3 x10*3/uL    nRBC 0.0 0.0 - 0.0 /100 WBCs    RBC 3.88 (L) 4.00 - 5.20 x10*6/uL    Hemoglobin 12.1 12.0 - 16.0 g/dL    Hematocrit 40.0 36.0 - 46.0 %     (H) 80 - 100 fL    MCH 31.2 26.0 - 34.0 pg    MCHC 30.3 (L) 32.0 - 36.0 g/dL    RDW 14.1 11.5 - 14.5 %    Platelets 255 150 - 450 x10*3/uL    Neutrophils % 81.0 40.0 - 80.0 %    Immature Granulocytes %, Automated 0.6 0.0 - 0.9 %    Lymphocytes % 7.6 13.0 - 44.0 %    Monocytes % 10.1 2.0 - 10.0 %    Eosinophils % 0.6 0.0 - 6.0 %    Basophils % 0.1 0.0 - 2.0 %    Neutrophils Absolute 11.01 (H) 1.60 - 5.50 x10*3/uL    Immature Granulocytes Absolute, Automated 0.08 0.00 - 0.50 x10*3/uL    Lymphocytes Absolute 1.03 0.80 - 3.00 x10*3/uL    Monocytes Absolute 1.37 (H) 0.05 - 0.80 x10*3/uL    Eosinophils Absolute 0.08 0.00 - 0.40 x10*3/uL    Basophils Absolute 0.02 0.00 - 0.10 x10*3/uL   Troponin I, High Sensitivity   Result Value Ref Range    Troponin I, High Sensitivity 119 (HH) 0 - 13 ng/L   ECG  12 Lead   Result Value Ref Range    Ventricular Rate 85 BPM    Atrial Rate 85 BPM    CO Interval 162 ms    QRS Duration 104 ms    QT Interval 350 ms    QTC Calculation(Bazett) 416 ms    P Axis 75 degrees    R Axis 104 degrees    T Axis -16 degrees    QRS Count 14 beats    Q Onset 215 ms    P Onset 134 ms    P Offset 191 ms    T Offset 390 ms    QTC Fredericia 393 ms   ECG 12 Lead   Result Value Ref Range    Ventricular Rate 108 BPM    Atrial Rate 300 BPM    QRS Duration 114 ms    QT Interval 342 ms    QTC Calculation(Bazett) 458 ms    R Axis 106 degrees    T Axis 270 degrees    QRS Count 18 beats    Q Onset 215 ms    P Onset 152 ms    P Offset 197 ms    T Offset 386 ms    QTC Fredericia 416 ms   POCT GLUCOSE   Result Value Ref Range    POCT Glucose 160 (H) 74 - 99 mg/dL   ECG 12 Lead   Result Value Ref Range    Ventricular Rate 154 BPM    Atrial Rate 308 BPM    QRS Duration 108 ms    QT Interval 246 ms    QTC Calculation(Bazett) 394 ms    R Axis 91 degrees    T Axis 264 degrees    QRS Count 25 beats    Q Onset 216 ms    P Onset 155 ms    P Offset 208 ms    T Offset 339 ms    QTC Fredericia 337 ms   CBC   Result Value Ref Range    WBC 8.4 4.4 - 11.3 x10*3/uL    nRBC 0.0 0.0 - 0.0 /100 WBCs    RBC 3.52 (L) 4.00 - 5.20 x10*6/uL    Hemoglobin 11.0 (L) 12.0 - 16.0 g/dL    Hematocrit 35.7 (L) 36.0 - 46.0 %     (H) 80 - 100 fL    MCH 31.3 26.0 - 34.0 pg    MCHC 30.8 (L) 32.0 - 36.0 g/dL    RDW 13.5 11.5 - 14.5 %    Platelets 230 150 - 450 x10*3/uL   Renal function panel   Result Value Ref Range    Glucose 201 (H) 74 - 99 mg/dL    Sodium 136 136 - 145 mmol/L    Potassium 4.7 3.5 - 5.3 mmol/L    Chloride 99 98 - 107 mmol/L    Bicarbonate 30 21 - 32 mmol/L    Anion Gap 12 10 - 20 mmol/L    Urea Nitrogen 19 6 - 23 mg/dL    Creatinine 0.86 0.50 - 1.05 mg/dL    eGFR 71 >60 mL/min/1.73m*2    Calcium 8.7 8.6 - 10.3 mg/dL    Phosphorus 3.8 2.5 - 4.9 mg/dL    Albumin 3.2 (L) 3.4 - 5.0 g/dL   Magnesium   Result Value Ref Range     Magnesium 2.55 (H) 1.60 - 2.40 mg/dL   ECG 12 Lead   Result Value Ref Range    Ventricular Rate 63 BPM    Atrial Rate 63 BPM    PA Interval 150 ms    QRS Duration 112 ms    QT Interval 386 ms    QTC Calculation(Bazett) 395 ms    P Axis -7 degrees    R Axis 80 degrees    T Axis 80 degrees    QRS Count 10 beats    Q Onset 220 ms    P Onset 145 ms    P Offset 175 ms    T Offset 413 ms    QTC Fredericia 392 ms         Physical Exam:  Subjective:   Examination:   General Examination:   General Appearance: Well developed, well nourished, in no acute distress.   Head: normocephalic, atraumatic   Eyes: Anicteric sclera. Pupils are equally round and reactive to light.  Extraocular movements are intact.    Ears: normal   Oral: Cavity: mucosa moist.   Throat: clear.   Neck/Thyroid: neck supple, full range of motion, no cervical lymphadenopathy.   Skin: warm and dry, no suspicious lesions.    Heart: regular rate and rhythm, S1, S2 normal, no murmurs.  Occasional PACs  Lungs: copd but distant breath sounds with no audible wheeze  Abdomen: soft, non-tender, non-distended, bowl sound present, normal.   Extremities: no clubbing, no cyanosis, no edema.   Neuro: non-focal, motor strength normal upper lower extremities, sensory exam intact.       Assessment/Plan   I25.1 CAD remote CABG, stent  I42.9 ischemic cardiomyopathy  F17.10 cigarette smoking  I48.91 transient AF in setting of uterine acute bleeding resolved      Code Status:  Full Code    I spent 40 minutes in the professional and overall care of this patient.        Horacio Armijo MD

## 2024-05-03 NOTE — NURSING NOTE
Pt remained safe throughout shift. Sp02 maintained >90% on 3L NC. Pt reports improvement in SOB after ativan, morphine, lasix. PO metoprolol given once early in shift dt -120s. Cardizem gtt initiated around 2200 dt HR sustaining 150s. Cardizem running at 15ml/hr. Vitals stable. HR 70s-100s.

## 2024-05-03 NOTE — PROGRESS NOTES
Mitzi Pro is a 73 y.o. female on day 3 of admission presenting with Atrial fibrillation with RVR (Multi).      Subjective    Patient seen and examined at bedside morning.  Patient started on IV Solu-Medrol and given dose of IV Lasix last night due to having increased shortness of breath.  Patient is on 4 L O2 nasal cannula.  Notes that her breathing feels slightly better this morning.  Diltiazem drip restarted at 15 Mg per hour, EP following.    Objective     Last Recorded Vitals  /54 (BP Location: Right arm, Patient Position: Lying)   Pulse 74   Temp 36.6 °C (97.9 °F) (Temporal)   Resp (!) 29   Wt 76.8 kg (169 lb 5 oz)   SpO2 91%   Intake/Output last 3 Shifts:    Intake/Output Summary (Last 24 hours) at 5/3/2024 0936  Last data filed at 5/3/2024 0300  Gross per 24 hour   Intake 470.42 ml   Output 1500 ml   Net -1029.58 ml       Admission Weight  Weight: 77.1 kg (170 lb) (04/30/24 0034)    Daily Weight  05/03/24 : 76.8 kg (169 lb 5 oz)    Image Results  ECG 12 Lead  Sinus tachycardia with Premature supraventricular complexes with occasional Premature ventricular complexes  Rightward axis  Anterior infarct (cited on or before 30-APR-2024)  ST & T wave abnormality, consider inferior ischemia  Abnormal ECG  When compared with ECG of 02-MAY-2024 12:13, (unconfirmed)  Significant changes have occurred  ECG 12 Lead  Sinus tachycardia  Rightward axis  ST & T wave abnormality, consider inferior ischemia  Abnormal ECG  When compared with ECG of 02-MAY-2024 21:07, (unconfirmed)  Premature ventricular complexes are no longer Present  Premature supraventricular complexes are no longer Present      Physical Exam    Constitutional: Slightly fatigued, although resting comfortably  CV: Irregularly irregular, tachycardic, no murmurs or gallops appreciated, distal pulses intact   Pulmonary: Mild bibasilar crackles bilaterally, no rales, rhonchi, wheezing  GI: Abdomen soft, nondistended, nontender to palpation.  No  guarding or rebound tenderness noted.  MSK: Range of motion intact in all 4 extremities  Extremities: No edema noted  Neuro: ANO x3, no focal neurologic deficits  Psych: Slightly fatigued   Skin: Warm and dry, no erythema skin lesions noted.     Relevant Results    Results for orders placed or performed during the hospital encounter of 04/30/24 (from the past 24 hour(s))   Troponin I, High Sensitivity   Result Value Ref Range    Troponin I, High Sensitivity 119 (HH) 0 - 13 ng/L   ECG 12 Lead   Result Value Ref Range    Ventricular Rate 85 BPM    Atrial Rate 85 BPM    GA Interval 162 ms    QRS Duration 104 ms    QT Interval 350 ms    QTC Calculation(Bazett) 416 ms    P Axis 75 degrees    R Axis 104 degrees    T Axis -16 degrees    QRS Count 14 beats    Q Onset 215 ms    P Onset 134 ms    P Offset 191 ms    T Offset 390 ms    QTC Fredericia 393 ms   ECG 12 Lead   Result Value Ref Range    Ventricular Rate 108 BPM    Atrial Rate 144 BPM    GA Interval 126 ms    QRS Duration 114 ms    QT Interval 342 ms    QTC Calculation(Bazett) 458 ms    R Axis 106 degrees    T Axis 270 degrees    QRS Count 18 beats    Q Onset 215 ms    P Onset 152 ms    P Offset 197 ms    T Offset 386 ms    QTC Fredericia 416 ms   POCT GLUCOSE   Result Value Ref Range    POCT Glucose 160 (H) 74 - 99 mg/dL   ECG 12 Lead   Result Value Ref Range    Ventricular Rate 154 BPM    Atrial Rate 154 BPM    GA Interval 122 ms    QRS Duration 108 ms    QT Interval 246 ms    QTC Calculation(Bazett) 394 ms    R Axis 91 degrees    T Axis 264 degrees    QRS Count 25 beats    Q Onset 216 ms    P Onset 155 ms    P Offset 208 ms    T Offset 339 ms    QTC Fredericia 337 ms   CBC   Result Value Ref Range    WBC 8.4 4.4 - 11.3 x10*3/uL    nRBC 0.0 0.0 - 0.0 /100 WBCs    RBC 3.52 (L) 4.00 - 5.20 x10*6/uL    Hemoglobin 11.0 (L) 12.0 - 16.0 g/dL    Hematocrit 35.7 (L) 36.0 - 46.0 %     (H) 80 - 100 fL    MCH 31.3 26.0 - 34.0 pg    MCHC 30.8 (L) 32.0 - 36.0 g/dL    RDW  13.5 11.5 - 14.5 %    Platelets 230 150 - 450 x10*3/uL   Renal function panel   Result Value Ref Range    Glucose 201 (H) 74 - 99 mg/dL    Sodium 136 136 - 145 mmol/L    Potassium 4.7 3.5 - 5.3 mmol/L    Chloride 99 98 - 107 mmol/L    Bicarbonate 30 21 - 32 mmol/L    Anion Gap 12 10 - 20 mmol/L    Urea Nitrogen 19 6 - 23 mg/dL    Creatinine 0.86 0.50 - 1.05 mg/dL    eGFR 71 >60 mL/min/1.73m*2    Calcium 8.7 8.6 - 10.3 mg/dL    Phosphorus 3.8 2.5 - 4.9 mg/dL    Albumin 3.2 (L) 3.4 - 5.0 g/dL   Magnesium   Result Value Ref Range    Magnesium 2.55 (H) 1.60 - 2.40 mg/dL      ECG 12 Lead    Result Date: 5/3/2024  Sinus tachycardia with Premature supraventricular complexes with occasional Premature ventricular complexes Rightward axis Anterior infarct (cited on or before 30-APR-2024) ST & T wave abnormality, consider inferior ischemia Abnormal ECG When compared with ECG of 02-MAY-2024 12:13, (unconfirmed) Significant changes have occurred    ECG 12 Lead    Result Date: 5/3/2024  Sinus tachycardia Rightward axis ST & T wave abnormality, consider inferior ischemia Abnormal ECG When compared with ECG of 02-MAY-2024 21:07, (unconfirmed) Premature ventricular complexes are no longer Present Premature supraventricular complexes are no longer Present    XR chest 1 view    Result Date: 5/2/2024  Interpreted By:  Brandon Nazario, STUDY: XR CHEST 1 VIEW   INDICATION: Signs/Symptoms:SOB.   COMPARISON: April 30   ACCESSION NUMBER(S): [id number]   ORDERING CLINICIAN: YANIRA BAXTER   FINDINGS: Interval development of perihilar and basilar interstitial edema with new right effusion and basilar airspace opacity favored to represent more conglomerate edema. Focus of pneumonia not excluded.   Previous cardiomegaly with median sternotomy unchanged.       Interval development of perihilar and basilar interstitial edema with new right effusion and basilar airspace opacity favored to represent more conglomerate edema. Focus of pneumonia not  excluded.   Signed by: Brandon Nazario 5/2/2024 6:55 PM Dictation workstation:   [id number]    ECG 12 Lead    Result Date: 5/2/2024  Sinus rhythm with sinus arrhythmia with occasional Premature ventricular complexes and Fusion complexes Rightward axis Anteroseptal infarct (cited on or before 30-APR-2024) Abnormal ECG When compared with ECG of 01-MAY-2024 05:33, Sinus rhythm has replaced Atrial flutter Vent. rate has decreased BY  79 BPM Serial changes of Anteroseptal infarct Present       Assessment/Plan     Principal Problem:    Atrial fibrillation with RVR (Multi)  Active Problems:    Type 2 diabetes mellitus with hyperglycemia, without long-term current use of insulin (Multi)    Chronic combined systolic (congestive) and diastolic (congestive) heart failure (Multi)    Cystitis    Idiopathic hypotension    73-year-old female with a history of combined systolic/diastolic heart failure (EF 34%), non-insulin-dependent type 2 diabetes, CAD with prior CABG, atrial fibrillation on Eliquis, carotid artery disease, hypertension, GERD, hyperlipidemia, ovarian cancer s/p bilateral oophorectomy, uterine fibroid resections, presented to the hospital with weakness and was admitted for UTI and A-fib RVR.    Atrial fibrillation with RVR  Complaint systolic/diastolic heart failure (EF 34%), ischemic cardiomyopathy  CAD s/p CABG  Troponinemia  History of COPD  -S/p 1 dose IV Lasix 20 mg yesterday, net -1 L.  Patient weaned from 4 L to 2 L O2 nasal cannula this morning.  Restarted on diltiazem drip this morning for A-fib RVR.  -Will administer an additional dose of 20 mg IV Lasix this morning  -Cardiology continuing medical management, troponins did downtrend.  No plan for cardiac catheterization or further stress test at this time.  -EP consulted regarding A-fib/flutter with RVR, may need to increase rate control regimen however will defer to EP at this time.  -Strict I's and O's, daily weights  -Wean O2 as tolerated  -Continue home  Eliquis    Uncomplicated cystitis  -Urine cultures growing  Citrobacter and E. coli, susceptibilities given  -Continue oral Keflex    Acute kidney injury, prerenal versus ischemic ATN-resolved  -creatinine improved  -Holding home lisinopril and spironolactone given recovering renal function and diuresing further today.  -Serial RFP's    Non-insulin-dependent type 2 diabetes  -Patient hypoglycemic this morning, holding home glyburide  -Hypoglycemic protocol in place  -Continue monitor sugars    Hypertension  -Holding home lisinopril and spironolactone    Hyperlipidemia  -Continue home atorvastatin    Diet: N.p.o.  DVT prophylaxis: Eliquis  Consults: Cardiology,  EP  CODE STATUS: Full    Disposition: Patient to remain in IMCU, currently on diltiazem drip.  Likely to remain in the hospital for the next 24-48 hours.    To be discussed with attending,    Renita Landry DO  Internal Medicine, PGY-3

## 2024-05-04 LAB
ALBUMIN SERPL BCP-MCNC: 3.1 G/DL (ref 3.4–5)
ANION GAP SERPL CALC-SCNC: 11 MMOL/L (ref 10–20)
ATRIAL RATE: 63 BPM
BUN SERPL-MCNC: 38 MG/DL (ref 6–23)
CALCIUM SERPL-MCNC: 8.6 MG/DL (ref 8.6–10.3)
CHLORIDE SERPL-SCNC: 95 MMOL/L (ref 98–107)
CO2 SERPL-SCNC: 31 MMOL/L (ref 21–32)
CREAT SERPL-MCNC: 1.31 MG/DL (ref 0.5–1.05)
EGFRCR SERPLBLD CKD-EPI 2021: 43 ML/MIN/1.73M*2
ERYTHROCYTE [DISTWIDTH] IN BLOOD BY AUTOMATED COUNT: 13.5 % (ref 11.5–14.5)
GLUCOSE BLD MANUAL STRIP-MCNC: 236 MG/DL (ref 74–99)
GLUCOSE BLD MANUAL STRIP-MCNC: 298 MG/DL (ref 74–99)
GLUCOSE BLD MANUAL STRIP-MCNC: 413 MG/DL (ref 74–99)
GLUCOSE SERPL-MCNC: 294 MG/DL (ref 74–99)
HCT VFR BLD AUTO: 33.3 % (ref 36–46)
HGB BLD-MCNC: 10.3 G/DL (ref 12–16)
MAGNESIUM SERPL-MCNC: 2.3 MG/DL (ref 1.6–2.4)
MCH RBC QN AUTO: 31.2 PG (ref 26–34)
MCHC RBC AUTO-ENTMCNC: 30.9 G/DL (ref 32–36)
MCV RBC AUTO: 101 FL (ref 80–100)
NRBC BLD-RTO: 0 /100 WBCS (ref 0–0)
P AXIS: -7 DEGREES
P OFFSET: 175 MS
P ONSET: 145 MS
PHOSPHATE SERPL-MCNC: 2.8 MG/DL (ref 2.5–4.9)
PLATELET # BLD AUTO: 255 X10*3/UL (ref 150–450)
POTASSIUM SERPL-SCNC: 4.9 MMOL/L (ref 3.5–5.3)
PR INTERVAL: 150 MS
Q ONSET: 220 MS
QRS COUNT: 10 BEATS
QRS DURATION: 112 MS
QT INTERVAL: 386 MS
QTC CALCULATION(BAZETT): 395 MS
QTC FREDERICIA: 392 MS
R AXIS: 80 DEGREES
RBC # BLD AUTO: 3.3 X10*6/UL (ref 4–5.2)
SODIUM SERPL-SCNC: 132 MMOL/L (ref 136–145)
T AXIS: 80 DEGREES
T OFFSET: 413 MS
VENTRICULAR RATE: 63 BPM
WBC # BLD AUTO: 14.1 X10*3/UL (ref 4.4–11.3)

## 2024-05-04 PROCEDURE — 2500000006 HC RX 250 W HCPCS SELF ADMINISTERED DRUGS (ALT 637 FOR ALL PAYERS): Performed by: INTERNAL MEDICINE

## 2024-05-04 PROCEDURE — 99232 SBSQ HOSP IP/OBS MODERATE 35: CPT | Performed by: INTERNAL MEDICINE

## 2024-05-04 PROCEDURE — 83735 ASSAY OF MAGNESIUM: CPT

## 2024-05-04 PROCEDURE — 2500000002 HC RX 250 W HCPCS SELF ADMINISTERED DRUGS (ALT 637 FOR MEDICARE OP, ALT 636 FOR OP/ED): Performed by: INTERNAL MEDICINE

## 2024-05-04 PROCEDURE — 80069 RENAL FUNCTION PANEL: CPT

## 2024-05-04 PROCEDURE — 85027 COMPLETE CBC AUTOMATED: CPT

## 2024-05-04 PROCEDURE — 82947 ASSAY GLUCOSE BLOOD QUANT: CPT | Mod: 91

## 2024-05-04 PROCEDURE — 2500000001 HC RX 250 WO HCPCS SELF ADMINISTERED DRUGS (ALT 637 FOR MEDICARE OP): Performed by: INTERNAL MEDICINE

## 2024-05-04 PROCEDURE — 2060000001 HC INTERMEDIATE ICU ROOM DAILY

## 2024-05-04 PROCEDURE — 2500000005 HC RX 250 GENERAL PHARMACY W/O HCPCS: Performed by: INTERNAL MEDICINE

## 2024-05-04 PROCEDURE — 2500000002 HC RX 250 W HCPCS SELF ADMINISTERED DRUGS (ALT 637 FOR MEDICARE OP, ALT 636 FOR OP/ED)

## 2024-05-04 PROCEDURE — 2500000004 HC RX 250 GENERAL PHARMACY W/ HCPCS (ALT 636 FOR OP/ED): Performed by: INTERNAL MEDICINE

## 2024-05-04 PROCEDURE — 36415 COLL VENOUS BLD VENIPUNCTURE: CPT

## 2024-05-04 PROCEDURE — 99233 SBSQ HOSP IP/OBS HIGH 50: CPT

## 2024-05-04 PROCEDURE — 94640 AIRWAY INHALATION TREATMENT: CPT

## 2024-05-04 RX ORDER — INSULIN LISPRO 100 [IU]/ML
0-10 INJECTION, SOLUTION INTRAVENOUS; SUBCUTANEOUS
Status: DISCONTINUED | OUTPATIENT
Start: 2024-05-04 | End: 2024-05-06 | Stop reason: HOSPADM

## 2024-05-04 RX ORDER — METOPROLOL SUCCINATE 25 MG/1
25 TABLET, EXTENDED RELEASE ORAL ONCE
Status: COMPLETED | OUTPATIENT
Start: 2024-05-04 | End: 2024-05-04

## 2024-05-04 RX ORDER — IPRATROPIUM BROMIDE AND ALBUTEROL SULFATE 2.5; .5 MG/3ML; MG/3ML
3 SOLUTION RESPIRATORY (INHALATION) 3 TIMES DAILY
Status: DISCONTINUED | OUTPATIENT
Start: 2024-05-04 | End: 2024-05-06 | Stop reason: HOSPADM

## 2024-05-04 RX ORDER — METOPROLOL SUCCINATE 50 MG/1
50 TABLET, EXTENDED RELEASE ORAL DAILY
Status: DISCONTINUED | OUTPATIENT
Start: 2024-05-04 | End: 2024-05-04

## 2024-05-04 RX ORDER — METOPROLOL SUCCINATE 100 MG/1
100 TABLET, EXTENDED RELEASE ORAL DAILY
Status: DISCONTINUED | OUTPATIENT
Start: 2024-05-05 | End: 2024-05-06 | Stop reason: HOSPADM

## 2024-05-04 RX ORDER — METOPROLOL TARTRATE 1 MG/ML
5 INJECTION, SOLUTION INTRAVENOUS ONCE
Status: COMPLETED | OUTPATIENT
Start: 2024-05-04 | End: 2024-05-04

## 2024-05-04 RX ADMIN — METOPROLOL SUCCINATE 75 MG: 50 TABLET, EXTENDED RELEASE ORAL at 10:42

## 2024-05-04 RX ADMIN — APIXABAN 5 MG: 5 TABLET, FILM COATED ORAL at 21:25

## 2024-05-04 RX ADMIN — INSULIN LISPRO 6 UNITS: 100 INJECTION, SOLUTION INTRAVENOUS; SUBCUTANEOUS at 17:13

## 2024-05-04 RX ADMIN — INSULIN LISPRO 10 UNITS: 100 INJECTION, SOLUTION INTRAVENOUS; SUBCUTANEOUS at 13:08

## 2024-05-04 RX ADMIN — CEPHALEXIN 500 MG: 500 CAPSULE ORAL at 21:25

## 2024-05-04 RX ADMIN — ICOSAPENT ETHYL 2 G: 1 CAPSULE ORAL at 10:41

## 2024-05-04 RX ADMIN — Medication 3 L/MIN: at 08:32

## 2024-05-04 RX ADMIN — CILOSTAZOL 100 MG: 100 TABLET ORAL at 21:26

## 2024-05-04 RX ADMIN — PREDNISONE 40 MG: 20 TABLET ORAL at 10:41

## 2024-05-04 RX ADMIN — ICOSAPENT ETHYL 2 G: 1 CAPSULE ORAL at 21:26

## 2024-05-04 RX ADMIN — OXYBUTYNIN CHLORIDE 5 MG: 5 TABLET ORAL at 21:25

## 2024-05-04 RX ADMIN — METOPROLOL TARTRATE 5 MG: 5 INJECTION INTRAVENOUS at 15:36

## 2024-05-04 RX ADMIN — CILOSTAZOL 100 MG: 100 TABLET ORAL at 10:41

## 2024-05-04 RX ADMIN — APIXABAN 5 MG: 5 TABLET, FILM COATED ORAL at 10:42

## 2024-05-04 RX ADMIN — CEPHALEXIN 500 MG: 500 CAPSULE ORAL at 13:08

## 2024-05-04 RX ADMIN — LORATADINE 10 MG: 10 TABLET ORAL at 10:42

## 2024-05-04 RX ADMIN — METOPROLOL SUCCINATE 25 MG: 25 TABLET, EXTENDED RELEASE ORAL at 21:26

## 2024-05-04 RX ADMIN — IPRATROPIUM BROMIDE AND ALBUTEROL SULFATE 3 ML: 2.5; .5 SOLUTION RESPIRATORY (INHALATION) at 08:32

## 2024-05-04 RX ADMIN — PANTOPRAZOLE SODIUM 40 MG: 40 TABLET, DELAYED RELEASE ORAL at 05:35

## 2024-05-04 RX ADMIN — IPRATROPIUM BROMIDE AND ALBUTEROL SULFATE 3 ML: 2.5; .5 SOLUTION RESPIRATORY (INHALATION) at 14:00

## 2024-05-04 RX ADMIN — IPRATROPIUM BROMIDE AND ALBUTEROL SULFATE 3 ML: 2.5; .5 SOLUTION RESPIRATORY (INHALATION) at 20:55

## 2024-05-04 RX ADMIN — FLUTICASONE PROPIONATE 2 SPRAY: 50 SPRAY, METERED NASAL at 10:43

## 2024-05-04 RX ADMIN — OXYBUTYNIN CHLORIDE 5 MG: 5 TABLET ORAL at 10:42

## 2024-05-04 RX ADMIN — CEPHALEXIN 500 MG: 500 CAPSULE ORAL at 05:35

## 2024-05-04 RX ADMIN — FORMOTEROL FUMARATE 20 MCG: 20 SOLUTION RESPIRATORY (INHALATION) at 20:55

## 2024-05-04 RX ADMIN — CHOLECALCIFEROL TAB 125 MCG (5000 UNIT) 5000 UNITS: 125 TAB at 10:42

## 2024-05-04 RX ADMIN — FORMOTEROL FUMARATE 20 MCG: 20 SOLUTION RESPIRATORY (INHALATION) at 08:32

## 2024-05-04 RX ADMIN — ATORVASTATIN CALCIUM 40 MG: 40 TABLET, FILM COATED ORAL at 21:25

## 2024-05-04 ASSESSMENT — COGNITIVE AND FUNCTIONAL STATUS - GENERAL
CLIMB 3 TO 5 STEPS WITH RAILING: A LITTLE
MOBILITY SCORE: 20
HELP NEEDED FOR BATHING: A LITTLE
WALKING IN HOSPITAL ROOM: A LITTLE
TOILETING: A LITTLE
DAILY ACTIVITIY SCORE: 22
STANDING UP FROM CHAIR USING ARMS: A LITTLE
MOVING TO AND FROM BED TO CHAIR: A LITTLE

## 2024-05-04 ASSESSMENT — PAIN - FUNCTIONAL ASSESSMENT
PAIN_FUNCTIONAL_ASSESSMENT: 0-10

## 2024-05-04 ASSESSMENT — PAIN SCALES - GENERAL
PAINLEVEL_OUTOF10: 0 - NO PAIN

## 2024-05-04 NOTE — PROGRESS NOTES
Subjective Data:  She tells me that she is going home tomorrow.  She reports improvement in her shortness of breath.         Objective Data:  Last Recorded Vitals:  Vitals:    05/04/24 0832 05/04/24 0836 05/04/24 1233 05/04/24 1535   BP:  134/58 (!) 101/45 102/50   BP Location:  Right arm Right arm Right arm   Patient Position:  Lying Lying Lying   Pulse:  74 68 102   Resp:  19 22 25   Temp:  36.4 °C (97.5 °F) 36.4 °C (97.5 °F) 36.4 °C (97.5 °F)   TempSrc:  Temporal Temporal Temporal   SpO2: 95% 95%  97%   Weight:       Height:         She is up in a chair, speaking in short sentences, no accessory muscle use.  Very distant breath sounds without rhonchi or wheezes, there are few scattered posterior crepitations which do not sound like congestive heart failure.  Well-healed anterior sternal scar of prior coronary artery bypass grafting is noted, heart sounds are regular with grade 2/6 crescendo decrescendo murmur along the left sternal border, telemetry shows atrial fibrillation with variable ventricular rate, frequently in the low 100s.  Last Labs:  CBC - 5/4/2024:  5:19 AM  14.1 10.3 255    33.3      CMP - 5/4/2024:  5:19 AM  8.6 5.5 15 --- 0.5   2.8 3.1 9 49      PTT - No results in last year.  1.8   19.9 _     TROPHS   Date/Time Value Ref Range Status   05/02/2024 11:08  0 - 13 ng/L Final   04/30/2024 02:03  0 - 13 ng/L Final     Comment:     Previous result verified on 4/30/2024 0129 on specimen/case [id number] called with component [facility] for procedure Troponin I, High Sensitivity, Initial with value 354 ng/L.   04/30/2024 12:54  0 - 13 ng/L Final     BNP   Date/Time Value Ref Range Status   05/01/2024 04:21  0 - 99 pg/mL Final   11/29/2022 01:09  0 - 99 pg/mL Final     Comment:     .  <100 pg/mL - Heart failure unlikely  100-299 pg/mL - Intermediate probability of acute heart  .               failure exacerbation. Correlate with clinical  .               context and patient  history.    >=300 pg/mL - Heart Failure likely. Correlate with clinical  .               context and patient history.  BNP testing is performed using different testing   methodology at [facility] than at other   system hospitals. Direct result comparisons should   only be made within the same method.     12/05/2021 01:53  0 - 99 pg/mL Final     Comment:     .  <100 pg/mL - Heart failure unlikely  100-299 pg/mL - Intermediate probability of acute heart  .               failure exacerbation. Correlate with clinical  .               context and patient history.    >=300 pg/mL - Heart Failure likely. Correlate with clinical  .               context and patient history.  BNP testing is performed using different testing   methodology at [facility] than at other   system hospitals. Direct result comparisons should   only be made within the same method.       HGBA1C   Date/Time Value Ref Range Status   10/04/2023 02:20 PM 6.5 4.2 - 6.5 % Final   05/31/2023 01:44 PM 6.2 % Final     Comment:          Diagnosis of Diabetes-Adults   Non-Diabetic: < or = 5.6%   Increased risk for developing diabetes: 5.7-6.4%   Diagnostic of diabetes: > or = 6.5%  .       Monitoring of Diabetes                Age (y)     Therapeutic Goal (%)   Adults:          >18           <7.0   Pediatrics:    13-18           <7.5                   7-12           <8.0                   0- 6            7.5-8.5   American Diabetes Association. Diabetes Care 33(S1), Jan 2010.   03/03/2021 09:28 AM 7.7 % Final     Comment:          Diagnosis of Diabetes-Adults   Non-Diabetic: < or = 5.6%   Increased risk for developing diabetes: 5.7-6.4%   Diagnostic of diabetes: > or = 6.5%  .       Monitoring of Diabetes                Age (y)     Therapeutic Goal (%)   Adults:          >18           <7.0   Pediatrics:    13-18           <7.5                   7-12           <8.0                   0- 6            7.5-8.5   American Diabetes  Association. Diabetes Care 33(S1), Jan 2010.       VLDL   Date/Time Value Ref Range Status   04/06/2022 10:04 AM 29 0 - 40 mg/dL Final   03/03/2021 09:28 AM 44 0 - 40 mg/dL Final   05/04/2020 09:14 AM 55 0 - 40 mg/dL Final      Last I/O:  I/O last 3 completed shifts:  In: 480 (6 mL/kg) [P.O.:480]  Out: 1300 (16.3 mL/kg) [Urine:1300 (0.5 mL/kg/hr)]  Weight: 79.6 kg       Inpatient Medications:  Scheduled medications   Medication Dose Route Frequency    apixaban  5 mg oral BID    atorvastatin  40 mg oral Nightly    cephalexin  500 mg oral q8h CHAYO    cholecalciferol  5,000 Units oral Daily    cilostazol  100 mg oral BID    fluticasone  2 spray Each Nostril Daily    formoterol  20 mcg nebulization BID    [Held by provider] glipiZIDE  10 mg oral BID AC    icosapent ethyL  2 g oral BID    insulin lispro  0-10 Units subcutaneous TID with meals    ipratropium-albuteroL  3 mL nebulization TID    loratadine  10 mg oral Daily    metoprolol succinate XL  75 mg oral Daily    oxybutynin  5 mg oral BID    pantoprazole  40 mg oral Daily before breakfast    Or    pantoprazole  40 mg intravenous Daily before breakfast    predniSONE  40 mg oral Daily    regadenoson  0.4 mg intravenous Once in imaging     PRN medications   Medication    acetaminophen    Or    acetaminophen    Or    acetaminophen    acetaminophen    Or    acetaminophen    Or    acetaminophen    LORazepam    melatonin    metoclopramide    Or    metoclopramide    ondansetron ODT    Or    ondansetron    oxygen    polyethylene glycol    sodium chloride     Continuous Medications   Medication Dose Last Rate            Assessment/Plan   73-year-old female with a history of combined systolic/diastolic heart failure (EF 34%), non-insulin-dependent type 2 diabetes, CAD with prior CABG, atrial fibrillation on Eliquis, carotid artery disease, hypertension, GERD, hyperlipidemia, ovarian cancer s/p bilateral oophorectomy, uterine fibroid resections, presented to the hospital with  weakness and was admitted for UTI and A-fib RVR.     Atrial fibrillation with RVR,heart rates much improved on current regimen.  Complaint systolic/diastolic heart failure (EF 34%), ischemic cardiomyopathy  CAD s/p CABG  Troponinemia  COPD  Peripheral IV 04/30/24 20 G Left Antecubital (Active)   Site Assessment Clean;Dry;Intact 05/04/24 0900   Dressing Status Clean;Dry;Occlusive 05/04/24 0900   Number of days: 4     Major issue seems to be respiratory i.e. COPD exacerbation, and concurrent increase in heart rate.  A-fib with rapid ventricular rate could also precipitate acute on chronic respiratory and heart failure.  Given LV dysfunction, we have chosen metoprolol succinate, I do not hear any audible wheezing, I will increase the dose to 100 mg daily.    Code Status:  Full Code          Idania Leger MD

## 2024-05-04 NOTE — PROGRESS NOTES
Mitzi Pro is a 73 y.o. female on day 4 of admission presenting with Atrial fibrillation with RVR (Multi).      Subjective    Patient seen and examined at bedside's morning.  Patient notes that she feels slightly better compared to yesterday.  Heart rates better controlled this morning and off Cardizem drip at this time.    Objective     Last Recorded Vitals  /58 (BP Location: Right arm, Patient Position: Lying)   Pulse 74   Temp 36.4 °C (97.5 °F) (Temporal)   Resp 19   Wt 79.6 kg (175 lb 7.8 oz)   SpO2 95%   Intake/Output last 3 Shifts:    Intake/Output Summary (Last 24 hours) at 5/4/2024 1040  Last data filed at 5/4/2024 0328  Gross per 24 hour   Intake 240 ml   Output 200 ml   Net 40 ml       Admission Weight  Weight: 77.1 kg (170 lb) (04/30/24 0034)    Daily Weight  05/04/24 : 79.6 kg (175 lb 7.8 oz)    Image Results  ECG 12 Lead  Sinus rhythm with occasional Premature ventricular complexes  Anterior infarct , age undetermined  Abnormal ECG  When compared with ECG of 02-MAY-2024 22:14,  Previous ECG has undetermined rhythm, needs review  ST no longer depressed in Inferior leads  T wave inversion no longer evident in Inferior leads  ECG 12 Lead  Atrial flutter with 2 to 1 block  Rightward axis  Incomplete left bundle branch block  Nonspecific ST abnormality  Abnormal ECG  When compared with ECG of 02-MAY-2024 21:07, (unconfirmed)  Premature ventricular complexes are no longer Present  Premature supraventricular complexes are no longer Present  Confirmed by Frank Garcia ([id number]) on 5/3/2024 1:30:56 PM  ECG 12 Lead  Atrial flutter with variable AV block  Rightward axis  Incomplete left bundle branch block  ST & T wave abnormality, consider inferior ischemia  Abnormal ECG  When compared with ECG of 02-MAY-2024 12:13, (unconfirmed)  Significant changes have occurred  Confirmed by Frank Garcia ([id number]) on 5/3/2024 1:29:58 PM      Physical Exam    Constitutional: Slightly fatigued, although  resting comfortably  CV: Irregularly irregular, tachycardic, no murmurs or gallops appreciated, distal pulses intact   Pulmonary: Mild bibasilar crackles bilaterally, no rales, rhonchi, wheezing  GI: Abdomen soft, nondistended, nontender to palpation.  No guarding or rebound tenderness noted.  MSK: Range of motion intact in all 4 extremities  Extremities: No edema noted  Neuro: ANO x3, no focal neurologic deficits  Psych: Slightly fatigued   Skin: Warm and dry, no erythema skin lesions noted.     Relevant Results    Results for orders placed or performed during the hospital encounter of 04/30/24 (from the past 24 hour(s))   ECG 12 Lead   Result Value Ref Range    Ventricular Rate 63 BPM    Atrial Rate 63 BPM    IL Interval 150 ms    QRS Duration 112 ms    QT Interval 386 ms    QTC Calculation(Bazett) 395 ms    P Axis -7 degrees    R Axis 80 degrees    T Axis 80 degrees    QRS Count 10 beats    Q Onset 220 ms    P Onset 145 ms    P Offset 175 ms    T Offset 413 ms    QTC Fredericia 392 ms   CBC   Result Value Ref Range    WBC 14.1 (H) 4.4 - 11.3 x10*3/uL    nRBC 0.0 0.0 - 0.0 /100 WBCs    RBC 3.30 (L) 4.00 - 5.20 x10*6/uL    Hemoglobin 10.3 (L) 12.0 - 16.0 g/dL    Hematocrit 33.3 (L) 36.0 - 46.0 %     (H) 80 - 100 fL    MCH 31.2 26.0 - 34.0 pg    MCHC 30.9 (L) 32.0 - 36.0 g/dL    RDW 13.5 11.5 - 14.5 %    Platelets 255 150 - 450 x10*3/uL   Renal function panel   Result Value Ref Range    Glucose 294 (H) 74 - 99 mg/dL    Sodium 132 (L) 136 - 145 mmol/L    Potassium 4.9 3.5 - 5.3 mmol/L    Chloride 95 (L) 98 - 107 mmol/L    Bicarbonate 31 21 - 32 mmol/L    Anion Gap 11 10 - 20 mmol/L    Urea Nitrogen 38 (H) 6 - 23 mg/dL    Creatinine 1.31 (H) 0.50 - 1.05 mg/dL    eGFR 43 (L) >60 mL/min/1.73m*2    Calcium 8.6 8.6 - 10.3 mg/dL    Phosphorus 2.8 2.5 - 4.9 mg/dL    Albumin 3.1 (L) 3.4 - 5.0 g/dL   Magnesium   Result Value Ref Range    Magnesium 2.30 1.60 - 2.40 mg/dL   POCT GLUCOSE   Result Value Ref Range    POCT  Glucose 236 (H) 74 - 99 mg/dL      ECG 12 Lead    Result Date: 5/3/2024  Sinus rhythm with occasional Premature ventricular complexes Anterior infarct , age undetermined Abnormal ECG When compared with ECG of 02-MAY-2024 22:14, Previous ECG has undetermined rhythm, needs review ST no longer depressed in Inferior leads T wave inversion no longer evident in Inferior leads    ECG 12 Lead    Result Date: 5/3/2024  Atrial flutter with 2 to 1 block Rightward axis Incomplete left bundle branch block Nonspecific ST abnormality Abnormal ECG When compared with ECG of 02-MAY-2024 21:07, (unconfirmed) Premature ventricular complexes are no longer Present Premature supraventricular complexes are no longer Present Confirmed by Frank Garcia ([id number]) on 5/3/2024 1:30:56 PM    ECG 12 Lead    Result Date: 5/3/2024  Atrial flutter with variable AV block Rightward axis Incomplete left bundle branch block ST & T wave abnormality, consider inferior ischemia Abnormal ECG When compared with ECG of 02-MAY-2024 12:13, (unconfirmed) Significant changes have occurred Confirmed by Frank Garcia ([id number]) on 5/3/2024 1:29:58 PM    XR chest 1 view    Result Date: 5/2/2024  Interpreted By:  Brandon Nazario, STUDY: XR CHEST 1 VIEW   INDICATION: Signs/Symptoms:SOB.   COMPARISON: April 30   ACCESSION NUMBER(S): [id number]   ORDERING CLINICIAN: YANIRA BAXTER   FINDINGS: Interval development of perihilar and basilar interstitial edema with new right effusion and basilar airspace opacity favored to represent more conglomerate edema. Focus of pneumonia not excluded.   Previous cardiomegaly with median sternotomy unchanged.       Interval development of perihilar and basilar interstitial edema with new right effusion and basilar airspace opacity favored to represent more conglomerate edema. Focus of pneumonia not excluded.   Signed by: Brandon Nazario 5/2/2024 6:55 PM Dictation workstation:   [id number]    ECG 12 Lead    Result Date: 5/2/2024  Sinus rhythm  with sinus arrhythmia with occasional Premature ventricular complexes and Fusion complexes Rightward axis Anteroseptal infarct (cited on or before 30-APR-2024) Abnormal ECG When compared with ECG of 01-MAY-2024 05:33, Sinus rhythm has replaced Atrial flutter Vent. rate has decreased BY  79 BPM Serial changes of Anteroseptal infarct Present       Assessment/Plan     Principal Problem:    Atrial fibrillation with RVR (Multi)  Active Problems:    Type 2 diabetes mellitus with hyperglycemia, without long-term current use of insulin (Multi)    Chronic combined systolic (congestive) and diastolic (congestive) heart failure (Multi)    Cystitis    Idiopathic hypotension    73-year-old female with a history of combined systolic/diastolic heart failure (EF 34%), non-insulin-dependent type 2 diabetes, CAD with prior CABG, atrial fibrillation on Eliquis, carotid artery disease, hypertension, GERD, hyperlipidemia, ovarian cancer s/p bilateral oophorectomy, uterine fibroid resections, presented to the hospital with weakness and was admitted for UTI and A-fib RVR.    Atrial fibrillation with RVR  Complaint systolic/diastolic heart failure (EF 34%), ischemic cardiomyopathy  CAD s/p CABG  Troponinemia  History of COPD  -Rates well-controlled this morning, off Cardizem drip.  On 3 L O2 nasal cannula.  -Increase metoprolol succinate dose to 75 mg today, will monitor patient on telemetry  -No further recommendations from cardiology or EP at this time.  Can consider outpatient ablation if needed.  -Wean O2 as tolerated  -Continue home Eliquis  -Hold off on diuresis given bump in kidney function today    Uncomplicated cystitis  -Urine cultures growing  Citrobacter and E. coli, susceptibilities given  -Continue oral Keflex    Acute kidney injury, prerenal versus ischemic ATN  -Creatinine 1.31 today (0.86 yesterday).  Likely in the setting of prednisone use and IV Lasix being given yesterday.  -Holding home lisinopril and spironolactone  given recovering renal function and diuresing further today.  -Serial RFP's    Non-insulin-dependent type 2 diabetes  -Mild insulin sliding scale in place  -Monitor sugars closely    Hypertension  -Holding home lisinopril and spironolactone    Hyperlipidemia  -Continue home atorvastatin    Diet: Cardiac  DVT prophylaxis: Eliquis  Consults: Cardiology,  EP  CODE STATUS: Full    Disposition: Patient to be monitored in the IMCU overnight.  Likely discharge in the next 24-48 hours pending rate control.  PT/OT evaluation pending.    To be discussed with attending,    Renita Landry DO  Internal Medicine, PGY-3

## 2024-05-04 NOTE — NURSING NOTE
Pt remained safe throughout shift. No episodes of anxiety and SOB throughout shift. Sp02 maintained >90% on 3L NC. Vitals stable. HR  NSR throughout shift.

## 2024-05-05 LAB
ALBUMIN SERPL BCP-MCNC: 3.5 G/DL (ref 3.4–5)
ANION GAP SERPL CALC-SCNC: 8 MMOL/L (ref 10–20)
BASOPHILS # BLD AUTO: 0.02 X10*3/UL (ref 0–0.1)
BASOPHILS NFR BLD AUTO: 0.2 %
BUN SERPL-MCNC: 28 MG/DL (ref 6–23)
CALCIUM SERPL-MCNC: 9 MG/DL (ref 8.6–10.3)
CHLORIDE SERPL-SCNC: 97 MMOL/L (ref 98–107)
CO2 SERPL-SCNC: 38 MMOL/L (ref 21–32)
CREAT SERPL-MCNC: 0.99 MG/DL (ref 0.5–1.05)
EGFRCR SERPLBLD CKD-EPI 2021: 60 ML/MIN/1.73M*2
EOSINOPHIL # BLD AUTO: 0 X10*3/UL (ref 0–0.4)
EOSINOPHIL NFR BLD AUTO: 0 %
ERYTHROCYTE [DISTWIDTH] IN BLOOD BY AUTOMATED COUNT: 13.7 % (ref 11.5–14.5)
GLUCOSE BLD MANUAL STRIP-MCNC: 146 MG/DL (ref 74–99)
GLUCOSE BLD MANUAL STRIP-MCNC: 206 MG/DL (ref 74–99)
GLUCOSE BLD MANUAL STRIP-MCNC: 224 MG/DL (ref 74–99)
GLUCOSE BLD MANUAL STRIP-MCNC: 318 MG/DL (ref 74–99)
GLUCOSE SERPL-MCNC: 129 MG/DL (ref 74–99)
HCT VFR BLD AUTO: 37.1 % (ref 36–46)
HGB BLD-MCNC: 11.4 G/DL (ref 12–16)
HOLD SPECIMEN: NORMAL
IMM GRANULOCYTES # BLD AUTO: 0.08 X10*3/UL (ref 0–0.5)
IMM GRANULOCYTES NFR BLD AUTO: 0.7 % (ref 0–0.9)
LYMPHOCYTES # BLD AUTO: 1.18 X10*3/UL (ref 0.8–3)
LYMPHOCYTES NFR BLD AUTO: 9.7 %
MAGNESIUM SERPL-MCNC: 2.13 MG/DL (ref 1.6–2.4)
MCH RBC QN AUTO: 31.8 PG (ref 26–34)
MCHC RBC AUTO-ENTMCNC: 30.7 G/DL (ref 32–36)
MCV RBC AUTO: 104 FL (ref 80–100)
MONOCYTES # BLD AUTO: 1.05 X10*3/UL (ref 0.05–0.8)
MONOCYTES NFR BLD AUTO: 8.6 %
NEUTROPHILS # BLD AUTO: 9.82 X10*3/UL (ref 1.6–5.5)
NEUTROPHILS NFR BLD AUTO: 80.8 %
NRBC BLD-RTO: 0 /100 WBCS (ref 0–0)
PHOSPHATE SERPL-MCNC: 3 MG/DL (ref 2.5–4.9)
PLATELET # BLD AUTO: 280 X10*3/UL (ref 150–450)
POTASSIUM SERPL-SCNC: 4.9 MMOL/L (ref 3.5–5.3)
RBC # BLD AUTO: 3.58 X10*6/UL (ref 4–5.2)
SODIUM SERPL-SCNC: 138 MMOL/L (ref 136–145)
WBC # BLD AUTO: 12.2 X10*3/UL (ref 4.4–11.3)

## 2024-05-05 PROCEDURE — 1200000002 HC GENERAL ROOM WITH TELEMETRY DAILY

## 2024-05-05 PROCEDURE — 2500000005 HC RX 250 GENERAL PHARMACY W/O HCPCS: Performed by: INTERNAL MEDICINE

## 2024-05-05 PROCEDURE — 2500000001 HC RX 250 WO HCPCS SELF ADMINISTERED DRUGS (ALT 637 FOR MEDICARE OP): Performed by: INTERNAL MEDICINE

## 2024-05-05 PROCEDURE — 2500000004 HC RX 250 GENERAL PHARMACY W/ HCPCS (ALT 636 FOR OP/ED): Performed by: INTERNAL MEDICINE

## 2024-05-05 PROCEDURE — A4216 STERILE WATER/SALINE, 10 ML: HCPCS

## 2024-05-05 PROCEDURE — C9113 INJ PANTOPRAZOLE SODIUM, VIA: HCPCS | Performed by: INTERNAL MEDICINE

## 2024-05-05 PROCEDURE — 2500000002 HC RX 250 W HCPCS SELF ADMINISTERED DRUGS (ALT 637 FOR MEDICARE OP, ALT 636 FOR OP/ED)

## 2024-05-05 PROCEDURE — 83735 ASSAY OF MAGNESIUM: CPT | Performed by: INTERNAL MEDICINE

## 2024-05-05 PROCEDURE — 99239 HOSP IP/OBS DSCHRG MGMT >30: CPT | Performed by: INTERNAL MEDICINE

## 2024-05-05 PROCEDURE — 99232 SBSQ HOSP IP/OBS MODERATE 35: CPT | Performed by: INTERNAL MEDICINE

## 2024-05-05 PROCEDURE — 36415 COLL VENOUS BLD VENIPUNCTURE: CPT | Performed by: INTERNAL MEDICINE

## 2024-05-05 PROCEDURE — 2500000004 HC RX 250 GENERAL PHARMACY W/ HCPCS (ALT 636 FOR OP/ED)

## 2024-05-05 PROCEDURE — 2500000002 HC RX 250 W HCPCS SELF ADMINISTERED DRUGS (ALT 637 FOR MEDICARE OP, ALT 636 FOR OP/ED): Performed by: INTERNAL MEDICINE

## 2024-05-05 PROCEDURE — 97530 THERAPEUTIC ACTIVITIES: CPT | Mod: GP | Performed by: PHYSICAL THERAPIST

## 2024-05-05 PROCEDURE — 2500000002 HC RX 250 W HCPCS SELF ADMINISTERED DRUGS (ALT 637 FOR MEDICARE OP, ALT 636 FOR OP/ED): Performed by: STUDENT IN AN ORGANIZED HEALTH CARE EDUCATION/TRAINING PROGRAM

## 2024-05-05 PROCEDURE — 85025 COMPLETE CBC W/AUTO DIFF WBC: CPT | Performed by: INTERNAL MEDICINE

## 2024-05-05 PROCEDURE — 94640 AIRWAY INHALATION TREATMENT: CPT

## 2024-05-05 PROCEDURE — 82947 ASSAY GLUCOSE BLOOD QUANT: CPT

## 2024-05-05 PROCEDURE — 97116 GAIT TRAINING THERAPY: CPT | Mod: GP | Performed by: PHYSICAL THERAPIST

## 2024-05-05 PROCEDURE — 2500000006 HC RX 250 W HCPCS SELF ADMINISTERED DRUGS (ALT 637 FOR ALL PAYERS): Performed by: INTERNAL MEDICINE

## 2024-05-05 PROCEDURE — 80069 RENAL FUNCTION PANEL: CPT

## 2024-05-05 RX ORDER — IPRATROPIUM BROMIDE AND ALBUTEROL SULFATE 2.5; .5 MG/3ML; MG/3ML
3 SOLUTION RESPIRATORY (INHALATION)
Qty: 360 ML | Refills: 0 | Status: SHIPPED | OUTPATIENT
Start: 2024-05-05 | End: 2024-05-06

## 2024-05-05 RX ORDER — INSULIN LISPRO 100 [IU]/ML
5 INJECTION, SOLUTION INTRAVENOUS; SUBCUTANEOUS ONCE
Status: COMPLETED | OUTPATIENT
Start: 2024-05-05 | End: 2024-05-05

## 2024-05-05 RX ORDER — CEPHALEXIN 500 MG/1
500 CAPSULE ORAL EVERY 8 HOURS SCHEDULED
Qty: 15 CAPSULE | Refills: 0 | Status: SHIPPED | OUTPATIENT
Start: 2024-05-05 | End: 2024-05-06

## 2024-05-05 RX ORDER — SODIUM CHLORIDE 9 MG/ML
INJECTION, SOLUTION INTRAMUSCULAR; INTRAVENOUS; SUBCUTANEOUS
Status: COMPLETED
Start: 2024-05-05 | End: 2024-05-05

## 2024-05-05 RX ORDER — PREDNISONE 20 MG/1
40 TABLET ORAL DAILY
Qty: 6 TABLET | Refills: 0 | Status: SHIPPED | OUTPATIENT
Start: 2024-05-05 | End: 2024-05-06

## 2024-05-05 RX ORDER — LORATADINE 10 MG/1
10 TABLET ORAL DAILY
Qty: 30 TABLET | Refills: 0 | Status: SHIPPED | OUTPATIENT
Start: 2024-05-05 | End: 2024-05-06

## 2024-05-05 RX ORDER — METOPROLOL SUCCINATE 100 MG/1
100 TABLET, EXTENDED RELEASE ORAL DAILY
Qty: 30 TABLET | Refills: 0 | Status: SHIPPED | OUTPATIENT
Start: 2024-05-05 | End: 2024-05-06

## 2024-05-05 RX ADMIN — IPRATROPIUM BROMIDE AND ALBUTEROL SULFATE 3 ML: 2.5; .5 SOLUTION RESPIRATORY (INHALATION) at 20:34

## 2024-05-05 RX ADMIN — ICOSAPENT ETHYL 2 G: 1 CAPSULE ORAL at 21:10

## 2024-05-05 RX ADMIN — METOPROLOL SUCCINATE 100 MG: 100 TABLET, EXTENDED RELEASE ORAL at 08:53

## 2024-05-05 RX ADMIN — GLIPIZIDE 10 MG: 10 TABLET ORAL at 17:00

## 2024-05-05 RX ADMIN — ICOSAPENT ETHYL 2 G: 1 CAPSULE ORAL at 08:54

## 2024-05-05 RX ADMIN — INSULIN LISPRO 5 UNITS: 100 INJECTION, SOLUTION INTRAVENOUS; SUBCUTANEOUS at 23:07

## 2024-05-05 RX ADMIN — GLIPIZIDE 10 MG: 10 TABLET ORAL at 08:53

## 2024-05-05 RX ADMIN — OXYBUTYNIN CHLORIDE 5 MG: 5 TABLET ORAL at 08:54

## 2024-05-05 RX ADMIN — Medication 3 L/MIN: at 23:16

## 2024-05-05 RX ADMIN — FORMOTEROL FUMARATE 20 MCG: 20 SOLUTION RESPIRATORY (INHALATION) at 09:02

## 2024-05-05 RX ADMIN — IPRATROPIUM BROMIDE AND ALBUTEROL SULFATE 3 ML: 2.5; .5 SOLUTION RESPIRATORY (INHALATION) at 09:01

## 2024-05-05 RX ADMIN — APIXABAN 5 MG: 5 TABLET, FILM COATED ORAL at 21:10

## 2024-05-05 RX ADMIN — FLUTICASONE PROPIONATE 2 SPRAY: 50 SPRAY, METERED NASAL at 08:54

## 2024-05-05 RX ADMIN — APIXABAN 5 MG: 5 TABLET, FILM COATED ORAL at 08:54

## 2024-05-05 RX ADMIN — INSULIN LISPRO 4 UNITS: 100 INJECTION, SOLUTION INTRAVENOUS; SUBCUTANEOUS at 17:00

## 2024-05-05 RX ADMIN — Medication 3 L/MIN: at 20:35

## 2024-05-05 RX ADMIN — CEPHALEXIN 500 MG: 500 CAPSULE ORAL at 05:48

## 2024-05-05 RX ADMIN — CILOSTAZOL 100 MG: 100 TABLET ORAL at 21:10

## 2024-05-05 RX ADMIN — PANTOPRAZOLE SODIUM 40 MG: 40 INJECTION, POWDER, FOR SOLUTION INTRAVENOUS at 05:01

## 2024-05-05 RX ADMIN — FORMOTEROL FUMARATE 20 MCG: 20 SOLUTION RESPIRATORY (INHALATION) at 20:34

## 2024-05-05 RX ADMIN — SODIUM CHLORIDE: 9 INJECTION INTRAMUSCULAR; INTRAVENOUS; SUBCUTANEOUS at 05:15

## 2024-05-05 RX ADMIN — ONDANSETRON 4 MG: 2 INJECTION INTRAMUSCULAR; INTRAVENOUS at 05:01

## 2024-05-05 RX ADMIN — LORATADINE 10 MG: 10 TABLET ORAL at 08:54

## 2024-05-05 RX ADMIN — OXYBUTYNIN CHLORIDE 5 MG: 5 TABLET ORAL at 21:18

## 2024-05-05 RX ADMIN — ATORVASTATIN CALCIUM 40 MG: 40 TABLET, FILM COATED ORAL at 21:10

## 2024-05-05 RX ADMIN — CILOSTAZOL 100 MG: 100 TABLET ORAL at 08:54

## 2024-05-05 RX ADMIN — INSULIN LISPRO 4 UNITS: 100 INJECTION, SOLUTION INTRAVENOUS; SUBCUTANEOUS at 13:34

## 2024-05-05 RX ADMIN — PREDNISONE 40 MG: 20 TABLET ORAL at 08:54

## 2024-05-05 RX ADMIN — CEPHALEXIN 500 MG: 500 CAPSULE ORAL at 21:10

## 2024-05-05 RX ADMIN — CHOLECALCIFEROL TAB 125 MCG (5000 UNIT) 5000 UNITS: 125 TAB at 08:53

## 2024-05-05 RX ADMIN — CEPHALEXIN 500 MG: 500 CAPSULE ORAL at 13:34

## 2024-05-05 RX ADMIN — IPRATROPIUM BROMIDE AND ALBUTEROL SULFATE 3 ML: 2.5; .5 SOLUTION RESPIRATORY (INHALATION) at 13:39

## 2024-05-05 RX ADMIN — Medication 3 L/MIN: at 09:02

## 2024-05-05 ASSESSMENT — PAIN - FUNCTIONAL ASSESSMENT
PAIN_FUNCTIONAL_ASSESSMENT: 0-10

## 2024-05-05 ASSESSMENT — PAIN SCALES - GENERAL
PAINLEVEL_OUTOF10: 0 - NO PAIN

## 2024-05-05 ASSESSMENT — COGNITIVE AND FUNCTIONAL STATUS - GENERAL
MOBILITY SCORE: 18
STANDING UP FROM CHAIR USING ARMS: A LITTLE
WALKING IN HOSPITAL ROOM: A LITTLE
CLIMB 3 TO 5 STEPS WITH RAILING: A LOT
TURNING FROM BACK TO SIDE WHILE IN FLAT BAD: A LITTLE
MOVING TO AND FROM BED TO CHAIR: A LITTLE

## 2024-05-05 NOTE — CONSULTS
Reason For Consult  Cardiovascular Nurse Navigator Education     History Of Present Illness  Mitzi Pro is a 73 y.o. female presenting 4/30/34 with weakness and fatigue for 2 days and a smear of abnormal vaginal bleeding. In ER she was found to be in a-fib/RVR with 's and SBP 80's. She was bolused with 2L IVF, given Cardizem 10 mg IV bolus, and initiated on a Cardizem drip. . Trop 354, 322. BUN/creat 34/1.63 (eGFR 33). UA >100,000 enteric bacilli. .   Dr. Armijo and Dr. Garcia on for cardiology and EP. She saw Dr. Duncan 1/26/23 who recommended ischemic evaluation at that time. I could not find any general cardiology follow up after that time. Dr. Alcaraz saw her during her last admit but she requested a second opinion, therefore Dr. Armijo was placed on consult.    5/1: RRT called for RVR  5/2: Scheduled for nuclear stress test but unable to tolerate laying flat due to severe back pain, claustrophobia, and SOB.    Echo 5/1: EF 34% with multiple segmental abnormalities, impaired relaxation of LV diastolic filling, increased LV end diastolic pressure, mildly decreased RV systolic function, moderate MR, mildly elevated RVSP, and multiple wall motion abnormalities.   2015: EF 25% that improved to 45-50%.  Last stress test: fixed anterior infarct without ischemia.  2007: cardiac cath with occluded LAD with occluded and thrombotic SVG to LAD, occluded RCA with R-R collaterols, patent ramus stent, minimal CAD to circumflex.    GDMT: Metoprolol Succinate 50 mg BID, Lisinopril 20 mg daily (on hold due to renal function), Spironolactone 12.5 mg daily (on hold, renal function), Cardizem drip now stopped, along with Eliquis, Atorvastatin, Vascepa, Digoxin 500 mcg daily, Pletal, Glipizide and Januvia (on hold), Perforomist and DuoNebs.  She would not qualify for SGLT2i with current UTI.     Past Medical History  Pertinent: IWMI, CAD with CABG, a-fib,  severe COPD with recent admit for resp  failure, DM, CKD, CAD, carotid artery disease, PAD.    She has a past medical history of Personal history of malignant neoplasm of ovary.    Surgical History  She has a past surgical history that includes Coronary artery bypass graft (11/20/2017); Other surgical history (05/17/2022); Other surgical history (05/17/2022); Other surgical history (11/08/2019); and CT angio aorta and bilateral iliofemoral runoff w and or wo IV contrast (12/21/2021).     Social History  She reports that she has been smoking cigarettes. She has never used smokeless tobacco. She reports that she does not currently use alcohol. She reports that she does not use drugs.    Family History  Family History   Problem Relation Name Age of Onset    Other (arteriosclerotic cardiovascular disease) Mother      Other (arteriosclerotic cardiovascular disease) Father      Colon cancer Sister          Allergies  Metformin and Nitrofurantoin        Assessment/Plan     I introduced myself to patient yesterday and met again with her today after her attempt at nuclear stress. She is a bit resistive to HF education. The basics of HF management were explained and the  Living with Heart Failure booklet and weight calendars given for her review. I will round and attempt to reinforce and see if I can work through her persistent resistance.        Amalia Campos RN

## 2024-05-05 NOTE — DISCHARGE INSTR - DIET
Low sodium diet - no more than 700 mg per meal (2000 mg per day).  No more than 8 cups (2 liters) or less than 6 cups fluids daily.

## 2024-05-05 NOTE — DISCHARGE SUMMARY
Discharge Diagnosis  Atrial fibrillation with RVR (Multi)    Issues Requiring Follow-Up  Follow-up with primary care provider and EP    Discharge Meds     Your medication list        START taking these medications        Instructions Last Dose Given Next Dose Due   cephalexin 500 mg capsule  Commonly known as: Keflex      Take 1 capsule (500 mg) by mouth every 8 hours for 5 days.       ipratropium-albuteroL 0.5-2.5 mg/3 mL nebulizer solution  Commonly known as: Duo-Neb      Take 3 mL by nebulization 4 times a day.       loratadine 10 mg tablet  Commonly known as: Claritin      Take 1 tablet (10 mg) by mouth once daily.       predniSONE 20 mg tablet  Commonly known as: Deltasone      Take 2 tablets (40 mg) by mouth once daily for 3 doses.       sodium chloride 0.65 % nasal spray  Commonly known as: Coffee      Administer 1 spray into each nostril 4 times a day as needed for congestion.              CHANGE how you take these medications        Instructions Last Dose Given Next Dose Due   metoprolol succinate  mg 24 hr tablet  Commonly known as: Toprol-XL  What changed:   medication strength  how much to take  when to take this  additional instructions      Take 1 tablet (100 mg) by mouth once daily. Do not crush or chew.       SITagliptin phosphate 50 mg tablet  Commonly known as: Januvia  What changed:   medication strength  how much to take      Take 1 tablet (50 mg) by mouth once daily.              CONTINUE taking these medications        Instructions Last Dose Given Next Dose Due   albuterol 90 mcg/actuation inhaler      INHALE 2 PUFFS EVERY 4 HOURS AS NEEDED FOR WHEEZING (FILL WITH THESE DIRECTIONS.)       Anoro Ellipta 62.5-25 mcg/actuation blister with device  Generic drug: umeclidinium-vilanteroL      Inhale 1 puff once daily.       atorvastatin 40 mg tablet  Commonly known as: Lipitor      Take 1 tablet (40 mg) by mouth once daily.       blood sugar diagnostic strip      1 strip once daily.        cholecalciferol 5,000 Units tablet  Commonly known as: Vitamin D-3           cilostazol 100 mg tablet  Commonly known as: Pletal      Take 1 tablet (100 mg) by mouth 2 times a day.       Dexcom G7  misc  Generic drug: blood-glucose meter,continuous      Use as instructed       Dexcom G7 Sensor device  Generic drug: blood-glucose sensor      As directed, 1 kit       Eliquis 5 mg tablet  Generic drug: apixaban      Take 1 tablet (5 mg) by mouth 2 times a day.       fluticasone 50 mcg/actuation nasal spray  Commonly known as: Flonase           FreeStyle lancets 28 gauge      1 daily       furosemide 20 mg tablet  Commonly known as: Lasix      Take 1 tablet (20 mg) by mouth once daily as needed (swelling).       glyBURIDE 5 mg tablet  Commonly known as: Diabeta      Take 2 tablets (10 mg) by mouth 2 times a day.       icosapent ethyL 1 gram capsule  Commonly known as: Vascepa      Take 2 capsules (2 g) by mouth 2 times a day.       OneTouch Verio Flex meter misc  Generic drug: blood-glucose meter      1 EACH IF NEED TO TEST GLUCOSE ONCE DAILY       oxybutynin XL 10 mg 24 hr tablet  Commonly known as: Ditropan-XL      Take 1 tablet (10 mg) by mouth once daily.       pioglitazone 30 mg tablet  Commonly known as: Actos      Take 1 tablet (30 mg) by mouth once daily.              STOP taking these medications      lisinopril 10 mg tablet        spironolactone 25 mg tablet  Commonly known as: Aldactone                  Where to Get Your Medications        These medications were [facility]  [address] [address]      Phone: [phone]   cephalexin 500 mg capsule  ipratropium-albuteroL 0.5-2.5 mg/3 mL nebulizer solution  loratadine 10 mg tablet  metoprolol succinate  mg 24 hr tablet  predniSONE 20 mg tablet  SITagliptin phosphate 50 mg tablet  sodium chloride 0.65 % nasal spray         Test Results Pending At Discharge  Pending Labs        Order Current Status    Extra Tubes In process    Red Top In process            Hospital Course  73-year-old female with past medical history of combined systolic and diastolic congestive heart failure with most recent ejection fraction of 34% on this hospitalization with multiple wall motion abnormalities, non-insulin-dependent diabetes mellitus, CAD status post CABG, atrial fibrillation on Eliquis, carotid artery disease, hypertension, hyperlipidemia, nocturnal hypoxemic respiratory failure, ovarian cancer s/p bilateral oophorectomy, uterine fibroid resection presented to emergency department due to generalized weakness secondary to UTI and A-fib with RVR.  Patient required IV Cardizem multiple times in the hospital and up titration of her metoprolol.  Currently she is on 100 mg of metoprolol long-acting daily to help with the heart rate control which can be uptitrated as needed.  Patient will need ischemic workup at a later date as outpatient.  Patient had E. coli secondary to Citrobacter and E. coli which is susceptible to Keflex and will continue oral Keflex on discharge.  Patient had acute kidney injury with IV diuretics which has improved.  She was also treated for COPD exacerbation with steroids and that has helped her a lot.  Patient is at a very high risk of readmission with mortality and morbidity given chronic conditions and severe COPD.    34 minutes spent in discharge timing    Pertinent Physical Exam At Time of Discharge  General: Not in acute distress, alert.  On nasal cannula  HEENT: PERRLA, head intact and normocephalic  Neck: Normal to inspection  Lungs: Diminished on nasal cannula.  Bilateral expiratory wheezing has improved  Cardiac: Regular rate and rhythm  Abdomen: Soft nontender, positive bowel sounds  : Exam deferred  Skin: Intact  Hematology: No petechia or excessive ecchymosis  Musculoskeletal: Without significant trauma  Neurological: Alert awake oriented, no focal deficit, cranial  nerves grossly intact  Psych: No suicidal ideation or homicidal ideation    Outpatient Follow-Up  Future Appointments   Date Time Provider Department Center   5/23/2024  3:30 PM Diego Duncan MD [id number] [place]         Rom Valadez MD    Addendum: 5/6/2024: Patient does not want to go to skilled nursing facility or acute rehab.  Wants to go home with home health.  Discharge date changed to 5/6/2024 with home health.

## 2024-05-05 NOTE — CARE PLAN
S: Shift note.   B: Pt admitted for atrial fibrillation and uti.   A: Pt remains in atrial fibrillation, per dr Leger daily toprol dose increased to 100mg daily, additional 25mg oral given 1x this shift to equal daily 100mg toprol. Pt heart rate remains 90-100s, pt remains asymptomatic. Pt o2 decreased to 2lpm per home routine, pulse ox remains >92%. Pt remains calm throughout this shift.   Am lab work not completed per pt refusal, Dr Mora made aware. Pt educated on importance of following am lab work until discharge to monitor lab trends, ie: kidney function. Pt states that enough blood has been taken throughout this admission.   See nursing flowsheet for complete assessment. Call light in reach, safety maintained.   R: Awaiting physicians recommendations.

## 2024-05-05 NOTE — PROGRESS NOTES
Physical Therapy    Physical Therapy Treatment    Patient Name: Mitzi Pro  MRN: [id number]  Today's Date: 5/5/2024          Assessment/Plan   PT Assessment  PT Assessment Results: Decreased endurance, Impaired balance, Decreased mobility  Rehab Prognosis: Good  Evaluation/Treatment Tolerance: Treatment limited secondary to medical complications (Comment) (decreased endurance)  Medical Staff Made Aware: Yes  End of Session Communication: Bedside nurse, Physician  Assessment Comment: Pt presents with decline in endurance for functional activities. Pt is concerned about returning home as she has a roommate but cannot rely on him for physical assistance. Pt desaturates easily and did have 2 mild LOB during today's session. May benefit from mod-intensity skilled PT to maximize safety and IND, but pt is currently refusing. Will continue to follow and update DC rec's as appropriate.  End of Session Patient Position: Bed, 3 rail up, Alarm on  PT Plan  Inpatient/Swing Bed or Outpatient: Inpatient  PT Plan  Treatment/Interventions: Bed mobility, Transfer training, Gait training, Balance training, Neuromuscular re-education, Strengthening, Endurance training, Range of motion, Therapeutic exercise, Therapeutic activity, Home exercise program  PT Plan:  (will increase frequency to 3x/wk d/t new concerns over DC)  PT Frequency: 3 times per week  PT Discharge Recommendations: Moderate intensity level of continued care  Equipment Recommended upon Discharge: Wheeled walker (pt's rollator cannot fit in her home)  PT Recommended Transfer Status: Stand by assist  PT - OK to Discharge: Yes. , to recommended next level of care as indicated in PT eval, once cleared by medical team.      Current Problem:  Patient Active Problem List   Diagnosis    Abnormal stress ECG with treadmill    Atopic dermatitis    Atrial fibrillation (Multi)    Bilateral carotid artery stenosis    CAD (coronary artery disease)    Carotid artery disease  (CMS-[facility])    COPD (chronic obstructive pulmonary disease) (Multi)    Diabetic polyneuropathy associated with type 2 diabetes mellitus (Multi)    Edema    Essential hypertension    Extremity atherosclerosis with intermittent claudication (CMS-[facility])    Female incontinence    GERD (gastroesophageal reflux disease)    Glaucoma    Hematuria    Mixed hyperlipidemia    RBBB    Overweight    Peripheral vascular disease (CMS-[facility])    Pulmonary nodules    Right leg swelling    Stage 3a chronic kidney disease (Multi)    Diabetes mellitus with peripheral vascular disease (Multi)    Type 2 diabetes mellitus with hyperglycemia, without long-term current use of insulin (Multi)    Vaginal itching    Vitamin D deficiency    Allergic rhinitis    Current every day smoker    Diabetes mellitus with kidney complication (Multi)    Hypertension    LBBB (left bundle branch block)    Hyperlipidemia    Pruritus    Seborrheic keratoses, inflamed    Xerosis of skin    Chronic combined systolic (congestive) and diastolic (congestive) heart failure (Multi)    Acute and chronic respiratory failure with hypercapnia (Multi)    Hemorrhagic disorder due to circulating anticoagulants (Multi)    Atrial fibrillation with RVR (Multi)    Cystitis    Idiopathic hypotension       General Visit Information:   PT  Visit  PT Received On: 05/05/24  Response to Previous Treatment:  (refused previous tx attempt; no complaints from initial eval)  General  Reason for Referral: impaired mobility; DC planning as pt voicing concerns about DC home  Referred By: Dr. Valadez  Past Medical History Relevant to Rehab: Includes: Ovarian CA, CABG, CAD, CHF, Afib with RVR, Eliquis, resection of a uterine mass suspected to be fibroids, DM2  Prior to Session Communication: Bedside nurse  Patient Position Received: Bed, 3 rail up  General Comment: 3L O2, telemetry, pure-wick  Subjective     Precautions:  Precautions  Medical Precautions: Fall precautions    Vital Signs:  Vital  Signs  Heart Rate: 101  SpO2: 94 %  Patient Position:  (desats with activity; see PT tx note for details)  Objective     Pain:  Pain Assessment  Pain Assessment: 0-10  Pain Score: 0 - No pain    Cognition:  Cognition  Overall Cognitive Status: Within Functional Limits  Safety/Judgement: Exceptions to WFL (Does not use O2 as recommended at home; concerned about safety if going home but refusing SNF)      Activity Tolerance:  Activity Tolerance  Endurance: Tolerates less than 10 min exercise with changes in vital signs (Sp02 on 3 L O2 96% at rest, desats to 86-87% with AMB, recovers to 91% after 1 min and 93-94% after 2-3 min.)    Treatments:           Bed Mobility  Bed Mobility: Yes  Bed Mobility 1  Bed Mobility 1: Supine to sitting, Sitting to supine  Level of Assistance 1: Distant supervision  Ambulation/Gait Training  Ambulation/Gait Training Performed: Yes  Ambulation/Gait Training 1  Device 1: Rolling walker (no AD x 3rd trials)  Gait Support Devices: Gait belt  Quality of Gait 1: Shuffling gait  Comments/Distance (ft) 1: 5 ft x 1 and 15 ft x 1 with RW and CGA; 2 mild LOB requiring min a to recover. AMB 12 feet without AD as pt does not use AD at home (rollator doesn't fit), min A.  Transfers  Transfer: Yes  Transfer 1  Technique 1: Sit to stand  Transfer Device 1: Gait belt  Transfer Level of Assistance 1:  (SBA)  Trials/Comments 1: 3 trials; VC's for hand placement Education re: need for supplemental O2 during daily activities to avoid further medical complications. Discussed options for using O2 safely at home but pt resistant.          Outcome Measures:  [facility] Basic Mobility  Turning from your back to your side while in a flat bed without using bedrails: None  Moving from lying on your back to sitting on the side of a flat bed without using bedrails: A little  Moving to and from bed to chair (including a wheelchair): A little  Standing up from a chair using your arms (e.g. wheelchair or bedside chair): A  little  To walk in hospital room: A little  Climbing 3-5 steps with railing: A lot  Basic Mobility - Total Score: 18  Education Documentation  ADL Training, taught by Maegan Donnelly PT at 5/5/2024  3:52 PM.  Learner: Patient  Readiness: Nonacceptance  Method: Explanation  Response: Needs Reinforcement  Comment: Pt voices several concerns over homegoing but currently refusing SNF. Did demo decreased balance and endurance this date.    Precautions, taught by Maegan Donnelly PT at 5/5/2024  3:52 PM.  Learner: Patient  Readiness: Nonacceptance  Method: Explanation  Response: Needs Reinforcement  Comment: Pt voices several concerns over homegoing but currently refusing SNF. Did demo decreased balance and endurance this date.    Body Mechanics, taught by Maegan Donnelly PT at 5/5/2024  3:52 PM.  Learner: Patient  Readiness: Nonacceptance  Method: Explanation  Response: Needs Reinforcement  Comment: Pt voices several concerns over homegoing but currently refusing SNF. Did demo decreased balance and endurance this date.    Mobility Training, taught by Maegan Donnelly PT at 5/5/2024  3:52 PM.  Learner: Patient  Readiness: Nonacceptance  Method: Explanation  Response: Needs Reinforcement  Comment: Pt voices several concerns over homegoing but currently refusing SNF. Did demo decreased balance and endurance this date.    Education Comments  No comments found.        EDUCATION:     Encounter Problems       Encounter Problems (Active)       PT Problem       Pt will ambulate 300 feet mod I using LRAD with no significant gait deviations.   (Progressing)       Start:  04/30/24    Expected End:  05/14/24            Pt will tolerate at least 5 minutes of activity maintaining oxygen saturation >90% and with no report of SOB.   (Not Progressing)       Start:  04/30/24    Expected End:  05/14/24            Pt will ascend/descend at least 6 stairs using rail SBA in order to navigate home environment.   (Progressing)       Start:   04/30/24    Expected End:  05/14/24            Pt will progress to completing 3 x 20 supine/seated exercises in order to increase strength and improve gait mechanics.   (Progressing)       Start:  04/30/24

## 2024-05-05 NOTE — HOSPITAL COURSE
73-year-old female with past medical history of combined systolic and diastolic congestive heart failure with most recent ejection fraction of 34% on this hospitalization with multiple wall motion abnormalities, non-insulin-dependent diabetes mellitus, CAD status post CABG, atrial fibrillation on Eliquis, carotid artery disease, hypertension, hyperlipidemia, nocturnal hypoxemic respiratory failure, ovarian cancer s/p bilateral oophorectomy, uterine fibroid resection presented to emergency department due to generalized weakness secondary to UTI and A-fib with RVR.  Patient required IV Cardizem multiple times in the hospital and up titration of her metoprolol.  Currently she is on 100 mg of metoprolol long-acting daily to help with the heart rate control which can be uptitrated as needed.  Patient will need ischemic workup at a later date as outpatient.  Patient had E. coli secondary to Citrobacter and E. coli which is susceptible to Keflex and will continue oral Keflex on discharge.  Patient had acute kidney injury with IV diuretics which has improved.  She was also treated for COPD exacerbation with steroids and that has helped her a lot.  Patient is at a very high risk of readmission with mortality and morbidity given chronic conditions and severe COPD.    34 minutes spent in discharge timing

## 2024-05-05 NOTE — PROGRESS NOTES
Subjective Data:  Patient does not report any symptoms.  Particular she denies chest pressure tightness heaviness or palpitations           Objective Data: She speaks in full sentences.  Breath sounds are distant lung fields are clear heart sounds are irregular without murmur rub or gallop extremities show no edema alert and oriented x 3.    Last Recorded Vitals:  Vitals:    05/05/24 0750 05/05/24 0851 05/05/24 1241 05/05/24 1450   BP: (!) 94/48 107/58 130/60    BP Location: Right arm Right arm Right arm    Patient Position: Sitting Sitting Lying    Pulse: 100 96 88 101   Resp: 23 25 25    Temp: 36.6 °C (97.9 °F)  36.6 °C (97.9 °F)    TempSrc: Temporal  Temporal    SpO2: 96% 96% 94% 94%   Weight:       Height:           Last Labs:  CBC - 5/5/2024:  8:33 AM  12.2 11.4 280    37.1      CMP - 5/5/2024:  8:33 AM  9.0 5.5 15 --- 0.5   3.0 3.5 9 49      PTT - No results in last year.  1.8   19.9 _     TROPHS   Date/Time Value Ref Range Status   05/02/2024 11:08  0 - 13 ng/L Final   04/30/2024 02:03  0 - 13 ng/L Final     Comment:     Previous result verified on 4/30/2024 0129 on specimen/case [id number] called with component [facility] for procedure Troponin I, High Sensitivity, Initial with value 354 ng/L.   04/30/2024 12:54  0 - 13 ng/L Final     BNP   Date/Time Value Ref Range Status   05/01/2024 04:21  0 - 99 pg/mL Final   11/29/2022 01:09  0 - 99 pg/mL Final     Comment:     .  <100 pg/mL - Heart failure unlikely  100-299 pg/mL - Intermediate probability of acute heart  .               failure exacerbation. Correlate with clinical  .               context and patient history.    >=300 pg/mL - Heart Failure likely. Correlate with clinical  .               context and patient history.  BNP testing is performed using different testing   methodology at [facility] than at other   [facility] hospitals. Direct result comparisons should   only be made within the same method.      12/05/2021 01:53  0 - 99 pg/mL Final     Comment:     .  <100 pg/mL - Heart failure unlikely  100-299 pg/mL - Intermediate probability of acute heart  .               failure exacerbation. Correlate with clinical  .               context and patient history.    >=300 pg/mL - Heart Failure likely. Correlate with clinical  .               context and patient history.  BNP testing is performed using different testing   methodology at [facility] than at other   [facility]. Direct result comparisons should   only be made within the same method.       HGBA1C   Date/Time Value Ref Range Status   10/04/2023 02:20 PM 6.5 4.2 - 6.5 % Final   05/31/2023 01:44 PM 6.2 % Final     Comment:          Diagnosis of Diabetes-Adults   Non-Diabetic: < or = 5.6%   Increased risk for developing diabetes: 5.7-6.4%   Diagnostic of diabetes: > or = 6.5%  .       Monitoring of Diabetes                Age (y)     Therapeutic Goal (%)   Adults:          >18           <7.0   Pediatrics:    13-18           <7.5                   7-12           <8.0                   0- 6            7.5-8.5   American Diabetes Association. Diabetes Care 33(S1), Jan 2010.   03/03/2021 09:28 AM 7.7 % Final     Comment:          Diagnosis of Diabetes-Adults   Non-Diabetic: < or = 5.6%   Increased risk for developing diabetes: 5.7-6.4%   Diagnostic of diabetes: > or = 6.5%  .       Monitoring of Diabetes                Age (y)     Therapeutic Goal (%)   Adults:          >18           <7.0   Pediatrics:    13-18           <7.5                   7-12           <8.0                   0- 6            7.5-8.5   American Diabetes Association. Diabetes Care 33(S1), Jan 2010.       VLDL   Date/Time Value Ref Range Status   04/06/2022 10:04 AM 29 0 - 40 mg/dL Final   03/03/2021 09:28 AM 44 0 - 40 mg/dL Final   05/04/2020 09:14 AM 55 0 - 40 mg/dL Final      Last I/O:  I/O last 3 completed shifts:  In: 240 (3 mL/kg) [P.O.:240]  Out: 1950 (24.7 mL/kg)  "[Urine:1950 (0.7 mL/kg/hr)]  Weight: 79.1 kg     Past Cardiology Tests (Last 3 Years):  EKG:  ECG 12 Lead 05/03/2024      ECG 12 Lead 05/02/2024      ECG 12 Lead 05/02/2024      ECG 12 Lead 05/02/2024 (Preliminary)      ECG 12 Lead 05/01/2024      ECG 12 lead 04/30/2024      ECG 12 lead 04/30/2024    Echo:  Transthoracic Echo (TTE) Complete 04/30/2024    Ejection Fractions:  No results found for: \"EF\"  Cath:  No results found for this or any previous visit from the past 1095 days.    Stress Test:  No results found for this or any previous visit from the past 1095 days.    Cardiac Imaging:  No results found for this or any previous visit from the past 1095 days.      Inpatient Medications:  Scheduled medications   Medication Dose Route Frequency    apixaban  5 mg oral BID    atorvastatin  40 mg oral Nightly    cephalexin  500 mg oral q8h CHAYO    cholecalciferol  5,000 Units oral Daily    cilostazol  100 mg oral BID    fluticasone  2 spray Each Nostril Daily    formoterol  20 mcg nebulization BID    glipiZIDE  10 mg oral BID AC    icosapent ethyL  2 g oral BID    insulin lispro  0-10 Units subcutaneous TID with meals    ipratropium-albuteroL  3 mL nebulization TID    loratadine  10 mg oral Daily    metoprolol succinate XL  100 mg oral Daily    oxybutynin  5 mg oral BID    pantoprazole  40 mg oral Daily before breakfast    Or    pantoprazole  40 mg intravenous Daily before breakfast    predniSONE  40 mg oral Daily    regadenoson  0.4 mg intravenous Once in imaging     PRN medications   Medication    acetaminophen    Or    acetaminophen    Or    acetaminophen    acetaminophen    Or    acetaminophen    Or    acetaminophen    LORazepam    melatonin    metoclopramide    Or    metoclopramide    ondansetron ODT    Or    ondansetron    oxygen    polyethylene glycol    sodium chloride     Continuous Medications   Medication Dose Last Rate       Assessment/Plan   73-year-old female with a history of combined systolic/diastolic " heart failure (EF 34%), non-insulin-dependent type 2 diabetes, CAD with prior CABG, atrial fibrillation on Eliquis, carotid artery disease, hypertension, GERD, hyperlipidemia, ovarian cancer s/p bilateral oophorectomy, uterine fibroid resections, presented to the hospital with weakness and was admitted for UTI and A-fib RVR.     Atrial fibrillation with RVR,heart rates much improved on current regimen.  Complaint systolic/diastolic heart failure (EF 34%), ischemic cardiomyopathy  CAD s/p CABG  Troponinemia  COPD  Heart rates remain controlled or at target.  No new cardiology recommendations.  Discharge planning per primary service.    Peripheral IV 04/30/24 20 G Left Antecubital (Active)   Site Assessment Clean;Dry;Intact 05/05/24 0900   Dressing Status Clean;Dry;Occlusive 05/05/24 0900   Number of days: 5       Code Status:  Full Code    I spent  minutes in the professional and overall care of this patient.        Idania Leger MD

## 2024-05-05 NOTE — DISCHARGE INSTR - AVS FIRST PAGE
Heart Failure Discharge Instructions  1. Weigh yourself daily and record on your weight calendars.  2. If you gain more than 2 or 3 pounds overnight or 5 pounds in 5 days, call your cardiologist Dr. Duncan [phone].  3. Follow a low sodium diet. No more than 2000 mg in one day, or more than 700 mg per meal.  4. Limit total fluids to no more than 8 cups (or 2 liters) per day - this includes all fluids (water, coffee, juice, milk, tea, etc.)  5. Monitor your blood pressure and heart rate in the morning, then take your meds, than check blood pressure and heart rate a few hours later and record on your calendars.  6. Be sure to see your cardiologist in one week after discharge. Call to schedule your follow-up appointments when you get home if they were not already scheduled for you.  7. Keep your follow-up appointments! Bring your weight calendars with you so the doctors can see your weight trend and blood pressure readings.  8. Be sure to  any new prescriptions and take them as directed. If unsure of the medications, be sure to call your cardiologist.  9. Stay as active as you can tolerate.   10. If you notice subtle change of symptoms (slight increase in swelling, slight shortness of breath, a new intolerance to lying flat, a new cough), be sure to call your cardiologist.  11. If you have any questions or concerns or you have not heard back from the cardiologist, feel free to call Amalia Campos, heart failure navigator at [phone].

## 2024-05-06 ENCOUNTER — HOME HEALTH ADMISSION (OUTPATIENT)
Dept: HOME HEALTH SERVICES | Facility: HOME HEALTH | Age: 74
End: 2024-05-06
Payer: MEDICARE

## 2024-05-06 VITALS
HEIGHT: 65 IN | DIASTOLIC BLOOD PRESSURE: 63 MMHG | OXYGEN SATURATION: 93 % | TEMPERATURE: 96.8 F | HEART RATE: 105 BPM | WEIGHT: 174.38 LBS | SYSTOLIC BLOOD PRESSURE: 144 MMHG | RESPIRATION RATE: 19 BRPM | BODY MASS INDEX: 29.05 KG/M2

## 2024-05-06 LAB
GLUCOSE BLD MANUAL STRIP-MCNC: 107 MG/DL (ref 74–99)
GLUCOSE BLD MANUAL STRIP-MCNC: 230 MG/DL (ref 74–99)

## 2024-05-06 PROCEDURE — 2500000001 HC RX 250 WO HCPCS SELF ADMINISTERED DRUGS (ALT 637 FOR MEDICARE OP): Performed by: INTERNAL MEDICINE

## 2024-05-06 PROCEDURE — 97535 SELF CARE MNGMENT TRAINING: CPT | Mod: GO,CO

## 2024-05-06 PROCEDURE — 2500000006 HC RX 250 W HCPCS SELF ADMINISTERED DRUGS (ALT 637 FOR ALL PAYERS): Performed by: INTERNAL MEDICINE

## 2024-05-06 PROCEDURE — 99231 SBSQ HOSP IP/OBS SF/LOW 25: CPT | Performed by: INTERNAL MEDICINE

## 2024-05-06 PROCEDURE — 97530 THERAPEUTIC ACTIVITIES: CPT | Mod: GO,CO

## 2024-05-06 PROCEDURE — 94640 AIRWAY INHALATION TREATMENT: CPT

## 2024-05-06 PROCEDURE — 82947 ASSAY GLUCOSE BLOOD QUANT: CPT

## 2024-05-06 PROCEDURE — 2500000002 HC RX 250 W HCPCS SELF ADMINISTERED DRUGS (ALT 637 FOR MEDICARE OP, ALT 636 FOR OP/ED)

## 2024-05-06 PROCEDURE — 2500000002 HC RX 250 W HCPCS SELF ADMINISTERED DRUGS (ALT 637 FOR MEDICARE OP, ALT 636 FOR OP/ED): Performed by: INTERNAL MEDICINE

## 2024-05-06 PROCEDURE — 2500000004 HC RX 250 GENERAL PHARMACY W/ HCPCS (ALT 636 FOR OP/ED): Performed by: INTERNAL MEDICINE

## 2024-05-06 RX ORDER — IPRATROPIUM BROMIDE AND ALBUTEROL SULFATE 2.5; .5 MG/3ML; MG/3ML
3 SOLUTION RESPIRATORY (INHALATION)
Qty: 360 ML | Refills: 0 | Status: SHIPPED | OUTPATIENT
Start: 2024-05-06 | End: 2024-05-30 | Stop reason: HOSPADM

## 2024-05-06 RX ORDER — LORATADINE 10 MG/1
10 TABLET ORAL DAILY
Qty: 30 TABLET | Refills: 0 | Status: SHIPPED | OUTPATIENT
Start: 2024-05-06 | End: 2024-06-05

## 2024-05-06 RX ORDER — PREDNISONE 20 MG/1
40 TABLET ORAL DAILY
Qty: 6 TABLET | Refills: 0 | Status: SHIPPED | OUTPATIENT
Start: 2024-05-06 | End: 2024-05-09

## 2024-05-06 RX ORDER — METOPROLOL SUCCINATE 100 MG/1
100 TABLET, EXTENDED RELEASE ORAL DAILY
Qty: 30 TABLET | Refills: 0 | Status: SHIPPED | OUTPATIENT
Start: 2024-05-06 | End: 2024-05-29 | Stop reason: HOSPADM

## 2024-05-06 RX ORDER — CEPHALEXIN 500 MG/1
500 CAPSULE ORAL EVERY 8 HOURS SCHEDULED
Qty: 30 CAPSULE | Refills: 0 | Status: SHIPPED | OUTPATIENT
Start: 2024-05-06 | End: 2024-05-29 | Stop reason: HOSPADM

## 2024-05-06 RX ADMIN — ICOSAPENT ETHYL 2 G: 1 CAPSULE ORAL at 11:08

## 2024-05-06 RX ADMIN — CEPHALEXIN 500 MG: 500 CAPSULE ORAL at 13:25

## 2024-05-06 RX ADMIN — CHOLECALCIFEROL TAB 125 MCG (5000 UNIT) 5000 UNITS: 125 TAB at 11:09

## 2024-05-06 RX ADMIN — PREDNISONE 40 MG: 20 TABLET ORAL at 11:09

## 2024-05-06 RX ADMIN — INSULIN LISPRO 4 UNITS: 100 INJECTION, SOLUTION INTRAVENOUS; SUBCUTANEOUS at 13:25

## 2024-05-06 RX ADMIN — METOPROLOL SUCCINATE 100 MG: 100 TABLET, EXTENDED RELEASE ORAL at 11:09

## 2024-05-06 RX ADMIN — FORMOTEROL FUMARATE 20 MCG: 20 SOLUTION RESPIRATORY (INHALATION) at 09:15

## 2024-05-06 RX ADMIN — IPRATROPIUM BROMIDE AND ALBUTEROL SULFATE 3 ML: 2.5; .5 SOLUTION RESPIRATORY (INHALATION) at 09:15

## 2024-05-06 RX ADMIN — OXYBUTYNIN CHLORIDE 5 MG: 5 TABLET ORAL at 11:09

## 2024-05-06 RX ADMIN — APIXABAN 5 MG: 5 TABLET, FILM COATED ORAL at 11:08

## 2024-05-06 RX ADMIN — CILOSTAZOL 100 MG: 100 TABLET ORAL at 11:10

## 2024-05-06 RX ADMIN — LORATADINE 10 MG: 10 TABLET ORAL at 11:09

## 2024-05-06 RX ADMIN — PANTOPRAZOLE SODIUM 40 MG: 40 TABLET, DELAYED RELEASE ORAL at 06:01

## 2024-05-06 RX ADMIN — CEPHALEXIN 500 MG: 500 CAPSULE ORAL at 06:01

## 2024-05-06 ASSESSMENT — COGNITIVE AND FUNCTIONAL STATUS - GENERAL
TURNING FROM BACK TO SIDE WHILE IN FLAT BAD: A LITTLE
TOILETING: A LITTLE
HELP NEEDED FOR BATHING: A LITTLE
DRESSING REGULAR UPPER BODY CLOTHING: A LITTLE
CLIMB 3 TO 5 STEPS WITH RAILING: A LOT
TOILETING: A LITTLE
MOVING TO AND FROM BED TO CHAIR: A LITTLE
PERSONAL GROOMING: A LITTLE
DAILY ACTIVITIY SCORE: 19
WALKING IN HOSPITAL ROOM: A LITTLE
DAILY ACTIVITIY SCORE: 19
EATING MEALS: A LITTLE
MOBILITY SCORE: 18
STANDING UP FROM CHAIR USING ARMS: A LITTLE
DRESSING REGULAR LOWER BODY CLOTHING: A LITTLE
HELP NEEDED FOR BATHING: A LITTLE
DRESSING REGULAR UPPER BODY CLOTHING: A LITTLE
DRESSING REGULAR LOWER BODY CLOTHING: A LITTLE

## 2024-05-06 ASSESSMENT — PAIN SCALES - GENERAL
PAINLEVEL_OUTOF10: 0 - NO PAIN
PAINLEVEL_OUTOF10: 0 - NO PAIN

## 2024-05-06 ASSESSMENT — ACTIVITIES OF DAILY LIVING (ADL): HOME_MANAGEMENT_TIME_ENTRY: 30

## 2024-05-06 NOTE — PROGRESS NOTES
Mitzi Pro is a 73 y.o. female on day 6 of admission presenting with Atrial fibrillation with RVR (Multi).      Subjective   Patient doing well.  Talked about being compliant with oxygen.  Overall feeling well.  Agreeable to going to rehab       Objective     Last Recorded Vitals  /69 (BP Location: Right arm, Patient Position: Lying)   Pulse 83   Temp 35.9 °C (96.6 °F)   Resp 16   Wt 79.1 kg (174 lb 6.1 oz)   SpO2 95%   Intake/Output last 3 Shifts:    Intake/Output Summary (Last 24 hours) at 5/6/2024 1000  Last data filed at 5/6/2024 0606  Gross per 24 hour   Intake 50 ml   Output 800 ml   Net -750 ml       Admission Weight  Weight: 77.1 kg (170 lb) (04/30/24 0034)    Daily Weight  05/05/24 : 79.1 kg (174 lb 6.1 oz)      Physical Exam:  General: Not in acute distress, alert.  On 3 L nasal cannula  HEENT: PERRLA, head intact and normocephalic  Neck: Normal to inspection  Lungs: Clear to auscultation, work of breathing within normal limit  Cardiac:  irregularly irregular  Abdomen: Soft nontender, positive bowel sounds  : Exam deferred  Skin: Intact  Hematology: No petechia or excessive ecchymosis  Musculoskeletal: Without significant trauma  Neurological: Alert awake oriented, no focal deficit, cranial nerves grossly intact  Psych: No suicidal ideation or homicidal ideation    Relevant Results  Scheduled medications  apixaban, 5 mg, oral, BID  atorvastatin, 40 mg, oral, Nightly  cephalexin, 500 mg, oral, q8h CHAYO  cholecalciferol, 5,000 Units, oral, Daily  cilostazol, 100 mg, oral, BID  fluticasone, 2 spray, Each Nostril, Daily  formoterol, 20 mcg, nebulization, BID  glipiZIDE, 10 mg, oral, BID AC  icosapent ethyL, 2 g, oral, BID  insulin lispro, 0-10 Units, subcutaneous, TID with meals  ipratropium-albuteroL, 3 mL, nebulization, TID  loratadine, 10 mg, oral, Daily  metoprolol succinate XL, 100 mg, oral, Daily  oxybutynin, 5 mg, oral, BID  pantoprazole, 40 mg, oral, Daily before breakfast    Or  pantoprazole, 40 mg, intravenous, Daily before breakfast  predniSONE, 40 mg, oral, Daily  regadenoson, 0.4 mg, intravenous, Once in imaging      Continuous medications     PRN medications  PRN medications: acetaminophen **OR** acetaminophen **OR** acetaminophen, acetaminophen **OR** acetaminophen **OR** acetaminophen, LORazepam, melatonin, metoclopramide **OR** metoclopramide, ondansetron ODT **OR** ondansetron, oxygen, polyethylene glycol, sodium chloride   Labs  Results from last 7 days   Lab Units 05/05/24 0833 05/04/24 0519 05/03/24 0422   WBC AUTO x10*3/uL 12.2* 14.1* 8.4   HEMOGLOBIN g/dL 11.4* 10.3* 11.0*   HEMATOCRIT % 37.1 33.3* 35.7*   PLATELETS AUTO x10*3/uL 280 255 230     Results from last 7 days   Lab Units 05/05/24  0833 05/04/24 0519 05/03/24 0422 05/01/24  0421 04/30/24  0453 04/30/24  0054   SODIUM mmol/L 138 132* 136   < > 139 135*   POTASSIUM mmol/L 4.9 4.9 4.7   < > 4.0 4.4   CHLORIDE mmol/L 97* 95* 99   < > 103 97*   CO2 mmol/L 38* 31 30   < > 29 28   BUN mg/dL 28* 38* 19   < > 31* 34*   CREATININE mg/dL 0.99 1.31* 0.86   < > 1.43* 1.63*   CALCIUM mg/dL 9.0 8.6 8.7   < > 8.1* 8.9   PROTEIN TOTAL g/dL  --   --   --   --  5.5* 6.2*   BILIRUBIN TOTAL mg/dL  --   --   --   --  0.5 0.8   ALK PHOS U/L  --   --   --   --  49 53   ALT U/L  --   --   --   --  9 10   AST U/L  --   --   --   --  15 18   GLUCOSE mg/dL 129* 294* 201*   < > 132* 175*    < > = values in this interval not displayed.       ECG 12 Lead    Result Date: 5/4/2024  Sinus rhythm with occasional Premature ventricular complexes Incomplete left bundle branch block Poor R-wave progression Abnormal ECG Confirmed by Frank Gacria ([id number]) on 5/4/2024 3:25:34 PM    ECG 12 Lead    Result Date: 5/3/2024  Atrial flutter with 2 to 1 block Rightward axis Incomplete left bundle branch block Nonspecific ST abnormality Abnormal ECG When compared with ECG of 02-MAY-2024 21:07, (unconfirmed) Premature ventricular complexes are no longer  Present Premature supraventricular complexes are no longer Present Confirmed by Frank Garcia ([id number]) on 5/3/2024 1:30:56 PM    ECG 12 Lead    Result Date: 5/3/2024  Atrial flutter with variable AV block Rightward axis Incomplete left bundle branch block ST & T wave abnormality, consider inferior ischemia Abnormal ECG When compared with ECG of 02-MAY-2024 12:13, (unconfirmed) Significant changes have occurred Confirmed by Frank Garcia ([id number]) on 5/3/2024 1:29:58 PM    XR chest 1 view    Result Date: 5/2/2024  Interpreted By:  Brandon Nazario, STUDY: XR CHEST 1 VIEW   INDICATION: Signs/Symptoms:SOB.   COMPARISON: April 30   ACCESSION NUMBER(S): [id number]   ORDERING CLINICIAN: YANIRA BAXTER   FINDINGS: Interval development of perihilar and basilar interstitial edema with new right effusion and basilar airspace opacity favored to represent more conglomerate edema. Focus of pneumonia not excluded.   Previous cardiomegaly with median sternotomy unchanged.       Interval development of perihilar and basilar interstitial edema with new right effusion and basilar airspace opacity favored to represent more conglomerate edema. Focus of pneumonia not excluded.   Signed by: Brandon Nazario 5/2/2024 6:55 PM Dictation workstation:   [id number]    ECG 12 Lead    Result Date: 5/2/2024  Sinus rhythm with sinus arrhythmia with occasional Premature ventricular complexes and Fusion complexes Rightward axis Anteroseptal infarct (cited on or before 30-APR-2024) Abnormal ECG When compared with ECG of 01-MAY-2024 05:33, Sinus rhythm has replaced Atrial flutter Vent. rate has decreased BY  79 BPM Serial changes of Anteroseptal infarct Present    ECG 12 Lead    Result Date: 5/2/2024  Atrial flutter with 2:1 AV conduction Right axis deviation Anteroseptal infarct (cited on or before 30-APR-2024) ST & T wave abnormality, consider inferior ischemia Abnormal ECG When compared with ECG of 30-APR-2024 01:56, (unconfirmed) Significant changes  have occurred Confirmed by Ernie Diaz ([id number]) on 5/2/2024 1:08:56 PM    ECG 12 lead    Result Date: 5/2/2024  cannot exclude atrial flutter vs sinus tachycardia Left axis deviation Incomplete right bundle branch block Nonspecific ST and T wave abnormality Abnormal ECG When compared with ECG of 08-APR-2023 13:22, Fusion complexes are now Present Incomplete right bundle branch block is now Present Confirmed by Ernie Diaz ([id number]) on 5/2/2024 12:56:18 PM    ECG 12 lead    Result Date: 5/2/2024  atrial fibrillation/flutter Septal infarct , age undetermined ST & T wave abnormality, consider lateral ischemia Abnormal ECG When compared with ECG of 30-APR-2024 00:46, (unconfirmed) Current undetermined rhythm precludes rhythm comparison, needs review Incomplete right bundle branch block is no longer Present Septal infarct is now Present Confirmed by Ernie Diaz ([id number]) on 5/2/2024 12:55:44 PM    Transthoracic Echo (TTE) Complete    Result Date: 4/30/2024            [facility] [address] [address]    Tel [phone] Fax [phone] TRANSTHORACIC ECHOCARDIOGRAM REPORT  Patient Name:      MILVIA Garces Physician:    [id number] Horacio Armijo MD Study Date:        4/30/2024             Ordering Provider:    [id number] BAXTER MRN/PID:           [id number]              Fellow: Accession#:        [id number]          Nurse:                Leena AMATO Date of Birth/Age: 1950 / 73 years  Sonographer:          Amalia Brewster RD Gender:            F                     Additional Staff: Height:            165.00 cm             Admit Date: Weight:            76.20 kg              Admission Status:     Inpatient -                                                                Priority                                                                discharge BSA / BMI:          1.84 m2 / 27.99 kg/m2 Department Location:  [facility] CCU Blood Pressure: 99 /56 mmHg Study Type:    TRANSTHORACIC ECHO (TTE) COMPLETE Diagnosis/ICD: 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 Diabetes:          Yes Pertinent History: HTN, Hyperlipidemia and COPD. Study Detail: The following Echo studies were performed: 2D, M-Mode, Doppler and               color flow. Technically challenging study due to patient lying in               supine position and prominent lung artifact. Definity used as a               contrast agent for endocardial border definition. Total contrast               used for this procedure was 2 mL via IV push.  PHYSICIAN INTERPRETATION: Left Ventricle: Left ventricular systolic function is moderately decreased, with an estimated ejection fraction of 34 %. There are multiple wall motion abnormalities. The left ventricular cavity size is mild to moderately dilated. The left ventricular septal wall thickness is decreased. There is normal left ventricular posterior wall thickness. Spectral Doppler shows an impaired relaxation pattern of left ventricular diastolic filling. There is an elevated left ventricular end diastolic pressure. LV Wall Scoring: The mid and apical anterior septum, mid and apical inferior septum, apical lateral segment, apical anterior segment, and apex are akinetic. The entire inferior wall, basal and mid anterior wall, basal and mid anterolateral wall, basal and mid inferolateral wall, basal anteroseptal segment, and basal inferoseptal segment are hypokinetic. Left Atrium: The left atrium is mildly dilated. Right Ventricle: The right ventricle is normal in size. There is mildly reduced right ventricular systolic function. Right Atrium: The right atrium is normal in size. Aortic Valve: The  aortic valve is trileaflet. There is evidence of mildly elevated transaortic gradients consistent with sclerosis of the aortic valve. There is no evidence of aortic valve regurgitation. The peak instantaneous gradient of the aortic valve is 7.6 mmHg. Mitral Valve: The mitral valve is normal in structure. There is mild to moderate mitral annular calcification. There is moderate mitral valve regurgitation. EROA by PISA 0.2 moderate, posterior MR skyler type IIIA apical tethering. Tricuspid Valve: The tricuspid valve is structurally normal. There is mild tricuspid regurgitation. The Doppler estimated RVSP is mildly elevated at 43.7 mmHg. Pulmonic Valve: The pulmonic valve is structurally normal. There is trace pulmonic valve regurgitation. Pericardium: There is no pericardial effusion noted. Aorta: The aortic root is normal. There is no dilatation of the ascending aorta. There is no dilatation of the aortic root. Pulmonary Artery: The main pulmonary artery is normal in size, and position, with normal bifurcation into the left and right pulmonary arteries. Systemic Veins: The inferior vena cava appears mildly dilated. The hepatic vein appears dilated. There is less than 50% IVC collapse with inspiration.  CONCLUSIONS:  1. Left ventricular systolic function is moderately decreased with a 34 % estimated ejection fraction.  2. Multiple segmental abnormalities exist. See findings.  3. Spectral Doppler shows an impaired relaxation pattern of left ventricular diastolic filling.  4. There is an elevated left ventricular end diastolic pressure.  5. There is mildly reduced right ventricular systolic function.  6. Moderate mitral valve regurgitation.  7. Mildly elevated RVSP.  8. Aortic valve sclerosis.  9. There are multiple wall motion abnormalities. QUANTITATIVE DATA SUMMARY: 2D MEASUREMENTS:                           Normal Ranges: LAs:           4.50 cm    (2.7-4.0cm) IVSd:          1.00 cm    (0.6-1.1cm) LVPWd:          0.90 cm    (0.6-1.1cm) LVIDd:         6.50 cm    (3.9-5.9cm) LVIDs:         5.40 cm LV Mass Index: 144.5 g/m2 LV % FS        16.9 % LA VOLUME:                             Normal Ranges: LA Area A4C:     21.0 cm2 LA Area A2C:     21.0 cm2 LA Volume Index: 37.0 ml/m2 M-MODE MEASUREMENTS:                  Normal Ranges: Ao Root: 2.60 cm (2.0-3.7cm) AoV Exc: 1.40 cm (1.5-2.5cm) AORTA MEASUREMENTS:                    Normal Ranges: AoV Exc:   1.40 cm (1.5-2.5cm) Asc Ao, d: 3.30 cm (2.1-3.4cm) LV SYSTOLIC FUNCTION BY 2D PLANIMETRY (MOD):                     Normal Ranges: EF-A4C View: 39.4 % (>=55%) EF-A2C View: 28.7 % EF-Biplane:  34.0 % LV DIASTOLIC FUNCTION:                        Normal Ranges: MV Peak E:    1.38 m/s (0.7-1.2 m/s) MV Peak A:    0.90 m/s (0.42-0.7 m/s) E/A Ratio:    1.54     (1.0-2.2) MV e'         0.06 m/s (>8.0) MV lateral e' 0.08 m/s MV medial e'  0.04 m/s E/e' Ratio:   23.00    (<8.0) MITRAL VALVE:                 Normal Ranges: MV DT: 177 msec (150-240msec) MITRAL INSUFFICIENCY:                           Normal Ranges: PISA Radius:  0.4 cm MR VTI:       142.00 cm MR Vmax:      407.00 cm/s MR Alias Subhash: 84.7 cm/s MR Volume:    29.71 ml MR Flow Rt:   85.15 ml/s MR EROA:      0.21 cm2 AORTIC VALVE:                         Normal Ranges: AoV Vmax:      1.38 m/s (<=1.7m/s) AoV Peak P.6 mmHg (<20mmHg) LVOT Max Subhash:  0.89 m/s (<=1.1m/s) LVOT VTI:      20.00 cm LVOT Diameter: 1.70 cm  (1.8-2.4cm) AoV Area,Vmax: 1.47 cm2 (2.5-4.5cm2)  RIGHT VENTRICLE: RV Basal 3.40 cm RV Mid   1.80 cm RV Major 7.6 cm TAPSE:   14.1 mm RV s'    0.09 m/s TRICUSPID VALVE/RVSP:                             Normal Ranges: Peak TR Velocity: 3.19 m/s RV Syst Pressure: 43.7 mmHg (< 30mmHg) PULMONIC VALVE:                        Normal Ranges: PV Accel Time: 74 msec (>120ms)  [id number] Horacio Armijo MD Electronically signed on 2024 at 4:34:01 PM  Wall Scoring  ** Final **     XR chest 1 view    Result Date: 2024  STUDY:  Chest Radiograph;  4/30/2024 at 1:13 AM INDICATION: Chest pain. COMPARISON: XR chest 11/28/2022, XR chest 12/5/2021, XR chest 7/14/2021, XR chest 3/1/2019. ACCESSION NUMBER(S): [id number] ORDERING CLINICIAN: ISIDRO MARSHALL TECHNIQUE:  Frontal chest was obtained at 0113 hours. FINDINGS: CARDIOMEDIASTINAL SILHOUETTE: Cardiomediastinal silhouette is mildly enlarged in size and configuration.  Calcified plaque is seen in the aorta.  LUNGS: Lungs are clear.  Flattening of hemidiaphragms suggests chronic changes of COPD.  ABDOMEN: No remarkable upper abdominal findings.  BONES: No acute osseous changes.    No acute cardia pulmonary disease. Cardiomegaly with suspected mild chronic changes of COPD. Signed by Rojas Lawrenec          Assessment/Plan   Mitzi Pro is a 73 y.o. female on day 6 of admission presenting with Atrial fibrillation with RVR (Multi).  Principal Problem:    Atrial fibrillation with RVR (Multi)  Active Problems:    Type 2 diabetes mellitus with hyperglycemia, without long-term current use of insulin (Multi)    Chronic combined systolic (congestive) and diastolic (congestive) heart failure (Multi)    Cystitis    Idiopathic hypotension      73-year-old female with past medical history of combined systolic and diastolic congestive heart failure with most recent ejection fraction of 34% on this hospitalization with multiple wall motion abnormalities, non-insulin-dependent diabetes mellitus, CAD status post CABG, atrial fibrillation on Eliquis, carotid artery disease, hypertension, hyperlipidemia, nocturnal hypoxemic respiratory failure, ovarian cancer s/p bilateral oophorectomy, uterine fibroid resection presented to emergency department due to generalized weakness secondary to UTI and A-fib with RVR.  Patient remains medically stable for discharge to skilled nursing facility.    A-fib with RVR  Well-controlled  Continue with increased dose of metoprolol 100 mg daily  Cardiology consult is  noted  Non-STEMI secondary to infection on admission  Ischemic workup as outpatient if needed  To consider EP ablation if patient keeps having recurrence but she is at a high risk of worsening breathing due to her prior pulmonary condition  Unable to use lots of antiarrhythmic take because of this    UTI  Continue with Keflex as ordered    COPD exacerbation with acute hypoxemic respiratory failure  Continue prednisone  Recommended follow-up with pulmonology  Patient with advanced COPD  DVT prophylaxis  On Eliquis       Plan discussed with patient at bedside  Disposition: Awaiting discharge to skilled nursing facility.  Medically stable for discharge  Moderate level of MDM based on above issue and discussing plan    This note is created using voice recognition software. All efforts are made to minimize errors, if there are errors there due to transcription.    Rom Valadez  Hospitalist

## 2024-05-06 NOTE — NURSING NOTE
Pt. Was traffered from ccu via bed. Pt. 02 hooked to 3L/NC, tele monitor applie, kelby hooked to suction. V/S taken by pca. Pt situated in bed,denies any valuables  That needs locked up. See v/a and assessment.

## 2024-05-06 NOTE — NURSING NOTE
Pt. Claims slept fairly. Tele monitor  had a few hr -high 40's & 50's occasional rvr, dr. Ge alvarado aware claim she 's been bradycardic at [facility] in the past.denies c/o chest pain urine is yellow and clear.

## 2024-05-06 NOTE — PROGRESS NOTES
I spoke with this patient about COPD and the benefits of self-management. The patient is aware that this is a guideline and does not replace medical advice from their physician. We discussed pursed-lip and diaphragmatic breathing, recognizing their personal yellow zone, and reviewed the  Living Well With COPD book.   Is this a 30 re-admission? no  Smoking cessation? yes  Wears home oxygen? 2 liter at night and while napping  Does the patient know why they are here? yes  Most recent PFT:     This patient is pleasant to speak with and seems interested in her education. She states that she is here mostly due to her CHF which she has been educated on per patient. She states that she is a current smoker who does not smoke often. She lives with someone who also occasionally smokes and states she can ask him to smoke outside. She has a good understanding of the risk factors and how the smoking is affecting her health. She is not confident that she will quit but is willing to entertain the thought of not going back to smoking. She is high risk for continued issues at discharge if she is not compliant and she is well aware. She states she lives alone and has a lot of stress to keep up with the house. I will continue to educate her further during her stay.

## 2024-05-06 NOTE — PROGRESS NOTES
Occupational Therapy    OT Treatment    Patient Name: Mitzi Pro  MRN: [id number]  Today's Date: 5/6/2024  Time Calculation  Start Time: 1219  Stop Time: 1300  Time Calculation (min): 41 min         Assessment:  OT Assessment: pt educated on knowing limitataions and taking breaks. Pt states she feels she needs to do more walking in order to stay strong and does not feel she will be able to do that if she goes to a SNF  Evaluation/Treatment Tolerance: Patient tolerated treatment well  End of Session Communication: Bedside nurse  End of Session Patient Position: Up in chair, Alarm on  OT Assessment Results: Decreased ADL status, Decreased endurance, Decreased IADLs, Decreased functional mobility  Evaluation/Treatment Tolerance: Patient tolerated treatment well    Plan:  Treatment Interventions: ADL retraining, Functional transfer training  OT Frequency: 2 times per week  OT Discharge Recommendations: Low intensity level of continued care  Equipment Recommended upon Discharge: Wheeled walker  Treatment Interventions: ADL retraining, Functional transfer training  Subjective     Outcome Measures:[facility] Daily Activity  Putting on and taking off regular lower body clothing: A little  Bathing (including washing, rinsing, drying): A little  Putting on and taking off regular upper body clothing: A little  Toileting, which includes using toilet, bedpan or urinal: A little  Taking care of personal grooming such as brushing teeth: A little  Eating Meals: None  Daily Activity - Total Score: 19       05/06/24 1219   OT Last Visit   OT Received On 05/06/24   General   Reason for Referral impaired mobility   Prior to Session Communication Bedside nurse   Patient Position Received Bed, 3 rail up   Preferred Learning Style verbal   General Comment 3L O2, telemetry, pure-wick   Precautions   Medical Precautions Fall precautions   Precautions Comment Pt HR increasing with mobility, up to 140 but does subside once seated for a minute    Vital Signs   SpO2 95 %  (3L)   Pain Assessment   Pain Score 0 - No pain   Cognition   Orientation Level Oriented X4   LE Dressing   LE Dressing   (Pt able to figure 4 legs in order to don/doff socks seated EOB. Pt reports no issues with dressing)   Toileting   Toileting Comments Pt using pure wick. States she is incontinenet at home and wears depends   Bed Mobility   Bed Mobility   (SBA to EOB)   Functional Mobility   Functional Mobility Performed   (Pt ambulates approximatly 50 ft with  ww support at CGA. Demonstrated ability to complete household distances. Pt educated on understanding and knowing her limitations. Pt states she understands and knows when she needs to take a break.)   Transfers   Transfer   (STS at SBA)   IP OT Assessment   OT Assessment pt educated on knowing limitataions and taking breaks. Pt states she feels she needs to do more walking in order to stay strong and does not feel she will be able to do that if she goes to a SNF   Evaluation/Treatment Tolerance Patient tolerated treatment well   End of Session Communication Bedside nurse   End of Session Patient Position Up in chair;Alarm on   OT Assessment   OT Assessment Results Decreased ADL status;Decreased endurance;Decreased IADLs;Decreased functional mobility   Education   Individual(s) Educated Patient   Education Provided Symptom management;Fall precautons   Patient Response to Education Patient/Caregiver Verbalized Understanding of Information   Education Comment Pt would benefit from continued reinforcement   Inpatient Plan   Treatment Interventions ADL retraining;Functional transfer training   OT Frequency 2 times per week   OT Discharge Recommendations Low intensity level of continued care   Equipment Recommended upon Discharge Wheeled walker           Goals:  Encounter Problems       Encounter Problems (Active)       OT Goals       Mod I ADL/IADL functional mobility.       Start:  04/30/24    Expected End:  05/14/24            Mod  I LB ADL.        Start:  04/30/24    Expected End:  05/14/24            Mod I sit/stand, bed/chair/commode.       Start:  04/30/24    Expected End:  05/14/24            Good dynamic standing balance for ADL.        Start:  04/30/24    Expected End:  05/14/24

## 2024-05-06 NOTE — PROGRESS NOTES
05/06/24 1146   Discharge Planning   Patient expects to be discharged to: SNF vs. Acute Rehab   Does the patient need discharge transport arranged? Yes   RoundTrip coordination needed? Yes   Has discharge transport been arranged? No   What day is the transport expected? 05/06/24     Met with patient at the bedside and she is agreeable to skilled vs. Acute rehab. Therapy is recommending a moderate level of therapy at discharge with an ampac of 18. I provided a skilled and acute rehab choice lists for patient to review and select a facility of choice. I explained the differences between the two. I also explained she would need to be accepted at a facility and would need a pre cert. She is reviewing lists and I will follow up with her today.     1530: Patient refusing skilled or acute, wants to return home, agreeable to home care and the preference is [facility].  Message sent to Dr. Valadez    05/07/2024 1550: Patient is requesting a smaller concentrator. She is active with [facility] for DME for her oxygen. Referral sent through care port for agency to contact the patient regarding the availability for a smaller unit.

## 2024-05-07 ENCOUNTER — PATIENT OUTREACH (OUTPATIENT)
Dept: PRIMARY CARE | Facility: CLINIC | Age: 74
End: 2024-05-07
Payer: MEDICARE

## 2024-05-07 ENCOUNTER — TELEPHONE (OUTPATIENT)
Dept: PRIMARY CARE | Facility: CLINIC | Age: 74
End: 2024-05-07
Payer: MEDICARE

## 2024-05-07 ENCOUNTER — DOCUMENTATION (OUTPATIENT)
Dept: HOME HEALTH SERVICES | Facility: HOME HEALTH | Age: 74
End: 2024-05-07
Payer: MEDICARE

## 2024-05-07 NOTE — TELEPHONE ENCOUNTER
Sharmila [facility] [phone]  Called to see the status of a Foldable Cart that the patient requested.     Please advise       Thank you !

## 2024-05-07 NOTE — PROGRESS NOTES
Discharge Facility:  [facility]    Discharge Diagnosis:  Atrial fibrillation with RVR (Multi)     Admission Date:4/30/24  Discharge Date: 5/6/24    PCP Appointment Date: BENITO- Amalia Obrien CNP    Specialist Appointment Date:  Dr Garcia - pt declined offer and encouragement to set up a follow up with EP. She said that she had told them before that she does not want anything placed in her. I mentioned that she could just make appt to review options as there are medications and other non surgical options that he might want to discuss. She still declined offer and said that she will let Perla or PCP office know if she changes her mind .     Cardiology Established Patient Visit with Diego Duncan   Thursday May 23, 2024 3:30 PM     Hospital Encounter and Summary: Linked   See discharge assessment below for further details    Medications  Medications reviewed with patient/caregiver?: (S) Yes (pt did not want to review in complete detail a she just went over for an hour and half with other nurse. I am not able to confirm this through chart notes to see who it was.) (5/7/2024  3:06 PM)  Is the patient having any side effects they believe may be caused by any medication additions or changes?: No (5/7/2024  3:06 PM)  Does the patient have all medications ordered at discharge?: No (she said they were sent to the wrong pharm but now are at [facility] and she will  the RX tomorrow) (5/7/2024  3:06 PM)  Care Management Interventions: Advised patient to call provider (5/7/2024  3:06 PM)  Prescription Comments: PER DISCHARGE PAPERS -START taking: cephalexin (Keflex) ipratropium-albuteroL (Duo-Neb) loratadine (Claritin) predniSONE (Deltasone) sodium chloride (Ocean)    CHANGE how you take: metoprolol succinate XL (Toprol-XL) SITagliptin phosphate (Januvia)     STOP taking: lisinopril 10 mg tablet spironolactone 25 mg tablet (Aldactone) (5/7/2024  3:06 PM)  Care Management Interventions:  Advised patient to call provider (5/7/2024  3:06 PM)  Medication Comments: Please see discharge changes above. Pt reports that she just got off the phone from talking to a nurse but then also mentioned they came to the house and went over meds but said that they are working on taking her down from 21 meds to around 10.  I can not find notes of this encounter. I asked if it was a pharmacist that called her on the phone but she said she thinks it was the nurse. I encouraged her to make sure she talks to doctors office. Patient did confirmed that she has Discahrge paper with med changes but already went over it with other nurse. (5/7/2024  3:06 PM)    Appointments  Does the patient have a primary care provider?: Yes (5/7/2024  3:06 PM)  Care Management Interventions: Educated patient on importance of making appointment (pt said that she is going to call and make PCP appt because her orginal PCP is now in [place] so will follow up with Amalia Obrien CNP) (5/7/2024  3:06 PM)  Care Management Interventions: Advised to schedule with specialist; Educated on importance of keeping appointment (Confirmed date of Cardio appt with Dr Duncan that she will keep but pt declined all offers to schedule with Cardio EP at this time.) (5/7/2024  3:06 PM)    Self Management  What is the home health agency?:  [facility]- Pt reports that she is calling them tomorrow morning because they left her a message (5/7/2024  3:06 PM)  Has home health visited the patient within 72 hours of discharge?: Call prior to 72 hours (5/7/2024  3:06 PM)  What Durable Medical Equipment (DME) was ordered?: per patient- they are still working on Cont. O2, and getting her a walker. She said that [facility] came to house today and they are woring on it for her. She has their number to contact if needed but they are not planning to come back out. She also mentioned that she was to get the air mattress that was on her bed at the hospital but was only given the pump and  "not the mattress. She reported that she threw the pump out and \"they can keep the mattress now\" (5/7/2024  3:06 PM)  Has all Durable Medical Equipment (DME) been delivered?: No (5/7/2024  3:06 PM)    Patient Teaching  Does the patient have access to their discharge instructions?: Yes (5/7/2024  3:06 PM)  Care Management Interventions: Reviewed instructions with patient (5/7/2024  3:06 PM)  What is the patient's perception of their health status since discharge?: Same (doing okay just tired from being on the phone all day) (5/7/2024  3:06 PM)  Is the patient/caregiver able to teach back the hierarchy of who to call/visit for symptoms/problems? PCP, Specialist, Home Health nurse, Urgent Care, ED, 911: Yes (5/7/2024  3:06 PM)    Wrap Up  Wrap Up Additional Comments: I introduced myself and the TCM program to Mitzi Pro. Reviewed hospital stay and answered any questions. I gave my contact information and encouraged to call me if needing assistance or has any further non-emergent questions . (5/7/2024  3:06 PM)         [facility] received a Referral for Nursing, Physical Therapy, and Occupational Therapy. We have processed the referral for a Start of Care on 05/08/2024.         "

## 2024-05-07 NOTE — HH CARE COORDINATION
[facility] received a Referral for Nursing, Physical Therapy, and Occupational Therapy. We have processed the referral for a Start of Care on 05/08/2024.     If you have any questions or concerns, please feel free to contact us at [phone]. Follow the prompts, enter your five digit zip code, and you will be directed to your care team on WEST 2.

## 2024-05-09 ENCOUNTER — TELEPHONE (OUTPATIENT)
Dept: PRIMARY CARE | Facility: CLINIC | Age: 74
End: 2024-05-09

## 2024-05-09 ENCOUNTER — DOCUMENTATION (OUTPATIENT)
Dept: HOME HEALTH SERVICES | Facility: HOME HEALTH | Age: 74
End: 2024-05-09
Payer: MEDICARE

## 2024-05-09 NOTE — TELEPHONE ENCOUNTER
[facility] [phone] called on behalf of Mitzi. She not sure how to get the walker she was was Prescribed. Please call patient. ([facility] would like to know status for patient.)

## 2024-05-10 ENCOUNTER — TELEPHONE (OUTPATIENT)
Dept: PRIMARY CARE | Facility: CLINIC | Age: 74
End: 2024-05-10

## 2024-05-10 ENCOUNTER — HOME CARE VISIT (OUTPATIENT)
Dept: HOME HEALTH SERVICES | Facility: HOME HEALTH | Age: 74
End: 2024-05-10

## 2024-05-10 SDOH — HEALTH STABILITY: MENTAL HEALTH: SMOKING IN HOME: 1

## 2024-05-10 SDOH — ECONOMIC STABILITY: HOUSING INSECURITY: EVIDENCE OF SMOKING MATERIAL: 1

## 2024-05-10 ASSESSMENT — ENCOUNTER SYMPTOMS
SHORTNESS OF BREATH: 1
DENIES PAIN: 1
DYSPNEA ACTIVITY LEVEL: AFTER AMBULATING LESS THAN 10 FT
APPETITE LEVEL: GOOD
PERSON REPORTING PAIN: PATIENT
DESCRIPTION OF MEMORY LOSS: IMMEDIATE

## 2024-05-10 ASSESSMENT — LIFESTYLE VARIABLES: SMOKING_STATUS: 0

## 2024-05-10 ASSESSMENT — ACTIVITIES OF DAILY LIVING (ADL)
ENTERING_EXITING_HOME: DEPENDENT
OASIS_M1830: 03

## 2024-05-10 NOTE — HOME HEALTH
PATIENT WENT THROUGH [facility]LE [facility] W/ RN. AT THE END WHEN ASKING IF SHE WANTED PT AND OT SERVICES SHE STATED SHE WANTED NO SERVICES. SHE SAID SHE GETS AROUND FINE AND CAN FIGURE THINGS OUT ON HER OWN. PT WILL HAVE NO MORE FUTURE VISITS

## 2024-05-10 NOTE — TELEPHONE ENCOUNTER
Patient is returning a call to Amalia Obrien.  A phone number was not left for the patient to call back.    Please give her another call.

## 2024-05-11 ENCOUNTER — HOME CARE VISIT (OUTPATIENT)
Dept: HOME HEALTH SERVICES | Facility: HOME HEALTH | Age: 74
End: 2024-05-11

## 2024-05-14 ENCOUNTER — OFFICE VISIT (OUTPATIENT)
Dept: PRIMARY CARE | Facility: CLINIC | Age: 74
End: 2024-05-14
Payer: MEDICARE

## 2024-05-14 VITALS
RESPIRATION RATE: 16 BRPM | HEIGHT: 65 IN | SYSTOLIC BLOOD PRESSURE: 134 MMHG | WEIGHT: 164.2 LBS | DIASTOLIC BLOOD PRESSURE: 62 MMHG | HEART RATE: 87 BPM | BODY MASS INDEX: 27.36 KG/M2 | OXYGEN SATURATION: 93 %

## 2024-05-14 DIAGNOSIS — E11.65 TYPE 2 DIABETES MELLITUS WITH HYPERGLYCEMIA, WITHOUT LONG-TERM CURRENT USE OF INSULIN (MULTI): Primary | ICD-10-CM

## 2024-05-14 DIAGNOSIS — I48.0 PAROXYSMAL ATRIAL FIBRILLATION (MULTI): ICD-10-CM

## 2024-05-14 DIAGNOSIS — I50.42 CHRONIC COMBINED SYSTOLIC (CONGESTIVE) AND DIASTOLIC (CONGESTIVE) HEART FAILURE (MULTI): ICD-10-CM

## 2024-05-14 DIAGNOSIS — J44.9 CHRONIC OBSTRUCTIVE PULMONARY DISEASE, UNSPECIFIED COPD TYPE (MULTI): ICD-10-CM

## 2024-05-14 DIAGNOSIS — E11.51 DIABETES MELLITUS WITH PERIPHERAL VASCULAR DISEASE (MULTI): ICD-10-CM

## 2024-05-14 PROBLEM — N28.9 RENAL LESION: Status: ACTIVE | Noted: 2024-05-14

## 2024-05-14 PROBLEM — E11.3293 MILD NONPROLIFERATIVE DIABETIC RETINOPATHY OF BOTH EYES WITHOUT MACULAR EDEMA ASSOCIATED WITH TYPE 2 DIABETES MELLITUS (MULTI): Status: ACTIVE | Noted: 2023-06-14

## 2024-05-14 PROBLEM — J96.92 RESPIRATORY FAILURE WITH HYPERCAPNIA (MULTI): Status: ACTIVE | Noted: 2024-01-19

## 2024-05-14 PROBLEM — K86.9 PANCREATIC LESION (HHS-HCC): Status: ACTIVE | Noted: 2024-05-14

## 2024-05-14 PROBLEM — H25.013 CORTICAL AGE-RELATED CATARACT OF BOTH EYES: Status: ACTIVE | Noted: 2023-06-14

## 2024-05-14 PROBLEM — N89.8 VAGINAL ITCHING: Status: RESOLVED | Noted: 2024-05-14 | Resolved: 2024-05-14

## 2024-05-14 PROBLEM — H52.203 ASTIGMATISM OF BOTH EYES: Status: ACTIVE | Noted: 2023-06-14

## 2024-05-14 PROBLEM — R06.02 SOB (SHORTNESS OF BREATH): Status: ACTIVE | Noted: 2024-01-19

## 2024-05-14 LAB — POC HEMOGLOBIN A1C: 6.3 % (ref 4.2–6.5)

## 2024-05-14 PROCEDURE — 1157F ADVNC CARE PLAN IN RCRD: CPT | Performed by: NURSE PRACTITIONER

## 2024-05-14 PROCEDURE — 3078F DIAST BP <80 MM HG: CPT | Performed by: NURSE PRACTITIONER

## 2024-05-14 PROCEDURE — 1111F DSCHRG MED/CURRENT MED MERGE: CPT | Performed by: NURSE PRACTITIONER

## 2024-05-14 PROCEDURE — 1159F MED LIST DOCD IN RCRD: CPT | Performed by: NURSE PRACTITIONER

## 2024-05-14 PROCEDURE — 83036 HEMOGLOBIN GLYCOSYLATED A1C: CPT | Performed by: NURSE PRACTITIONER

## 2024-05-14 PROCEDURE — 99496 TRANSJ CARE MGMT HIGH F2F 7D: CPT | Performed by: NURSE PRACTITIONER

## 2024-05-14 PROCEDURE — 3075F SYST BP GE 130 - 139MM HG: CPT | Performed by: NURSE PRACTITIONER

## 2024-05-14 ASSESSMENT — ENCOUNTER SYMPTOMS
DIARRHEA: 0
CONSTIPATION: 0
SLEEP DISTURBANCE: 0
COUGH: 0
SHORTNESS OF BREATH: 1
NERVOUS/ANXIOUS: 0
WEAKNESS: 0
FATIGUE: 0
VOMITING: 0
AGITATION: 0
NAUSEA: 0
FEVER: 0

## 2024-05-14 NOTE — PROGRESS NOTES
"Subjective   Patient ID: Mitzi Pro is a 73 y.o. female who presents for Hospital Follow-up.    Recent hospitalization for AF RVR, UTI and COPD. Medications and hospital records reviewed. She has follow up scheduled with cardiology. She is doing well today, A1C 6.3. She is taking Glyburide, Januvia and Actos,  She is currently receiving [facility] PT/OT and has improved, she still reports SOB with exertion.     She was unable to get the light weight walker because she did not qualify for it through her insurance since she already has a wheeled walker.    She is uses 02 at home at 3 L NC continuously for COPD,CHF and is unable to travel with it because she does not have a light weight portable concentrator. She would like to have one to be more mobile and be able to enjoy time outside of the home with friends and family. Her current 02 tank is too heavy for her to carry around and then she becomes increasingly SOB with exertion because of the weight. She states she goes through [employer] and they have told her she does not qualify for another tank that is lighter.          Review of Systems   Constitutional:  Negative for fatigue and fever.   Respiratory:  Positive for shortness of breath. Negative for cough.         SOB with exertion   Cardiovascular:  Negative for chest pain and leg swelling.   Gastrointestinal:  Negative for constipation, diarrhea, nausea and vomiting.   Skin:  Negative for pallor and rash.   Neurological:  Negative for weakness.   Psychiatric/Behavioral:  Negative for agitation, behavioral problems and sleep disturbance. The patient is not nervous/anxious.        Objective   /62   Pulse 87   Resp 16   Ht 1.651 m (5' 5\")   Wt 74.5 kg (164 lb 3.2 oz)   SpO2 93%   BMI 27.32 kg/m²     Physical Exam  Vitals and nursing note reviewed.   Constitutional:       General: She is not in acute distress.  HENT:      Head: Normocephalic and atraumatic.   Cardiovascular:      Rate and Rhythm: Normal " rate and regular rhythm.   Pulmonary:      Effort: Pulmonary effort is normal.      Breath sounds: Normal breath sounds.   Skin:     General: Skin is warm and dry.   Neurological:      Mental Status: She is alert and oriented to person, place, and time.   Psychiatric:         Mood and Affect: Mood normal.         Assessment/Plan   Problem List Items Addressed This Visit             ICD-10-CM    Atrial fibrillation (Multi) I48.91     Currently RRR  On metoprolol XL 100mg daily         COPD (chronic obstructive pulmonary disease) (Multi) J44.9     Requires 3L nc 02 continuously         Diabetes mellitus with peripheral vascular disease (Multi) E11.51    Relevant Medications    SITagliptin phosphate (Januvia) 50 mg tablet    Type 2 diabetes mellitus with hyperglycemia, without long-term current use of insulin (Multi) - Primary E11.65    Relevant Orders    POCT glycosylated hemoglobin (Hb A1C) manually resulted (Completed)    Chronic combined systolic (congestive) and diastolic (congestive) heart failure (Multi) I50.42     Stable  Follows with cardiology

## 2024-05-14 NOTE — ASSESSMENT & PLAN NOTE
Currently RRR  On metoprolol XL 100mg daily  
Requires 3L nc 02 continuously  
Stable  Follows with cardiology  
You can access the FollowMyHealth Patient Portal offered by [facility] by registering at the following website: [email or web address]. By joining [facility]’s FollowMyHealth portal, you will also be able to view your health information using other applications (apps) compatible with our system.

## 2024-05-23 ENCOUNTER — APPOINTMENT (OUTPATIENT)
Dept: CARDIOLOGY | Facility: HOSPITAL | Age: 74
DRG: 280 | End: 2024-05-23
Payer: OTHER MISCELLANEOUS

## 2024-05-23 ENCOUNTER — APPOINTMENT (OUTPATIENT)
Dept: CARDIOLOGY | Facility: CLINIC | Age: 74
End: 2024-05-23
Payer: MEDICARE

## 2024-05-23 ENCOUNTER — HOSPITAL ENCOUNTER (INPATIENT)
Facility: HOSPITAL | Age: 74
LOS: 6 days | Discharge: STILL ADMITTED OR ADMINISTRATIVE | DRG: 280 | End: 2024-05-29
Attending: EMERGENCY MEDICINE | Admitting: STUDENT IN AN ORGANIZED HEALTH CARE EDUCATION/TRAINING PROGRAM
Payer: OTHER MISCELLANEOUS

## 2024-05-23 ENCOUNTER — APPOINTMENT (OUTPATIENT)
Dept: RADIOLOGY | Facility: HOSPITAL | Age: 74
DRG: 280 | End: 2024-05-23
Payer: OTHER MISCELLANEOUS

## 2024-05-23 DIAGNOSIS — I50.9 ACUTE ON CHRONIC HEART FAILURE, UNSPECIFIED HEART FAILURE TYPE (MULTI): Primary | ICD-10-CM

## 2024-05-23 DIAGNOSIS — I48.92 ATRIAL FLUTTER, UNSPECIFIED TYPE (MULTI): ICD-10-CM

## 2024-05-23 DIAGNOSIS — J18.9 PNEUMONIA OF RIGHT LOWER LOBE DUE TO INFECTIOUS ORGANISM: ICD-10-CM

## 2024-05-23 DIAGNOSIS — J96.22 ACUTE ON CHRONIC RESPIRATORY FAILURE WITH HYPOXIA AND HYPERCAPNIA (MULTI): ICD-10-CM

## 2024-05-23 DIAGNOSIS — G47.00 INSOMNIA, UNSPECIFIED TYPE: ICD-10-CM

## 2024-05-23 DIAGNOSIS — I47.29 NONSUSTAINED VENTRICULAR TACHYCARDIA (MULTI): ICD-10-CM

## 2024-05-23 DIAGNOSIS — I48.92 ATRIAL FLUTTER WITH RAPID VENTRICULAR RESPONSE (MULTI): ICD-10-CM

## 2024-05-23 DIAGNOSIS — I95.9 HYPOTENSION, UNSPECIFIED HYPOTENSION TYPE: ICD-10-CM

## 2024-05-23 DIAGNOSIS — J96.21 ACUTE ON CHRONIC RESPIRATORY FAILURE WITH HYPOXIA AND HYPERCAPNIA (MULTI): ICD-10-CM

## 2024-05-23 PROBLEM — J96.01 ACUTE HYPOXIC RESPIRATORY FAILURE (MULTI): Status: ACTIVE | Noted: 2024-05-23

## 2024-05-23 PROBLEM — I21.4 NSTEMI (NON-ST ELEVATED MYOCARDIAL INFARCTION) (MULTI): Status: ACTIVE | Noted: 2024-05-23

## 2024-05-23 PROBLEM — J45.901 ACUTE EXACERBATION OF COPD WITH ASTHMA (MULTI): Status: ACTIVE | Noted: 2024-05-23

## 2024-05-23 PROBLEM — J44.1 ACUTE EXACERBATION OF COPD WITH ASTHMA (MULTI): Status: ACTIVE | Noted: 2024-05-23

## 2024-05-23 PROBLEM — I50.41 ACUTE COMBINED SYSTOLIC AND DIASTOLIC HEART FAILURE (MULTI): Status: ACTIVE | Noted: 2024-05-23

## 2024-05-23 PROBLEM — E87.20 LACTIC ACIDOSIS: Status: ACTIVE | Noted: 2024-05-23

## 2024-05-23 PROBLEM — E87.5 HYPERKALEMIA: Status: ACTIVE | Noted: 2024-05-23

## 2024-05-23 LAB
ALBUMIN SERPL BCP-MCNC: 3.7 G/DL (ref 3.4–5)
ALP SERPL-CCNC: 58 U/L (ref 33–136)
ALT SERPL W P-5'-P-CCNC: 20 U/L (ref 7–45)
ANION GAP BLDA CALCULATED.4IONS-SCNC: 8 MMO/L (ref 10–25)
ANION GAP SERPL CALC-SCNC: 15 MMOL/L (ref 10–20)
AST SERPL W P-5'-P-CCNC: 33 U/L (ref 9–39)
BASE EXCESS BLDA CALC-SCNC: 1.1 MMOL/L (ref -2–3)
BASOPHILS # BLD AUTO: 0.02 X10*3/UL (ref 0–0.1)
BASOPHILS NFR BLD AUTO: 0.2 %
BILIRUB SERPL-MCNC: 0.8 MG/DL (ref 0–1.2)
BNP SERPL-MCNC: 1005 PG/ML (ref 0–99)
BODY TEMPERATURE: ABNORMAL
BUN SERPL-MCNC: 21 MG/DL (ref 6–23)
CA-I BLDA-SCNC: 1.19 MMOL/L (ref 1.1–1.33)
CALCIUM SERPL-MCNC: 9 MG/DL (ref 8.6–10.3)
CARDIAC TROPONIN I PNL SERPL HS: 247 NG/L (ref 0–13)
CARDIAC TROPONIN I PNL SERPL HS: 264 NG/L (ref 0–13)
CHLORIDE BLDA-SCNC: 99 MMOL/L (ref 98–107)
CHLORIDE SERPL-SCNC: 96 MMOL/L (ref 98–107)
CO2 SERPL-SCNC: 28 MMOL/L (ref 21–32)
CREAT SERPL-MCNC: 1.33 MG/DL (ref 0.5–1.05)
CRITICAL CALL TIME: 1612
CRITICAL CALLED BY: ABNORMAL
CRITICAL CALLED TO: ABNORMAL
CRITICAL READ BACK: ABNORMAL
EGFRCR SERPLBLD CKD-EPI 2021: 42 ML/MIN/1.73M*2
EOSINOPHIL # BLD AUTO: 0.01 X10*3/UL (ref 0–0.4)
EOSINOPHIL NFR BLD AUTO: 0.1 %
ERYTHROCYTE [DISTWIDTH] IN BLOOD BY AUTOMATED COUNT: 13.3 % (ref 11.5–14.5)
GLUCOSE BLDA-MCNC: 320 MG/DL (ref 74–99)
GLUCOSE SERPL-MCNC: 297 MG/DL (ref 74–99)
HCO3 BLDA-SCNC: 29.4 MMOL/L (ref 22–26)
HCT VFR BLD AUTO: 38.1 % (ref 36–46)
HCT VFR BLD EST: 37 % (ref 36–46)
HGB BLD-MCNC: 11.9 G/DL (ref 12–16)
HGB BLDA-MCNC: 12.4 G/DL (ref 12–16)
IMM GRANULOCYTES # BLD AUTO: 0.05 X10*3/UL (ref 0–0.5)
IMM GRANULOCYTES NFR BLD AUTO: 0.5 % (ref 0–0.9)
INHALED O2 CONCENTRATION: 100 %
LACTATE BLDA-SCNC: 5.6 MMOL/L (ref 0.4–2)
LYMPHOCYTES # BLD AUTO: 1.06 X10*3/UL (ref 0.8–3)
LYMPHOCYTES NFR BLD AUTO: 10.1 %
MAGNESIUM SERPL-MCNC: 2.08 MG/DL (ref 1.6–2.4)
MCH RBC QN AUTO: 31.5 PG (ref 26–34)
MCHC RBC AUTO-ENTMCNC: 31.2 G/DL (ref 32–36)
MCV RBC AUTO: 101 FL (ref 80–100)
MONOCYTES # BLD AUTO: 0.51 X10*3/UL (ref 0.05–0.8)
MONOCYTES NFR BLD AUTO: 4.9 %
NEUTROPHILS # BLD AUTO: 8.84 X10*3/UL (ref 1.6–5.5)
NEUTROPHILS NFR BLD AUTO: 84.2 %
NRBC BLD-RTO: 0 /100 WBCS (ref 0–0)
OXYHGB MFR BLDA: 91.4 % (ref 94–98)
PCO2 BLDA: 64 MM HG (ref 38–42)
PH BLDA: 7.27 PH (ref 7.38–7.42)
PLATELET # BLD AUTO: 237 X10*3/UL (ref 150–450)
PO2 BLDA: 73 MM HG (ref 85–95)
POTASSIUM BLDA-SCNC: 4.5 MMOL/L (ref 3.5–5.3)
POTASSIUM SERPL-SCNC: 5.8 MMOL/L (ref 3.5–5.3)
PROT SERPL-MCNC: 7 G/DL (ref 6.4–8.2)
RBC # BLD AUTO: 3.78 X10*6/UL (ref 4–5.2)
SAO2 % BLDA: 94 % (ref 94–100)
SARS-COV-2 RNA RESP QL NAA+PROBE: NOT DETECTED
SODIUM BLDA-SCNC: 132 MMOL/L (ref 136–145)
SODIUM SERPL-SCNC: 133 MMOL/L (ref 136–145)
WBC # BLD AUTO: 10.5 X10*3/UL (ref 4.4–11.3)

## 2024-05-23 PROCEDURE — 84145 PROCALCITONIN (PCT): CPT | Mod: STJLAB | Performed by: STUDENT IN AN ORGANIZED HEALTH CARE EDUCATION/TRAINING PROGRAM

## 2024-05-23 PROCEDURE — 36415 COLL VENOUS BLD VENIPUNCTURE: CPT

## 2024-05-23 PROCEDURE — 99291 CRITICAL CARE FIRST HOUR: CPT | Performed by: EMERGENCY MEDICINE

## 2024-05-23 PROCEDURE — 83036 HEMOGLOBIN GLYCOSYLATED A1C: CPT | Mod: STJLAB | Performed by: STUDENT IN AN ORGANIZED HEALTH CARE EDUCATION/TRAINING PROGRAM

## 2024-05-23 PROCEDURE — 93005 ELECTROCARDIOGRAM TRACING: CPT

## 2024-05-23 PROCEDURE — 2500000005 HC RX 250 GENERAL PHARMACY W/O HCPCS: Performed by: EMERGENCY MEDICINE

## 2024-05-23 PROCEDURE — 83735 ASSAY OF MAGNESIUM: CPT

## 2024-05-23 PROCEDURE — 2500000004 HC RX 250 GENERAL PHARMACY W/ HCPCS (ALT 636 FOR OP/ED): Performed by: STUDENT IN AN ORGANIZED HEALTH CARE EDUCATION/TRAINING PROGRAM

## 2024-05-23 PROCEDURE — 84132 ASSAY OF SERUM POTASSIUM: CPT | Mod: 91

## 2024-05-23 PROCEDURE — 99223 1ST HOSP IP/OBS HIGH 75: CPT | Performed by: STUDENT IN AN ORGANIZED HEALTH CARE EDUCATION/TRAINING PROGRAM

## 2024-05-23 PROCEDURE — 84484 ASSAY OF TROPONIN QUANT: CPT

## 2024-05-23 PROCEDURE — 87635 SARS-COV-2 COVID-19 AMP PRB: CPT

## 2024-05-23 PROCEDURE — 2500000004 HC RX 250 GENERAL PHARMACY W/ HCPCS (ALT 636 FOR OP/ED)

## 2024-05-23 PROCEDURE — 94640 AIRWAY INHALATION TREATMENT: CPT

## 2024-05-23 PROCEDURE — 96375 TX/PRO/DX INJ NEW DRUG ADDON: CPT

## 2024-05-23 PROCEDURE — 93010 ELECTROCARDIOGRAM REPORT: CPT | Performed by: INTERNAL MEDICINE

## 2024-05-23 PROCEDURE — 85025 COMPLETE CBC W/AUTO DIFF WBC: CPT

## 2024-05-23 PROCEDURE — 83880 ASSAY OF NATRIURETIC PEPTIDE: CPT

## 2024-05-23 PROCEDURE — 2500000005 HC RX 250 GENERAL PHARMACY W/O HCPCS

## 2024-05-23 PROCEDURE — 2500000002 HC RX 250 W HCPCS SELF ADMINISTERED DRUGS (ALT 637 FOR MEDICARE OP, ALT 636 FOR OP/ED)

## 2024-05-23 PROCEDURE — 71045 X-RAY EXAM CHEST 1 VIEW: CPT

## 2024-05-23 PROCEDURE — 96365 THER/PROPH/DIAG IV INF INIT: CPT

## 2024-05-23 PROCEDURE — 2060000001 HC INTERMEDIATE ICU ROOM DAILY

## 2024-05-23 PROCEDURE — 5A0935A ASSISTANCE WITH RESPIRATORY VENTILATION, LESS THAN 24 CONSECUTIVE HOURS, HIGH NASAL FLOW/VELOCITY: ICD-10-PCS | Performed by: INTERNAL MEDICINE

## 2024-05-23 PROCEDURE — 5A2204Z RESTORATION OF CARDIAC RHYTHM, SINGLE: ICD-10-PCS

## 2024-05-23 PROCEDURE — 99291 CRITICAL CARE FIRST HOUR: CPT | Mod: CS,25

## 2024-05-23 PROCEDURE — 82330 ASSAY OF CALCIUM: CPT | Mod: 91

## 2024-05-23 PROCEDURE — 71045 X-RAY EXAM CHEST 1 VIEW: CPT | Performed by: RADIOLOGY

## 2024-05-23 PROCEDURE — 93010 ELECTROCARDIOGRAM REPORT: CPT | Performed by: EMERGENCY MEDICINE

## 2024-05-23 PROCEDURE — 5A09357 ASSISTANCE WITH RESPIRATORY VENTILATION, LESS THAN 24 CONSECUTIVE HOURS, CONTINUOUS POSITIVE AIRWAY PRESSURE: ICD-10-PCS | Performed by: INTERNAL MEDICINE

## 2024-05-23 PROCEDURE — 96367 TX/PROPH/DG ADDL SEQ IV INF: CPT

## 2024-05-23 PROCEDURE — 96374 THER/PROPH/DIAG INJ IV PUSH: CPT | Mod: 59

## 2024-05-23 PROCEDURE — 82435 ASSAY OF BLOOD CHLORIDE: CPT

## 2024-05-23 RX ORDER — ACETAMINOPHEN 325 MG/1
650 TABLET ORAL EVERY 4 HOURS PRN
Status: DISCONTINUED | OUTPATIENT
Start: 2024-05-23 | End: 2024-05-29 | Stop reason: HOSPADM

## 2024-05-23 RX ORDER — LORAZEPAM 2 MG/ML
1 INJECTION INTRAMUSCULAR ONCE
Status: COMPLETED | OUTPATIENT
Start: 2024-05-23 | End: 2024-05-23

## 2024-05-23 RX ORDER — IPRATROPIUM BROMIDE AND ALBUTEROL SULFATE 2.5; .5 MG/3ML; MG/3ML
SOLUTION RESPIRATORY (INHALATION)
Status: DISPENSED
Start: 2024-05-23 | End: 2024-05-24

## 2024-05-23 RX ORDER — ONDANSETRON 4 MG/1
4 TABLET, ORALLY DISINTEGRATING ORAL EVERY 8 HOURS PRN
Status: DISCONTINUED | OUTPATIENT
Start: 2024-05-23 | End: 2024-05-29 | Stop reason: HOSPADM

## 2024-05-23 RX ORDER — FUROSEMIDE 10 MG/ML
40 INJECTION INTRAMUSCULAR; INTRAVENOUS ONCE
Status: COMPLETED | OUTPATIENT
Start: 2024-05-23 | End: 2024-05-23

## 2024-05-23 RX ORDER — ACETAMINOPHEN 160 MG/5ML
650 SOLUTION ORAL EVERY 4 HOURS PRN
Status: DISCONTINUED | OUTPATIENT
Start: 2024-05-23 | End: 2024-05-29 | Stop reason: HOSPADM

## 2024-05-23 RX ORDER — POLYETHYLENE GLYCOL 3350 17 G/17G
17 POWDER, FOR SOLUTION ORAL 2 TIMES DAILY
Status: DISCONTINUED | OUTPATIENT
Start: 2024-05-24 | End: 2024-05-29 | Stop reason: HOSPADM

## 2024-05-23 RX ORDER — ONDANSETRON HYDROCHLORIDE 2 MG/ML
4 INJECTION, SOLUTION INTRAVENOUS EVERY 8 HOURS PRN
Status: DISCONTINUED | OUTPATIENT
Start: 2024-05-23 | End: 2024-05-29 | Stop reason: HOSPADM

## 2024-05-23 RX ORDER — IPRATROPIUM BROMIDE 0.5 MG/2.5ML
0.5 SOLUTION RESPIRATORY (INHALATION)
Status: DISCONTINUED | OUTPATIENT
Start: 2024-05-24 | End: 2024-05-24

## 2024-05-23 RX ORDER — ACETAMINOPHEN 650 MG/1
650 SUPPOSITORY RECTAL EVERY 4 HOURS PRN
Status: DISCONTINUED | OUTPATIENT
Start: 2024-05-23 | End: 2024-05-29 | Stop reason: HOSPADM

## 2024-05-23 RX ORDER — TALC
3 POWDER (GRAM) TOPICAL NIGHTLY
Status: DISCONTINUED | OUTPATIENT
Start: 2024-05-23 | End: 2024-05-29 | Stop reason: HOSPADM

## 2024-05-23 RX ORDER — IPRATROPIUM BROMIDE AND ALBUTEROL SULFATE 2.5; .5 MG/3ML; MG/3ML
3 SOLUTION RESPIRATORY (INHALATION) EVERY 4 HOURS PRN
Status: DISCONTINUED | OUTPATIENT
Start: 2024-05-23 | End: 2024-05-29

## 2024-05-23 RX ORDER — DILTIAZEM HCL/D5W 125 MG/125
5-15 PLASTIC BAG, INJECTION (ML) INTRAVENOUS CONTINUOUS
Status: DISCONTINUED | OUTPATIENT
Start: 2024-05-23 | End: 2024-05-23

## 2024-05-23 RX ORDER — LORAZEPAM 2 MG/ML
0.5 INJECTION INTRAMUSCULAR ONCE
Status: DISCONTINUED | OUTPATIENT
Start: 2024-05-23 | End: 2024-05-23

## 2024-05-23 RX ORDER — CEFTRIAXONE 1 G/50ML
1 INJECTION, SOLUTION INTRAVENOUS EVERY 24 HOURS
Status: DISCONTINUED | OUTPATIENT
Start: 2024-05-23 | End: 2024-05-24

## 2024-05-23 RX ORDER — IPRATROPIUM BROMIDE AND ALBUTEROL SULFATE 2.5; .5 MG/3ML; MG/3ML
3 SOLUTION RESPIRATORY (INHALATION) ONCE
Status: COMPLETED | OUTPATIENT
Start: 2024-05-23 | End: 2024-05-23

## 2024-05-23 RX ORDER — DILTIAZEM HYDROCHLORIDE 5 MG/ML
10 INJECTION INTRAVENOUS ONCE
Status: DISCONTINUED | OUTPATIENT
Start: 2024-05-23 | End: 2024-05-23

## 2024-05-23 RX ORDER — FENTANYL CITRATE 50 UG/ML
50 INJECTION, SOLUTION INTRAMUSCULAR; INTRAVENOUS ONCE
Status: DISCONTINUED | OUTPATIENT
Start: 2024-05-23 | End: 2024-05-23

## 2024-05-23 RX ORDER — SENNOSIDES 8.6 MG/1
2 TABLET ORAL 2 TIMES DAILY
Status: DISCONTINUED | OUTPATIENT
Start: 2024-05-24 | End: 2024-05-29 | Stop reason: HOSPADM

## 2024-05-23 RX ORDER — FENTANYL CITRATE 50 UG/ML
INJECTION, SOLUTION INTRAMUSCULAR; INTRAVENOUS
Status: COMPLETED
Start: 2024-05-23 | End: 2024-05-23

## 2024-05-23 RX ORDER — FENTANYL CITRATE 50 UG/ML
50 INJECTION, SOLUTION INTRAMUSCULAR; INTRAVENOUS ONCE
Status: COMPLETED | OUTPATIENT
Start: 2024-05-23 | End: 2024-05-23

## 2024-05-23 RX ORDER — CEFTRIAXONE 2 G/50ML
2 INJECTION, SOLUTION INTRAVENOUS ONCE
Status: COMPLETED | OUTPATIENT
Start: 2024-05-23 | End: 2024-05-23

## 2024-05-23 RX ADMIN — METHYLPREDNISOLONE SODIUM SUCCINATE 125 MG: 125 INJECTION, POWDER, FOR SOLUTION INTRAMUSCULAR; INTRAVENOUS at 16:17

## 2024-05-23 RX ADMIN — Medication 100 PERCENT: at 20:00

## 2024-05-23 RX ADMIN — AMIODARONE HYDROCHLORIDE 1 MG/MIN: 1.8 INJECTION, SOLUTION INTRAVENOUS at 18:11

## 2024-05-23 RX ADMIN — FUROSEMIDE 40 MG: 10 INJECTION, SOLUTION INTRAMUSCULAR; INTRAVENOUS at 17:30

## 2024-05-23 RX ADMIN — FENTANYL CITRATE 50 MCG: 50 INJECTION, SOLUTION INTRAMUSCULAR; INTRAVENOUS at 17:51

## 2024-05-23 RX ADMIN — AMIODARONE HYDROCHLORIDE 150 MG: 1.5 INJECTION, SOLUTION INTRAVENOUS at 18:01

## 2024-05-23 RX ADMIN — DOXYCYCLINE 100 MG: 100 INJECTION, POWDER, LYOPHILIZED, FOR SOLUTION INTRAVENOUS at 18:54

## 2024-05-23 RX ADMIN — LORAZEPAM 1 MG: 2 INJECTION, SOLUTION INTRAMUSCULAR; INTRAVENOUS at 16:27

## 2024-05-23 RX ADMIN — IPRATROPIUM BROMIDE AND ALBUTEROL SULFATE 3 ML: 2.5; .5 SOLUTION RESPIRATORY (INHALATION) at 16:07

## 2024-05-23 RX ADMIN — Medication 60 PERCENT: at 21:45

## 2024-05-23 RX ADMIN — AMIODARONE HYDROCHLORIDE 1 MG/MIN: 1.8 INJECTION, SOLUTION INTRAVENOUS at 23:29

## 2024-05-23 RX ADMIN — CEFTRIAXONE SODIUM 2 G: 2 INJECTION, SOLUTION INTRAVENOUS at 18:37

## 2024-05-23 SDOH — SOCIAL STABILITY: SOCIAL INSECURITY: DO YOU FEEL ANYONE HAS EXPLOITED OR TAKEN ADVANTAGE OF YOU FINANCIALLY OR OF YOUR PERSONAL PROPERTY?: NO

## 2024-05-23 SDOH — SOCIAL STABILITY: SOCIAL INSECURITY: HAS ANYONE EVER THREATENED TO HURT YOUR FAMILY OR YOUR PETS?: NO

## 2024-05-23 SDOH — SOCIAL STABILITY: SOCIAL INSECURITY: DOES ANYONE TRY TO KEEP YOU FROM HAVING/CONTACTING OTHER FRIENDS OR DOING THINGS OUTSIDE YOUR HOME?: NO

## 2024-05-23 SDOH — SOCIAL STABILITY: SOCIAL INSECURITY: WERE YOU ABLE TO COMPLETE ALL THE BEHAVIORAL HEALTH SCREENINGS?: YES

## 2024-05-23 SDOH — SOCIAL STABILITY: SOCIAL INSECURITY: ARE THERE ANY APPARENT SIGNS OF INJURIES/BEHAVIORS THAT COULD BE RELATED TO ABUSE/NEGLECT?: NO

## 2024-05-23 SDOH — SOCIAL STABILITY: SOCIAL INSECURITY: ABUSE: ADULT

## 2024-05-23 SDOH — SOCIAL STABILITY: SOCIAL INSECURITY: HAVE YOU HAD ANY THOUGHTS OF HARMING ANYONE ELSE?: NO

## 2024-05-23 SDOH — SOCIAL STABILITY: SOCIAL INSECURITY: HAVE YOU HAD THOUGHTS OF HARMING ANYONE ELSE?: NO

## 2024-05-23 SDOH — SOCIAL STABILITY: SOCIAL INSECURITY: DO YOU FEEL UNSAFE GOING BACK TO THE PLACE WHERE YOU ARE LIVING?: NO

## 2024-05-23 SDOH — SOCIAL STABILITY: SOCIAL INSECURITY: ARE YOU OR HAVE YOU BEEN THREATENED OR ABUSED PHYSICALLY, EMOTIONALLY, OR SEXUALLY BY ANYONE?: NO

## 2024-05-23 ASSESSMENT — COLUMBIA-SUICIDE SEVERITY RATING SCALE - C-SSRS
6. HAVE YOU EVER DONE ANYTHING, STARTED TO DO ANYTHING, OR PREPARED TO DO ANYTHING TO END YOUR LIFE?: NO
2. HAVE YOU ACTUALLY HAD ANY THOUGHTS OF KILLING YOURSELF?: NO
1. IN THE PAST MONTH, HAVE YOU WISHED YOU WERE DEAD OR WISHED YOU COULD GO TO SLEEP AND NOT WAKE UP?: NO

## 2024-05-23 ASSESSMENT — ACTIVITIES OF DAILY LIVING (ADL)
JUDGMENT_ADEQUATE_SAFELY_COMPLETE_DAILY_ACTIVITIES: YES
ASSISTIVE_DEVICE: WALKER
LACK_OF_TRANSPORTATION: NO
HEARING - LEFT EAR: FUNCTIONAL
DRESSING YOURSELF: INDEPENDENT
ADEQUATE_TO_COMPLETE_ADL: YES
TOILETING: INDEPENDENT
GROOMING: INDEPENDENT
WALKS IN HOME: INDEPENDENT
HEARING - RIGHT EAR: FUNCTIONAL
FEEDING YOURSELF: INDEPENDENT
BATHING: INDEPENDENT
PATIENT'S MEMORY ADEQUATE TO SAFELY COMPLETE DAILY ACTIVITIES?: YES

## 2024-05-23 ASSESSMENT — PAIN SCALES - GENERAL
PAINLEVEL_OUTOF10: 0 - NO PAIN

## 2024-05-23 ASSESSMENT — COGNITIVE AND FUNCTIONAL STATUS - GENERAL
STANDING UP FROM CHAIR USING ARMS: A LITTLE
DRESSING REGULAR LOWER BODY CLOTHING: A LITTLE
MOVING FROM LYING ON BACK TO SITTING ON SIDE OF FLAT BED WITH BEDRAILS: A LITTLE
TOILETING: A LITTLE
PATIENT BASELINE BEDBOUND: NO
WALKING IN HOSPITAL ROOM: A LITTLE
HELP NEEDED FOR BATHING: A LITTLE
DAILY ACTIVITIY SCORE: 18
MOBILITY SCORE: 18
EATING MEALS: A LITTLE
PERSONAL GROOMING: A LITTLE
CLIMB 3 TO 5 STEPS WITH RAILING: A LITTLE
TURNING FROM BACK TO SIDE WHILE IN FLAT BAD: A LITTLE
DRESSING REGULAR UPPER BODY CLOTHING: A LITTLE
MOVING TO AND FROM BED TO CHAIR: A LITTLE

## 2024-05-23 ASSESSMENT — PATIENT HEALTH QUESTIONNAIRE - PHQ9
1. LITTLE INTEREST OR PLEASURE IN DOING THINGS: NOT AT ALL
SUM OF ALL RESPONSES TO PHQ9 QUESTIONS 1 & 2: 1
2. FEELING DOWN, DEPRESSED OR HOPELESS: SEVERAL DAYS

## 2024-05-23 ASSESSMENT — PAIN - FUNCTIONAL ASSESSMENT
PAIN_FUNCTIONAL_ASSESSMENT: 0-10
PAIN_FUNCTIONAL_ASSESSMENT: 0-10

## 2024-05-23 ASSESSMENT — LIFESTYLE VARIABLES
HOW OFTEN DO YOU HAVE 6 OR MORE DRINKS ON ONE OCCASION: NEVER
TOTAL SCORE: 0
HOW MANY STANDARD DRINKS CONTAINING ALCOHOL DO YOU HAVE ON A TYPICAL DAY: PATIENT DOES NOT DRINK
AUDIT-C TOTAL SCORE: 0
HAVE PEOPLE ANNOYED YOU BY CRITICIZING YOUR DRINKING: NO
HOW OFTEN DO YOU HAVE A DRINK CONTAINING ALCOHOL: NEVER
EVER FELT BAD OR GUILTY ABOUT YOUR DRINKING: NO
EVER HAD A DRINK FIRST THING IN THE MORNING TO STEADY YOUR NERVES TO GET RID OF A HANGOVER: NO
AUDIT-C TOTAL SCORE: 0
SKIP TO QUESTIONS 9-10: 1
HAVE YOU EVER FELT YOU SHOULD CUT DOWN ON YOUR DRINKING: NO

## 2024-05-23 ASSESSMENT — PAIN DESCRIPTION - PROGRESSION: CLINICAL_PROGRESSION: NOT CHANGED

## 2024-05-23 NOTE — ED NOTES
Patient is alert and oriented; patient Is adamantly refusing to wear bipap; patient stated that she cannot tolerate anything on her face; Dr Zamora to bedside to discuss next steps with patient due to her increased work of breathing and her abg status; it was explained to the patient that she needs with bipap or intubation at this point; DO Zamora stated that we should try to premedicate first then bipap prior to intubation; patient stated that she understands; RT at bedside.     Esperanza Layton RN  05/23/24 1621    RT at bedside to attempt to place bipap     Esperanza Layton RN  05/23/24 1634    Patient was premedicated with 50mcg of fent; time out called by Dr Zamora at 1751; procedure and patient verified; patient was on life pack and monitors; patient was shocked with 120 joules at 1752; vitals assessed; please see orders; patient tolerated well.     Esperanza Layton RN  05/23/24 1296

## 2024-05-23 NOTE — ED PROVIDER NOTES
HPI   No chief complaint on file.      Patient is a 73-year-old with atrial fibrillation on Eliquis, COPD on 2 L nasal cannula baseline, diabetes, CHF, CKD, presenting to the emerged part for shortness of breath.  This shortness of breath began last night and has been worsening.  She called EMS.  She was saturating 88% on her baseline 2 L and a placed on to 6 L with improvement oxygen saturation but not her symptoms.  She sleeps propped up on 3 pillows at night.  She has not had any worsening of her orthopnea but does have mild swelling of her lower extremities bilaterally.  She denies any chest pain, cough, fevers, nausea or vomiting, abdominal pain.  She feels like this is a CHF exacerbation.  She reports that she has been taking her medications as prescribed.      History provided by:  EMS personnel, medical records and patient                      No data recorded                   Patient History   Past Medical History:   Diagnosis Date    Personal history of malignant neoplasm of ovary     History of ovarian cancer    Vaginal itching 05/14/2024     Past Surgical History:   Procedure Laterality Date    CORONARY ARTERY BYPASS GRAFT  11/20/2017    Coronary Artery Surgery    CT AORTA AND BILATERAL ILIOFEMORAL RUNOFF ANGIOGRAM W AND/OR WO IV CONTRAST  12/21/2021    CT AORTA AND BILATERAL ILIOFEMORAL RUNOFF ANGIOGRAM W AND/OR WO IV CONTRAST 12/21/2021 [facility]    OTHER SURGICAL HISTORY  05/17/2022    Coronary artery bypass graft    OTHER SURGICAL HISTORY  05/17/2022    Salpingo-oophorectomy    OTHER SURGICAL HISTORY  11/08/2019    Bypass     Family History   Problem Relation Name Age of Onset    Other (arteriosclerotic cardiovascular disease) Mother      Other (arteriosclerotic cardiovascular disease) Father      Colon cancer Sister       Social History     Tobacco Use    Smoking status: Every Day     Types: Cigarettes    Smokeless tobacco: Never   Substance Use Topics    Alcohol use: Not Currently     Drug use: Never       Physical Exam   ED Triage Vitals   Temp Pulse Resp BP   -- -- -- --      SpO2 Temp src Heart Rate Source Patient Position   -- -- -- --      BP Location FiO2 (%)     -- --       Physical Exam  Vitals and nursing note reviewed.   Constitutional:       General: She is not in acute distress.     Appearance: She is well-developed. She is not toxic-appearing.   HENT:      Head: Normocephalic and atraumatic.      Right Ear: External ear normal.      Left Ear: External ear normal.      Nose: Nose normal.      Mouth/Throat:      Mouth: Mucous membranes are moist.   Eyes:      General: No scleral icterus.     Extraocular Movements: Extraocular movements intact.      Conjunctiva/sclera: Conjunctivae normal.      Pupils: Pupils are equal, round, and reactive to light.   Cardiovascular:      Rate and Rhythm: Tachycardia present. Rhythm irregular.      Heart sounds: No murmur heard.  Pulmonary:      Comments: Tachypneic.  Diminished breath sounds in her lower lung fields bilaterally.  Abdominal:      Palpations: Abdomen is soft.      Tenderness: There is no abdominal tenderness.   Musculoskeletal:      Cervical back: Neck supple.      Right lower leg: Edema present.      Left lower leg: Edema present.   Skin:     General: Skin is warm and dry.      Capillary Refill: Capillary refill takes less than 2 seconds.   Neurological:      General: No focal deficit present.      Mental Status: She is alert.   Psychiatric:         Mood and Affect: Mood normal.         ED Course & MDM   ED Course as of 05/23/24 2016   u May 23, 2024   1739 Venous blood gas (pH 7.36, pCO2 60, pO2 49, lactate 2.3, potassium 4.3, glucose 262, bicarb 33.9, base excess 6.8. [AL]      ED Course User Index  [AL] Chad Zamora DO         Diagnoses as of 05/23/24 2016   Acute on chronic heart failure, unspecified heart failure type (Multi)   Acute on chronic respiratory failure with hypoxia and hypercapnia (Multi)   Pneumonia of right lower  lobe due to infectious organism   Atrial flutter, unspecified type (Multi)       Medical Decision Making  Patient is a 73-year-old female with COPD on 2 L nasal cannula, CHF, atrial fibrillation Eliquis presenting to the emergency department for shortness of breath.  On arrival, she is awake and alert, tachypneic and tachycardic, but nontoxic.  Diminished breath sounds in the posterior lower lung fields, but no significant wheezing.  Patient is unable to tolerate facemask such as BiPAP and nonrebreather, so patient is placed on a high flow nasal cannula.  She is given a trial of DuoNeb and Solu-Medrol 125 mg IV.  We will obtain labwork and imaging to evaluate for myocardial infarction, pneumothorax, pneumonia, heart failure.  Lower suspicion for pulmonary embolism at this time given that she is on Eliquis and reports compliance with it.    Patient continued to have increased work of breathing while on the high flow nasal cannula.  Arterial blood gas showed a metabolic and respiratory acidosis with a pH of 7.27, pCO2 64, pO2 73, lactate 5.6.  Potassium 4.5, bicarb 29.4, base excess 1.1.  Patient would benefit from BiPAP, but she reports significant anxiety with the mask, so we discussed administering IV lorazepam for anxiolysis.  She is agreeable to this and patient was given Ativan 1 mg IV.  Patient was able to tolerate BiPAP afterwards, with improvement in her symptoms and respiratory rate.  Venous blood gas showed a pH of 7.36, pCO2 60, pO2 49, lactate 2.3.  She did have improvement in her acidosis while on the BiPAP, as well as improvement in her lactate.    CBC is negative for leukocytosis.  She has anemia with hemoglobin 11.9, which is close to her baseline.  CMP shows a markedly hemolyzed potassium 5.8, but her ABG showed potassium 4.5 and a VBG potassium of 4.3.  Her creatinine is elevated 1.33, with a baseline between 0.86 - 1.43.  Magnesium within normal limits.  BNP elevated 1005.  Troponin also elevated  but stable at 247.  Suspect that this secondary to hypoxia, tachycardia, and demand rather than acute coronary syndrome.  COVID is negative.    Patient's blood pressure decreased with systolics in the 80s to 90s.  She remained tachycardic in the 160s, with what appeared to be atrial flutter on repeat EKG.  She does have history of atrial flutter in the past.  Given that she is anticoagulated and hemodynamically unstable, we discussed cardioversion.  She is agreeable to this.  We did also discuss this with Dr. Valentin, cardiologist.  She agrees with this plan as well.  Prior to cardioversion, we also discussed CODE STATUS.  She wishes to be DNR CCA, DNI.  Patient was given fentanyl 50 mcg IV.  She was cardioverted at 125 J.  Patient converted to a sinus rhythm with rates in the 90s after the cardioversion.  She did have a short run of what appeared to be V. tach on the monitor, with additional PVCs.  Patient is given amiodarone bolus of 150 mcg IV and started on amiodarone infusion.    Second troponin is elevated at 264, similar to prior.  EKG is negative for any ST elevations.  Her chest x-ray showed right lower lobe infiltrate versus atelectasis, rule out pneumonia.  Cardiomegaly with venous congestion and interstitial edema consistent with congestive heart failure.  Given the patient is on amiodarone, which can prolong QT, patient is given doxycycline and ceftriaxone for pneumonia coverage.    Discussed the patient's case with the hospitalist, Dr. Mora.  Patient has been admitted to Dr. Mora's service.    Chad Zamora DO, PGY 3  Emergency Medicine Resident                  Amount and/or Complexity of Data Reviewed  Labs: ordered.  Radiology: ordered.  ECG/medicine tests: ordered and independent interpretation performed.     Details: EKG at 1556 on 5/23/2024 as interpreted by myself: Ventricular rate 157, QRS 96, QTc 520.  Atrial fibrillation with RVR versus SVT, unable to determine underlying rhythm due  to significant artifact.    EKG at 1757 on 5/23/2024 as interpreted by myself: Ventricular rate 99, , , QTc 433.  Sinus rhythm, occasional PVCs.  No acute injury pattern.        Procedure  Electrical Cardioversion    Performed by: Chad Zamora DO  Authorized by: Christiano Fisher DO    Consent:     Consent obtained:  Verbal and emergent situation    Consent given by:  Patient    Risks, benefits, and alternatives were discussed: yes      Risks discussed:  Death, pain and induced arrhythmia    Alternatives discussed:  No treatment and alternative treatment  Universal protocol:     Procedure explained and questions answered to patient or proxy's satisfaction: yes      Patient identity confirmed:  Arm band  Pre-procedure details:     Cardioversion basis:  Emergent    Rhythm:  Atrial flutter    Electrode placement:  Anterior-lateral  Attempt one:     Cardioversion mode:  Synchronous    Waveform:  Biphasic    Shock (joules) attempt one: 125.    Shock outcome:  Conversion to normal sinus rhythm  Post-procedure details:     Patient status:  Alert    Procedure completion:  Tolerated well, no immediate complications  Critical Care    Performed by: Chad Zamora DO  Authorized by: Christiano Fisher DO    Critical care provider statement:     Critical care time (minutes):  55    Critical care was necessary to treat or prevent imminent or life-threatening deterioration of the following conditions:  Shock, cardiac failure and respiratory failure    Critical care was time spent personally by me on the following activities:  Development of treatment plan with patient or surrogate, discussions with consultants, evaluation of patient's response to treatment, examination of patient, obtaining history from patient or surrogate, ordering and performing treatments and interventions, ordering and review of laboratory studies, ordering and review of radiographic studies, pulse oximetry, re-evaluation of patient's condition and  review of old charts    Care discussed with: admitting provider         Chad Zamora DO  Resident  05/23/24 2017

## 2024-05-24 ENCOUNTER — APPOINTMENT (OUTPATIENT)
Dept: RADIOLOGY | Facility: HOSPITAL | Age: 74
DRG: 280 | End: 2024-05-24
Payer: OTHER MISCELLANEOUS

## 2024-05-24 LAB
ALBUMIN SERPL BCP-MCNC: 3.2 G/DL (ref 3.4–5)
ALBUMIN SERPL BCP-MCNC: 3.2 G/DL (ref 3.4–5)
ANION GAP BLDV CALCULATED.4IONS-SCNC: 1 MMOL/L (ref 10–25)
ANION GAP SERPL CALC-SCNC: 13 MMOL/L (ref 10–20)
ANION GAP SERPL CALC-SCNC: 13 MMOL/L (ref 10–20)
ATRIAL RATE: 160 BPM
ATRIAL RATE: 99 BPM
BASE EXCESS BLDV CALC-SCNC: 6.8 MMOL/L (ref -2–3)
BODY TEMPERATURE: ABNORMAL
BUN SERPL-MCNC: 25 MG/DL (ref 6–23)
BUN SERPL-MCNC: 25 MG/DL (ref 6–23)
CA-I BLDV-SCNC: 1.17 MMOL/L (ref 1.1–1.33)
CALCIUM SERPL-MCNC: 8.3 MG/DL (ref 8.6–10.3)
CALCIUM SERPL-MCNC: 8.4 MG/DL (ref 8.6–10.3)
CHLORIDE BLDV-SCNC: 100 MMOL/L (ref 98–107)
CHLORIDE SERPL-SCNC: 97 MMOL/L (ref 98–107)
CHLORIDE SERPL-SCNC: 97 MMOL/L (ref 98–107)
CO2 SERPL-SCNC: 30 MMOL/L (ref 21–32)
CO2 SERPL-SCNC: 30 MMOL/L (ref 21–32)
CREAT SERPL-MCNC: 1.3 MG/DL (ref 0.5–1.05)
CREAT SERPL-MCNC: 1.3 MG/DL (ref 0.5–1.05)
EGFRCR SERPLBLD CKD-EPI 2021: 43 ML/MIN/1.73M*2
EGFRCR SERPLBLD CKD-EPI 2021: 43 ML/MIN/1.73M*2
ERYTHROCYTE [DISTWIDTH] IN BLOOD BY AUTOMATED COUNT: 13 % (ref 11.5–14.5)
EST. AVERAGE GLUCOSE BLD GHB EST-MCNC: 143 MG/DL
GLUCOSE BLD MANUAL STRIP-MCNC: 174 MG/DL (ref 74–99)
GLUCOSE BLD MANUAL STRIP-MCNC: 182 MG/DL (ref 74–99)
GLUCOSE BLD MANUAL STRIP-MCNC: 245 MG/DL (ref 74–99)
GLUCOSE BLD MANUAL STRIP-MCNC: 252 MG/DL (ref 74–99)
GLUCOSE BLDV-MCNC: 262 MG/DL (ref 74–99)
GLUCOSE SERPL-MCNC: 223 MG/DL (ref 74–99)
GLUCOSE SERPL-MCNC: 233 MG/DL (ref 74–99)
HBA1C MFR BLD: 6.6 %
HCO3 BLDV-SCNC: 33.9 MMOL/L (ref 22–26)
HCT VFR BLD AUTO: 31.2 % (ref 36–46)
HCT VFR BLD EST: 35 % (ref 36–46)
HGB BLD-MCNC: 9.8 G/DL (ref 12–16)
HGB BLDV-MCNC: 11.5 G/DL (ref 12–16)
LACTATE BLDV-SCNC: 2.3 MMOL/L (ref 0.4–2)
LACTATE SERPL-SCNC: 1.1 MMOL/L (ref 0.4–2)
MAGNESIUM SERPL-MCNC: 1.6 MG/DL (ref 1.6–2.4)
MAGNESIUM SERPL-MCNC: 1.6 MG/DL (ref 1.6–2.4)
MCH RBC QN AUTO: 31.6 PG (ref 26–34)
MCHC RBC AUTO-ENTMCNC: 31.4 G/DL (ref 32–36)
MCV RBC AUTO: 101 FL (ref 80–100)
NRBC BLD-RTO: 0 /100 WBCS (ref 0–0)
OXYHGB MFR BLDV: 69.7 % (ref 45–75)
P AXIS: -74 DEGREES
P AXIS: 92 DEGREES
P OFFSET: 200 MS
P OFFSET: 207 MS
P ONSET: 142 MS
P ONSET: 158 MS
PCO2 BLDV: 60 MM HG (ref 41–51)
PH BLDV: 7.36 PH (ref 7.33–7.43)
PHOSPHATE SERPL-MCNC: 4.7 MG/DL (ref 2.5–4.9)
PHOSPHATE SERPL-MCNC: 4.7 MG/DL (ref 2.5–4.9)
PLATELET # BLD AUTO: 168 X10*3/UL (ref 150–450)
PO2 BLDV: 49 MM HG (ref 35–45)
POTASSIUM BLDV-SCNC: 4.3 MMOL/L (ref 3.5–5.3)
POTASSIUM SERPL-SCNC: 3.9 MMOL/L (ref 3.5–5.3)
POTASSIUM SERPL-SCNC: 3.9 MMOL/L (ref 3.5–5.3)
PR INTERVAL: 114 MS
PR INTERVAL: 146 MS
PROCALCITONIN SERPL-MCNC: 0.25 NG/ML
Q ONSET: 215 MS
Q ONSET: 215 MS
QRS COUNT: 16 BEATS
QRS COUNT: 26 BEATS
QRS DURATION: 106 MS
QRS DURATION: 108 MS
QT INTERVAL: 244 MS
QT INTERVAL: 338 MS
QTC CALCULATION(BAZETT): 398 MS
QTC CALCULATION(BAZETT): 433 MS
QTC FREDERICIA: 338 MS
QTC FREDERICIA: 399 MS
R AXIS: 108 DEGREES
R AXIS: 92 DEGREES
RBC # BLD AUTO: 3.1 X10*6/UL (ref 4–5.2)
SAO2 % BLDV: 71 % (ref 45–75)
SODIUM BLDV-SCNC: 131 MMOL/L (ref 136–145)
SODIUM SERPL-SCNC: 136 MMOL/L (ref 136–145)
SODIUM SERPL-SCNC: 136 MMOL/L (ref 136–145)
T AXIS: 192 DEGREES
T AXIS: 255 DEGREES
T OFFSET: 337 MS
T OFFSET: 384 MS
VENTRICULAR RATE: 160 BPM
VENTRICULAR RATE: 99 BPM
WBC # BLD AUTO: 6.7 X10*3/UL (ref 4.4–11.3)

## 2024-05-24 PROCEDURE — 87449 NOS EACH ORGANISM AG IA: CPT | Mod: STJLAB | Performed by: STUDENT IN AN ORGANIZED HEALTH CARE EDUCATION/TRAINING PROGRAM

## 2024-05-24 PROCEDURE — 99232 SBSQ HOSP IP/OBS MODERATE 35: CPT | Performed by: INTERNAL MEDICINE

## 2024-05-24 PROCEDURE — 2500000001 HC RX 250 WO HCPCS SELF ADMINISTERED DRUGS (ALT 637 FOR MEDICARE OP): Performed by: INTERNAL MEDICINE

## 2024-05-24 PROCEDURE — 83605 ASSAY OF LACTIC ACID: CPT | Performed by: STUDENT IN AN ORGANIZED HEALTH CARE EDUCATION/TRAINING PROGRAM

## 2024-05-24 PROCEDURE — 94640 AIRWAY INHALATION TREATMENT: CPT

## 2024-05-24 PROCEDURE — 2060000001 HC INTERMEDIATE ICU ROOM DAILY

## 2024-05-24 PROCEDURE — 85027 COMPLETE CBC AUTOMATED: CPT | Performed by: STUDENT IN AN ORGANIZED HEALTH CARE EDUCATION/TRAINING PROGRAM

## 2024-05-24 PROCEDURE — 2500000005 HC RX 250 GENERAL PHARMACY W/O HCPCS: Performed by: EMERGENCY MEDICINE

## 2024-05-24 PROCEDURE — 94640 AIRWAY INHALATION TREATMENT: CPT | Mod: MUE

## 2024-05-24 PROCEDURE — 71046 X-RAY EXAM CHEST 2 VIEWS: CPT

## 2024-05-24 PROCEDURE — 83735 ASSAY OF MAGNESIUM: CPT | Mod: 91 | Performed by: STUDENT IN AN ORGANIZED HEALTH CARE EDUCATION/TRAINING PROGRAM

## 2024-05-24 PROCEDURE — 36415 COLL VENOUS BLD VENIPUNCTURE: CPT | Performed by: STUDENT IN AN ORGANIZED HEALTH CARE EDUCATION/TRAINING PROGRAM

## 2024-05-24 PROCEDURE — 71046 X-RAY EXAM CHEST 2 VIEWS: CPT | Performed by: RADIOLOGY

## 2024-05-24 PROCEDURE — 80069 RENAL FUNCTION PANEL: CPT | Performed by: STUDENT IN AN ORGANIZED HEALTH CARE EDUCATION/TRAINING PROGRAM

## 2024-05-24 PROCEDURE — 87899 AGENT NOS ASSAY W/OPTIC: CPT | Mod: STJLAB | Performed by: STUDENT IN AN ORGANIZED HEALTH CARE EDUCATION/TRAINING PROGRAM

## 2024-05-24 PROCEDURE — 2500000004 HC RX 250 GENERAL PHARMACY W/ HCPCS (ALT 636 FOR OP/ED): Performed by: INTERNAL MEDICINE

## 2024-05-24 PROCEDURE — 83735 ASSAY OF MAGNESIUM: CPT | Performed by: STUDENT IN AN ORGANIZED HEALTH CARE EDUCATION/TRAINING PROGRAM

## 2024-05-24 PROCEDURE — 99223 1ST HOSP IP/OBS HIGH 75: CPT

## 2024-05-24 PROCEDURE — 2500000004 HC RX 250 GENERAL PHARMACY W/ HCPCS (ALT 636 FOR OP/ED): Performed by: STUDENT IN AN ORGANIZED HEALTH CARE EDUCATION/TRAINING PROGRAM

## 2024-05-24 PROCEDURE — 82947 ASSAY GLUCOSE BLOOD QUANT: CPT

## 2024-05-24 PROCEDURE — 2500000001 HC RX 250 WO HCPCS SELF ADMINISTERED DRUGS (ALT 637 FOR MEDICARE OP): Performed by: STUDENT IN AN ORGANIZED HEALTH CARE EDUCATION/TRAINING PROGRAM

## 2024-05-24 PROCEDURE — 2500000002 HC RX 250 W HCPCS SELF ADMINISTERED DRUGS (ALT 637 FOR MEDICARE OP, ALT 636 FOR OP/ED): Mod: MUE | Performed by: INTERNAL MEDICINE

## 2024-05-24 PROCEDURE — 99222 1ST HOSP IP/OBS MODERATE 55: CPT | Performed by: INTERNAL MEDICINE

## 2024-05-24 PROCEDURE — 2500000005 HC RX 250 GENERAL PHARMACY W/O HCPCS: Performed by: STUDENT IN AN ORGANIZED HEALTH CARE EDUCATION/TRAINING PROGRAM

## 2024-05-24 PROCEDURE — 80069 RENAL FUNCTION PANEL: CPT | Mod: 91 | Performed by: STUDENT IN AN ORGANIZED HEALTH CARE EDUCATION/TRAINING PROGRAM

## 2024-05-24 PROCEDURE — 2500000002 HC RX 250 W HCPCS SELF ADMINISTERED DRUGS (ALT 637 FOR MEDICARE OP, ALT 636 FOR OP/ED): Performed by: STUDENT IN AN ORGANIZED HEALTH CARE EDUCATION/TRAINING PROGRAM

## 2024-05-24 RX ORDER — ATORVASTATIN CALCIUM 40 MG/1
40 TABLET, FILM COATED ORAL DAILY
Status: DISCONTINUED | OUTPATIENT
Start: 2024-05-24 | End: 2024-05-29

## 2024-05-24 RX ORDER — INSULIN LISPRO 100 [IU]/ML
0-10 INJECTION, SOLUTION INTRAVENOUS; SUBCUTANEOUS
Status: DISCONTINUED | OUTPATIENT
Start: 2024-05-24 | End: 2024-05-29 | Stop reason: HOSPADM

## 2024-05-24 RX ORDER — CEFTRIAXONE 1 G/50ML
1 INJECTION, SOLUTION INTRAVENOUS EVERY 24 HOURS
Status: COMPLETED | OUTPATIENT
Start: 2024-05-24 | End: 2024-05-27

## 2024-05-24 RX ORDER — ASPIRIN 325 MG
325 TABLET ORAL ONCE
Status: COMPLETED | OUTPATIENT
Start: 2024-05-24 | End: 2024-05-24

## 2024-05-24 RX ORDER — LORATADINE 10 MG/1
10 TABLET ORAL DAILY
Status: DISCONTINUED | OUTPATIENT
Start: 2024-05-24 | End: 2024-05-29 | Stop reason: HOSPADM

## 2024-05-24 RX ORDER — IPRATROPIUM BROMIDE AND ALBUTEROL SULFATE 2.5; .5 MG/3ML; MG/3ML
3 SOLUTION RESPIRATORY (INHALATION)
Status: DISCONTINUED | OUTPATIENT
Start: 2024-05-24 | End: 2024-05-29 | Stop reason: HOSPADM

## 2024-05-24 RX ORDER — METOPROLOL SUCCINATE 50 MG/1
50 TABLET, EXTENDED RELEASE ORAL DAILY
Status: DISCONTINUED | OUTPATIENT
Start: 2024-05-25 | End: 2024-05-29 | Stop reason: HOSPADM

## 2024-05-24 RX ORDER — ICOSAPENT ETHYL 1 G/1
2 CAPSULE ORAL 2 TIMES DAILY
Status: DISCONTINUED | OUTPATIENT
Start: 2024-05-24 | End: 2024-05-29

## 2024-05-24 RX ORDER — CILOSTAZOL 100 MG/1
100 TABLET ORAL 2 TIMES DAILY
Status: DISCONTINUED | OUTPATIENT
Start: 2024-05-24 | End: 2024-05-29 | Stop reason: HOSPADM

## 2024-05-24 RX ORDER — FLUTICASONE PROPIONATE 50 MCG
1 SPRAY, SUSPENSION (ML) NASAL DAILY PRN
Status: DISCONTINUED | OUTPATIENT
Start: 2024-05-24 | End: 2024-05-29

## 2024-05-24 RX ORDER — ASPIRIN 81 MG/1
81 TABLET ORAL DAILY
Status: DISCONTINUED | OUTPATIENT
Start: 2024-05-25 | End: 2024-05-29

## 2024-05-24 RX ORDER — MIDODRINE HYDROCHLORIDE 10 MG/1
10 TABLET ORAL ONCE
Status: COMPLETED | OUTPATIENT
Start: 2024-05-24 | End: 2024-05-24

## 2024-05-24 RX ORDER — DEXTROSE 50 % IN WATER (D50W) INTRAVENOUS SYRINGE
12.5
Status: DISCONTINUED | OUTPATIENT
Start: 2024-05-24 | End: 2024-05-29

## 2024-05-24 RX ORDER — MIDODRINE HYDROCHLORIDE 10 MG/1
10 TABLET ORAL
Status: DISCONTINUED | OUTPATIENT
Start: 2024-05-24 | End: 2024-05-29 | Stop reason: HOSPADM

## 2024-05-24 RX ORDER — PANTOPRAZOLE SODIUM 40 MG/1
40 TABLET, DELAYED RELEASE ORAL
Status: DISCONTINUED | OUTPATIENT
Start: 2024-05-25 | End: 2024-05-29 | Stop reason: HOSPADM

## 2024-05-24 RX ORDER — DEXTROSE 50 % IN WATER (D50W) INTRAVENOUS SYRINGE
25
Status: DISCONTINUED | OUTPATIENT
Start: 2024-05-24 | End: 2024-05-29

## 2024-05-24 RX ORDER — FORMOTEROL FUMARATE DIHYDRATE 20 UG/2ML
20 SOLUTION RESPIRATORY (INHALATION)
Status: DISCONTINUED | OUTPATIENT
Start: 2024-05-24 | End: 2024-05-29 | Stop reason: HOSPADM

## 2024-05-24 RX ORDER — MAGNESIUM SULFATE HEPTAHYDRATE 40 MG/ML
2 INJECTION, SOLUTION INTRAVENOUS ONCE
Status: COMPLETED | OUTPATIENT
Start: 2024-05-24 | End: 2024-05-24

## 2024-05-24 RX ORDER — PIOGLITAZONEHYDROCHLORIDE 15 MG/1
30 TABLET ORAL DAILY
Status: DISCONTINUED | OUTPATIENT
Start: 2024-05-25 | End: 2024-05-29

## 2024-05-24 RX ORDER — OXYBUTYNIN CHLORIDE 5 MG/1
5 TABLET ORAL 2 TIMES DAILY
Status: DISCONTINUED | OUTPATIENT
Start: 2024-05-24 | End: 2024-05-29 | Stop reason: HOSPADM

## 2024-05-24 RX ADMIN — Medication 3 MG: at 21:16

## 2024-05-24 RX ADMIN — LORATADINE 10 MG: 10 TABLET ORAL at 17:51

## 2024-05-24 RX ADMIN — DOXYCYCLINE 100 MG: 100 INJECTION, POWDER, LYOPHILIZED, FOR SOLUTION INTRAVENOUS at 06:01

## 2024-05-24 RX ADMIN — IPRATROPIUM BROMIDE AND ALBUTEROL SULFATE 3 ML: 2.5; .5 SOLUTION RESPIRATORY (INHALATION) at 01:01

## 2024-05-24 RX ADMIN — IPRATROPIUM BROMIDE 0.5 MG: 0.5 SOLUTION RESPIRATORY (INHALATION) at 01:04

## 2024-05-24 RX ADMIN — CILOSTAZOL 100 MG: 100 TABLET ORAL at 21:17

## 2024-05-24 RX ADMIN — IPRATROPIUM BROMIDE AND ALBUTEROL SULFATE 3 ML: 2.5; .5 SOLUTION RESPIRATORY (INHALATION) at 19:58

## 2024-05-24 RX ADMIN — CEFTRIAXONE SODIUM 1 G: 1 INJECTION, SOLUTION INTRAVENOUS at 17:51

## 2024-05-24 RX ADMIN — ATORVASTATIN CALCIUM 40 MG: 40 TABLET, FILM COATED ORAL at 21:29

## 2024-05-24 RX ADMIN — OXYBUTYNIN CHLORIDE 5 MG: 5 TABLET ORAL at 21:20

## 2024-05-24 RX ADMIN — INSULIN LISPRO 2 UNITS: 100 INJECTION, SOLUTION INTRAVENOUS; SUBCUTANEOUS at 12:58

## 2024-05-24 RX ADMIN — Medication 30 L/MIN: at 10:00

## 2024-05-24 RX ADMIN — IPRATROPIUM BROMIDE 0.5 MG: 0.5 SOLUTION RESPIRATORY (INHALATION) at 12:49

## 2024-05-24 RX ADMIN — IPRATROPIUM BROMIDE AND ALBUTEROL SULFATE 3 ML: 2.5; .5 SOLUTION RESPIRATORY (INHALATION) at 08:40

## 2024-05-24 RX ADMIN — Medication 30 L/MIN: at 20:00

## 2024-05-24 RX ADMIN — METHYLPREDNISOLONE SODIUM SUCCINATE 40 MG: 40 INJECTION, POWDER, FOR SOLUTION INTRAMUSCULAR; INTRAVENOUS at 09:59

## 2024-05-24 RX ADMIN — ICOSAPENT ETHYL 2 G: 1 CAPSULE ORAL at 21:20

## 2024-05-24 RX ADMIN — INSULIN LISPRO 6 UNITS: 100 INJECTION, SOLUTION INTRAVENOUS; SUBCUTANEOUS at 17:51

## 2024-05-24 RX ADMIN — FORMOTEROL FUMARATE 20 MCG: 20 SOLUTION RESPIRATORY (INHALATION) at 19:57

## 2024-05-24 RX ADMIN — DOXYCYCLINE 100 MG: 100 INJECTION, POWDER, LYOPHILIZED, FOR SOLUTION INTRAVENOUS at 18:39

## 2024-05-24 RX ADMIN — ASPIRIN 325 MG ORAL TABLET 325 MG: 325 PILL ORAL at 03:12

## 2024-05-24 RX ADMIN — APIXABAN 5 MG: 5 TABLET, FILM COATED ORAL at 21:17

## 2024-05-24 RX ADMIN — INSULIN LISPRO 2 UNITS: 100 INJECTION, SOLUTION INTRAVENOUS; SUBCUTANEOUS at 09:59

## 2024-05-24 RX ADMIN — MIDODRINE HYDROCHLORIDE 10 MG: 10 TABLET ORAL at 17:51

## 2024-05-24 RX ADMIN — MAGNESIUM SULFATE HEPTAHYDRATE 2 G: 40 INJECTION, SOLUTION INTRAVENOUS at 03:12

## 2024-05-24 RX ADMIN — MIDODRINE HYDROCHLORIDE 10 MG: 10 TABLET ORAL at 06:00

## 2024-05-24 SDOH — SOCIAL STABILITY: SOCIAL INSECURITY: WITHIN THE LAST YEAR, HAVE YOU BEEN HUMILIATED OR EMOTIONALLY ABUSED IN OTHER WAYS BY YOUR PARTNER OR EX-PARTNER?: NO

## 2024-05-24 SDOH — SOCIAL STABILITY: SOCIAL NETWORK: HOW OFTEN DO YOU ATTENT MEETINGS OF THE CLUB OR ORGANIZATION YOU BELONG TO?: NEVER

## 2024-05-24 SDOH — SOCIAL STABILITY: SOCIAL NETWORK: ARE YOU MARRIED, WIDOWED, DIVORCED, SEPARATED, NEVER MARRIED, OR LIVING WITH A PARTNER?: WIDOWED

## 2024-05-24 SDOH — SOCIAL STABILITY: SOCIAL INSECURITY
WITHIN THE LAST YEAR, HAVE YOU BEEN KICKED, HIT, SLAPPED, OR OTHERWISE PHYSICALLY HURT BY YOUR PARTNER OR EX-PARTNER?: NO

## 2024-05-24 SDOH — SOCIAL STABILITY: SOCIAL INSECURITY: WITHIN THE LAST YEAR, HAVE YOU BEEN AFRAID OF YOUR PARTNER OR EX-PARTNER?: NO

## 2024-05-24 SDOH — HEALTH STABILITY: MENTAL HEALTH
STRESS IS WHEN SOMEONE FEELS TENSE, NERVOUS, ANXIOUS, OR CAN'T SLEEP AT NIGHT BECAUSE THEIR MIND IS TROUBLED. HOW STRESSED ARE YOU?: TO SOME EXTENT

## 2024-05-24 SDOH — SOCIAL STABILITY: SOCIAL NETWORK
DO YOU BELONG TO ANY CLUBS OR ORGANIZATIONS SUCH AS CHURCH GROUPS, UNIONS, FRATERNAL OR ATHLETIC GROUPS, OR SCHOOL GROUPS?: NO

## 2024-05-24 SDOH — HEALTH STABILITY: PHYSICAL HEALTH
HOW OFTEN DO YOU NEED TO HAVE SOMEONE HELP YOU WHEN YOU READ INSTRUCTIONS, PAMPHLETS, OR OTHER WRITTEN MATERIAL FROM YOUR DOCTOR OR PHARMACY?: NEVER

## 2024-05-24 SDOH — SOCIAL STABILITY: SOCIAL INSECURITY: ARE YOU MARRIED, WIDOWED, DIVORCED, SEPARATED, NEVER MARRIED, OR LIVING WITH A PARTNER?: WIDOWED

## 2024-05-24 SDOH — SOCIAL STABILITY: SOCIAL NETWORK
IN A TYPICAL WEEK, HOW MANY TIMES DO YOU TALK ON THE PHONE WITH FAMILY, FRIENDS, OR NEIGHBORS?: MORE THAN THREE TIMES A WEEK

## 2024-05-24 SDOH — SOCIAL STABILITY: SOCIAL NETWORK
DO YOU BELONG TO ANY CLUBS OR ORGANIZATIONS SUCH AS CHURCH GROUPS UNIONS, FRATERNAL OR ATHLETIC GROUPS, OR SCHOOL GROUPS?: NO

## 2024-05-24 SDOH — SOCIAL STABILITY: SOCIAL NETWORK: HOW OFTEN DO YOU GET TOGETHER WITH FRIENDS OR RELATIVES?: TWICE A WEEK

## 2024-05-24 SDOH — SOCIAL STABILITY: SOCIAL NETWORK: HOW OFTEN DO YOU ATTEND CHURCH OR RELIGIOUS SERVICES?: NEVER

## 2024-05-24 SDOH — HEALTH STABILITY: MENTAL HEALTH
DO YOU FEEL STRESS - TENSE, RESTLESS, NERVOUS, OR ANXIOUS, OR UNABLE TO SLEEP AT NIGHT BECAUSE YOUR MIND IS TROUBLED ALL THE TIME - THESE DAYS?: TO SOME EXTENT

## 2024-05-24 SDOH — SOCIAL STABILITY: SOCIAL NETWORK: HOW OFTEN DO YOU ATTEND MEETINGS OF THE CLUBS OR ORGANIZATIONS YOU BELONG TO?: NEVER

## 2024-05-24 SDOH — HEALTH STABILITY: PHYSICAL HEALTH: ON AVERAGE, HOW MANY MINUTES DO YOU ENGAGE IN EXERCISE AT THIS LEVEL?: 0 MIN

## 2024-05-24 ASSESSMENT — ENCOUNTER SYMPTOMS
PALPITATIONS: 1
PALPITATIONS: 0
LIGHT-HEADEDNESS: 0
ABDOMINAL DISTENTION: 1
ACTIVITY CHANGE: 1
WEAKNESS: 1
COUGH: 0
EYES NEGATIVE: 1
CONSTIPATION: 1
CHEST TIGHTNESS: 0
NERVOUS/ANXIOUS: 1
STRIDOR: 0
WHEEZING: 1
SHORTNESS OF BREATH: 1
COUGH: 1
ENDOCRINE NEGATIVE: 1
JOINT SWELLING: 1
FREQUENCY: 1
BRUISES/BLEEDS EASILY: 1
FATIGUE: 1
ARTHRALGIAS: 1
DIZZINESS: 0
DIFFICULTY URINATING: 0

## 2024-05-24 ASSESSMENT — COGNITIVE AND FUNCTIONAL STATUS - GENERAL
DRESSING REGULAR LOWER BODY CLOTHING: A LITTLE
MOBILITY SCORE: 17
DRESSING REGULAR UPPER BODY CLOTHING: A LITTLE
HELP NEEDED FOR BATHING: A LITTLE
DAILY ACTIVITIY SCORE: 20
STANDING UP FROM CHAIR USING ARMS: A LITTLE
WALKING IN HOSPITAL ROOM: A LITTLE
TOILETING: A LITTLE
CLIMB 3 TO 5 STEPS WITH RAILING: A LOT
TURNING FROM BACK TO SIDE WHILE IN FLAT BAD: A LITTLE
CLIMB 3 TO 5 STEPS WITH RAILING: A LOT
DAILY ACTIVITIY SCORE: 19
MOVING TO AND FROM BED TO CHAIR: A LITTLE
HELP NEEDED FOR BATHING: A LITTLE
WALKING IN HOSPITAL ROOM: A LITTLE
DRESSING REGULAR UPPER BODY CLOTHING: A LITTLE
MOBILITY SCORE: 18
TURNING FROM BACK TO SIDE WHILE IN FLAT BAD: A LITTLE
MOVING TO AND FROM BED TO CHAIR: A LITTLE
STANDING UP FROM CHAIR USING ARMS: A LITTLE
PERSONAL GROOMING: A LITTLE
TOILETING: A LITTLE
DRESSING REGULAR LOWER BODY CLOTHING: A LITTLE
MOVING FROM LYING ON BACK TO SITTING ON SIDE OF FLAT BED WITH BEDRAILS: A LITTLE

## 2024-05-24 ASSESSMENT — PAIN - FUNCTIONAL ASSESSMENT
PAIN_FUNCTIONAL_ASSESSMENT: 0-10

## 2024-05-24 ASSESSMENT — PAIN SCALES - GENERAL
PAINLEVEL_OUTOF10: 0 - NO PAIN

## 2024-05-24 NOTE — NURSING NOTE
Notified Dr. Mora regarding patient's blood pressures continuously being low (SBP 70-80's) despite amiodarone drip being off. He came to the bed side to evaluate patient and spoke with her regarding possibility of needing ICU care and needing to be on vasopressors. Patient told him she needs time to think about it and does not want to escalate care at this time.     Per Dr. Mora, ok to just monitor BP's hourly. He will change her code status to NO ICU on top of her DNR/DNI. Will continue to monitor.

## 2024-05-24 NOTE — CONSULTS
Inpatient consult to Cardiology  Consult performed by: Polly Valentin MD  Consult ordered by: Ge Mora DO  Reason for consult: acute on chronic heart failure, arrhythmia, elevated troponin          Cardiology Consult Note      Date:   5/24/2024  Patient name:  Mitzi Pro  Date of admission:  5/23/2024  3:33 PM  MRN:   [id number]  YOB: 1950  Time of Consult:  9:35 AM    Consulting Cardiologist: Dr. Polly Valentin        Primary Cardiologist:  Dr. Diego Duncan    Referring Provider: Dr. Bertram Rasheed      Admission Diagnosis:     Acute combined systolic and diastolic heart failure (Multi)    History of Present Illness:      Mitzi Pro is a 73 y.o.  female patient who is being at the request of Dr. Rasheed for inpatient consultation of CHF. She was admitted on 5/23/2024.  Previous NOH and EHR records have been reviewed in detail.      Patient admitted via EMS for acute heart failure, a flutter with 2:1 conduction, hypotension. Patient in her usual state of compensated health after recent DC from [facility] and the [facility] for similar issues (minus hypotension). She has been following her diet but admits that she has not been monitoring her weight and fluid status as directed. She had rather rapid onset of SOB, bilateral arm weakness/heaviness, and lightheadedness. Had no awareness of a rapid heart beat.     Has known history of CAD with last cardiac cath 2/2023 and EF 30% (34% by more recent echo in April). Severe COPD with chronic oxygen therapy.     On presentation to ER was found to be in rapid a flutter and hypotensive as well as hypoxic. Due to unstable clinical situation was eventually cardioverted in the ER back to sinus rhythm but hypotension and hypoxia persisted although hypoxia improved and patient felt better. Remains hypotensive at this time with increased oxygen requirements from her baseline.     Has no chest pain or angina. Has no edema. Denies dizziness/lightheadedness  at this time. No fever, chills or productive cough.     Allergies:     Allergies   Allergen Reactions    Metformin Diarrhea    Nitrofurantoin Myalgia       Past Medical History:     Past Medical History:   Diagnosis Date    Personal history of malignant neoplasm of ovary     History of ovarian cancer    Vaginal itching 05/14/2024       Past Surgical History:     Past Surgical History:   Procedure Laterality Date    CORONARY ARTERY BYPASS GRAFT  11/20/2017    Coronary Artery Surgery    CT AORTA AND BILATERAL ILIOFEMORAL RUNOFF ANGIOGRAM W AND/OR WO IV CONTRAST  12/21/2021    CT AORTA AND BILATERAL ILIOFEMORAL RUNOFF ANGIOGRAM W AND/OR WO IV CONTRAST 12/21/2021 [facility]    OTHER SURGICAL HISTORY  05/17/2022    Coronary artery bypass graft    OTHER SURGICAL HISTORY  05/17/2022    Salpingo-oophorectomy    OTHER SURGICAL HISTORY  11/08/2019    Bypass       Family History:     Family History   Problem Relation Name Age of Onset    Other (arteriosclerotic cardiovascular disease) Mother      Other (arteriosclerotic cardiovascular disease) Father      Colon cancer Sister         Social History:     Social History     Socioeconomic History    Marital status:      Spouse name: Not on file    Number of children: Not on file    Years of education: Not on file    Highest education level: Not on file   Occupational History    Not on file   Tobacco Use    Smoking status: Every Day     Types: Cigarettes    Smokeless tobacco: Never   Substance and Sexual Activity    Alcohol use: Not Currently    Drug use: Never    Sexual activity: Not on file   Other Topics Concern    Not on file   Social History Narrative    Not on file     Social Determinants of Health     Financial Resource Strain: Medium Risk (5/23/2024)    Overall Financial Resource Strain (CARDIA)     Difficulty of Paying Living Expenses: Somewhat hard   Food Insecurity: Food Insecurity Present (5/1/2024)    Hunger Vital Sign     Worried About Running Out of  Food in the Last Year: Sometimes true     Ran Out of Food in the Last Year: Sometimes true   Transportation Needs: No Transportation Needs (5/23/2024)    PRAPARE - Transportation     Lack of Transportation (Medical): No     Lack of Transportation (Non-Medical): No   Physical Activity: Not on file   Stress: Not on file   Social Connections: Feeling Socially Integrated (5/10/2024)    OASIS : Social Isolation     Frequency of experiencing loneliness or isolation: Never   Intimate Partner Violence: Not on file   Housing Stability: Low Risk  (5/23/2024)    Housing Stability Vital Sign     Unable to Pay for Housing in the Last Year: No     Number of Places Lived in the Last Year: 1     Unstable Housing in the Last Year: No       Home Medications:     Prior to Admission medications    Medication Sig Start Date End Date Taking? Authorizing Provider   atorvastatin (Lipitor) 40 mg tablet Take 1 tablet (40 mg) by mouth once daily. 11/3/23 10/28/24 Yes Alona Corea MD   cholecalciferol (Vitamin D-3) 5,000 Units tablet Take 1 tablet (5,000 Units) by mouth once daily. 11/20/17  Yes Historical Provider, MD   cilostazol (Pletal) 100 mg tablet Take 1 tablet (100 mg) by mouth 2 times a day. 11/3/23  Yes Alona Corea MD   Eliquis 5 mg tablet Take 1 tablet (5 mg) by mouth 2 times a day. 11/3/23  Yes Alona oCrea MD   glyBURIDE (Diabeta) 5 mg tablet Take 2 tablets (10 mg) by mouth 2 times a day. 12/19/23 6/16/24 Yes Alona Corea MD   icosapent ethyL (Vascepa) 1 gram capsule Take 2 capsules (2 g) by mouth 2 times a day. 12/19/23  Yes Alona Corea MD   metoprolol succinate XL (Toprol-XL) 100 mg 24 hr tablet Take 1 tablet (100 mg) by mouth once daily. Do not crush or chew.  Patient taking differently: Take 0.5 tablets (50 mg) by mouth once daily. Do not crush or chew. 5/6/24 6/5/24 Yes Rom Valadez MD   oxybutynin XL (Ditropan-XL) 10 mg 24 hr tablet Take 1 tablet (10 mg) by mouth once daily. 11/3/23  11/2/24 Yes Alona Corea MD   pantoprazole (ProtoNix) 40 mg EC tablet Take 1 tablet (40 mg) by mouth once daily in the morning. Take before meals.   Yes Historical Provider, MD   pioglitazone (Actos) 30 mg tablet Take 1 tablet (30 mg) by mouth once daily. 2/16/24  Yes Alona Corea MD   SITagliptin phosphate (Januvia) 50 mg tablet Take 1 tablet (50 mg) by mouth once daily. 5/14/24 6/13/24 Yes LALITO Vasquez   albuterol 90 mcg/actuation inhaler INHALE 2 PUFFS EVERY 4 HOURS AS NEEDED FOR WHEEZING (FILL WITH THESE DIRECTIONS.) 12/29/23   Alona Corea MD   blood sugar diagnostic strip 1 strip once daily. 2/22/24   LALITO Ferris   Dexcom G7  misc Use as instructed 4/16/24   LALITO Vasquez   Dexcom G7 Sensor device As directed, 1 kit 4/16/24   LALITO Vasquez   fluticasone (Flonase) 50 mcg/actuation nasal spray Administer 1 spray into each nostril once daily as needed for allergies.    Historical Provider, MD   FreeStyle lancets 28 gauge 1 daily 2/22/24 2/21/25  LALITO Ferris   furosemide (Lasix) 20 mg tablet Take 1 tablet (20 mg) by mouth once daily as needed (swelling). 11/3/23   Alona Corea MD   ipratropium-albuteroL (Duo-Neb) 0.5-2.5 mg/3 mL nebulizer solution Take 3 mL by nebulization 4 times a day. 5/6/24 6/5/24  Rom Valadez MD   loratadine (Claritin) 10 mg tablet Take 1 tablet (10 mg) by mouth once daily. 5/6/24 6/5/24  Rom Valadez MD   OneTouch Verio Flex meter misc 1 EACH IF NEED TO TEST GLUCOSE ONCE DAILY 2/22/24   LALITO Ferris   oxygen (O2) gas therapy Inhale 3 L/min continuously. Indications: SOB    Historical Provider, MD   sodium chloride (Ocean) 0.65 % nasal spray Administer 1 spray into each nostril 4 times a day as needed for congestion. 5/5/24   Rom Valadez MD   umeclidinium-vilanteroL (Anoro Ellipta) 62.5-25 mcg/actuation blister with device Inhale 1 puff once daily. 11/3/23   Alona Corea MD        Current Medications:     Current Outpatient Medications   Medication Instructions    albuterol 90 mcg/actuation inhaler INHALE 2 PUFFS EVERY 4 HOURS AS NEEDED FOR WHEEZING (FILL WITH THESE DIRECTIONS.)    atorvastatin (LIPITOR) 40 mg, oral, Daily    blood sugar diagnostic strip 1 strip, miscellaneous, Daily    cholecalciferol (Vitamin D-3) 5,000 Units tablet 1 tablet, oral, Daily    cilostazol (PLETAL) 100 mg, oral, 2 times daily    Dexcom G7  misc Use as instructed    Dexcom G7 Sensor device As directed, 1 kit    Eliquis 5 mg, oral, 2 times daily    fluticasone (Flonase) 50 mcg/actuation nasal spray 1 spray, Each Nostril, Daily PRN    FreeStyle lancets 28 gauge 1 daily    furosemide (LASIX) 20 mg, oral, Daily PRN    glyBURIDE (DIABETA) 10 mg, oral, 2 times daily    icosapent ethyL (VASCEPA) 2 g, oral, 2 times daily    ipratropium-albuteroL (Duo-Neb) 0.5-2.5 mg/3 mL nebulizer solution 3 mL, nebulization, 4 times daily RT    loratadine (CLARITIN) 10 mg, oral, Daily    metoprolol succinate XL (TOPROL-XL) 100 mg, oral, Daily, Do not crush or chew.    OneTouch Verio Flex meter misc 1 EACH IF NEED TO TEST GLUCOSE ONCE DAILY    oxybutynin XL (DITROPAN-XL) 10 mg, oral, Daily    oxygen (O2) 3 L/min, inhalation, Continuous    pantoprazole (PROTONIX) 40 mg, oral, Daily before breakfast    pioglitazone (ACTOS) 30 mg, oral, Daily    SITagliptin phosphate (JANUVIA) 50 mg, oral, Daily    sodium chloride (Ocean) 0.65 % nasal spray 1 spray, Each Nostril, 4 times daily PRN    umeclidinium-vilanteroL (Anoro Ellipta) 62.5-25 mcg/actuation blister with device 1 puff, inhalation, Daily        IV Medications:          Review of Systems:      Review of Systems   Constitutional:  Positive for activity change.   HENT:  Positive for congestion.    Respiratory:  Positive for shortness of breath and wheezing. Negative for cough, chest tightness and stridor.    Cardiovascular:  Negative for chest pain, palpitations and leg  swelling.   Gastrointestinal:  Positive for abdominal distention and constipation.   Genitourinary:  Positive for frequency. Negative for difficulty urinating.   Musculoskeletal:  Positive for arthralgias.   Neurological:  Positive for weakness. Negative for dizziness and light-headedness.   Hematological:  Bruises/bleeds easily.   Psychiatric/Behavioral:  The patient is nervous/anxious.    All other systems reviewed and are negative.      Vital Signs:     Vitals:    05/24/24 0500 05/24/24 0505 05/24/24 0601 05/24/24 0800   BP: (!) 64/35 (!) 83/42 (!) 83/45 (!) 95/49   Pulse: 74 74 74 74   Resp: 20 20 19 20   Temp:    36 °C (96.8 °F)   TempSrc:       SpO2: 95% 96% 98% 100%   Weight:       Height:         No intake or output data in the 24 hours ending 05/24/24 0935  Vitals:    05/24/24 0426   Weight: 76.5 kg (168 lb 10.4 oz)       Physical Examination:     GENERAL APPEARANCE: Well developed, well nourished chronically ill appearing woman.   HEENT: No gross abnormalities of conjunctiva, gums, oral mucosa. Partially edentulous with moist membranes  NECK: Supple, no JVD, no bruit. Thyroid not palpable. Carotid upstrokes normal.  CHEST: Symmetric and non-tender.  LUNGS: Normal respiratory effort on high flow oxygen. Very poor air movement. No appreciable rales or wheeze.   HEART: PMI nondisplaced, RR, occ irreg with normal S1 and S2, no appreciable S3 or murmur. Heart sounds are distant. No edema, normal distal perfusion.   ABDOMEN: Soft, nontender, no palpable hepatosplenomegaly, no mases, no bruits. MUSCULOSKELETAL: Ambulatory with normal tandem gait.  EXTREMITIES: Warm with good color, no clubbing or cyanois. There is no edema noted.  PERIPHERAL VASCULAR: Pulses present and equally palpable.   NEURO/PSHCY: Alert and oriented x3; appropriate behavior and responses and responses, no focal deficits.   INTEGUMENT: Skin warm and dry, without gross excoriationis or lesions.  LYMPH: No significant palpable  "lymphadenopathy      Lab:     CBC:   Lab Results   Component Value Date    WBC 6.7 05/24/2024    RBC 3.10 (L) 05/24/2024    HGB 9.8 (L) 05/24/2024    HCT 31.2 (L) 05/24/2024     05/24/2024        CMP:    Lab Results   Component Value Date     05/24/2024    K 3.9 05/24/2024    CL 97 (L) 05/24/2024    CO2 30 05/24/2024    BUN 25 (H) 05/24/2024    CREATININE 1.30 (H) 05/24/2024    GLUCOSE 223 (H) 05/24/2024    CALCIUM 8.4 (L) 05/24/2024       Magnesium:    Lab Results   Component Value Date    MG 1.60 05/24/2024       Lipid Profile:    No results found for: \"CHLPL\", \"TRIG\", \"HDL\", \"LDLCALC\", \"LDLDIRECT\"    TSH:    No results found for: \"TSH\"    BNP:   Lab Results   Component Value Date    BNP 1,005 (H) 05/23/2024        PT/INR:    No results found for: \"PROTIME\", \"INR\"    HgBA1c:    Lab Results   Component Value Date    HGBA1C 6.6 (H) 05/23/2024       BMP:  Lab Results   Component Value Date     05/24/2024     05/24/2024     (L) 05/23/2024    K 3.9 05/24/2024    K 3.9 05/24/2024    K 5.8 (H) 05/23/2024    CL 97 (L) 05/24/2024    CL 97 (L) 05/24/2024    CL 96 (L) 05/23/2024    CO2 30 05/24/2024    CO2 30 05/24/2024    CO2 28 05/23/2024    BUN 25 (H) 05/24/2024    BUN 25 (H) 05/24/2024    BUN 21 05/23/2024    CREATININE 1.30 (H) 05/24/2024    CREATININE 1.30 (H) 05/24/2024    CREATININE 1.33 (H) 05/23/2024       CBC:  Lab Results   Component Value Date    WBC 6.7 05/24/2024    WBC 10.5 05/23/2024    RBC 3.10 (L) 05/24/2024    RBC 3.78 (L) 05/23/2024    HGB 9.8 (L) 05/24/2024    HGB 11.9 (L) 05/23/2024    HCT 31.2 (L) 05/24/2024    HCT 38.1 05/23/2024     (H) 05/24/2024     (H) 05/23/2024    MCH 31.6 05/24/2024    MCH 31.5 05/23/2024    MCHC 31.4 (L) 05/24/2024    MCHC 31.2 (L) 05/23/2024    RDW 13.0 05/24/2024    RDW 13.3 05/23/2024     05/24/2024     05/23/2024       Cardiac Enzymes:    Lab Results   Component Value Date    TROPHS 247 (HH) 05/23/2024    TROPHS " 264 () 05/23/2024    TROPHS 119 (HH) 05/02/2024       Hepatic Function Panel:    Lab Results   Component Value Date    ALKPHOS 58 05/23/2024    ALT 20 05/23/2024    AST 33 05/23/2024    PROT 7.0 05/23/2024    BILITOT 0.8 05/23/2024         Diagnostic Studies:     XR chest 1 view    Result Date: 5/23/2024  Interpreted By:  Nandini Bui, STUDY: XR CHEST 1 VIEW;  5/23/2024 4:57 pm   INDICATION: Signs/Symptoms:Shortness of breath, history of CHF and COPD.   COMPARISON: 05/02/2024.   ACCESSION NUMBER(S): [id number]   ORDERING CLINICIAN: DAVID SAUCEDA   FINDINGS:         CARDIOMEDIASTINAL SILHOUETTE: Status post sternotomy. Heart is mildly enlarged. There is venous congestion and interstitial edema.   LUNGS: There is atelectasis/infiltrate in the right lower lobe seen also on the radiograph of 05/02/2024. No evidence of pneumothorax.   ABDOMEN: No remarkable upper abdominal findings.   BONES: No acute osseous changes.       1. Cardiomegaly with venous congestion and interstitial edema consistent with congestive heart failure. 2. Infiltrate/atelectasis in the right lung base. Rule out pneumonia.       MACRO: None   Signed by: Nandini Bui 5/23/2024 5:20 PM Dictation workstation:   [id number]      Radiology:     XR chest 1 view   Final Result   1. Cardiomegaly with venous congestion and interstitial edema   consistent with congestive heart failure.   2. Infiltrate/atelectasis in the right lung base. Rule out pneumonia.                  MACRO:   None        Signed by: Nandini Bui 5/23/2024 5:20 PM   Dictation workstation:   [id number]      XR chest 2 views    (Results Pending)       Assessment:     Problem List Items Addressed This Visit    None  Visit Diagnoses       Acute on chronic heart failure, unspecified heart failure type (Multi)    -  Primary    Relevant Medications    midodrine (Proamatine) tablet 10 mg (Start on 5/24/2024  5:00 PM)    midodrine (Proamatine) tablet 10 mg (Completed)    Other Relevant Orders     Electrical Cardioversion (Completed)    Critical Care (Completed)    Acute on chronic respiratory failure with hypoxia and hypercapnia (Multi)        Relevant Orders    Critical Care (Completed)    Pneumonia of right lower lobe due to infectious organism        Relevant Orders    Critical Care (Completed)    Atrial flutter, unspecified type (Multi)        Relevant Medications    midodrine (Proamatine) tablet 10 mg (Start on 5/24/2024  5:00 PM)    midodrine (Proamatine) tablet 10 mg (Completed)    Other Relevant Orders    Electrical Cardioversion (Completed)    Critical Care (Completed)            Patient Active Problem List   Diagnosis    Abnormal stress ECG with treadmill    Atopic dermatitis    Atrial fibrillation (Multi)    Bilateral carotid artery stenosis    Coronary artery disease involving native heart    Carotid artery disease (CMS-[facility])    COPD (chronic obstructive pulmonary disease) (Multi)    Diabetic polyneuropathy associated with type 2 diabetes mellitus (Multi)    Edema    Essential hypertension    Extremity atherosclerosis with intermittent claudication (CMS-HCC)    Female incontinence    GERD (gastroesophageal reflux disease)    Glaucoma    Hematuria    Mixed hyperlipidemia    RBBB    Overweight    Peripheral vascular disease (CMS-HCC)    Pulmonary nodules    Right leg swelling    Stage 3a chronic kidney disease (Multi)    Diabetes mellitus with peripheral vascular disease (Multi)    Type 2 diabetes mellitus with hyperglycemia, without long-term current use of insulin (Multi)    Vaginal itching    Vitamin D deficiency    Allergic rhinitis    Current every day smoker    Diabetes mellitus with kidney complication (Multi)    Hypertension    LBBB (left bundle branch block)    Hyperlipidemia    Pruritus    Seborrheic keratoses, inflamed    Xerosis of skin    Chronic combined systolic (congestive) and diastolic (congestive) heart failure (Multi)    Acute and chronic respiratory failure with hypercapnia  (Multi)    Hemorrhagic disorder due to circulating anticoagulants (Multi)    Atrial fibrillation with RVR (Multi)    Cystitis    Idiopathic hypotension    Cortical age-related cataract of both eyes    Astigmatism of both eyes    Mild nonproliferative diabetic retinopathy of both eyes without macular edema associated with type 2 diabetes mellitus (Multi)    Renal lesion    Respiratory failure with hypercapnia (Multi)    SOB (shortness of breath)    Pancreatic lesion (HHS-HCC)    Acute hypoxic respiratory failure (Multi)    Atrial flutter with rapid ventricular response (Multi)    Lactic acidosis    Hyperkalemia    Acute combined systolic and diastolic heart failure (Multi)    Acute exacerbation of COPD with asthma (Multi)    Community acquired pneumonia of right lower lobe of lung    NSTEMI (non-ST elevated myocardial infarction) (Multi)    Nonsustained ventricular tachycardia (Multi)       Plan:   Acute on chronic systolic and diastolic heart failure. No significant volume overload. Likely precipitated with arrhythmia.   CAD without angina. Elevated troponin - likely subendocardial ischemia with rapid heart beat. Given results of cardiac cath 2/2023 would not plan repeat evaluation.   Atrial flutter - recurrent. Consulted [facility]. Consider A flutter ablation - if patient agrees.   Severe COPD - ongoing Rx. Recent pneumonia with exacerbation.   CKD - stable.     Conservative medical management. Patient refuses intubation / resuscitation / ICU or higher support levels. If this continues would consult pallitative care/hospice. Discussed with attending MD this morning after patient seen.       Thank you for the opportunity to participate in the care of your patient.  Please do not hesitate to call if you have any questions.    Electronically signed by Polly Valentin MD, on 5/24/2024 at 9:35 AM

## 2024-05-24 NOTE — NURSING NOTE
S: Mitzi Pro arrived into 2109/2109-A at this time.    B: Acute on chronic heart failure.     A:  Dr. Mora notified that patient has arrived.     R: Will continue to monitor.

## 2024-05-24 NOTE — NURSING NOTE
4:04 am- Dr. Mora notified that patient's SBP was 69 and when I went in and rechecked it, it was still only 71/33 (47).     5:00 am- Dr. Mora at bedside and aware of patient's BP reading again in the 60's. Went in with physician to wake patient up and recheck and it went up to 83/42 (60).       No further intervention at this time per hospitalist given conversation earlier with patient. Will continue to monitor.

## 2024-05-24 NOTE — SIGNIFICANT EVENT
"Following admission to the CCU on amnio drip and high flow nasal cannula she subsequently became hypotensive with systolic blood pressures in the 70s and 80s, amiodarone was discontinued but she remained hypotensive, on exam she is denying chest pain but is somnolent, she is warm to the touch and  oriented x 3.  I discussed with her transfer to the ICU for central line and vasopressors, she declined transfer and stated she did not want to be moved to the ICU or to have any procedures done or vasopressors administered.  She stated that her current state of health with advanced COPD and heart failure was unbearable and that she did not want to be treated for acute heart failure  if that was the underlying cause of her hypotension.  She stated that if her heart \"gave out\" she would rather die due to her significant symptoms secondary to her heart disease and COPD that caused her to suffer on a daily basis from weakness and shortness of breath.  Nursing was at bedside during this discussion, during this discussion upon awakening the patient her systolic blood pressure did rise to the 90s and she was mentating well and had good insight into her disease process.  I changed her DNR status from DNR CCA/DNI to DNR CCA/DNI without IV ICU transfer and updated staff regarding her wishes.  She stated that she would talk with providers in the morning after getting some rest to speak more about her goals of care.  A palliative care consult was placed to discuss goals of care with the patient.  "

## 2024-05-24 NOTE — PROGRESS NOTES
"Mitzi Pro is a 73 y.o. female on day 1 of admission presenting with Acute combined systolic and diastolic heart failure (Multi).    Subjective   Patient familiar to writer from several years prior; patient did not recall being taken care of by writer as she was very ill during the prior hospitalization, although she notes that she is feeling significantly improved from the time of her presentation yesterday; she was admitted overnight and stated that she was extremely short of breath and is feeling so much improved at this time.  She was able to tolerate eating, she remembers speaking to the cardiologist and was hopeful about the prospect of potentially getting an ablation as a curative option as opposed to taking any more medications.  She otherwise denied having any fevers or chills and stated that she was feeling better overall with no other major complaint right now.    Objective     Physical Exam  Constitutional: Awake/alert/oriented x3, on BiPAP, no respiratory distress, calm  Eyes: Clear sclera  Head/Neck: Normocephalic, atraumatic  Respiratory/Thorax: Bilateral wheezing  Cardiovascular: RRR.  2/6 systolic murmur at right sternal border  Gastrointestinal: Non-TTP  Musculoskeletal: FROM  Extremities: Bilateral lower extremity pitting edema  Psychological: Appropriate mood and behavior  Skin: Warm and dry, no jaundice     Last Recorded Vitals  Blood pressure (!) 112/42, pulse 96, temperature 36.6 °C (97.9 °F), resp. rate (!) 27, height 1.651 m (5' 5\"), weight 76.5 kg (168 lb 10.4 oz), SpO2 95%.  Intake/Output last 3 Shifts:  No intake/output data recorded.    Relevant Results  Scheduled medications  apixaban, 5 mg, oral, BID  [START ON 5/25/2024] aspirin, 81 mg, oral, Daily  atorvastatin, 40 mg, oral, Daily  cefTRIAXone, 1 g, intravenous, q24h  cilostazol, 100 mg, oral, BID  doxycycline, 100 mg, intravenous, q12h  [START ON 5/25/2024] tiotropium, 2 puff, inhalation, Daily   And  formoterol, 20 mcg, " nebulization, q12h  icosapent ethyL, 2 g, oral, BID  insulin lispro, 0-10 Units, subcutaneous, TID  ipratropium, 0.5 mg, nebulization, q6h  ipratropium-albuteroL, 3 mL, nebulization, 4x daily  loratadine, 10 mg, oral, Daily  melatonin, 3 mg, oral, Nightly  methylPREDNISolone sodium succinate (PF), 40 mg, intravenous, Daily  [START ON 5/25/2024] metoprolol succinate XL, 50 mg, oral, Daily  midodrine, 10 mg, oral, BID  oxybutynin, 5 mg, oral, BID  oxygen, , inhalation, Continuous - Inhalation  oxygen, , inhalation, Continuous - Inhalation  [START ON 5/25/2024] pantoprazole, 40 mg, oral, Daily before breakfast  [START ON 5/25/2024] pioglitazone, 30 mg, oral, Daily  polyethylene glycol, 17 g, oral, BID  sennosides, 2 tablet, oral, BID  [START ON 5/25/2024] SITagliptin phosphate, 50 mg, oral, Daily      Continuous medications     PRN medications  PRN medications: acetaminophen **OR** acetaminophen **OR** acetaminophen, dextrose, dextrose, fluticasone, glucagon, glucagon, ipratropium-albuteroL, ondansetron ODT **OR** ondansetron, [START ON 5/25/2024] sodium chloride    Results for orders placed or performed during the hospital encounter of 05/23/24 (from the past 24 hour(s))   Electrocardiogram, 12-lead PRN ACS symptoms   Result Value Ref Range    Ventricular Rate 160 BPM    Atrial Rate 160 BPM    KY Interval 114 ms    QRS Duration 106 ms    QT Interval 244 ms    QTC Calculation(Bazett) 398 ms    P Axis -74 degrees    R Axis 92 degrees    T Axis 255 degrees    QRS Count 26 beats    Q Onset 215 ms    P Onset 158 ms    P Offset 207 ms    T Offset 337 ms    QTC Fredericia 338 ms   ECG 12 lead   Result Value Ref Range    Ventricular Rate 158 BPM    Atrial Rate 158 BPM    KY Interval 130 ms    QRS Duration 112 ms    QT Interval 312 ms    QTC Calculation(Bazett) 505 ms    R Axis 80 degrees    T Axis 269 degrees    QRS Count 27 beats    Q Onset 193 ms    P Onset 138 ms    P Offset 174 ms    T Offset 349 ms    QTC Fredericia 431  ms   Troponin, High Sensitivity, 1 Hour   Result Value Ref Range    Troponin I, High Sensitivity 247 (HH) 0 - 13 ng/L   Blood Gas Venous Full Panel Unsolicited   Result Value Ref Range    POCT pH, Venous 7.36 7.33 - 7.43 pH    POCT pCO2, Venous 60 (H) 41 - 51 mm Hg    POCT pO2, Venous 49 (H) 35 - 45 mm Hg    POCT SO2, Venous 71 45 - 75 %    POCT Oxy Hemoglobin, Venous 69.7 45.0 - 75.0 %    POCT Hematocrit Calculated, Venous 35.0 (L) 36.0 - 46.0 %    POCT Sodium, Venous 131 (L) 136 - 145 mmol/L    POCT Potassium, Venous 4.3 3.5 - 5.3 mmol/L    POCT Chloride, Venous 100 98 - 107 mmol/L    POCT Ionized Calicum, Venous 1.17 1.10 - 1.33 mmol/L    POCT Glucose, Venous 262 (H) 74 - 99 mg/dL    POCT Lactate, Venous 2.3 (H) 0.4 - 2.0 mmol/L    POCT Base Excess, Venous 6.8 (H) -2.0 - 3.0 mmol/L    POCT HCO3 Calculated, Venous 33.9 (H) 22.0 - 26.0 mmol/L    POCT Hemoglobin, Venous 11.5 (L) 12.0 - 16.0 g/dL    POCT Anion Gap, Venous 1.0 (L) 10.0 - 25.0 mmol/L    Patient Temperature     Electrocardiogram, 12-lead PRN ACS symptoms   Result Value Ref Range    Ventricular Rate 99 BPM    Atrial Rate 99 BPM    AL Interval 146 ms    QRS Duration 108 ms    QT Interval 338 ms    QTC Calculation(Bazett) 433 ms    P Axis 92 degrees    R Axis 108 degrees    T Axis 192 degrees    QRS Count 16 beats    Q Onset 215 ms    P Onset 142 ms    P Offset 200 ms    T Offset 384 ms    QTC Fredericia 399 ms   Procalcitonin   Result Value Ref Range    Procalcitonin 0.25 (H) <=0.07 ng/mL   Renal function panel   Result Value Ref Range    Glucose 233 (H) 74 - 99 mg/dL    Sodium 136 136 - 145 mmol/L    Potassium 3.9 3.5 - 5.3 mmol/L    Chloride 97 (L) 98 - 107 mmol/L    Bicarbonate 30 21 - 32 mmol/L    Anion Gap 13 10 - 20 mmol/L    Urea Nitrogen 25 (H) 6 - 23 mg/dL    Creatinine 1.30 (H) 0.50 - 1.05 mg/dL    eGFR 43 (L) >60 mL/min/1.73m*2    Calcium 8.3 (L) 8.6 - 10.3 mg/dL    Phosphorus 4.7 2.5 - 4.9 mg/dL    Albumin 3.2 (L) 3.4 - 5.0 g/dL   Magnesium    Result Value Ref Range    Magnesium 1.60 1.60 - 2.40 mg/dL   Lactate   Result Value Ref Range    Lactate 1.1 0.4 - 2.0 mmol/L   Renal Function Panel   Result Value Ref Range    Glucose 223 (H) 74 - 99 mg/dL    Sodium 136 136 - 145 mmol/L    Potassium 3.9 3.5 - 5.3 mmol/L    Chloride 97 (L) 98 - 107 mmol/L    Bicarbonate 30 21 - 32 mmol/L    Anion Gap 13 10 - 20 mmol/L    Urea Nitrogen 25 (H) 6 - 23 mg/dL    Creatinine 1.30 (H) 0.50 - 1.05 mg/dL    eGFR 43 (L) >60 mL/min/1.73m*2    Calcium 8.4 (L) 8.6 - 10.3 mg/dL    Phosphorus 4.7 2.5 - 4.9 mg/dL    Albumin 3.2 (L) 3.4 - 5.0 g/dL   CBC   Result Value Ref Range    WBC 6.7 4.4 - 11.3 x10*3/uL    nRBC 0.0 0.0 - 0.0 /100 WBCs    RBC 3.10 (L) 4.00 - 5.20 x10*6/uL    Hemoglobin 9.8 (L) 12.0 - 16.0 g/dL    Hematocrit 31.2 (L) 36.0 - 46.0 %     (H) 80 - 100 fL    MCH 31.6 26.0 - 34.0 pg    MCHC 31.4 (L) 32.0 - 36.0 g/dL    RDW 13.0 11.5 - 14.5 %    Platelets 168 150 - 450 x10*3/uL   Magnesium   Result Value Ref Range    Magnesium 1.60 1.60 - 2.40 mg/dL   POCT GLUCOSE   Result Value Ref Range    POCT Glucose 174 (H) 74 - 99 mg/dL   POCT GLUCOSE   Result Value Ref Range    POCT Glucose 182 (H) 74 - 99 mg/dL   POCT GLUCOSE   Result Value Ref Range    POCT Glucose 252 (H) 74 - 99 mg/dL     XR chest 2 views    Result Date: 5/24/2024  Interpreted By:  Kristian Abarca, STUDY: XR CHEST 2 VIEWS; ;  5/24/2024 9:37 am   INDICATION: Signs/Symptoms:AECHF vs PNA.   COMPARISON: 05/23/2024 chest radiograph   ACCESSION NUMBER(S): [id number]   ORDERING CLINICIAN: THIERRY MILLER   TECHNIQUE: PA and lateral views of the chest were obtained.   FINDINGS: Bilateral pleural effusions are present larger on the right. Underlying pneumonia in the right lung base cannot be excluded. In mount of pleural fluid has mildly increased on the right from the prior exam.   Interstitial markings of the lungs are mildly increased similar to the prior exam.   Cardiac silhouette remains borderline  enlarged.       Bilateral pleural effusions are present, larger on the right. Underlying pneumonia/atelectasis in the right lung base cannot be excluded.   Continued cardiomegaly and pulmonary vascular congestion.     MACRO: None   Signed by: Kristian Abarca 5/24/2024 10:02 AM Dictation workstation:   [id number]    Electrocardiogram, 12-lead PRN ACS symptoms    Result Date: 5/24/2024  Suspect arm lead reversal, interpretation assumes no reversal Sinus rhythm with Premature atrial complexes and Premature ventricular complexes or Fusion complexes Rightward axis Pulmonary disease pattern Septal infarct , age undetermined ST & T wave abnormality, consider inferior ischemia Abnormal ECG When compared with ECG of 23-MAY-2024 17:24, (unconfirmed) Sinus rhythm has replaced Junctional rhythm Vent. rate has decreased BY  61 BPM Incomplete left bundle branch block is no longer Present    Electrocardiogram, 12-lead PRN ACS symptoms    Result Date: 5/24/2024  Unusual P axis and short LA, probable junctional tachycardia Rightward axis Incomplete left bundle branch block ST elevation, consider lateral injury or acute infarct ** ** ACUTE MI ** ** Abnormal ECG When compared with ECG of 23-MAY-2024 16:08, (unconfirmed) Junctional rhythm has replaced Sinus rhythm    ECG 12 lead    Result Date: 5/24/2024  Sinus tachycardia with Fusion complexes Septal infarct (cited on or before 23-MAY-2024) Lateral injury pattern ** ** ACUTE MI ** ** Abnormal ECG When compared with ECG of 23-MAY-2024 15:56, (unconfirmed) Previous ECG has undetermined rhythm, needs review Serial changes of Septal infarct Present    ECG 12 lead    Result Date: 5/24/2024  Undetermined rhythm Rightward axis Septal infarct (cited on or before 23-MAY-2024) ST & T wave abnormality, consider inferior ischemia ST & T wave abnormality, consider anterolateral ischemia Abnormal ECG When compared with ECG of 03-MAY-2024 14:43, Significant changes have occurred    XR chest 1  view    Result Date: 5/23/2024  Interpreted By:  Nandini Bui, STUDY: XR CHEST 1 VIEW;  5/23/2024 4:57 pm   INDICATION: Signs/Symptoms:Shortness of breath, history of CHF and COPD.   COMPARISON: 05/02/2024.   ACCESSION NUMBER(S): [id number]   ORDERING CLINICIAN: DAVID SAUCEDA   FINDINGS:         CARDIOMEDIASTINAL SILHOUETTE: Status post sternotomy. Heart is mildly enlarged. There is venous congestion and interstitial edema.   LUNGS: There is atelectasis/infiltrate in the right lower lobe seen also on the radiograph of 05/02/2024. No evidence of pneumothorax.   ABDOMEN: No remarkable upper abdominal findings.   BONES: No acute osseous changes.       1. Cardiomegaly with venous congestion and interstitial edema consistent with congestive heart failure. 2. Infiltrate/atelectasis in the right lung base. Rule out pneumonia.       MACRO: None   Signed by: Nandini Bui 5/23/2024 5:20 PM Dictation workstation:   [id number]    ECG 12 Lead    Result Date: 5/22/2024  Sinus rhythm with sinus arrhythmia with occasional Premature ventricular complexes and Fusion complexes Rightward axis Anteroseptal infarct (cited on or before 30-APR-2024) Abnormal ECG When compared with ECG of 01-MAY-2024 05:33, Sinus rhythm has replaced Atrial flutter Vent. rate has decreased BY  79 BPM Serial changes of Anteroseptal infarct Present    NM injection no scan    Result Date: 5/8/2024  Interpreted By:  Frank Gannon,  and Polina Amato STUDY: NM INJECTION NO SCAN;  5/2/2024 10:00 am   INDICATION: Signs/Symptoms:decreased EF, arrythmia, elevated troponin.   COMPARISON: None.   ACCESSION NUMBER(S): [id number]   ORDERING CLINICIAN: CAM MOSS   TECHNIQUE: DIVISION OF NUCLEAR MEDICINE PHARMACOLOGIC STRESS MYOCARDIAL PERFUSION SCAN, ONE DAY PROTOCOL   The patient received an intravenous injection of 9.2 mCi of Tc-99m Myoview. However, the patient was unable to tolerate lying down for the study and did not want to continue with the examination. No  images were obtained.   FINDINGS: No images were obtained.       Injection no scan. No images were obtained.   I personally reviewed the images/study and I agree with the findings as stated by radiology resident Dr. Lima Fish. This study was interpreted at [place], [place].   MACRO: None   Signed by: Frank Gannon 5/8/2024 5:12 PM Dictation workstation:   [id number]    ECG 12 Lead    Result Date: 5/4/2024  Sinus rhythm with occasional Premature ventricular complexes Incomplete left bundle branch block Poor R-wave progression Abnormal ECG Confirmed by Frank Garcia ([id number]) on 5/4/2024 3:25:34 PM    ECG 12 Lead    Result Date: 5/3/2024  Atrial flutter with 2 to 1 block Rightward axis Incomplete left bundle branch block Nonspecific ST abnormality Abnormal ECG When compared with ECG of 02-MAY-2024 21:07, (unconfirmed) Premature ventricular complexes are no longer Present Premature supraventricular complexes are no longer Present Confirmed by Frank Garcia ([id number]) on 5/3/2024 1:30:56 PM    ECG 12 Lead    Result Date: 5/3/2024  Atrial flutter with variable AV block Rightward axis Incomplete left bundle branch block ST & T wave abnormality, consider inferior ischemia Abnormal ECG When compared with ECG of 02-MAY-2024 12:13, (unconfirmed) Significant changes have occurred Confirmed by Frank Garcia ([id number]) on 5/3/2024 1:29:58 PM    XR chest 1 view    Result Date: 5/2/2024  Interpreted By:  Brandon Nazario, STUDY: XR CHEST 1 VIEW   INDICATION: Signs/Symptoms:SOB.   COMPARISON: April 30   ACCESSION NUMBER(S): [id number]   ORDERING CLINICIAN: YANIRA BAXTER   FINDINGS: Interval development of perihilar and basilar interstitial edema with new right effusion and basilar airspace opacity favored to represent more conglomerate edema. Focus of pneumonia not excluded.   Previous cardiomegaly with median sternotomy unchanged.       Interval development of perihilar and basilar  interstitial edema with new right effusion and basilar airspace opacity favored to represent more conglomerate edema. Focus of pneumonia not excluded.   Signed by: Brandon Nazario 5/2/2024 6:55 PM Dictation workstation:   [id number]    ECG 12 Lead    Result Date: 5/2/2024  Atrial flutter with 2:1 AV conduction Right axis deviation Anteroseptal infarct (cited on or before 30-APR-2024) ST & T wave abnormality, consider inferior ischemia Abnormal ECG When compared with ECG of 30-APR-2024 01:56, (unconfirmed) Significant changes have occurred Confirmed by Ernie Diaz ([id number]) on 5/2/2024 1:08:56 PM    ECG 12 lead    Result Date: 5/2/2024  cannot exclude atrial flutter vs sinus tachycardia Left axis deviation Incomplete right bundle branch block Nonspecific ST and T wave abnormality Abnormal ECG When compared with ECG of 08-APR-2023 13:22, Fusion complexes are now Present Incomplete right bundle branch block is now Present Confirmed by Ernie Diaz ([id number]) on 5/2/2024 12:56:18 PM    ECG 12 lead    Result Date: 5/2/2024  atrial fibrillation/flutter Septal infarct , age undetermined ST & T wave abnormality, consider lateral ischemia Abnormal ECG When compared with ECG of 30-APR-2024 00:46, (unconfirmed) Current undetermined rhythm precludes rhythm comparison, needs review Incomplete right bundle branch block is no longer Present Septal infarct is now Present Confirmed by Ernie Diaz ([id number]) on 5/2/2024 12:55:44 PM    Transthoracic Echo (TTE) Complete    Result Date: 4/30/2024            [facility] [place] [address] [address]    Tel [phone] Fax [phone] TRANSTHORACIC ECHOCARDIOGRAM REPORT  Patient Name:      MILVIA Garces Physician:    [id number] Horacio Armijo MD Study Date:        4/30/2024             Ordering Provider:    [id number] YANIRA BAXTER MRN/PID:           [id number]              Fellow: Accession#:        [id number]           Nurse:                Leena AMATO Date of Birth/Age: 1950 / 73 years  Sonographer:          Amalia Brewster RDCS Gender:            F                     Additional Staff: Height:            165.00 cm             Admit Date: Weight:            76.20 kg              Admission Status:     Inpatient -                                                                Priority                                                                discharge BSA / BMI:         1.84 m2 / 27.99 kg/m2 Department Location:  [facility] Blood Pressure: 99 /56 mmHg Study Type:    TRANSTHORACIC ECHO (TTE) COMPLETE Diagnosis/ICD: 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 Diabetes:          Yes Pertinent History: HTN, Hyperlipidemia and COPD. Study Detail: The following Echo studies were performed: 2D, M-Mode, Doppler and               color flow. Technically challenging study due to patient lying in               supine position and prominent lung artifact. Definity used as a               contrast agent for endocardial border definition. Total contrast               used for this procedure was 2 mL via IV push.  PHYSICIAN INTERPRETATION: Left Ventricle: Left ventricular systolic function is moderately decreased, with an estimated ejection fraction of 34 %. There are multiple wall motion abnormalities. The left ventricular cavity size is mild to moderately dilated. The left ventricular septal wall thickness is decreased. There is normal left ventricular posterior wall thickness. Spectral Doppler shows an impaired relaxation pattern of left ventricular diastolic filling. There is an elevated left ventricular end diastolic pressure. LV Wall Scoring: The mid and apical anterior septum, mid and  apical inferior septum, apical lateral segment, apical anterior segment, and apex are akinetic. The entire inferior wall, basal and mid anterior wall, basal and mid anterolateral wall, basal and mid inferolateral wall, basal anteroseptal segment, and basal inferoseptal segment are hypokinetic. Left Atrium: The left atrium is mildly dilated. Right Ventricle: The right ventricle is normal in size. There is mildly reduced right ventricular systolic function. Right Atrium: The right atrium is normal in size. Aortic Valve: The aortic valve is trileaflet. There is evidence of mildly elevated transaortic gradients consistent with sclerosis of the aortic valve. There is no evidence of aortic valve regurgitation. The peak instantaneous gradient of the aortic valve is 7.6 mmHg. Mitral Valve: The mitral valve is normal in structure. There is mild to moderate mitral annular calcification. There is moderate mitral valve regurgitation. EROA by PISA 0.2 moderate, posterior MR skyler type IIIA apical tethering. Tricuspid Valve: The tricuspid valve is structurally normal. There is mild tricuspid regurgitation. The Doppler estimated RVSP is mildly elevated at 43.7 mmHg. Pulmonic Valve: The pulmonic valve is structurally normal. There is trace pulmonic valve regurgitation. Pericardium: There is no pericardial effusion noted. Aorta: The aortic root is normal. There is no dilatation of the ascending aorta. There is no dilatation of the aortic root. Pulmonary Artery: The main pulmonary artery is normal in size, and position, with normal bifurcation into the left and right pulmonary arteries. Systemic Veins: The inferior vena cava appears mildly dilated. The hepatic vein appears dilated. There is less than 50% IVC collapse with inspiration.  CONCLUSIONS:  1. Left ventricular systolic function is moderately decreased with a 34 % estimated ejection fraction.  2. Multiple segmental abnormalities exist. See findings.  3. Spectral Doppler  shows an impaired relaxation pattern of left ventricular diastolic filling.  4. There is an elevated left ventricular end diastolic pressure.  5. There is mildly reduced right ventricular systolic function.  6. Moderate mitral valve regurgitation.  7. Mildly elevated RVSP.  8. Aortic valve sclerosis.  9. There are multiple wall motion abnormalities. QUANTITATIVE DATA SUMMARY: 2D MEASUREMENTS:                           Normal Ranges: LAs:           4.50 cm    (2.7-4.0cm) IVSd:          1.00 cm    (0.6-1.1cm) LVPWd:         0.90 cm    (0.6-1.1cm) LVIDd:         6.50 cm    (3.9-5.9cm) LVIDs:         5.40 cm LV Mass Index: 144.5 g/m2 LV % FS        16.9 % LA VOLUME:                             Normal Ranges: LA Area A4C:     21.0 cm2 LA Area A2C:     21.0 cm2 LA Volume Index: 37.0 ml/m2 M-MODE MEASUREMENTS:                  Normal Ranges: Ao Root: 2.60 cm (2.0-3.7cm) AoV Exc: 1.40 cm (1.5-2.5cm) AORTA MEASUREMENTS:                    Normal Ranges: AoV Exc:   1.40 cm (1.5-2.5cm) Asc Ao, d: 3.30 cm (2.1-3.4cm) LV SYSTOLIC FUNCTION BY 2D PLANIMETRY (MOD):                     Normal Ranges: EF-A4C View: 39.4 % (>=55%) EF-A2C View: 28.7 % EF-Biplane:  34.0 % LV DIASTOLIC FUNCTION:                        Normal Ranges: MV Peak E:    1.38 m/s (0.7-1.2 m/s) MV Peak A:    0.90 m/s (0.42-0.7 m/s) E/A Ratio:    1.54     (1.0-2.2) MV e'         0.06 m/s (>8.0) MV lateral e' 0.08 m/s MV medial e'  0.04 m/s E/e' Ratio:   23.00    (<8.0) MITRAL VALVE:                 Normal Ranges: MV DT: 177 msec (150-240msec) MITRAL INSUFFICIENCY:                           Normal Ranges: PISA Radius:  0.4 cm MR VTI:       142.00 cm MR Vmax:      407.00 cm/s MR Alias Subhash: 84.7 cm/s MR Volume:    29.71 ml MR Flow Rt:   85.15 ml/s MR EROA:      0.21 cm2 AORTIC VALVE:                         Normal Ranges: AoV Vmax:      1.38 m/s (<=1.7m/s) AoV Peak P.6 mmHg (<20mmHg) LVOT Max Subhash:  0.89 m/s (<=1.1m/s) LVOT VTI:      20.00 cm LVOT Diameter: 1.70  cm  (1.8-2.4cm) AoV Area,Vmax: 1.47 cm2 (2.5-4.5cm2)  RIGHT VENTRICLE: RV Basal 3.40 cm RV Mid   1.80 cm RV Major 7.6 cm TAPSE:   14.1 mm RV s'    0.09 m/s TRICUSPID VALVE/RVSP:                             Normal Ranges: Peak TR Velocity: 3.19 m/s RV Syst Pressure: 43.7 mmHg (< 30mmHg) PULMONIC VALVE:                        Normal Ranges: PV Accel Time: 74 msec (>120ms)  [id number] Horacio Armijo MD Electronically signed on 4/30/2024 at 4:34:01 PM  Wall Scoring  ** Final **     XR chest 1 view    Result Date: 4/30/2024  STUDY: Chest Radiograph;  4/30/2024 at 1:13 AM INDICATION: Chest pain. COMPARISON: XR chest 11/28/2022, XR chest 12/5/2021, XR chest 7/14/2021, XR chest 3/1/2019. ACCESSION NUMBER(S): [id number] ORDERING CLINICIAN: ISIDRO MARSHALL TECHNIQUE:  Frontal chest was obtained at 0113 hours. FINDINGS: CARDIOMEDIASTINAL SILHOUETTE: Cardiomediastinal silhouette is mildly enlarged in size and configuration.  Calcified plaque is seen in the aorta.  LUNGS: Lungs are clear.  Flattening of hemidiaphragms suggests chronic changes of COPD.  ABDOMEN: No remarkable upper abdominal findings.  BONES: No acute osseous changes.    No acute cardia pulmonary disease. Cardiomegaly with suspected mild chronic changes of COPD. Signed by Rojas Lawrence       Assessment/Plan   Principal Problem:    Acute combined systolic and diastolic heart failure (Multi)  Active Problems:    Coronary artery disease involving native heart    Stage 3a chronic kidney disease (Multi)    Acute hypoxic respiratory failure (Multi)    Atrial flutter with rapid ventricular response (Multi)    Lactic acidosis    Hyperkalemia    Acute exacerbation of COPD with asthma (Multi)    Community acquired pneumonia of right lower lobe of lung    NSTEMI (non-ST elevated myocardial infarction) (Multi)    Nonsustained ventricular tachycardia (Multi)    Plan:  - Stable to remain at current level of care on telemetry  - Appreciate cardiology consultation, they will reach out  to EP to determine if she is a candidate for low risk ablation  - Home medication reconciliation was completed, will resume patient's Eliquis, she is a candidate for ablation, this was discussed in detail with the patient at the bedside and she will consider it, it would need to be done as an outpatient  - She will not need repeat evaluation with coronary angiography  - 2 view x-ray cannot exclude pneumonia, will continue on antibiotics and follow-up with procalcitonin collected  - Cardiac diet  - Eliquis for VTE prophylaxis  - DNR/DNI/no ICU         I spent 45 minutes in the professional and overall care of this patient.    Bertram Rasheed MD

## 2024-05-24 NOTE — PROGRESS NOTES
ELECTROPHYSIOLOGY PROGRESS NOTE    PATIENT NAME: Mitzi Pro  PATIENT MRN: [id number]  SERVICE DATE:  5/24/2024  SERVICE TIME: 2:37 PM    CONSULTANT: Frank Garcia MD  PRIMARY CARE PHYSICIAN: CLAIRE Vasquez-CNP  ATTENDING PHYSICIAN: Bertram Rasheed MD      IMPRESSIONS:  1.  Late stage chronic obstructive pulmonary disease, on supplemental oxygen therapy at home.  2.  Coronary artery disease, status post remote CABG, with known occlusion of both vein grafts and a known occluded right coronary artery with collaterals from the left system.  She had a ramus intermedius stent more recently.  3.  Moderate ischemic cardiomyopathy with LVEF 35 to 40%.  4.  Paroxysmal atrial arrhythmias, with probable typical counterclockwise isthmus-dependent atrial flutter as her dominant atrial arrhythmia.  She has had this on several occasions in the last few years, likely secondary to her lung disease.  She is a poor candidate for antiarrhythmic therapy (flecainide and propafenone contraindicated due to coronary disease, sotalol and amiodarone contraindicated due to his severe COPD, dronedarone contraindicated due to heart failure, and dofetilide relatively contraindicated due to some renal insufficiency).  She has a [id number] score of at least 7 (all factors except for stroke), and has been on chronic Eliquis anticoagulation because of this.  5.  Moderate hypotension, even after restoration of sinus rhythm, being addressed by Dr. Valentin.  6.  Other medical problem, including hypertension, type 2 diabetes, hyperlipidemia, glaucoma, remote ovarian cancer, incomplete left bundle branch block, and periodic urinary tract infections.    RECOMMENDATIONS:  1.  Resumption of Eliquis anticoagulation is certainly recommended.    2.  I spoke with the patient again about the possibility of cavotricuspid isthmus ablation.  This could be done electively on an outpatient basis sometime in the next month or so if the patient is  willing to undergo it.  This is a low risk ablation, done from the right side only.  It would have the potential to eliminate her classic atrial flutter circuit, and this might provide some long-term relief from recurrent atrial arrhythmias.    Thank you for allowing us to participate in the care of this patient.  She is currently contemplating palliative care and hospice-type options, in which case no intervention such as an ablation would be recommended.  If she elects for more aggressive therapy, however, we would be happy to schedule her for a cavotricuspid isthmus ablation at [facility] sometime in June 2024.      SIGNATURE: Frank Garcia MD   OFFICE: [phone]    ================================================================    SUBJECTIVE: The patient is a 73-year-old white female, known to me from consultation on 5/2/2024, who presented on 5/23/2024 because of worsening respiratory status.  She has a history of hypertension, diabetes, coronary artery disease (status post CABG in 1990s), chronic obstructive pulmonary disease with recent smoking cessation 3 weeks ago, hyperlipidemia, and peripheral vascular disease with lower extremity claudication.  She has had classic counterclockwise atrial flutter on several occasions, including November 2022, prompting amiodarone therapy over the next few months.  She had recurrent atrial flutter documented on 4/30/2024 and on 5/1/2024, with predominant 2:1 AV conduction, converting spontaneously to sinus rhythm.  I saw her in consultation at that time, and discussed the possibility of a cavotricuspid isthmus ablation with her.  It was felt that her lung disease precluded and attempted a pulmonary vein isolation but a right-sided CTI ablation was felt to be feasible.  The patient presented again with respiratory issues, and was noted to be back in atrial flutter with 2:1 AV conduction and a ventricular rate in 150s.  Because of sustained  "hypotension, she was actually cardioverted electively with restoration of sinus rhythm.  She is followed chronically by her regular cardiologist Dr. Ksenia Duncan.  Electrophysiology input was requested.    CURRENT CARDIOVASCULAR MEDICATIONS:   START ON 5/25/2024 aspirin EC tablet 81 mg, 81 mg, oral, Daily    midodrine (Proamatine) tablet 10 mg, 10 mg, oral, BID  She had been on Eliquis anticoagulation prior to admission as well.    PHYSICAL EXAM:  BP (!) 94/42   Pulse 80   Temp 36.6 °C (97.9 °F)   Resp 22   Ht 1.651 m (5' 5\")   Wt 76.5 kg (168 lb 10.4 oz)   SpO2 99%   BMI 28.07 kg/m²   Gen: Pleasant woman who appears to be chronically ill; in no distress.  HEENT: Supplemental oxygen by nasal cannula.  Neck: No JVD  Cardiac: Regular rhythm with occasional extrasystoles but no murmurs.    Resp: Diminished breath sounds throughout  Abd: Slightly obese but benign   Ext: No peripheral edema    EKG (5/23/2024 at 16:08): Probable classic atrial flutter with 2:1 AV conduction; incomplete left bundle branch block    MONITOR: Sinus rhythm with premature ventricular complexes    LABS:  Lab Results   Component Value Date    WBC 6.7 05/24/2024    HGB 9.8 (L) 05/24/2024    HCT 31.2 (L) 05/24/2024     (H) 05/24/2024     05/24/2024      Lab Results   Component Value Date    GLUCOSE 223 (H) 05/24/2024    CALCIUM 8.4 (L) 05/24/2024     05/24/2024    K 3.9 05/24/2024    CO2 30 05/24/2024    CL 97 (L) 05/24/2024    BUN 25 (H) 05/24/2024    CREATININE 1.30 (H) 05/24/2024          "

## 2024-05-24 NOTE — CONSULTS
Consults    I48.92 Atrial flutter intermittent 2-1 conduction  J44.9 Advanced severe COPD  I42.9 I50.92 Mixed systolic diastolic heart failure  J18.9 Community-acquired pneumonia        History Of Present Illness:    Mitzi Pro is a 73 y.o. female presenting with the above main issues Case discussed with me by Dr. Polly Valentin requesting second opinion.  Was also seen by Dr. Garcia.  From EP.  I am on-call for HPI cardiology for the weekend and have been asked for second opinion and to establish relationship as I will be following her throughout the holiday weekend EP was asked to see given her pulmonary disease if ablation caval isthmus is a consideration.  She is known to me from April 30, 2024 admission she has a remote anterior MI later cardiac cath showed occluded vein graft to the LAD and occluded vein graft to the RCA she was seen in December 2022 with atrial fibrillation treated with amiodarone then as well approximately a year ago she had an ovarian mass and uterine mass resection.    She normally sees Dr. Sharma in [facility].  Last cardiac cath February 2023 EF 30% 34% on echo in April.  Patient with known coronary artery disease remote CABG known occlusion right coronary artery with left-sided collaterals more recent stent to the ramus    She has paroxysmal counterclockwise isthmus dependent flutter possibly related to her lung disease her [id number] score is high 6 multiple factors but no history of stroke on chronic Eliquis    She presented to the ED with tachycardia underwent emergent cardioversion has incomplete left bundle branch block hypertension diabetes dyslipidemia and remote ovarian cancer earlier this admission she was contemplating discussing palliative care hospice options she had remote bypass in the 1990s she has been seen on several occasions by EP    She presented in the ED with acute respiratory distress heart rates in the 150s and subsequently her blood pressure dropped to  80/50 she was treated with doxycycline and Rocephin Solu-Medrol Lasix and received Ativan she had wide-complex intermittent arrhythmias was started on IV amiodarone on my recommendation because her primary cardiologist is with [facility] I initially recommended reaching out to Dr. Maurer later I was requested to give a second opinion consult by her      4/30/2024 echo       CONCLUSIONS:   1. Left ventricular systolic function is moderately decreased with a 34 % estimated ejection fraction.   2. Multiple segmental abnormalities exist. See findings.   3. Spectral Doppler shows an impaired relaxation pattern of left ventricular diastolic filling.   4. There is an elevated left ventricular end diastolic pressure.   5. There is mildly reduced right ventricular systolic function.   6. Moderate mitral valve regurgitation.   7. Mildly elevated RVSP.   8. Aortic valve sclerosis.   9. There are multiple wall motion abnormalities.    February 22, 2024 cardiac catheterization  Coronary Angiography:  The coronary circulation is right dominant.     Left Main Coronary Artery:  The left main coronary artery is free of atherosclerotic disease.     Left Anterior Descending Coronary Artery Distribution:  The Left Anterior Descending artery is a large vessel. There is 100% stenosis in the mid left anterior descending artery. Hemodynamically significant obstruction is noted in this vessel and fills via collaterals.     Circumflex Coronary Artery Distribution:  The Left Circumflex artery is a large vessel. Presents luminal irregularities and contains patent previously placed stents.     Right Coronary Artery Distribution:     The Right Coronary Artery is a large vessel. Hemodynamically significant obstruction is noted in this vessel and fills via collaterals. There is 100% stenosis in the the proximal Right Coronary Artery.        Left Ventriculography:  The estimated left ventricular ejection fraction is abnormal at 30%. There is  anterolateral hypokinesis noted.    MH:  CAD - prior CABG. Last cardiac cath 2007 with occluded LAD, widely patent ramus stent, minimal disease of the LCx, occluded RCA with right-to-right collaterals, occluded SVG to RCA, occluded and thrombotic SVG to LAD. LIMA not used as graft. Last stress  with fixed anterior infarct without ischemia  CHF - LVEF 45-50% by echo 2015, 35-40% here.   COPD / home oxygen therapy  Atrial fibrillation  Carotid vascular disease  DM with CKD, PAD, and peripheral neruopathy  Edema  HTN  GERD  Glaucoma  LBBB and RBBB seen in past  Mixed HLD  Vitamin D deficiency  Ovarian cancer?  - little details     PSH: CABG, BSO  SH: former smoker  FH: CAD in father, sister, colon cancer in sister      Active Problems  Problems    · Abnormal stress ECG with treadmill (794.39) (R94.39)   · Allergic rhinitis (477.9) (J30.9)   · Atopic dermatitis (691.8) (L20.9)   · Atrial fibrillation (427.31) (I48.91)   · Bilateral carotid artery stenosis (433.10,433.30) (I65.23)   · BMI 29.0-29.9,adult (V85.25) (Z68.29)   · CAD (coronary artery disease) (414.00) (I25.10)   · Carotid artery disease (447.9) (I77.9)   · Colon cancer screening (V76.51) (Z12.11)   · COPD (chronic obstructive pulmonary disease) (496) (J44.9)   · Diabetes mellitus with kidney complication (250.40) (E11.29)   · Diabetes mellitus with peripheral vascular disease (250.70,443.81) (E11.51)   · Diabetic polyneuropathy associated with type 2 diabetes mellitus (250.60,357.2)  (E11.42)   · Edema (782.3) (R60.9)   · Encounter for immunization (V03.89) (Z23)   · Essential hypertension (401.9) (I10)   · Extremity atherosclerosis with intermittent claudication (440.21) (I70.219)   · Female incontinence (625.6) (R32)   · GERD (gastroesophageal reflux disease) (530.81) (K21.9)   · Glaucoma (365.9) (H40.9)   · Hematuria (599.70) (R31.9)   · HTN (hypertension) (401.9) (I10)   · Hyperlipidemia (272.4) (E78.5)   · LBBB (left bundle branch block) (426.3)  (I44.7)   · Mixed hyperlipidemia (272.2) (E78.2)   · Overweight (278.02) (E66.3)   · Peripheral vascular disease (443.9) (I73.9)   · Pulmonary nodules (793.19) (R91.8)   · RBBB (426.4) (I45.10)   · Stage 3a chronic kidney disease (585.3) (N18.31)   · Type 2 diabetes mellitus with hyperglycemia, without long-term current use of insulin  (250.00,790.29) (E11.65)   · Vaginal itching (698.1) (N89.8)   · Vitamin D deficiency (268.9) (E55.9)     Surgical History  Problems    · History of Bypass   · History of Coronary artery bypass graft   · History of Coronary Artery Surgery   · History of Salpingo-oophorectomy     Past Medical History  Problems    · History of ovarian cancer (V10.43) (Z85.43)           Past Medical/Surgical History:        Medical History:         Atherosclerosis of right lower extremity with intermittent claudication : Onset Date: 04-Nov-2021         Atherosclerosis of left lower extremity with intermittent claudication : Onset Date: 04-Nov-2021         Peripheral arterial disease: Onset Date: 04-Nov-2021         Right leg swelling: Onset Date: 04-Oct-2021         Left leg swelling: Onset Date: 04-Oct-2021         Ovarian low malignant potential tumor:          Medically complex patient:          Hypertension:          Diabetes:          History of myocardial infarction:          History of anterolateral myocardial infarction:        Assessment and plan  1. Ischemic cardiomyopathy: With a previous EF of about 45 to 50% as noted above and recent admission for possible respiratory failure as well as a CHF exacerbation. At this time will recommend repeat ischemic evaluation with a repeat cardiac catheterization to rule out any significant obstructive CAD. In the meantime, we will continue with current heart failure medication and make further recommendations based on the recent results of the cardiac catheterization.  2. Hypertension: Blood pressures well controlled on current medications which we will  continue.  3. CAD s/p CABG as noted above with occluded grafts and old anterior MI as noted above, will proceed with repeat cardiac catheterization as noted above.  4. Continued smoking: Patient is encouraged to stop smoking          Last Recorded Vitals:  Vitals:    05/24/24 1500 05/24/24 1600 05/24/24 1700 05/24/24 1800   BP: 127/60 (!) 112/42 (!) 118/49 (!) 104/48   BP Location:       Patient Position:       Pulse: 96 96 92 90   Resp: 24 (!) 27 18 23   Temp:  36 °C (96.8 °F)     TempSrc:       SpO2: 94% 95% 97% 98%   Weight:       Height:           Last Labs:  CBC - 5/24/2024:  2:55 AM  6.7 9.8 168    31.2      CMP - 5/24/2024:  2:55 AM  8.4 7.0 33 --- 0.8   4.7 3.2 20 58      PTT - No results in last year.  1.8   19.9 _     Troponin I, High Sensitivity   Date/Time Value Ref Range Status   05/23/2024 05:32  (HH) 0 - 13 ng/L Final     Comment:     Previous result verified on 5/23/2024 1652 on specimen/case [id number] called with component [facility] for procedure Troponin I, High Sensitivity, Initial with value 264 ng/L.   05/23/2024 04:12  (HH) 0 - 13 ng/L Final   05/02/2024 11:08  (HH) 0 - 13 ng/L Final     BNP   Date/Time Value Ref Range Status   05/23/2024 04:12 PM 1,005 (H) 0 - 99 pg/mL Final   05/01/2024 04:21  (H) 0 - 99 pg/mL Final     POC HEMOGLOBIN A1c   Date/Time Value Ref Range Status   05/14/2024 10:52 AM 6.3 4.2 - 6.5 % Final   10/04/2023 02:20 PM 6.5 4.2 - 6.5 % Final     Hemoglobin A1C   Date/Time Value Ref Range Status   05/23/2024 04:12 PM 6.6 (H) see below % Final     VLDL   Date/Time Value Ref Range Status   04/06/2022 10:04 AM 29 0 - 40 mg/dL Final   03/03/2021 09:28 AM 44 (H) 0 - 40 mg/dL Final   05/04/2020 09:14 AM 55 (H) 0 - 40 mg/dL Final      Last I/O:  No intake/output data recorded.    Past Cardiology Tests (Last 3 Years):  EKG:  Electrocardiogram, 12-lead PRN ACS symptoms 05/23/2024 (Preliminary)      ECG 12 lead 05/23/2024 (Preliminary)      Electrocardiogram,  "12-lead PRN ACS symptoms 05/23/2024 (Preliminary)      ECG 12 lead 05/23/2024 (Preliminary)      ECG 12 Lead 05/03/2024      ECG 12 Lead 05/02/2024      ECG 12 Lead 05/02/2024      ECG 12 Lead 05/02/2024 (Preliminary)      ECG 12 Lead 05/01/2024      ECG 12 lead 04/30/2024      ECG 12 lead 04/30/2024    Echo:  Transthoracic Echo (TTE) Complete 04/30/2024    Ejection Fractions:  No results found for: \"EF\"  Cath:  No results found for this or any previous visit from the past 1095 days.    Stress Test:  No results found for this or any previous visit from the past 1095 days.    Cardiac Imaging:  No results found for this or any previous visit from the past 1095 days.      Past Medical History:  She has a past medical history of Personal history of malignant neoplasm of ovary and Vaginal itching (05/14/2024).    Past Surgical History:  She has a past surgical history that includes Coronary artery bypass graft (11/20/2017); Other surgical history (05/17/2022); Other surgical history (05/17/2022); Other surgical history (11/08/2019); and CT angio aorta and bilateral iliofemoral runoff w and or wo IV contrast (12/21/2021).      Social History:  She reports that she has been smoking cigarettes. She has never used smokeless tobacco. She reports that she does not currently use alcohol. She reports that she does not use drugs.    Family History:  Family History   Problem Relation Name Age of Onset    Other (arteriosclerotic cardiovascular disease) Mother      Other (arteriosclerotic cardiovascular disease) Father      Colon cancer Sister          Allergies:  Metformin and Nitrofurantoin    Inpatient Medications:  Scheduled medications   Medication Dose Route Frequency    apixaban  5 mg oral BID    [START ON 5/25/2024] aspirin  81 mg oral Daily    atorvastatin  40 mg oral Daily    cefTRIAXone  1 g intravenous q24h    cilostazol  100 mg oral BID    doxycycline  100 mg intravenous q12h    formoterol  20 mcg nebulization q12h    " icosapent ethyL  2 g oral BID    insulin lispro  0-10 Units subcutaneous TID    ipratropium  0.5 mg nebulization q6h    ipratropium-albuteroL  3 mL nebulization 4x daily    loratadine  10 mg oral Daily    melatonin  3 mg oral Nightly    methylPREDNISolone sodium succinate (PF)  40 mg intravenous Daily    [START ON 5/25/2024] metoprolol succinate XL  50 mg oral Daily    midodrine  10 mg oral BID    oxybutynin  5 mg oral BID    oxygen   inhalation Continuous - Inhalation    oxygen   inhalation Continuous - Inhalation    [START ON 5/25/2024] pantoprazole  40 mg oral Daily before breakfast    [START ON 5/25/2024] pioglitazone  30 mg oral Daily    polyethylene glycol  17 g oral BID    sennosides  2 tablet oral BID    [START ON 5/25/2024] SITagliptin phosphate  50 mg oral Daily     PRN medications   Medication    acetaminophen    Or    acetaminophen    Or    acetaminophen    dextrose    dextrose    fluticasone    glucagon    glucagon    ipratropium-albuteroL    ondansetron ODT    Or    ondansetron    [START ON 5/25/2024] sodium chloride     Continuous Medications   Medication Dose Last Rate     Outpatient Medications:  Current Outpatient Medications   Medication Instructions    albuterol 90 mcg/actuation inhaler INHALE 2 PUFFS EVERY 4 HOURS AS NEEDED FOR WHEEZING (FILL WITH THESE DIRECTIONS.)    atorvastatin (LIPITOR) 40 mg, oral, Daily    blood sugar diagnostic strip 1 strip, miscellaneous, Daily    cholecalciferol (Vitamin D-3) 5,000 Units tablet 1 tablet, oral, Daily    cilostazol (PLETAL) 100 mg, oral, 2 times daily    Dexcom G7  misc Use as instructed    Dexcom G7 Sensor device As directed, 1 kit    Eliquis 5 mg, oral, 2 times daily    fluticasone (Flonase) 50 mcg/actuation nasal spray 1 spray, Each Nostril, Daily PRN    FreeStyle lancets 28 gauge 1 daily    furosemide (LASIX) 20 mg, oral, Daily PRN    glyBURIDE (DIABETA) 10 mg, oral, 2 times daily    icosapent ethyL (VASCEPA) 2 g, oral, 2 times daily     ipratropium-albuteroL (Duo-Neb) 0.5-2.5 mg/3 mL nebulizer solution 3 mL, nebulization, 4 times daily RT    loratadine (CLARITIN) 10 mg, oral, Daily    metoprolol succinate XL (TOPROL-XL) 100 mg, oral, Daily, Do not crush or chew.    OneTouch Verio Flex meter misc 1 EACH IF NEED TO TEST GLUCOSE ONCE DAILY    oxybutynin XL (DITROPAN-XL) 10 mg, oral, Daily    oxygen (O2) 3 L/min, inhalation, Continuous    pantoprazole (PROTONIX) 40 mg, oral, Daily before breakfast    pioglitazone (ACTOS) 30 mg, oral, Daily    SITagliptin phosphate (JANUVIA) 50 mg, oral, Daily    sodium chloride (Ocean) 0.65 % nasal spray 1 spray, Each Nostril, 4 times daily PRN    umeclidinium-vilanteroL (Anoro Ellipta) 62.5-25 mcg/actuation blister with device 1 puff, inhalation, Daily     Results for orders placed or performed during the hospital encounter of 05/23/24 (from the past 96 hour(s))   ECG 12 lead   Result Value Ref Range    Ventricular Rate 157 BPM    Atrial Rate 157 BPM    ID Interval 120 ms    QRS Duration 96 ms    QT Interval 322 ms    QTC Calculation(Bazett) 520 ms    P Axis -85 degrees    R Axis 91 degrees    T Axis -89 degrees    QRS Count 26 beats    Q Onset 216 ms    P Onset 156 ms    P Offset 190 ms    T Offset 377 ms    QTC Fredericia 443 ms   Sars-CoV-2 PCR   Result Value Ref Range    Coronavirus 2019, PCR Not Detected Not Detected   BLOOD GAS ARTERIAL FULL PANEL   Result Value Ref Range    POCT pH, Arterial 7.27 (L) 7.38 - 7.42 pH    POCT pCO2, Arterial 64 (H) 38 - 42 mm Hg    POCT pO2, Arterial 73 (L) 85 - 95 mm Hg    POCT SO2, Arterial 94 94 - 100 %    POCT Oxy Hemoglobin, Arterial 91.4 (L) 94.0 - 98.0 %    POCT Hematocrit Calculated, Arterial 37.0 36.0 - 46.0 %    POCT Sodium, Arterial 132 (L) 136 - 145 mmol/L    POCT Potassium, Arterial 4.5 3.5 - 5.3 mmol/L    POCT Chloride, Arterial 99 98 - 107 mmol/L    POCT Ionized Calcium, Arterial 1.19 1.10 - 1.33 mmol/L    POCT Glucose, Arterial 320 (H) 74 - 99 mg/dL    POCT Lactate,  Arterial 5.6 (HH) 0.4 - 2.0 mmol/L    POCT Base Excess, Arterial 1.1 -2.0 - 3.0 mmol/L    POCT HCO3 Calculated, Arterial 29.4 (H) 22.0 - 26.0 mmol/L    POCT Hemoglobin, Arterial 12.4 12.0 - 16.0 g/dL    POCT Anion Gap, Arterial 8 (L) 10 - 25 mmo/L    Patient Temperature      FiO2 100 %    Critical Called By DAMON RRT     Critical Called To MD SILVER     Critical Call Time 1612     Critical Read Back Y    CBC and Auto Differential   Result Value Ref Range    WBC 10.5 4.4 - 11.3 x10*3/uL    nRBC 0.0 0.0 - 0.0 /100 WBCs    RBC 3.78 (L) 4.00 - 5.20 x10*6/uL    Hemoglobin 11.9 (L) 12.0 - 16.0 g/dL    Hematocrit 38.1 36.0 - 46.0 %     (H) 80 - 100 fL    MCH 31.5 26.0 - 34.0 pg    MCHC 31.2 (L) 32.0 - 36.0 g/dL    RDW 13.3 11.5 - 14.5 %    Platelets 237 150 - 450 x10*3/uL    Neutrophils % 84.2 40.0 - 80.0 %    Immature Granulocytes %, Automated 0.5 0.0 - 0.9 %    Lymphocytes % 10.1 13.0 - 44.0 %    Monocytes % 4.9 2.0 - 10.0 %    Eosinophils % 0.1 0.0 - 6.0 %    Basophils % 0.2 0.0 - 2.0 %    Neutrophils Absolute 8.84 (H) 1.60 - 5.50 x10*3/uL    Immature Granulocytes Absolute, Automated 0.05 0.00 - 0.50 x10*3/uL    Lymphocytes Absolute 1.06 0.80 - 3.00 x10*3/uL    Monocytes Absolute 0.51 0.05 - 0.80 x10*3/uL    Eosinophils Absolute 0.01 0.00 - 0.40 x10*3/uL    Basophils Absolute 0.02 0.00 - 0.10 x10*3/uL   Comprehensive Metabolic Panel   Result Value Ref Range    Glucose 297 (H) 74 - 99 mg/dL    Sodium 133 (L) 136 - 145 mmol/L    Potassium 5.8 (H) 3.5 - 5.3 mmol/L    Chloride 96 (L) 98 - 107 mmol/L    Bicarbonate 28 21 - 32 mmol/L    Anion Gap 15 10 - 20 mmol/L    Urea Nitrogen 21 6 - 23 mg/dL    Creatinine 1.33 (H) 0.50 - 1.05 mg/dL    eGFR 42 (L) >60 mL/min/1.73m*2    Calcium 9.0 8.6 - 10.3 mg/dL    Albumin 3.7 3.4 - 5.0 g/dL    Alkaline Phosphatase 58 33 - 136 U/L    Total Protein 7.0 6.4 - 8.2 g/dL    AST 33 9 - 39 U/L    Bilirubin, Total 0.8 0.0 - 1.2 mg/dL    ALT 20 7 - 45 U/L   Magnesium   Result Value Ref Range     Magnesium 2.08 1.60 - 2.40 mg/dL   B-type natriuretic peptide   Result Value Ref Range    BNP 1,005 (H) 0 - 99 pg/mL   Troponin I, High Sensitivity, Initial   Result Value Ref Range    Troponin I, High Sensitivity 264 (HH) 0 - 13 ng/L   Hemoglobin A1C   Result Value Ref Range    Hemoglobin A1C 6.6 (H) see below %    Estimated Average Glucose 143 Not Established mg/dL   Electrocardiogram, 12-lead PRN ACS symptoms   Result Value Ref Range    Ventricular Rate 160 BPM    Atrial Rate 160 BPM    MD Interval 114 ms    QRS Duration 106 ms    QT Interval 244 ms    QTC Calculation(Bazett) 398 ms    P Axis -74 degrees    R Axis 92 degrees    T Axis 255 degrees    QRS Count 26 beats    Q Onset 215 ms    P Onset 158 ms    P Offset 207 ms    T Offset 337 ms    QTC Fredericia 338 ms   ECG 12 lead   Result Value Ref Range    Ventricular Rate 158 BPM    Atrial Rate 158 BPM    MD Interval 130 ms    QRS Duration 112 ms    QT Interval 312 ms    QTC Calculation(Bazett) 505 ms    R Axis 80 degrees    T Axis 269 degrees    QRS Count 27 beats    Q Onset 193 ms    P Onset 138 ms    P Offset 174 ms    T Offset 349 ms    QTC Fredericia 431 ms   Troponin, High Sensitivity, 1 Hour   Result Value Ref Range    Troponin I, High Sensitivity 247 (HH) 0 - 13 ng/L   Blood Gas Venous Full Panel Unsolicited   Result Value Ref Range    POCT pH, Venous 7.36 7.33 - 7.43 pH    POCT pCO2, Venous 60 (H) 41 - 51 mm Hg    POCT pO2, Venous 49 (H) 35 - 45 mm Hg    POCT SO2, Venous 71 45 - 75 %    POCT Oxy Hemoglobin, Venous 69.7 45.0 - 75.0 %    POCT Hematocrit Calculated, Venous 35.0 (L) 36.0 - 46.0 %    POCT Sodium, Venous 131 (L) 136 - 145 mmol/L    POCT Potassium, Venous 4.3 3.5 - 5.3 mmol/L    POCT Chloride, Venous 100 98 - 107 mmol/L    POCT Ionized Calicum, Venous 1.17 1.10 - 1.33 mmol/L    POCT Glucose, Venous 262 (H) 74 - 99 mg/dL    POCT Lactate, Venous 2.3 (H) 0.4 - 2.0 mmol/L    POCT Base Excess, Venous 6.8 (H) -2.0 - 3.0 mmol/L    POCT HCO3  Calculated, Venous 33.9 (H) 22.0 - 26.0 mmol/L    POCT Hemoglobin, Venous 11.5 (L) 12.0 - 16.0 g/dL    POCT Anion Gap, Venous 1.0 (L) 10.0 - 25.0 mmol/L    Patient Temperature     Electrocardiogram, 12-lead PRN ACS symptoms   Result Value Ref Range    Ventricular Rate 99 BPM    Atrial Rate 99 BPM    VT Interval 146 ms    QRS Duration 108 ms    QT Interval 338 ms    QTC Calculation(Bazett) 433 ms    P Axis 92 degrees    R Axis 108 degrees    T Axis 192 degrees    QRS Count 16 beats    Q Onset 215 ms    P Onset 142 ms    P Offset 200 ms    T Offset 384 ms    QTC Fredericia 399 ms   Procalcitonin   Result Value Ref Range    Procalcitonin 0.25 (H) <=0.07 ng/mL   Renal function panel   Result Value Ref Range    Glucose 233 (H) 74 - 99 mg/dL    Sodium 136 136 - 145 mmol/L    Potassium 3.9 3.5 - 5.3 mmol/L    Chloride 97 (L) 98 - 107 mmol/L    Bicarbonate 30 21 - 32 mmol/L    Anion Gap 13 10 - 20 mmol/L    Urea Nitrogen 25 (H) 6 - 23 mg/dL    Creatinine 1.30 (H) 0.50 - 1.05 mg/dL    eGFR 43 (L) >60 mL/min/1.73m*2    Calcium 8.3 (L) 8.6 - 10.3 mg/dL    Phosphorus 4.7 2.5 - 4.9 mg/dL    Albumin 3.2 (L) 3.4 - 5.0 g/dL   Magnesium   Result Value Ref Range    Magnesium 1.60 1.60 - 2.40 mg/dL   Lactate   Result Value Ref Range    Lactate 1.1 0.4 - 2.0 mmol/L   Renal Function Panel   Result Value Ref Range    Glucose 223 (H) 74 - 99 mg/dL    Sodium 136 136 - 145 mmol/L    Potassium 3.9 3.5 - 5.3 mmol/L    Chloride 97 (L) 98 - 107 mmol/L    Bicarbonate 30 21 - 32 mmol/L    Anion Gap 13 10 - 20 mmol/L    Urea Nitrogen 25 (H) 6 - 23 mg/dL    Creatinine 1.30 (H) 0.50 - 1.05 mg/dL    eGFR 43 (L) >60 mL/min/1.73m*2    Calcium 8.4 (L) 8.6 - 10.3 mg/dL    Phosphorus 4.7 2.5 - 4.9 mg/dL    Albumin 3.2 (L) 3.4 - 5.0 g/dL   CBC   Result Value Ref Range    WBC 6.7 4.4 - 11.3 x10*3/uL    nRBC 0.0 0.0 - 0.0 /100 WBCs    RBC 3.10 (L) 4.00 - 5.20 x10*6/uL    Hemoglobin 9.8 (L) 12.0 - 16.0 g/dL    Hematocrit 31.2 (L) 36.0 - 46.0 %     (H)  80 - 100 fL    MCH 31.6 26.0 - 34.0 pg    MCHC 31.4 (L) 32.0 - 36.0 g/dL    RDW 13.0 11.5 - 14.5 %    Platelets 168 150 - 450 x10*3/uL   Magnesium   Result Value Ref Range    Magnesium 1.60 1.60 - 2.40 mg/dL   POCT GLUCOSE   Result Value Ref Range    POCT Glucose 174 (H) 74 - 99 mg/dL   POCT GLUCOSE   Result Value Ref Range    POCT Glucose 182 (H) 74 - 99 mg/dL   POCT GLUCOSE   Result Value Ref Range    POCT Glucose 252 (H) 74 - 99 mg/dL       Physical Exam:  Subjective:   Examination:   General Examination:   General Appearance: Well developed, well nourished, in no acute distress.   Head: normocephalic, atraumatic   Eyes: Anicteric sclera. Pupils are equally round and reactive to light.  Extraocular movements are intact.    Ears: normal   Oral: Cavity: mucosa moist.   Throat: clear.   Neck/Thyroid: neck supple, full range of motion, no cervical lymphadenopathy.   Skin: warm and dry, no suspicious lesions.    Heart: regular rate and rhythm, S1, S2 normal, no murmurs.  Occasional PACs  Lungs: copd but distant breath sounds with no audible wheeze  Abdomen: soft, non-tender, non-distended, bowl sound present, normal.   Extremities: no clubbing, no cyanosis, no edema.   Neuro: non-focal, motor strength normal upper lower extremities, sensory exam intact.       Assessment/Plan     I48.92 Atrial flutter intermittent 2-1 conduction  J44.9 Advanced severe COPD  I42.9 I50.92 Mixed systolic diastolic heart failure  J18.9 Community-acquired pneumonia    Code Status:  DNR and No Intubation and No ICU    I spent 35 minutes in the professional and overall care of this patient.        Horacio Armijo MD

## 2024-05-24 NOTE — NURSING NOTE
Notified Dr. Mora that most recent BP is 83/45 (58), HR 72. Was ordered to discontinue the amiodarone drip at this time. Will continue to monitor.

## 2024-05-24 NOTE — H&P
History Of Present Illness  Mitzi Pro is a 73 y.o. female presenting with extensive cardiac history including atrial fibrillation/flutter see below as well as COPD on 2l of O2 at night, presenting to the emergency department with respiratory distress from home, she called EMS when they arrived she was 88% on her baseline 2 L, they placed her on 6 L of oxygen with appropriate saturations however she was still complaining of severe dyspnea she was subsequently brought to the emergency room.  She reports around 2 days of increasing shortness of breath, she denies chest pain, fevers, chills, sweats.  She endorses a cough that is nonproductive.    ED course  She was tachycardic and normotensive on arrival in respiratory distress, was attempted to place the patient on BiPAP however she was unable to tolerate this, she was in atrial flutter with a heart rate in the 150s, initially normotensive however subsequently her blood pressure did drop with systolics in the 80s and 90s, she is already anticoagulated on Eliquis and she was cardioverted in the ED with return to normal sinus rhythm and was subsequently normotensive, discussed with cardiology in the ED and she was admitted for acute hypoxic respiratory failure secondary to pneumonia versus acute exacerbation of heart failure, management of recurrent atrial flutter and hyperkalemia.  Received doxycycline, Rocephin, Solu-Medrol 125, 50 mcg of fentanyl, 1 mg of Ativan and Lasix 40 mg IV in the ED  Following cardioversion she did have runs of nonsustained V. tach on telemetry and she was started on amiodarone infusion with resolution  Post cardioversion EKG showing sinus rhythm with PVCs, T wave flattening in the inferior leads, no romulo ST segment depressions, no elevations, troponin slightly elevated but likely consistent with at 264/247, patient denying chest pain and troponin elevation likely due to rapid rates in the in the setting of known coronary artery  "disease       PMH/PSX: HFprEF EF 35% ECHO 4/2024, MI/ICM s/p remote CABG, atrial fibrillation/typical flutter on Eliquis, COPD on home oxygen, , carotid vascular disease, uterine surgery for fibroids and postmenopausal bleeding, CKD, PAD, peripheral neuropathy, primary hypertension, bifascicular block,Ovarian cancer s/p BSO  CAD - prior CABG. Last cardiac cath 2007 with occluded LAD, widely patent ramus stent, minimal disease of the LCx, occluded RCA with right-to-right collaterals, occluded SVG to RCA, occluded and thrombotic SVG to LAD. LIMA not used as graft. Last stress  with fixed anterior infarct without ischemia  FamHx: Mother/Father - CAD  SocHx: The patient is , but lives with an 80-year-old gentleman who is reportedly in good health. She has 1 son who lives locally. She has been a 1 pack/day smoker for over 50 years. She does not drink alcohol. She previously worked in the [employer] industry     Code Status: DNR/DNI    Allergies  Metformin and Nitrofurantoin      Physical Exam  Constitutional: Awake/alert/oriented x3, on BiPAP, no respiratory distress, calm  Eyes: Clear sclera  Head/Neck: Normocephalic, atraumatic  Respiratory/Thorax: Bilateral wheezing  Cardiovascular: RRR.  2/6 systolic murmur at right sternal border  Gastrointestinal: Non-TTP  Musculoskeletal: FROM  Extremities: Bilateral lower extremity pitting edema  Psychological: Appropriate mood and behavior  Skin: Warm and dry, no jaundice     Last Recorded Vitals  Blood pressure 121/60, pulse 79, temperature 36 °C (96.8 °F), temperature source Temporal, resp. rate 18, height 1.6 m (5' 3\"), weight 74.8 kg (165 lb), SpO2 100%.    Relevant Results    Results for orders placed or performed during the hospital encounter of 05/23/24 (from the past 24 hour(s))   Sars-CoV-2 PCR   Result Value Ref Range    Coronavirus 2019, PCR Not Detected Not Detected   BLOOD GAS ARTERIAL FULL PANEL   Result Value Ref Range    POCT pH, Arterial 7.27 (L) 7.38 - " 7.42 pH    POCT pCO2, Arterial 64 (H) 38 - 42 mm Hg    POCT pO2, Arterial 73 (L) 85 - 95 mm Hg    POCT SO2, Arterial 94 94 - 100 %    POCT Oxy Hemoglobin, Arterial 91.4 (L) 94.0 - 98.0 %    POCT Hematocrit Calculated, Arterial 37.0 36.0 - 46.0 %    POCT Sodium, Arterial 132 (L) 136 - 145 mmol/L    POCT Potassium, Arterial 4.5 3.5 - 5.3 mmol/L    POCT Chloride, Arterial 99 98 - 107 mmol/L    POCT Ionized Calcium, Arterial 1.19 1.10 - 1.33 mmol/L    POCT Glucose, Arterial 320 (H) 74 - 99 mg/dL    POCT Lactate, Arterial 5.6 (HH) 0.4 - 2.0 mmol/L    POCT Base Excess, Arterial 1.1 -2.0 - 3.0 mmol/L    POCT HCO3 Calculated, Arterial 29.4 (H) 22.0 - 26.0 mmol/L    POCT Hemoglobin, Arterial 12.4 12.0 - 16.0 g/dL    POCT Anion Gap, Arterial 8 (L) 10 - 25 mmo/L    Patient Temperature      FiO2 100 %    Critical Called By DAMON RRT     Critical Called To MD SILVER     Critical Call Time 1612     Critical Read Back Y    CBC and Auto Differential   Result Value Ref Range    WBC 10.5 4.4 - 11.3 x10*3/uL    nRBC 0.0 0.0 - 0.0 /100 WBCs    RBC 3.78 (L) 4.00 - 5.20 x10*6/uL    Hemoglobin 11.9 (L) 12.0 - 16.0 g/dL    Hematocrit 38.1 36.0 - 46.0 %     (H) 80 - 100 fL    MCH 31.5 26.0 - 34.0 pg    MCHC 31.2 (L) 32.0 - 36.0 g/dL    RDW 13.3 11.5 - 14.5 %    Platelets 237 150 - 450 x10*3/uL    Neutrophils % 84.2 40.0 - 80.0 %    Immature Granulocytes %, Automated 0.5 0.0 - 0.9 %    Lymphocytes % 10.1 13.0 - 44.0 %    Monocytes % 4.9 2.0 - 10.0 %    Eosinophils % 0.1 0.0 - 6.0 %    Basophils % 0.2 0.0 - 2.0 %    Neutrophils Absolute 8.84 (H) 1.60 - 5.50 x10*3/uL    Immature Granulocytes Absolute, Automated 0.05 0.00 - 0.50 x10*3/uL    Lymphocytes Absolute 1.06 0.80 - 3.00 x10*3/uL    Monocytes Absolute 0.51 0.05 - 0.80 x10*3/uL    Eosinophils Absolute 0.01 0.00 - 0.40 x10*3/uL    Basophils Absolute 0.02 0.00 - 0.10 x10*3/uL   Comprehensive Metabolic Panel   Result Value Ref Range    Glucose 297 (H) 74 - 99 mg/dL    Sodium 133 (L) 136 -  145 mmol/L    Potassium 5.8 (H) 3.5 - 5.3 mmol/L    Chloride 96 (L) 98 - 107 mmol/L    Bicarbonate 28 21 - 32 mmol/L    Anion Gap 15 10 - 20 mmol/L    Urea Nitrogen 21 6 - 23 mg/dL    Creatinine 1.33 (H) 0.50 - 1.05 mg/dL    eGFR 42 (L) >60 mL/min/1.73m*2    Calcium 9.0 8.6 - 10.3 mg/dL    Albumin 3.7 3.4 - 5.0 g/dL    Alkaline Phosphatase 58 33 - 136 U/L    Total Protein 7.0 6.4 - 8.2 g/dL    AST 33 9 - 39 U/L    Bilirubin, Total 0.8 0.0 - 1.2 mg/dL    ALT 20 7 - 45 U/L   Magnesium   Result Value Ref Range    Magnesium 2.08 1.60 - 2.40 mg/dL   B-type natriuretic peptide   Result Value Ref Range    BNP 1,005 (H) 0 - 99 pg/mL   Troponin I, High Sensitivity, Initial   Result Value Ref Range    Troponin I, High Sensitivity 264 (HH) 0 - 13 ng/L   Troponin, High Sensitivity, 1 Hour   Result Value Ref Range    Troponin I, High Sensitivity 247 (HH) 0 - 13 ng/L        Results for orders placed during the hospital encounter of 05/23/24    XR chest 1 view    Narrative  Interpreted By:  Nandini Bui,  STUDY:  XR CHEST 1 VIEW;  5/23/2024 4:57 pm    INDICATION:  Signs/Symptoms:Shortness of breath, history of CHF and COPD.    COMPARISON:  05/02/2024.    ACCESSION NUMBER(S):  [id number]    ORDERING CLINICIAN:  DAVID SAUCEDA    FINDINGS:          CARDIOMEDIASTINAL SILHOUETTE:  Status post sternotomy. Heart is mildly enlarged. There is venous  congestion and interstitial edema.    LUNGS:  There is atelectasis/infiltrate in the right lower lobe seen also on  the radiograph of 05/02/2024. No evidence of pneumothorax.    ABDOMEN:  No remarkable upper abdominal findings.    BONES:  No acute osseous changes.    Impression  1. Cardiomegaly with venous congestion and interstitial edema  consistent with congestive heart failure.  2. Infiltrate/atelectasis in the right lung base. Rule out pneumonia.        MACRO:  None    Signed by: Nandini Bui 5/23/2024 5:20 PM  Dictation workstation:   [id number]      Assessment/Plan    Principal  Problem:    Acute combined systolic and diastolic heart failure (Multi)  Active Problems:    Coronary artery disease involving native heart    Stage 3a chronic kidney disease (Multi)    Acute hypoxic respiratory failure (Multi)    Atrial flutter with rapid ventricular response (Multi)    Lactic acidosis    Hyperkalemia    Acute exacerbation of COPD with asthma (Multi)    Community acquired pneumonia of right lower lobe of lung    NSTEMI (non-ST elevated myocardial infarction) (Multi)    Nonsustained ventricular tachycardia (Multi)  Uncertain cause of her decompensation and respiratory failure today, possibly multifactorial including COPD exacerbation, acute heart failure, pneumonia.  Will treat and workup all underlying conditions pending cardiology input and further data to tailor therapy.  Prior to transfer to the floor she was titrated off of BiPAP therapy onto high flow nasal cannula with appropriate oxygen saturation.  Plan  Full dose aspirin now followed by 81 twice daily  2 View Chest x-ray in the A.M.  Respiratory cultures pending  Urinary antigens pending  Procalcitonin pending for antibiotic discontinuation  Continue ceftriaxone and doscycycline to complete 5-day course  Cusic scheduled DuoNebs, every 4 hours DuoNebs as needed  Strict I's and O's  Solu-Medrol 40 mg daily x 5 days  Trending electrolytes and CBC  Cardiac telemetry  Continue amiodarone infusion pending cardiology and electrophysiology input  Continue home [place]  Had an echocardiogram in April of this year, will defer repeat echo to cardiology  Will hold further IV diuresis, follow I's and O's closely and determine need for further IV diuresis based on clinical course received 40 mg of IV Lasix in the ED, it appears she takes 20 mg of p.o. Lasix as needed at home, she does have significant peripheral edema however she remains slightly hypotensive with a systolic blood pressure of 95 following cardioversion, will hold further IV diuresis  pending clinical course, urine output and chest x-ray in the morning      Dispo: Pending PT/OT eval. LOS > 2 MN    Code Status: DNR/DNI    Plan of care was discussed extensively with patient. Patient verbalized understanding through teach back method. All questions and concerns addressed upon examination.   Martin Mora DO  Complexity: High  ###Of note, this documentation is completed using the Dragon Dictation system (voice recognition software). There may be spelling and/or grammatical errors that were not corrected prior to final submission.**

## 2024-05-24 NOTE — CARE PLAN
SHIFT NOTE    Diagnosis:  Acute on chronic respiratory failure    Assessment:    Patient remains on HFNC 50L, 65% and sating well. She didn't complain of any SOB overnight. Patient's blood pressures have been soft (as low as 60's systolic) and patient had a conversation with the hospitalist regarding not wanting escalation of care. Palliative care was put on consult. She received a dose of PO midodrine to see if that increases her BP. She's remained in NSR with occasional PVC's. IV atbx are on board and tolerating well.     Hourly rounding was performed and patient needs were met. Call light within reach. No other acute events, will continue to monitor.       The clinical goals for the shift include Patient will maintain POX >92% by end of shift 5/24/24 at 0700.

## 2024-05-24 NOTE — PROGRESS NOTES
Physical Therapy                 Therapy Communication Note    Patient Name: Mitzi Pro  MRN: [id number]  Today's Date: 5/24/2024     Discipline: Physical Therapy    Missed Visit Reason: Missed Visit Reason: Patient in a medical procedure (Attempt 0924: Pt currently off division for procedure. Will reattempt to evalaute pt as time permits.)    Missed Time: Attempt

## 2024-05-24 NOTE — CARE PLAN
Patient admitted on 5/23 for acute on chronic heart failure. Patient currently weaned to 30L, 55% high flow nasal cannula and does not complain of any shortness of breath. Bps soft, patient remains asymptomatic. See flowsheets for details. All other vitals signs stable. PVCs noted on monitor. Urine sent down, awaiting on sputum sample. Safety maintained. Call light within reach.     Problem: Pain  Goal: My pain/discomfort is manageable  Outcome: Progressing     Problem: Safety  Goal: Patient will be injury free during hospitalization  Outcome: Met  Goal: I will remain free of falls  Outcome: Met     Problem: Daily Care  Goal: Daily care needs are met  Outcome: Met     Problem: Psychosocial Needs  Goal: Demonstrates ability to cope with hospitalization/illness  Outcome: Met  Goal: Collaborate with me, my family, and caregiver to identify my specific goals  Outcome: Progressing     Problem: Discharge Barriers  Goal: My discharge needs are met  Outcome: Progressing     Problem: Respiratory  Goal: Minimize anxiety/maximize coping throughout shift  Outcome: Met  Goal: Minimal/no exertional discomfort or dyspnea this shift  Outcome: Met  Goal: No signs of respiratory distress (eg. Use of accessory muscles. Peds grunting)  Outcome: Met  Goal: Wean oxygen to maintain O2 saturation per order/standard this shift  Outcome: Progressing

## 2024-05-24 NOTE — PROGRESS NOTES
24 1140   Discharge Planning   Living Arrangements   (Friend Frank Espinoza)   Support Systems Children   Assistance Needed Has a paid housekeeping service.   Number of Stairs Within Residence 10  (to basement laundry)   Who is requesting discharge planning? Provider   Home or Post Acute Services Post acute facilities (Rehab/SNF/etc)  (Current with  HHC SN PT OT)   Type of Post Acute Facility Services Skilled nursing   Type of Home Care Services DME or oxygen;Home nursing visits;Home OT;Home PT   Patient expects to be discharged to: discussed SNF until stronger and friend has returned home and able to assist her. Choice list given to her.   Does the patient need discharge transport arranged? Yes   RoundTrip coordination needed? Yes   Patient Choice   Provider Choice list and CMS website (https://medicare.gov/care-compare#search) for post-acute Quality and Resource Measure Data were provided and reviewed with: Patient   Patient / Family choosing to utilize agency / facility established prior to hospitalization Yes     Confirmed name, , address, Phone , insurance  and PCP.  Declines need for a pulmonary Dr.  Declined need for Pulmonary Rehab.  Lives with Frank Espinoza. He is currently visiting his family and wont be back until middle of . Permission to speak with her 53 year old son, Stepan,  if needed. States hs standard portable tanks for o2 at home. They are to heavy for her to carry and on Sundays she is out longer than they last. Message sent to [facility]  to address this issue.  She is requesting help with her laundry , ( in  the basement).  Her pain  wont do laundry.  Requested our  speak with her about community services and possible additional  benefits from the [facility] ( currently receiving).  On High flow o2 50L 65%. Palliative care and  HHC will continue if goes home.. SNF choice and PT OT evals needed.    1545 called [facility] . [facility] pt. Is with healthcare  [facility] for her oxygen.  They faxed information to them. I also sent referral.

## 2024-05-24 NOTE — PROGRESS NOTES
Occupational Therapy                 Therapy Communication Note    Patient Name: Mitzi Pro  MRN: [id number]  Today's Date: 5/24/2024     Discipline: Occupational Therapy    Missed Visit Reason: Missed Visit Reason: Patient in a medical procedure    Missed Time: Attempt    Comment: OT orders received.  Chart reviewed.  Patient off the floor for procedure. Will follow up at a later time.

## 2024-05-25 ENCOUNTER — APPOINTMENT (OUTPATIENT)
Dept: CARDIOLOGY | Facility: HOSPITAL | Age: 74
DRG: 280 | End: 2024-05-25
Payer: OTHER MISCELLANEOUS

## 2024-05-25 LAB
ALBUMIN SERPL BCP-MCNC: 3.2 G/DL (ref 3.4–5)
ANION GAP SERPL CALC-SCNC: 13 MMOL/L (ref 10–20)
BUN SERPL-MCNC: 39 MG/DL (ref 6–23)
CALCIUM SERPL-MCNC: 8.6 MG/DL (ref 8.6–10.3)
CHLORIDE SERPL-SCNC: 96 MMOL/L (ref 98–107)
CO2 SERPL-SCNC: 29 MMOL/L (ref 21–32)
CREAT SERPL-MCNC: 1.29 MG/DL (ref 0.5–1.05)
EGFRCR SERPLBLD CKD-EPI 2021: 43 ML/MIN/1.73M*2
ERYTHROCYTE [DISTWIDTH] IN BLOOD BY AUTOMATED COUNT: 13.4 % (ref 11.5–14.5)
GLUCOSE BLD MANUAL STRIP-MCNC: 161 MG/DL (ref 74–99)
GLUCOSE BLD MANUAL STRIP-MCNC: 186 MG/DL (ref 74–99)
GLUCOSE BLD MANUAL STRIP-MCNC: 275 MG/DL (ref 74–99)
GLUCOSE SERPL-MCNC: 198 MG/DL (ref 74–99)
HCT VFR BLD AUTO: 30.8 % (ref 36–46)
HGB BLD-MCNC: 9.6 G/DL (ref 12–16)
LEGIONELLA AG UR QL: NEGATIVE
MAGNESIUM SERPL-MCNC: 2.04 MG/DL (ref 1.6–2.4)
MCH RBC QN AUTO: 31.4 PG (ref 26–34)
MCHC RBC AUTO-ENTMCNC: 31.2 G/DL (ref 32–36)
MCV RBC AUTO: 101 FL (ref 80–100)
NRBC BLD-RTO: 0 /100 WBCS (ref 0–0)
PHOSPHATE SERPL-MCNC: 3.5 MG/DL (ref 2.5–4.9)
PLATELET # BLD AUTO: 193 X10*3/UL (ref 150–450)
POTASSIUM SERPL-SCNC: 4.1 MMOL/L (ref 3.5–5.3)
RBC # BLD AUTO: 3.06 X10*6/UL (ref 4–5.2)
S PNEUM AG UR QL: NEGATIVE
SODIUM SERPL-SCNC: 134 MMOL/L (ref 136–145)
WBC # BLD AUTO: 13 X10*3/UL (ref 4.4–11.3)

## 2024-05-25 PROCEDURE — 2500000002 HC RX 250 W HCPCS SELF ADMINISTERED DRUGS (ALT 637 FOR MEDICARE OP, ALT 636 FOR OP/ED): Performed by: STUDENT IN AN ORGANIZED HEALTH CARE EDUCATION/TRAINING PROGRAM

## 2024-05-25 PROCEDURE — 2060000001 HC INTERMEDIATE ICU ROOM DAILY

## 2024-05-25 PROCEDURE — 2500000005 HC RX 250 GENERAL PHARMACY W/O HCPCS: Performed by: INTERNAL MEDICINE

## 2024-05-25 PROCEDURE — 85027 COMPLETE CBC AUTOMATED: CPT | Performed by: STUDENT IN AN ORGANIZED HEALTH CARE EDUCATION/TRAINING PROGRAM

## 2024-05-25 PROCEDURE — 93005 ELECTROCARDIOGRAM TRACING: CPT

## 2024-05-25 PROCEDURE — 2500000004 HC RX 250 GENERAL PHARMACY W/ HCPCS (ALT 636 FOR OP/ED): Performed by: INTERNAL MEDICINE

## 2024-05-25 PROCEDURE — 94660 CPAP INITIATION&MGMT: CPT

## 2024-05-25 PROCEDURE — 2500000001 HC RX 250 WO HCPCS SELF ADMINISTERED DRUGS (ALT 637 FOR MEDICARE OP): Performed by: STUDENT IN AN ORGANIZED HEALTH CARE EDUCATION/TRAINING PROGRAM

## 2024-05-25 PROCEDURE — 94640 AIRWAY INHALATION TREATMENT: CPT | Mod: MUE

## 2024-05-25 PROCEDURE — 80069 RENAL FUNCTION PANEL: CPT | Performed by: STUDENT IN AN ORGANIZED HEALTH CARE EDUCATION/TRAINING PROGRAM

## 2024-05-25 PROCEDURE — 82947 ASSAY GLUCOSE BLOOD QUANT: CPT

## 2024-05-25 PROCEDURE — 83735 ASSAY OF MAGNESIUM: CPT | Performed by: STUDENT IN AN ORGANIZED HEALTH CARE EDUCATION/TRAINING PROGRAM

## 2024-05-25 PROCEDURE — 2500000005 HC RX 250 GENERAL PHARMACY W/O HCPCS: Performed by: STUDENT IN AN ORGANIZED HEALTH CARE EDUCATION/TRAINING PROGRAM

## 2024-05-25 PROCEDURE — 2500000001 HC RX 250 WO HCPCS SELF ADMINISTERED DRUGS (ALT 637 FOR MEDICARE OP): Performed by: INTERNAL MEDICINE

## 2024-05-25 PROCEDURE — 2500000004 HC RX 250 GENERAL PHARMACY W/ HCPCS (ALT 636 FOR OP/ED): Performed by: STUDENT IN AN ORGANIZED HEALTH CARE EDUCATION/TRAINING PROGRAM

## 2024-05-25 PROCEDURE — 99232 SBSQ HOSP IP/OBS MODERATE 35: CPT | Performed by: INTERNAL MEDICINE

## 2024-05-25 PROCEDURE — 2500000002 HC RX 250 W HCPCS SELF ADMINISTERED DRUGS (ALT 637 FOR MEDICARE OP, ALT 636 FOR OP/ED): Mod: MUE | Performed by: INTERNAL MEDICINE

## 2024-05-25 PROCEDURE — 36415 COLL VENOUS BLD VENIPUNCTURE: CPT | Performed by: STUDENT IN AN ORGANIZED HEALTH CARE EDUCATION/TRAINING PROGRAM

## 2024-05-25 RX ORDER — DIGOXIN 0.25 MG/ML
250 INJECTION INTRAMUSCULAR; INTRAVENOUS DAILY
Status: COMPLETED | OUTPATIENT
Start: 2024-05-25 | End: 2024-05-25

## 2024-05-25 RX ORDER — DIGOXIN 0.25 MG/ML
INJECTION INTRAMUSCULAR; INTRAVENOUS
Status: DISPENSED
Start: 2024-05-25 | End: 2024-05-25

## 2024-05-25 RX ORDER — MIDODRINE HYDROCHLORIDE 10 MG/1
10 TABLET ORAL ONCE
Status: DISCONTINUED | OUTPATIENT
Start: 2024-05-25 | End: 2024-05-29 | Stop reason: HOSPADM

## 2024-05-25 RX ORDER — METOPROLOL TARTRATE 1 MG/ML
5 INJECTION, SOLUTION INTRAVENOUS ONCE
Status: COMPLETED | OUTPATIENT
Start: 2024-05-25 | End: 2024-05-25

## 2024-05-25 RX ADMIN — Medication 30 L/MIN: at 08:00

## 2024-05-25 RX ADMIN — Medication 30 L/MIN: at 20:00

## 2024-05-25 RX ADMIN — INSULIN LISPRO 2 UNITS: 100 INJECTION, SOLUTION INTRAVENOUS; SUBCUTANEOUS at 09:29

## 2024-05-25 RX ADMIN — METHYLPREDNISOLONE SODIUM SUCCINATE 40 MG: 40 INJECTION, POWDER, FOR SOLUTION INTRAMUSCULAR; INTRAVENOUS at 09:18

## 2024-05-25 RX ADMIN — Medication 3 MG: at 21:20

## 2024-05-25 RX ADMIN — DIGOXIN 250 MCG: 0.25 INJECTION INTRAMUSCULAR; INTRAVENOUS at 11:21

## 2024-05-25 RX ADMIN — ASPIRIN 81 MG: 81 TABLET, COATED ORAL at 09:18

## 2024-05-25 RX ADMIN — ICOSAPENT ETHYL 2 G: 1 CAPSULE ORAL at 20:15

## 2024-05-25 RX ADMIN — LORATADINE 10 MG: 10 TABLET ORAL at 09:18

## 2024-05-25 RX ADMIN — OXYBUTYNIN CHLORIDE 5 MG: 5 TABLET ORAL at 20:15

## 2024-05-25 RX ADMIN — PIOGLITAZONE 30 MG: 15 TABLET ORAL at 09:18

## 2024-05-25 RX ADMIN — FORMOTEROL FUMARATE 20 MCG: 20 SOLUTION RESPIRATORY (INHALATION) at 21:33

## 2024-05-25 RX ADMIN — APIXABAN 5 MG: 5 TABLET, FILM COATED ORAL at 09:18

## 2024-05-25 RX ADMIN — MIDODRINE HYDROCHLORIDE 10 MG: 10 TABLET ORAL at 18:11

## 2024-05-25 RX ADMIN — DIGOXIN 250 MCG: 0.25 INJECTION INTRAMUSCULAR; INTRAVENOUS at 15:30

## 2024-05-25 RX ADMIN — CILOSTAZOL 100 MG: 100 TABLET ORAL at 20:15

## 2024-05-25 RX ADMIN — DOXYCYCLINE 100 MG: 100 INJECTION, POWDER, LYOPHILIZED, FOR SOLUTION INTRAVENOUS at 20:14

## 2024-05-25 RX ADMIN — IPRATROPIUM BROMIDE AND ALBUTEROL SULFATE 3 ML: 2.5; .5 SOLUTION RESPIRATORY (INHALATION) at 21:33

## 2024-05-25 RX ADMIN — MIDODRINE HYDROCHLORIDE 10 MG: 10 TABLET ORAL at 09:21

## 2024-05-25 RX ADMIN — ICOSAPENT ETHYL 2 G: 1 CAPSULE ORAL at 09:18

## 2024-05-25 RX ADMIN — INSULIN LISPRO 6 UNITS: 100 INJECTION, SOLUTION INTRAVENOUS; SUBCUTANEOUS at 18:11

## 2024-05-25 RX ADMIN — SODIUM CHLORIDE, POTASSIUM CHLORIDE, SODIUM LACTATE AND CALCIUM CHLORIDE 250 ML: 600; 310; 30; 20 INJECTION, SOLUTION INTRAVENOUS at 02:34

## 2024-05-25 RX ADMIN — OXYBUTYNIN CHLORIDE 5 MG: 5 TABLET ORAL at 09:18

## 2024-05-25 RX ADMIN — DOXYCYCLINE 100 MG: 100 INJECTION, POWDER, LYOPHILIZED, FOR SOLUTION INTRAVENOUS at 09:31

## 2024-05-25 RX ADMIN — SITAGLIPTIN 50 MG: 50 TABLET, FILM COATED ORAL at 09:18

## 2024-05-25 RX ADMIN — METOPROLOL TARTRATE 5 MG: 5 INJECTION INTRAVENOUS at 03:09

## 2024-05-25 RX ADMIN — CEFTRIAXONE SODIUM 1 G: 1 INJECTION, SOLUTION INTRAVENOUS at 18:15

## 2024-05-25 RX ADMIN — Medication 45 PERCENT: at 10:02

## 2024-05-25 RX ADMIN — ATORVASTATIN CALCIUM 40 MG: 40 TABLET, FILM COATED ORAL at 20:15

## 2024-05-25 RX ADMIN — APIXABAN 5 MG: 5 TABLET, FILM COATED ORAL at 20:15

## 2024-05-25 RX ADMIN — CILOSTAZOL 100 MG: 100 TABLET ORAL at 09:18

## 2024-05-25 RX ADMIN — Medication 45 PERCENT: at 01:09

## 2024-05-25 RX ADMIN — METOPROLOL SUCCINATE 50 MG: 50 TABLET, EXTENDED RELEASE ORAL at 10:54

## 2024-05-25 RX ADMIN — IPRATROPIUM BROMIDE AND ALBUTEROL SULFATE 3 ML: 2.5; .5 SOLUTION RESPIRATORY (INHALATION) at 17:08

## 2024-05-25 ASSESSMENT — COGNITIVE AND FUNCTIONAL STATUS - GENERAL
DRESSING REGULAR LOWER BODY CLOTHING: A LITTLE
PERSONAL GROOMING: A LITTLE
CLIMB 3 TO 5 STEPS WITH RAILING: A LITTLE
DAILY ACTIVITIY SCORE: 20
WALKING IN HOSPITAL ROOM: A LITTLE
TURNING FROM BACK TO SIDE WHILE IN FLAT BAD: A LITTLE
STANDING UP FROM CHAIR USING ARMS: A LITTLE
MOVING FROM LYING ON BACK TO SITTING ON SIDE OF FLAT BED WITH BEDRAILS: A LITTLE
MOBILITY SCORE: 18
TOILETING: A LITTLE
TOILETING: A LITTLE
MOVING TO AND FROM BED TO CHAIR: A LITTLE
MOBILITY SCORE: 20
PERSONAL GROOMING: A LITTLE
WALKING IN HOSPITAL ROOM: A LITTLE
MOVING TO AND FROM BED TO CHAIR: A LITTLE
HELP NEEDED FOR BATHING: A LITTLE
STANDING UP FROM CHAIR USING ARMS: A LITTLE
DRESSING REGULAR LOWER BODY CLOTHING: A LITTLE
CLIMB 3 TO 5 STEPS WITH RAILING: A LITTLE
DAILY ACTIVITIY SCORE: 21

## 2024-05-25 ASSESSMENT — PAIN SCALES - GENERAL
PAINLEVEL_OUTOF10: 0 - NO PAIN

## 2024-05-25 ASSESSMENT — PAIN - FUNCTIONAL ASSESSMENT
PAIN_FUNCTIONAL_ASSESSMENT: 0-10

## 2024-05-25 NOTE — CARE PLAN
Problem: Pain  Goal: My pain/discomfort is manageable  Outcome: Progressing     Problem: Psychosocial Needs  Goal: Collaborate with me, my family, and caregiver to identify my specific goals  Outcome: Progressing     Problem: Discharge Barriers  Goal: My discharge needs are met  Outcome: Progressing     Problem: Respiratory  Goal: Wean oxygen to maintain O2 saturation per order/standard this shift  Outcome: Progressing     Problem: Arrythmia/Dysrhythmia  Goal: Lab values return to normal range  Outcome: Progressing  Goal: Vital signs return to baseline  Outcome: Progressing     Problem: Heart Failure  Goal: Improved urinary output this shift  Outcome: Progressing  Goal: Reduction in peripheral edema within 24 hours  Outcome: Progressing  Goal: Report improvement of dyspnea/breathlessness this shift  Outcome: Progressing     Problem: Skin  Goal: Participates in plan/prevention/treatment measures  Outcome: Progressing  Flowsheets (Taken 5/25/2024 1040)  Participates in plan/prevention/treatment measures: Increase activity/out of bed for meals  Goal: Prevent/manage excess moisture  Outcome: Progressing  Goal: Prevent/minimize sheer/friction injuries  Outcome: Progressing  Goal: Promote/optimize nutrition  Outcome: Progressing  Flowsheets (Taken 5/25/2024 1040)  Promote/optimize nutrition:   Consume > 50% meals/supplements   Monitor/record intake including meals   The patient's goals for the shift include I want to get some sleep    The clinical goals for the shift include The patient will maintain a systolic BP greater than 90, a MAP greater than 65 and HR less than 100 by 5/25 0700    Over the shift, the patient did not make progress toward the following goals. Barriers to progression include oxygen needs. Recommendations to address these barriers include monitor, wean as able.

## 2024-05-25 NOTE — CARE PLAN
The patient's goals for the shift include I want to get some sleep    The clinical goals for the shift include The patient will maintain a systolic BP greater than 90, a MAP greater than 65 and HR less than 100 by 5/25 0700      Problem: Respiratory  Goal: Wean oxygen to maintain O2 saturation per order/standard this shift  Outcome: Progressing     Problem: Arrythmia/Dysrhythmia  Goal: Serial ECG will return to baseline  Outcome: Progressing     Problem: Arrythmia/Dysrhythmia  Goal: Vital signs return to baseline  Outcome: Progressing     Problem: Heart Failure  Goal: Reduction in peripheral edema within 24 hours  Outcome: Progressing     Problem: Heart Failure  Goal: Improved urinary output this shift  Outcome: Progressing     Problem: Heart Failure  Goal: Report improvement of dyspnea/breathlessness this shift  Outcome: Progressing

## 2024-05-25 NOTE — PROGRESS NOTES
Subjective Data:  I48.92 Atrial flutter intermittent 2-1 conduction  J44.9 Advanced severe COPD  I42.9 I50.92 Mixed systolic diastolic heart failure  J18.9 Community-acquired pneumonia     Well-known to me from evaluation on call 2 weeks ago  Has significant pulmonary disease  Has fairly persistent atrial flutter with 2-1 conduction  Advanced severe COPD  Diastolic dysfunction combined with systolic dysfunction  Echo in 20 2334% EF  Known coronary disease remote CABG right coronary occlusion with left-sided collaterals stent to the ramus  Counterclockwise paroxysmal atrial flutter  [id number] score of 6  Taken off IV amiodarone because of pulmonary disease previously    Today persistent tachycardia given IV digoxin with intermittent improved rate control    4/30/2024 echo        CONCLUSIONS:   1. Left ventricular systolic function is moderately decreased with a 34 % estimated ejection fraction.   2. Multiple segmental abnormalities exist. See findings.   3. Spectral Doppler shows an impaired relaxation pattern of left ventricular diastolic filling.   4. There is an elevated left ventricular end diastolic pressure.   5. There is mildly reduced right ventricular systolic function.   6. Moderate mitral valve regurgitation.   7. Mildly elevated RVSP.   8. Aortic valve sclerosis.   9. There are multiple wall motion abnormalities.     February 22, 2024 cardiac catheterization  Coronary Angiography:  The coronary circulation is right dominant.     Left Main Coronary Artery:  The left main coronary artery is free of atherosclerotic disease.     Left Anterior Descending Coronary Artery Distribution:  The Left Anterior Descending artery is a large vessel. There is 100% stenosis in the mid left anterior descending artery. Hemodynamically significant obstruction is noted in this vessel and fills via collaterals.     Circumflex Coronary Artery Distribution:  The Left Circumflex artery is a large vessel. Presents luminal  irregularities and contains patent previously placed stents.     Right Coronary Artery Distribution:     The Right Coronary Artery is a large vessel. Hemodynamically significant obstruction is noted in this vessel and fills via collaterals. There is 100% stenosis in the the proximal Right Coronary Artery.        Left Ventriculography:  The estimated left ventricular ejection fraction is abnormal at 30%. There is anterolateral hypokinesis noted.     MH:  CAD - prior CABG. Last cardiac cath 2007 with occluded LAD, widely patent ramus stent, minimal disease of the LCx, occluded RCA with right-to-right collaterals, occluded SVG to RCA, occluded and thrombotic SVG to LAD. LIMA not used as graft. Last stress  with fixed anterior infarct without ischemia  CHF - LVEF 45-50% by echo 2015, 35-40% here.   COPD / home oxygen therapy  Atrial fibrillation  Carotid vascular disease  DM with CKD, PAD, and peripheral neruopathy  Edema  HTN  GERD  Glaucoma  LBBB and RBBB seen in past  Mixed HLD  Vitamin D deficiency  Ovarian cancer?  - little details     PSH: CABG, BSO  SH: former smoker  FH: CAD in father, sister, colon cancer in sister        Active Problems  Problems    · Abnormal stress ECG with treadmill (794.39) (R94.39)   · Allergic rhinitis (477.9) (J30.9)   · Atopic dermatitis (691.8) (L20.9)   · Atrial fibrillation (427.31) (I48.91)   · Bilateral carotid artery stenosis (433.10,433.30) (I65.23)   · BMI 29.0-29.9,adult (V85.25) (Z68.29)   · CAD (coronary artery disease) (414.00) (I25.10)   · Carotid artery disease (447.9) (I77.9)   · Colon cancer screening (V76.51) (Z12.11)   · COPD (chronic obstructive pulmonary disease) (496) (J44.9)   · Diabetes mellitus with kidney complication (250.40) (E11.29)   · Diabetes mellitus with peripheral vascular disease (250.70,443.81) (E11.51)   · Diabetic polyneuropathy associated with type 2 diabetes mellitus (250.60,357.2)  (E11.42)   · Edema (782.3) (R60.9)   · Encounter for  immunization (V03.89) (Z23)   · Essential hypertension (401.9) (I10)   · Extremity atherosclerosis with intermittent claudication (440.21) (I70.219)   · Female incontinence (625.6) (R32)   · GERD (gastroesophageal reflux disease) (530.81) (K21.9)   · Glaucoma (365.9) (H40.9)   · Hematuria (599.70) (R31.9)   · HTN (hypertension) (401.9) (I10)   · Hyperlipidemia (272.4) (E78.5)   · LBBB (left bundle branch block) (426.3) (I44.7)   · Mixed hyperlipidemia (272.2) (E78.2)   · Overweight (278.02) (E66.3)   · Peripheral vascular disease (443.9) (I73.9)   · Pulmonary nodules (793.19) (R91.8)   · RBBB (426.4) (I45.10)   · Stage 3a chronic kidney disease (585.3) (N18.31)   · Type 2 diabetes mellitus with hyperglycemia, without long-term current use of insulin  (250.00,790.29) (E11.65)   · Vaginal itching (698.1) (N89.8)   · Vitamin D deficiency (268.9) (E55.9)     Surgical History  Problems    · History of Bypass   · History of Coronary artery bypass graft   · History of Coronary Artery Surgery   · History of Salpingo-oophorectomy     Past Medical History  Problems    · History of ovarian cancer (V10.43) (Z85.43)       Overnight Events:    Asymptomatic with intermittent tachycardia     Objective Data:  Last Recorded Vitals:  Vitals:    05/25/24 1000 05/25/24 1100 05/25/24 1200 05/25/24 1600   BP: (!) 102/49 98/62 101/59 116/64   BP Location:   Right arm    Patient Position:   Lying    Pulse: (!) 156 (!) 154 (!) 154 (!) 118   Resp: (!) 28 24 26 22   Temp:   36.6 °C (97.9 °F)    TempSrc:       SpO2: 97% 94% 96% 96%   Weight:       Height:           Last Labs:  CBC - 5/25/2024:  4:24 AM  13.0 9.6 193    30.8      CMP - 5/25/2024:  4:24 AM  8.6 7.0 33 --- 0.8   3.5 3.2 20 58      PTT - No results in last year.  1.8   19.9 _     TROPHS   Date/Time Value Ref Range Status   05/23/2024 05:32  0 - 13 ng/L Final     Comment:     Previous result verified on 5/23/2024 1652 on specimen/case [id number] called with component  "[facility] for procedure Troponin I, High Sensitivity, Initial with value 264 ng/L.   05/23/2024 04:12  0 - 13 ng/L Final   05/02/2024 11:08  0 - 13 ng/L Final     BNP   Date/Time Value Ref Range Status   05/23/2024 04:12 PM 1,005 0 - 99 pg/mL Final   05/01/2024 04:21  0 - 99 pg/mL Final     HGBA1C   Date/Time Value Ref Range Status   05/23/2024 04:12 PM 6.6 see below % Final   05/14/2024 10:52 AM 6.3 4.2 - 6.5 % Final   10/04/2023 02:20 PM 6.5 4.2 - 6.5 % Final     VLDL   Date/Time Value Ref Range Status   04/06/2022 10:04 AM 29 0 - 40 mg/dL Final   03/03/2021 09:28 AM 44 0 - 40 mg/dL Final   05/04/2020 09:14 AM 55 0 - 40 mg/dL Final      Last I/O:  I/O last 3 completed shifts:  In: 100 (1.3 mL/kg) [IV Piggyback:100]  Out: 130 (1.7 mL/kg) [Urine:130 (0 mL/kg/hr)]  Weight: 77.7 kg     Past Cardiology Tests (Last 3 Years):  EKG:  Electrocardiogram, 12-lead PRN ACS symptoms 05/25/2024 (Preliminary)      Electrocardiogram, 12-lead PRN ACS symptoms 05/23/2024 (Preliminary)      ECG 12 lead 05/23/2024 (Preliminary)      Electrocardiogram, 12-lead PRN ACS symptoms 05/23/2024 (Preliminary)      ECG 12 lead 05/23/2024 (Preliminary)      ECG 12 Lead 05/03/2024      ECG 12 Lead 05/02/2024      ECG 12 Lead 05/02/2024      ECG 12 Lead 05/02/2024 (Preliminary)      ECG 12 Lead 05/01/2024      ECG 12 lead 04/30/2024      ECG 12 lead 04/30/2024    Echo:  Transthoracic Echo (TTE) Complete 04/30/2024    Ejection Fractions:  No results found for: \"EF\"  Cath:  No results found for this or any previous visit from the past 1095 days.    Stress Test:  No results found for this or any previous visit from the past 1095 days.    Cardiac Imaging:  No results found for this or any previous visit from the past 1095 days.      Inpatient Medications:  Scheduled medications   Medication Dose Route Frequency    apixaban  5 mg oral BID    aspirin  81 mg oral Daily    atorvastatin  40 mg oral Daily    cefTRIAXone  1 g intravenous q24h "    cilostazol  100 mg oral BID    doxycycline  100 mg intravenous q12h    formoterol  20 mcg nebulization q12h    icosapent ethyL  2 g oral BID    insulin lispro  0-10 Units subcutaneous TID    ipratropium-albuteroL  3 mL nebulization 4x daily    loratadine  10 mg oral Daily    melatonin  3 mg oral Nightly    methylPREDNISolone sodium succinate (PF)  40 mg intravenous Daily    metoprolol succinate XL  50 mg oral Daily    midodrine  10 mg oral BID    midodrine  10 mg oral Once    oxybutynin  5 mg oral BID    oxygen   inhalation Continuous - Inhalation    pantoprazole  40 mg oral Daily before breakfast    pioglitazone  30 mg oral Daily    polyethylene glycol  17 g oral BID    sennosides  2 tablet oral BID    SITagliptin phosphate  50 mg oral Daily     PRN medications   Medication    acetaminophen    Or    acetaminophen    Or    acetaminophen    dextrose    dextrose    fluticasone    glucagon    glucagon    ipratropium-albuteroL    ondansetron ODT    Or    ondansetron    oxygen    sodium chloride     Continuous Medications   Medication Dose Last Rate     Results for orders placed or performed during the hospital encounter of 05/23/24 (from the past 96 hour(s))   ECG 12 lead   Result Value Ref Range    Ventricular Rate 157 BPM    Atrial Rate 157 BPM    ND Interval 120 ms    QRS Duration 96 ms    QT Interval 322 ms    QTC Calculation(Bazett) 520 ms    P Axis -85 degrees    R Axis 91 degrees    T Axis -89 degrees    QRS Count 26 beats    Q Onset 216 ms    P Onset 156 ms    P Offset 190 ms    T Offset 377 ms    QTC Fredericia 443 ms   Sars-CoV-2 PCR   Result Value Ref Range    Coronavirus 2019, PCR Not Detected Not Detected   BLOOD GAS ARTERIAL FULL PANEL   Result Value Ref Range    POCT pH, Arterial 7.27 (L) 7.38 - 7.42 pH    POCT pCO2, Arterial 64 (H) 38 - 42 mm Hg    POCT pO2, Arterial 73 (L) 85 - 95 mm Hg    POCT SO2, Arterial 94 94 - 100 %    POCT Oxy Hemoglobin, Arterial 91.4 (L) 94.0 - 98.0 %    POCT Hematocrit  Calculated, Arterial 37.0 36.0 - 46.0 %    POCT Sodium, Arterial 132 (L) 136 - 145 mmol/L    POCT Potassium, Arterial 4.5 3.5 - 5.3 mmol/L    POCT Chloride, Arterial 99 98 - 107 mmol/L    POCT Ionized Calcium, Arterial 1.19 1.10 - 1.33 mmol/L    POCT Glucose, Arterial 320 (H) 74 - 99 mg/dL    POCT Lactate, Arterial 5.6 (HH) 0.4 - 2.0 mmol/L    POCT Base Excess, Arterial 1.1 -2.0 - 3.0 mmol/L    POCT HCO3 Calculated, Arterial 29.4 (H) 22.0 - 26.0 mmol/L    POCT Hemoglobin, Arterial 12.4 12.0 - 16.0 g/dL    POCT Anion Gap, Arterial 8 (L) 10 - 25 mmo/L    Patient Temperature      FiO2 100 %    Critical Called By DAMON RRT     Critical Called To MD SILVER     Critical Call Time 1612     Critical Read Back Y    CBC and Auto Differential   Result Value Ref Range    WBC 10.5 4.4 - 11.3 x10*3/uL    nRBC 0.0 0.0 - 0.0 /100 WBCs    RBC 3.78 (L) 4.00 - 5.20 x10*6/uL    Hemoglobin 11.9 (L) 12.0 - 16.0 g/dL    Hematocrit 38.1 36.0 - 46.0 %     (H) 80 - 100 fL    MCH 31.5 26.0 - 34.0 pg    MCHC 31.2 (L) 32.0 - 36.0 g/dL    RDW 13.3 11.5 - 14.5 %    Platelets 237 150 - 450 x10*3/uL    Neutrophils % 84.2 40.0 - 80.0 %    Immature Granulocytes %, Automated 0.5 0.0 - 0.9 %    Lymphocytes % 10.1 13.0 - 44.0 %    Monocytes % 4.9 2.0 - 10.0 %    Eosinophils % 0.1 0.0 - 6.0 %    Basophils % 0.2 0.0 - 2.0 %    Neutrophils Absolute 8.84 (H) 1.60 - 5.50 x10*3/uL    Immature Granulocytes Absolute, Automated 0.05 0.00 - 0.50 x10*3/uL    Lymphocytes Absolute 1.06 0.80 - 3.00 x10*3/uL    Monocytes Absolute 0.51 0.05 - 0.80 x10*3/uL    Eosinophils Absolute 0.01 0.00 - 0.40 x10*3/uL    Basophils Absolute 0.02 0.00 - 0.10 x10*3/uL   Comprehensive Metabolic Panel   Result Value Ref Range    Glucose 297 (H) 74 - 99 mg/dL    Sodium 133 (L) 136 - 145 mmol/L    Potassium 5.8 (H) 3.5 - 5.3 mmol/L    Chloride 96 (L) 98 - 107 mmol/L    Bicarbonate 28 21 - 32 mmol/L    Anion Gap 15 10 - 20 mmol/L    Urea Nitrogen 21 6 - 23 mg/dL    Creatinine 1.33 (H)  0.50 - 1.05 mg/dL    eGFR 42 (L) >60 mL/min/1.73m*2    Calcium 9.0 8.6 - 10.3 mg/dL    Albumin 3.7 3.4 - 5.0 g/dL    Alkaline Phosphatase 58 33 - 136 U/L    Total Protein 7.0 6.4 - 8.2 g/dL    AST 33 9 - 39 U/L    Bilirubin, Total 0.8 0.0 - 1.2 mg/dL    ALT 20 7 - 45 U/L   Magnesium   Result Value Ref Range    Magnesium 2.08 1.60 - 2.40 mg/dL   B-type natriuretic peptide   Result Value Ref Range    BNP 1,005 (H) 0 - 99 pg/mL   Troponin I, High Sensitivity, Initial   Result Value Ref Range    Troponin I, High Sensitivity 264 (HH) 0 - 13 ng/L   Hemoglobin A1C   Result Value Ref Range    Hemoglobin A1C 6.6 (H) see below %    Estimated Average Glucose 143 Not Established mg/dL   Electrocardiogram, 12-lead PRN ACS symptoms   Result Value Ref Range    Ventricular Rate 160 BPM    Atrial Rate 160 BPM    [place] Interval 114 ms    QRS Duration 106 ms    QT Interval 244 ms    QTC Calculation(Bazett) 398 ms    P Axis -74 degrees    R Axis 92 degrees    T Axis 255 degrees    QRS Count 26 beats    Q Onset 215 ms    P Onset 158 ms    P Offset 207 ms    T Offset 337 ms    QTC Fredericia 338 ms   ECG 12 lead   Result Value Ref Range    Ventricular Rate 158 BPM    Atrial Rate 158 BPM    [place] Interval 130 ms    QRS Duration 112 ms    QT Interval 312 ms    QTC Calculation(Bazett) 505 ms    R Axis 80 degrees    T Axis 269 degrees    QRS Count 27 beats    Q Onset 193 ms    P Onset 138 ms    P Offset 174 ms    T Offset 349 ms    QTC Fredericia 431 ms   Troponin, High Sensitivity, 1 Hour   Result Value Ref Range    Troponin I, High Sensitivity 247 (HH) 0 - 13 ng/L   Blood Gas Venous Full Panel Unsolicited   Result Value Ref Range    POCT pH, Venous 7.36 7.33 - 7.43 pH    POCT pCO2, Venous 60 (H) 41 - 51 mm Hg    POCT pO2, Venous 49 (H) 35 - 45 mm Hg    POCT SO2, Venous 71 45 - 75 %    POCT Oxy Hemoglobin, Venous 69.7 45.0 - 75.0 %    POCT Hematocrit Calculated, Venous 35.0 (L) 36.0 - 46.0 %    POCT Sodium, Venous 131 (L) 136 - 145 mmol/L    POCT  Potassium, Venous 4.3 3.5 - 5.3 mmol/L    POCT Chloride, Venous 100 98 - 107 mmol/L    POCT Ionized Calicum, Venous 1.17 1.10 - 1.33 mmol/L    POCT Glucose, Venous 262 (H) 74 - 99 mg/dL    POCT Lactate, Venous 2.3 (H) 0.4 - 2.0 mmol/L    POCT Base Excess, Venous 6.8 (H) -2.0 - 3.0 mmol/L    POCT HCO3 Calculated, Venous 33.9 (H) 22.0 - 26.0 mmol/L    POCT Hemoglobin, Venous 11.5 (L) 12.0 - 16.0 g/dL    POCT Anion Gap, Venous 1.0 (L) 10.0 - 25.0 mmol/L    Patient Temperature     Electrocardiogram, 12-lead PRN ACS symptoms   Result Value Ref Range    Ventricular Rate 99 BPM    Atrial Rate 99 BPM    CT Interval 146 ms    QRS Duration 108 ms    QT Interval 338 ms    QTC Calculation(Bazett) 433 ms    P Axis 92 degrees    R Axis 108 degrees    T Axis 192 degrees    QRS Count 16 beats    Q Onset 215 ms    P Onset 142 ms    P Offset 200 ms    T Offset 384 ms    QTC Fredericia 399 ms   Procalcitonin   Result Value Ref Range    Procalcitonin 0.25 (H) <=0.07 ng/mL   Renal function panel   Result Value Ref Range    Glucose 233 (H) 74 - 99 mg/dL    Sodium 136 136 - 145 mmol/L    Potassium 3.9 3.5 - 5.3 mmol/L    Chloride 97 (L) 98 - 107 mmol/L    Bicarbonate 30 21 - 32 mmol/L    Anion Gap 13 10 - 20 mmol/L    Urea Nitrogen 25 (H) 6 - 23 mg/dL    Creatinine 1.30 (H) 0.50 - 1.05 mg/dL    eGFR 43 (L) >60 mL/min/1.73m*2    Calcium 8.3 (L) 8.6 - 10.3 mg/dL    Phosphorus 4.7 2.5 - 4.9 mg/dL    Albumin 3.2 (L) 3.4 - 5.0 g/dL   Magnesium   Result Value Ref Range    Magnesium 1.60 1.60 - 2.40 mg/dL   Lactate   Result Value Ref Range    Lactate 1.1 0.4 - 2.0 mmol/L   Renal Function Panel   Result Value Ref Range    Glucose 223 (H) 74 - 99 mg/dL    Sodium 136 136 - 145 mmol/L    Potassium 3.9 3.5 - 5.3 mmol/L    Chloride 97 (L) 98 - 107 mmol/L    Bicarbonate 30 21 - 32 mmol/L    Anion Gap 13 10 - 20 mmol/L    Urea Nitrogen 25 (H) 6 - 23 mg/dL    Creatinine 1.30 (H) 0.50 - 1.05 mg/dL    eGFR 43 (L) >60 mL/min/1.73m*2    Calcium 8.4 (L) 8.6 -  10.3 mg/dL    Phosphorus 4.7 2.5 - 4.9 mg/dL    Albumin 3.2 (L) 3.4 - 5.0 g/dL   CBC   Result Value Ref Range    WBC 6.7 4.4 - 11.3 x10*3/uL    nRBC 0.0 0.0 - 0.0 /100 WBCs    RBC 3.10 (L) 4.00 - 5.20 x10*6/uL    Hemoglobin 9.8 (L) 12.0 - 16.0 g/dL    Hematocrit 31.2 (L) 36.0 - 46.0 %     (H) 80 - 100 fL    MCH 31.6 26.0 - 34.0 pg    MCHC 31.4 (L) 32.0 - 36.0 g/dL    RDW 13.0 11.5 - 14.5 %    Platelets 168 150 - 450 x10*3/uL   Magnesium   Result Value Ref Range    Magnesium 1.60 1.60 - 2.40 mg/dL   POCT GLUCOSE   Result Value Ref Range    POCT Glucose 174 (H) 74 - 99 mg/dL   POCT GLUCOSE   Result Value Ref Range    POCT Glucose 182 (H) 74 - 99 mg/dL   Legionella Antigen, Urine    Specimen: Urine   Result Value Ref Range    L. pneumophila Urine Ag Negative Negative   Streptococcus pneumoniae Antigen, Urine    Specimen: Urine   Result Value Ref Range    Streptococcus pneumoniae Ag, Urine Negative Negative   POCT GLUCOSE   Result Value Ref Range    POCT Glucose 252 (H) 74 - 99 mg/dL   POCT GLUCOSE   Result Value Ref Range    POCT Glucose 245 (H) 74 - 99 mg/dL   Electrocardiogram, 12-lead PRN ACS symptoms   Result Value Ref Range    Ventricular Rate 148 BPM    Atrial Rate 148 BPM    [place] Interval 126 ms    QRS Duration 110 ms    QT Interval 288 ms    QTC Calculation(Bazett) 452 ms    R Axis 50 degrees    T Axis 250 degrees    QRS Count 24 beats    Q Onset 213 ms    P Onset 160 ms    P Offset 201 ms    T Offset 357 ms    QTC Fredericia 389 ms   Renal Function Panel   Result Value Ref Range    Glucose 198 (H) 74 - 99 mg/dL    Sodium 134 (L) 136 - 145 mmol/L    Potassium 4.1 3.5 - 5.3 mmol/L    Chloride 96 (L) 98 - 107 mmol/L    Bicarbonate 29 21 - 32 mmol/L    Anion Gap 13 10 - 20 mmol/L    Urea Nitrogen 39 (H) 6 - 23 mg/dL    Creatinine 1.29 (H) 0.50 - 1.05 mg/dL    eGFR 43 (L) >60 mL/min/1.73m*2    Calcium 8.6 8.6 - 10.3 mg/dL    Phosphorus 3.5 2.5 - 4.9 mg/dL    Albumin 3.2 (L) 3.4 - 5.0 g/dL   CBC   Result Value  Ref Range    WBC 13.0 (H) 4.4 - 11.3 x10*3/uL    nRBC 0.0 0.0 - 0.0 /100 WBCs    RBC 3.06 (L) 4.00 - 5.20 x10*6/uL    Hemoglobin 9.6 (L) 12.0 - 16.0 g/dL    Hematocrit 30.8 (L) 36.0 - 46.0 %     (H) 80 - 100 fL    MCH 31.4 26.0 - 34.0 pg    MCHC 31.2 (L) 32.0 - 36.0 g/dL    RDW 13.4 11.5 - 14.5 %    Platelets 193 150 - 450 x10*3/uL   Magnesium   Result Value Ref Range    Magnesium 2.04 1.60 - 2.40 mg/dL   POCT GLUCOSE   Result Value Ref Range    POCT Glucose 161 (H) 74 - 99 mg/dL   POCT GLUCOSE   Result Value Ref Range    POCT Glucose 186 (H) 74 - 99 mg/dL   POCT GLUCOSE   Result Value Ref Range    POCT Glucose 275 (H) 74 - 99 mg/dL       Physical Exam:  Subjective:   Examination:   General Examination:   General Appearance: Well developed, well nourished, in no acute distress.   Head: normocephalic, atraumatic   Eyes: Anicteric sclera. Pupils are equally round and reactive to light.  Extraocular movements are intact.    Ears: normal   Oral: Cavity: mucosa moist.   Throat: clear.   Neck/Thyroid: neck supple, full range of motion, no cervical lymphadenopathy.   Skin: warm and dry, no suspicious lesions.    Heart: regular rate and rhythm, S1, S2 normal, no murmurs.  Occasional PACs  Lungs: copd but distant breath sounds with no audible wheeze  Abdomen: soft, non-tender, non-distended, bowl sound present, normal.   Extremities: no clubbing, no cyanosis, no edema.   Neuro: non-focal, motor strength normal upper lower extremities, sensory exam intact.       Assessment/Plan     I48.92 Atrial flutter intermittent 2-1 conduction  J44.9 Advanced severe COPD  I42.9 I50.92 Mixed systolic diastolic heart failure  J18.9 Community-acquired pneumonia       Code Status:  DNR and No Intubation and No ICU    I spent 35 minutes in the professional and overall care of this patient.        Horacio Armijo MD

## 2024-05-25 NOTE — PROGRESS NOTES
"Mitzi Pro is a 73 y.o. female on day 2 of admission presenting with Acute combined systolic and diastolic heart failure (Multi).    Subjective   Patient without symptoms currently, as long as she is laying in bed, she denies having shortness of breath over her baseline; she did have some mild feelings of a chill, but no shaking.  No fevers.  She was tachycardic with going back into her atrial arrhythmia, although did not have any symptoms from it.  She had gotten an order for digoxin by cardiology and was tolerating.    Objective     Physical Exam  Constitutional: Awake/alert/oriented x3, on BiPAP, no respiratory distress, calm  Eyes: Clear sclera  Head/Neck: Normocephalic, atraumatic  Respiratory/Thorax: Bilateral wheezing  Cardiovascular: Tachycardic, regular, approximately 150 by apical auscultation  Gastrointestinal: Non-TTP  Musculoskeletal: FROM  Extremities: Bilateral lower extremity pitting edema  Psychological: Appropriate mood and behavior  Skin: Warm and dry, no jaundice     Last Recorded Vitals  Blood pressure 101/59, pulse (!) 154, temperature 36.6 °C (97.9 °F), resp. rate 26, height 1.651 m (5' 5\"), weight 77.7 kg (171 lb 4.8 oz), SpO2 96%.  Intake/Output last 3 Shifts:  I/O last 3 completed shifts:  In: 100 (1.3 mL/kg) [IV Piggyback:100]  Out: 130 (1.7 mL/kg) [Urine:130 (0 mL/kg/hr)]  Weight: 77.7 kg     Relevant Results  Scheduled medications  apixaban, 5 mg, oral, BID  aspirin, 81 mg, oral, Daily  atorvastatin, 40 mg, oral, Daily  cefTRIAXone, 1 g, intravenous, q24h  cilostazol, 100 mg, oral, BID  digoxin, 250 mcg, intravenous, Daily  doxycycline, 100 mg, intravenous, q12h  formoterol, 20 mcg, nebulization, q12h  icosapent ethyL, 2 g, oral, BID  insulin lispro, 0-10 Units, subcutaneous, TID  ipratropium-albuteroL, 3 mL, nebulization, 4x daily  loratadine, 10 mg, oral, Daily  melatonin, 3 mg, oral, Nightly  methylPREDNISolone sodium succinate (PF), 40 mg, intravenous, Daily  metoprolol " succinate XL, 50 mg, oral, Daily  midodrine, 10 mg, oral, BID  midodrine, 10 mg, oral, Once  oxybutynin, 5 mg, oral, BID  oxygen, , inhalation, Continuous - Inhalation  pantoprazole, 40 mg, oral, Daily before breakfast  pioglitazone, 30 mg, oral, Daily  polyethylene glycol, 17 g, oral, BID  sennosides, 2 tablet, oral, BID  SITagliptin phosphate, 50 mg, oral, Daily      Continuous medications     PRN medications  PRN medications: acetaminophen **OR** acetaminophen **OR** acetaminophen, dextrose, dextrose, fluticasone, glucagon, glucagon, ipratropium-albuteroL, ondansetron ODT **OR** ondansetron, oxygen, sodium chloride    Results for orders placed or performed during the hospital encounter of 05/23/24 (from the past 24 hour(s))   POCT GLUCOSE   Result Value Ref Range    POCT Glucose 252 (H) 74 - 99 mg/dL   POCT GLUCOSE   Result Value Ref Range    POCT Glucose 245 (H) 74 - 99 mg/dL   Electrocardiogram, 12-lead PRN ACS symptoms   Result Value Ref Range    Ventricular Rate 148 BPM    Atrial Rate 148 BPM    MA Interval 126 ms    QRS Duration 110 ms    QT Interval 288 ms    QTC Calculation(Bazett) 452 ms    R Axis 50 degrees    T Axis 250 degrees    QRS Count 24 beats    Q Onset 213 ms    P Onset 160 ms    P Offset 201 ms    T Offset 357 ms    QTC Fredericia 389 ms   Renal Function Panel   Result Value Ref Range    Glucose 198 (H) 74 - 99 mg/dL    Sodium 134 (L) 136 - 145 mmol/L    Potassium 4.1 3.5 - 5.3 mmol/L    Chloride 96 (L) 98 - 107 mmol/L    Bicarbonate 29 21 - 32 mmol/L    Anion Gap 13 10 - 20 mmol/L    Urea Nitrogen 39 (H) 6 - 23 mg/dL    Creatinine 1.29 (H) 0.50 - 1.05 mg/dL    eGFR 43 (L) >60 mL/min/1.73m*2    Calcium 8.6 8.6 - 10.3 mg/dL    Phosphorus 3.5 2.5 - 4.9 mg/dL    Albumin 3.2 (L) 3.4 - 5.0 g/dL   CBC   Result Value Ref Range    WBC 13.0 (H) 4.4 - 11.3 x10*3/uL    nRBC 0.0 0.0 - 0.0 /100 WBCs    RBC 3.06 (L) 4.00 - 5.20 x10*6/uL    Hemoglobin 9.6 (L) 12.0 - 16.0 g/dL    Hematocrit 30.8 (L) 36.0 -  46.0 %     (H) 80 - 100 fL    MCH 31.4 26.0 - 34.0 pg    MCHC 31.2 (L) 32.0 - 36.0 g/dL    RDW 13.4 11.5 - 14.5 %    Platelets 193 150 - 450 x10*3/uL   Magnesium   Result Value Ref Range    Magnesium 2.04 1.60 - 2.40 mg/dL   POCT GLUCOSE   Result Value Ref Range    POCT Glucose 161 (H) 74 - 99 mg/dL   POCT GLUCOSE   Result Value Ref Range    POCT Glucose 186 (H) 74 - 99 mg/dL     XR chest 2 views    Result Date: 5/24/2024  Interpreted By:  Kristian Abarca, STUDY: XR CHEST 2 VIEWS; ;  5/24/2024 9:37 am   INDICATION: Signs/Symptoms:AECHF vs PNA.   COMPARISON: 05/23/2024 chest radiograph   ACCESSION NUMBER(S): [id number]   ORDERING CLINICIAN: THIERRY MILLER   TECHNIQUE: PA and lateral views of the chest were obtained.   FINDINGS: Bilateral pleural effusions are present larger on the right. Underlying pneumonia in the right lung base cannot be excluded. In mount of pleural fluid has mildly increased on the right from the prior exam.   Interstitial markings of the lungs are mildly increased similar to the prior exam.   Cardiac silhouette remains borderline enlarged.       Bilateral pleural effusions are present, larger on the right. Underlying pneumonia/atelectasis in the right lung base cannot be excluded.   Continued cardiomegaly and pulmonary vascular congestion.     MACRO: None   Signed by: Kristian Abarca 5/24/2024 10:02 AM Dictation workstation:   [id number]    Electrocardiogram, 12-lead PRN ACS symptoms    Result Date: 5/24/2024  Suspect arm lead reversal, interpretation assumes no reversal Sinus rhythm with Premature atrial complexes and Premature ventricular complexes or Fusion complexes Rightward axis Pulmonary disease pattern Septal infarct , age undetermined ST & T wave abnormality, consider inferior ischemia Abnormal ECG When compared with ECG of 23-MAY-2024 17:24, (unconfirmed) Sinus rhythm has replaced Junctional rhythm Vent. rate has decreased BY  61 BPM Incomplete left bundle branch block is no  longer Present    Electrocardiogram, 12-lead PRN ACS symptoms    Result Date: 5/24/2024  Unusual P axis and short IL, probable junctional tachycardia Rightward axis Incomplete left bundle branch block ST elevation, consider lateral injury or acute infarct ** ** ACUTE MI ** ** Abnormal ECG When compared with ECG of 23-MAY-2024 16:08, (unconfirmed) Junctional rhythm has replaced Sinus rhythm    ECG 12 lead    Result Date: 5/24/2024  Sinus tachycardia with Fusion complexes Septal infarct (cited on or before 23-MAY-2024) Lateral injury pattern ** ** ACUTE MI ** ** Abnormal ECG When compared with ECG of 23-MAY-2024 15:56, (unconfirmed) Previous ECG has undetermined rhythm, needs review Serial changes of Septal infarct Present    ECG 12 lead    Result Date: 5/24/2024  Undetermined rhythm Rightward axis Septal infarct (cited on or before 23-MAY-2024) ST & T wave abnormality, consider inferior ischemia ST & T wave abnormality, consider anterolateral ischemia Abnormal ECG When compared with ECG of 03-MAY-2024 14:43, Significant changes have occurred    XR chest 1 view    Result Date: 5/23/2024  Interpreted By:  Nandini Bui, STUDY: XR CHEST 1 VIEW;  5/23/2024 4:57 pm   INDICATION: Signs/Symptoms:Shortness of breath, history of CHF and COPD.   COMPARISON: 05/02/2024.   ACCESSION NUMBER(S): [id number]   ORDERING CLINICIAN: DAVID SAUCEDA   FINDINGS:         CARDIOMEDIASTINAL SILHOUETTE: Status post sternotomy. Heart is mildly enlarged. There is venous congestion and interstitial edema.   LUNGS: There is atelectasis/infiltrate in the right lower lobe seen also on the radiograph of 05/02/2024. No evidence of pneumothorax.   ABDOMEN: No remarkable upper abdominal findings.   BONES: No acute osseous changes.       1. Cardiomegaly with venous congestion and interstitial edema consistent with congestive heart failure. 2. Infiltrate/atelectasis in the right lung base. Rule out pneumonia.       MACRO: None   Signed by: Nandini Bui  5/23/2024 5:20 PM Dictation workstation:   [id number]    ECG 12 Lead    Result Date: 5/22/2024  Sinus rhythm with sinus arrhythmia with occasional Premature ventricular complexes and Fusion complexes Rightward axis Anteroseptal infarct (cited on or before 30-APR-2024) Abnormal ECG When compared with ECG of 01-MAY-2024 05:33, Sinus rhythm has replaced Atrial flutter Vent. rate has decreased BY  79 BPM Serial changes of Anteroseptal infarct Present    NM injection no scan    Result Date: 5/8/2024  Interpreted By:  Frank Gannon and Adjei Effie STUDY: NM INJECTION NO SCAN;  5/2/2024 10:00 am   INDICATION: Signs/Symptoms:decreased EF, arrythmia, elevated troponin.   COMPARISON: None.   ACCESSION NUMBER(S): [id number]   ORDERING CLINICIAN: CAM MOSS   TECHNIQUE: DIVISION OF NUCLEAR MEDICINE PHARMACOLOGIC STRESS MYOCARDIAL PERFUSION SCAN, ONE DAY PROTOCOL   The patient received an intravenous injection of 9.2 mCi of Tc-99m Myoview. However, the patient was unable to tolerate lying down for the study and did not want to continue with the examination. No images were obtained.   FINDINGS: No images were obtained.       Injection no scan. No images were obtained.   I personally reviewed the images/study and I agree with the findings as stated by radiology resident Dr. Lima Fish. This study was interpreted at [place], [place].   MACRO: None   Signed by: Frank Gannon 5/8/2024 5:12 PM Dictation workstation:   [id number]    ECG 12 Lead    Result Date: 5/4/2024  Sinus rhythm with occasional Premature ventricular complexes Incomplete left bundle branch block Poor R-wave progression Abnormal ECG Confirmed by Frank Garcia ([id number]) on 5/4/2024 3:25:34 PM    ECG 12 Lead    Result Date: 5/3/2024  Atrial flutter with 2 to 1 block Rightward axis Incomplete left bundle branch block Nonspecific ST abnormality Abnormal ECG When compared with ECG of 02-MAY-2024 21:07,  (unconfirmed) Premature ventricular complexes are no longer Present Premature supraventricular complexes are no longer Present Confirmed by Frank Garcia ([id number]) on 5/3/2024 1:30:56 PM    ECG 12 Lead    Result Date: 5/3/2024  Atrial flutter with variable AV block Rightward axis Incomplete left bundle branch block ST & T wave abnormality, consider inferior ischemia Abnormal ECG When compared with ECG of 02-MAY-2024 12:13, (unconfirmed) Significant changes have occurred Confirmed by Frank Garcia ([id number]) on 5/3/2024 1:29:58 PM    XR chest 1 view    Result Date: 5/2/2024  Interpreted By:  Brandon Nazario, STUDY: XR CHEST 1 VIEW   INDICATION: Signs/Symptoms:SOB.   COMPARISON: April 30   ACCESSION NUMBER(S): [id number]   ORDERING CLINICIAN: YANIRA BAXTER   FINDINGS: Interval development of perihilar and basilar interstitial edema with new right effusion and basilar airspace opacity favored to represent more conglomerate edema. Focus of pneumonia not excluded.   Previous cardiomegaly with median sternotomy unchanged.       Interval development of perihilar and basilar interstitial edema with new right effusion and basilar airspace opacity favored to represent more conglomerate edema. Focus of pneumonia not excluded.   Signed by: Brandon Nazario 5/2/2024 6:55 PM Dictation workstation:   [id number]    ECG 12 Lead    Result Date: 5/2/2024  Atrial flutter with 2:1 AV conduction Right axis deviation Anteroseptal infarct (cited on or before 30-APR-2024) ST & T wave abnormality, consider inferior ischemia Abnormal ECG When compared with ECG of 30-APR-2024 01:56, (unconfirmed) Significant changes have occurred Confirmed by Ernie Diaz ([id number]) on 5/2/2024 1:08:56 PM    ECG 12 lead    Result Date: 5/2/2024  cannot exclude atrial flutter vs sinus tachycardia Left axis deviation Incomplete right bundle branch block Nonspecific ST and T wave abnormality Abnormal ECG When compared with ECG of 08-APR-2023 13:22, Fusion complexes are now  Present Incomplete right bundle branch block is now Present Confirmed by Ernie Diaz ([id number]) on 5/2/2024 12:56:18 PM    ECG 12 lead    Result Date: 5/2/2024  atrial fibrillation/flutter Septal infarct , age undetermined ST & T wave abnormality, consider lateral ischemia Abnormal ECG When compared with ECG of 30-APR-2024 00:46, (unconfirmed) Current undetermined rhythm precludes rhythm comparison, needs review Incomplete right bundle branch block is no longer Present Septal infarct is now Present Confirmed by Ernie Diaz ([id number]) on 5/2/2024 12:55:44 PM    Transthoracic Echo (TTE) Complete    Result Date: 4/30/2024            [facility] [id number] [id number]    Tel [phone] Fax [phone] TRANSTHORACIC ECHOCARDIOGRAM REPORT  Patient Name:      MILVIA ARRIOLA      Reading Physician:    [id number] Horacio Armijo MD Study Date:        4/30/2024             Ordering Provider:    [id number] YANIRA BAXTER MRN/PID:           [id number]              Fellow: Accession#:        [id number]          Nurse:                Leena AMATO Date of Birth/Age: 1950 / 73 years  Sonographer:          Amalia Brewster                                                                RDCS Gender:            F                     Additional Staff: Height:            165.00 cm             Admit Date: Weight:            76.20 kg              Admission Status:     Inpatient -                                                                Priority                                                                discharge BSA / BMI:         1.84 m2 / 27.99 kg/m2 Department Location:  [facility] CCU Blood Pressure: 99 /56 mmHg Study Type:    TRANSTHORACIC ECHO (TTE) COMPLETE Diagnosis/ICD: Chronic combined systolic (congestive) and diastolic (congestive)                heart failure (CHF)-I50.42 Indication:    Hx of HFrEF, SOB; Afib RVR with troponin elevation Patient  History: Valve Disorders:   Mitral Regurgitation. Diabetes:          Yes Pertinent History: HTN, Hyperlipidemia and COPD. Study Detail: The following Echo studies were performed: 2D, M-Mode, Doppler and               color flow. Technically challenging study due to patient lying in               supine position and prominent lung artifact. Definity used as a               contrast agent for endocardial border definition. Total contrast               used for this procedure was 2 mL via IV push.  PHYSICIAN INTERPRETATION: Left Ventricle: Left ventricular systolic function is moderately decreased, with an estimated ejection fraction of 34 %. There are multiple wall motion abnormalities. The left ventricular cavity size is mild to moderately dilated. The left ventricular septal wall thickness is decreased. There is normal left ventricular posterior wall thickness. Spectral Doppler shows an impaired relaxation pattern of left ventricular diastolic filling. There is an elevated left ventricular end diastolic pressure. LV Wall Scoring: The mid and apical anterior septum, mid and apical inferior septum, apical lateral segment, apical anterior segment, and apex are akinetic. The entire inferior wall, basal and mid anterior wall, basal and mid anterolateral wall, basal and mid inferolateral wall, basal anteroseptal segment, and basal inferoseptal segment are hypokinetic. Left Atrium: The left atrium is mildly dilated. Right Ventricle: The right ventricle is normal in size. There is mildly reduced right ventricular systolic function. Right Atrium: The right atrium is normal in size. Aortic Valve: The aortic valve is trileaflet. There is evidence of mildly elevated transaortic gradients consistent with sclerosis of the aortic valve. There is no evidence of aortic valve regurgitation. The peak instantaneous gradient of the aortic valve is 7.6 mmHg. Mitral Valve: The mitral valve is normal in structure. There is mild to moderate  mitral annular calcification. There is moderate mitral valve regurgitation. EROA by PISA 0.2 moderate, posterior MR skyler type IIIA apical tethering. Tricuspid Valve: The tricuspid valve is structurally normal. There is mild tricuspid regurgitation. The Doppler estimated RVSP is mildly elevated at 43.7 mmHg. Pulmonic Valve: The pulmonic valve is structurally normal. There is trace pulmonic valve regurgitation. Pericardium: There is no pericardial effusion noted. Aorta: The aortic root is normal. There is no dilatation of the ascending aorta. There is no dilatation of the aortic root. Pulmonary Artery: The main pulmonary artery is normal in size, and position, with normal bifurcation into the left and right pulmonary arteries. Systemic Veins: The inferior vena cava appears mildly dilated. The hepatic vein appears dilated. There is less than 50% IVC collapse with inspiration.  CONCLUSIONS:  1. Left ventricular systolic function is moderately decreased with a 34 % estimated ejection fraction.  2. Multiple segmental abnormalities exist. See findings.  3. Spectral Doppler shows an impaired relaxation pattern of left ventricular diastolic filling.  4. There is an elevated left ventricular end diastolic pressure.  5. There is mildly reduced right ventricular systolic function.  6. Moderate mitral valve regurgitation.  7. Mildly elevated RVSP.  8. Aortic valve sclerosis.  9. There are multiple wall motion abnormalities. QUANTITATIVE DATA SUMMARY: 2D MEASUREMENTS:                           Normal Ranges: LAs:           4.50 cm    (2.7-4.0cm) IVSd:          1.00 cm    (0.6-1.1cm) LVPWd:         0.90 cm    (0.6-1.1cm) LVIDd:         6.50 cm    (3.9-5.9cm) LVIDs:         5.40 cm LV Mass Index: 144.5 g/m2 LV % FS        16.9 % LA VOLUME:                             Normal Ranges: LA Area A4C:     21.0 cm2 LA Area A2C:     21.0 cm2 LA Volume Index: 37.0 ml/m2 M-MODE MEASUREMENTS:                  Normal Ranges: Ao Root: 2.60  cm (2.0-3.7cm) AoV Exc: 1.40 cm (1.5-2.5cm) AORTA MEASUREMENTS:                    Normal Ranges: AoV Exc:   1.40 cm (1.5-2.5cm) Asc Ao, d: 3.30 cm (2.1-3.4cm) LV SYSTOLIC FUNCTION BY 2D PLANIMETRY (MOD):                     Normal Ranges: EF-A4C View: 39.4 % (>=55%) EF-A2C View: 28.7 % EF-Biplane:  34.0 % LV DIASTOLIC FUNCTION:                        Normal Ranges: MV Peak E:    1.38 m/s (0.7-1.2 m/s) MV Peak A:    0.90 m/s (0.42-0.7 m/s) E/A Ratio:    1.54     (1.0-2.2) MV e'         0.06 m/s (>8.0) MV lateral e' 0.08 m/s MV medial e'  0.04 m/s E/e' Ratio:   23.00    (<8.0) MITRAL VALVE:                 Normal Ranges: MV DT: 177 msec (150-240msec) MITRAL INSUFFICIENCY:                           Normal Ranges: PISA Radius:  0.4 cm MR VTI:       142.00 cm MR Vmax:      407.00 cm/s MR Alias Subhash: 84.7 cm/s MR Volume:    29.71 ml MR Flow Rt:   85.15 ml/s MR EROA:      0.21 cm2 AORTIC VALVE:                         Normal Ranges: AoV Vmax:      1.38 m/s (<=1.7m/s) AoV Peak P.6 mmHg (<20mmHg) LVOT Max Subhash:  0.89 m/s (<=1.1m/s) LVOT VTI:      20.00 cm LVOT Diameter: 1.70 cm  (1.8-2.4cm) AoV Area,Vmax: 1.47 cm2 (2.5-4.5cm2)  RIGHT VENTRICLE: RV Basal 3.40 cm RV Mid   1.80 cm RV Major 7.6 cm TAPSE:   14.1 mm RV s'    0.09 m/s TRICUSPID VALVE/RVSP:                             Normal Ranges: Peak TR Velocity: 3.19 m/s RV Syst Pressure: 43.7 mmHg (< 30mmHg) PULMONIC VALVE:                        Normal Ranges: PV Accel Time: 74 msec (>120ms)  [id number] Horacio Armijo MD Electronically signed on 2024 at 4:34:01 PM  Wall Scoring  ** Final **     XR chest 1 view    Result Date: 2024  STUDY: Chest Radiograph;  2024 at 1:13 AM INDICATION: Chest pain. COMPARISON: XR chest 2022, XR chest 2021, XR chest 2021, XR chest 3/1/2019. ACCESSION NUMBER(S): [id number] ORDERING CLINICIAN: ISIDRO MARSHALL TECHNIQUE:  Frontal chest was obtained at 0113 hours. FINDINGS: CARDIOMEDIASTINAL SILHOUETTE: Cardiomediastinal  silhouette is mildly enlarged in size and configuration.  Calcified plaque is seen in the aorta.  LUNGS: Lungs are clear.  Flattening of hemidiaphragms suggests chronic changes of COPD.  ABDOMEN: No remarkable upper abdominal findings.  BONES: No acute osseous changes.    No acute cardia pulmonary disease. Cardiomegaly with suspected mild chronic changes of COPD. Signed by Rojas Lawrence       Assessment/Plan   Principal Problem:    Acute combined systolic and diastolic heart failure (Multi)  Active Problems:    Coronary artery disease involving native heart    Stage 3a chronic kidney disease (Multi)    Acute hypoxic respiratory failure (Multi)    Atrial flutter with rapid ventricular response (Multi)    Lactic acidosis    Hyperkalemia    Acute exacerbation of COPD with asthma (Multi)    Community acquired pneumonia of right lower lobe of lung    NSTEMI (non-ST elevated myocardial infarction) (Multi)    Nonsustained ventricular tachycardia (Multi)    Plan:  - Stable to remain at current level of care on telemetry  - Appreciate cardiology consultation, can give a dose of digoxin  - Continue with current antibiotic treatment for potential pneumonia  - Home medication reconciliation was completed  - She may need repeat evaluation with coronary angiography  - Cardiac diet  - Eliquis for VTE prophylaxis  - DNR/DNI/no ICU         I spent 45 minutes in the professional and overall care of this patient.    Bertram Rasheed MD

## 2024-05-25 NOTE — NURSING NOTE
Patient's HR in the 140's and hypotensive BP 68/43 (51). Dr. Link informed. Patient is asymptomatic. Order received for  ml bolus over 1 hour and Midodrine 10 mg po times one now. Prior to intervention, checked BP on the right and was 110/64 (79) . Uncertain if patient is ST or Atrial Flutter 1:1. Bolus stopped. Midodrine held. Dr. Link made aware.

## 2024-05-25 NOTE — NURSING NOTE
0248 12 lead EKG show Sinus Tachycardia, Incomplete left bundle branch block and Marked ST abnormality possible inferior subendocardial injury. Dr. Link reviewed.

## 2024-05-25 NOTE — CARE PLAN
Problem: Skin  Goal: Participates in plan/prevention/treatment measures  Outcome: Progressing   The patient's goals for the shift include I want to get some sleep    The clinical goals for the shift include The patient will maintain a systolic BP greater than 90, a MAP greater than 65 and HR less than 100 by 5/25 0700    Over the shift, the patient did not make progress toward the following goals. Barriers to progression include ***. Recommendations to address these barriers include ***.

## 2024-05-25 NOTE — CONSULTS
Consults    Reason For Consult  Reason for Consult: patient/family support     History Of Present Illness  Mitzi Pro is a 73 y.o. female with past medical history of Chronic Obstructive Pulmonary Disease (COPD) and Congestive Heart Failure is presenting with shortness of breath, respiratory failure.      Symptoms (0 - 10, Best to Worst)  [place] Symptom Assessment System  Pain Score: 0 - No pain    BM in last 48 hours? unknown    Serious Illness Conversation  What is your understanding now of where you are with your illness:  patient has good insight of her illness  How much information about what is likely to be ahead with your illness  would you like from me: any information regarding progression and treatment  What are your most important goals if your health situation worsens:  patient wishes to go home  What are your biggest fears and worries about the future with your health:  not being able to breath      Personal/Social History   She reports that she has been smoking cigarettes. She has never used smokeless tobacco. She reports that she does not currently use alcohol. She reports that she does not use drugs.    FCaregiving/Caregiver Support  Does the patient require assistance in some or all components of his care, including coordination of medical care? Yes  If Yes, which person serves that role?   friend    Caregiver emotional or practical needs:  Caregiver assists with bringing her food and water as she has a well and cannot consume the well water.     Past Medical History  She has a past medical history of Personal history of malignant neoplasm of ovary and Vaginal itching (05/14/2024).    Surgical History  She has a past surgical history that includes Coronary artery bypass graft (11/20/2017); Other surgical history (05/17/2022); Other surgical history (05/17/2022); Other surgical history (11/08/2019); and CT angio aorta and bilateral iliofemoral runoff w and or wo IV contrast (12/21/2021).    "  Family History  Family History   Problem Relation Name Age of Onset    Other (arteriosclerotic cardiovascular disease) Mother      Other (arteriosclerotic cardiovascular disease) Father      Colon cancer Sister       Allergies  Metformin and Nitrofurantoin    Review of Systems   Constitutional:  Positive for activity change and fatigue.   HENT: Negative.     Eyes: Negative.    Respiratory:  Positive for cough and shortness of breath.    Cardiovascular:  Positive for palpitations and leg swelling.   Endocrine: Negative.    Genitourinary: Negative.    Musculoskeletal:  Positive for gait problem and joint swelling.   Skin: Negative.         Physical Exam  Constitutional:       General: She is in acute distress.      Appearance: Normal appearance. She is ill-appearing.   HENT:      Head: Normocephalic.      Right Ear: External ear normal.      Left Ear: External ear normal.      Nose: Nose normal.      Mouth/Throat:      Mouth: Mucous membranes are moist.      Pharynx: Oropharynx is clear.   Eyes:      Conjunctiva/sclera: Conjunctivae normal.   Cardiovascular:      Rate and Rhythm: Rhythm irregular.      Pulses: Normal pulses.      Heart sounds: Murmur heard.   Pulmonary:      Effort: Respiratory distress present.      Breath sounds: Wheezing present.   Abdominal:      Palpations: Abdomen is soft.   Musculoskeletal:         General: Swelling present.      Cervical back: Normal range of motion.   Skin:     General: Skin is warm and dry.      Capillary Refill: Capillary refill takes 2 to 3 seconds.   Neurological:      Mental Status: She is alert and oriented to person, place, and time.         Last Recorded Vitals  Blood pressure 111/52, pulse 90, temperature 36.4 °C (97.5 °F), temperature source Temporal, resp. rate 23, height 1.651 m (5' 5\"), weight 76.5 kg (168 lb 10.4 oz), SpO2 97%.    Relevant Results  apixaban, 5 mg, oral, BID  [START ON 5/25/2024] aspirin, 81 mg, oral, Daily  atorvastatin, 40 mg, oral, " Daily  cefTRIAXone, 1 g, intravenous, q24h  cilostazol, 100 mg, oral, BID  doxycycline, 100 mg, intravenous, q12h  formoterol, 20 mcg, nebulization, q12h  icosapent ethyL, 2 g, oral, BID  insulin lispro, 0-10 Units, subcutaneous, TID  ipratropium, 0.5 mg, nebulization, q6h  ipratropium-albuteroL, 3 mL, nebulization, 4x daily  loratadine, 10 mg, oral, Daily  melatonin, 3 mg, oral, Nightly  methylPREDNISolone sodium succinate (PF), 40 mg, intravenous, Daily  [START ON 5/25/2024] metoprolol succinate XL, 50 mg, oral, Daily  midodrine, 10 mg, oral, BID  oxybutynin, 5 mg, oral, BID  oxygen, , inhalation, Continuous - Inhalation  oxygen, , inhalation, Continuous - Inhalation  [START ON 5/25/2024] pantoprazole, 40 mg, oral, Daily before breakfast  [START ON 5/25/2024] pioglitazone, 30 mg, oral, Daily  polyethylene glycol, 17 g, oral, BID  sennosides, 2 tablet, oral, BID  [START ON 5/25/2024] SITagliptin phosphate, 50 mg, oral, Daily       Results for orders placed or performed during the hospital encounter of 05/23/24 (from the past 24 hour(s))   Procalcitonin   Result Value Ref Range    Procalcitonin 0.25 (H) <=0.07 ng/mL   Renal function panel   Result Value Ref Range    Glucose 233 (H) 74 - 99 mg/dL    Sodium 136 136 - 145 mmol/L    Potassium 3.9 3.5 - 5.3 mmol/L    Chloride 97 (L) 98 - 107 mmol/L    Bicarbonate 30 21 - 32 mmol/L    Anion Gap 13 10 - 20 mmol/L    Urea Nitrogen 25 (H) 6 - 23 mg/dL    Creatinine 1.30 (H) 0.50 - 1.05 mg/dL    eGFR 43 (L) >60 mL/min/1.73m*2    Calcium 8.3 (L) 8.6 - 10.3 mg/dL    Phosphorus 4.7 2.5 - 4.9 mg/dL    Albumin 3.2 (L) 3.4 - 5.0 g/dL   Magnesium   Result Value Ref Range    Magnesium 1.60 1.60 - 2.40 mg/dL   Lactate   Result Value Ref Range    Lactate 1.1 0.4 - 2.0 mmol/L   Renal Function Panel   Result Value Ref Range    Glucose 223 (H) 74 - 99 mg/dL    Sodium 136 136 - 145 mmol/L    Potassium 3.9 3.5 - 5.3 mmol/L    Chloride 97 (L) 98 - 107 mmol/L    Bicarbonate 30 21 - 32 mmol/L     Anion Gap 13 10 - 20 mmol/L    Urea Nitrogen 25 (H) 6 - 23 mg/dL    Creatinine 1.30 (H) 0.50 - 1.05 mg/dL    eGFR 43 (L) >60 mL/min/1.73m*2    Calcium 8.4 (L) 8.6 - 10.3 mg/dL    Phosphorus 4.7 2.5 - 4.9 mg/dL    Albumin 3.2 (L) 3.4 - 5.0 g/dL   CBC   Result Value Ref Range    WBC 6.7 4.4 - 11.3 x10*3/uL    nRBC 0.0 0.0 - 0.0 /100 WBCs    RBC 3.10 (L) 4.00 - 5.20 x10*6/uL    Hemoglobin 9.8 (L) 12.0 - 16.0 g/dL    Hematocrit 31.2 (L) 36.0 - 46.0 %     (H) 80 - 100 fL    MCH 31.6 26.0 - 34.0 pg    MCHC 31.4 (L) 32.0 - 36.0 g/dL    RDW 13.0 11.5 - 14.5 %    Platelets 168 150 - 450 x10*3/uL   Magnesium   Result Value Ref Range    Magnesium 1.60 1.60 - 2.40 mg/dL   POCT GLUCOSE   Result Value Ref Range    POCT Glucose 174 (H) 74 - 99 mg/dL   POCT GLUCOSE   Result Value Ref Range    POCT Glucose 182 (H) 74 - 99 mg/dL   POCT GLUCOSE   Result Value Ref Range    POCT Glucose 252 (H) 74 - 99 mg/dL   POCT GLUCOSE   Result Value Ref Range    POCT Glucose 245 (H) 74 - 99 mg/dL    XR chest 2 views    Result Date: 5/24/2024  Interpreted By:  Kristian Abarca, STUDY: XR CHEST 2 VIEWS; ;  5/24/2024 9:37 am   INDICATION: Signs/Symptoms:AECHF vs PNA.   COMPARISON: 05/23/2024 chest radiograph   ACCESSION NUMBER(S): [id number]   ORDERING CLINICIAN: THIERRY MILLER   TECHNIQUE: PA and lateral views of the chest were obtained.   FINDINGS: Bilateral pleural effusions are present larger on the right. Underlying pneumonia in the right lung base cannot be excluded. In mount of pleural fluid has mildly increased on the right from the prior exam.   Interstitial markings of the lungs are mildly increased similar to the prior exam.   Cardiac silhouette remains borderline enlarged.       Bilateral pleural effusions are present, larger on the right. Underlying pneumonia/atelectasis in the right lung base cannot be excluded.   Continued cardiomegaly and pulmonary vascular congestion.     MACRO: None   Signed by: Kristian Abarca 5/24/2024 10:02  AM Dictation workstation:   [id number]    Electrocardiogram, 12-lead PRN ACS symptoms    Result Date: 5/24/2024   Suspect arm lead reversal, interpretation assumes no reversal Sinus rhythm with Premature atrial complexes and Premature ventricular complexes or Fusion complexes Rightward axis Pulmonary disease pattern Septal infarct , age undetermined ST & T wave abnormality, consider inferior ischemia Abnormal ECG When compared with ECG of 23-MAY-2024 17:24, (unconfirmed) Sinus rhythm has replaced Junctional rhythm Vent. rate has decreased BY  61 BPM Incomplete left bundle branch block is no longer Present    Electrocardiogram, 12-lead PRN ACS symptoms    Result Date: 5/24/2024  Unusual P axis and short VT, probable junctional tachycardia Rightward axis Incomplete left bundle branch block ST elevation, consider lateral injury or acute infarct ** ** ACUTE MI ** ** Abnormal ECG When compared with ECG of 23-MAY-2024 16:08, (unconfirmed) Junctional rhythm has replaced Sinus rhythm    ECG 12 lead    Result Date: 5/24/2024  Sinus tachycardia with Fusion complexes Septal infarct (cited on or before 23-MAY-2024) Lateral injury pattern ** ** ACUTE MI ** ** Abnormal ECG When compared with ECG of 23-MAY-2024 15:56, (unconfirmed) Previous ECG has undetermined rhythm, needs review Serial changes of Septal infarct Present    ECG 12 lead    Result Date: 5/24/2024  Undetermined rhythm Rightward axis Septal infarct (cited on or before 23-MAY-2024) ST & T wave abnormality, consider inferior ischemia ST & T wave abnormality, consider anterolateral ischemia Abnormal ECG When compared with ECG of 03-MAY-2024 14:43, Significant changes have occurred    XR chest 1 view    Result Date: 5/23/2024  Interpreted By:  Nandini Bui, STUDY: XR CHEST 1 VIEW;  5/23/2024 4:57 pm   INDICATION: Signs/Symptoms:Shortness of breath, history of CHF and COPD.   COMPARISON: 05/02/2024.   ACCESSION NUMBER(S): [id number]   ORDERING CLINICIAN: DAVID SAUCEDA    FINDINGS:         CARDIOMEDIASTINAL SILHOUETTE: Status post sternotomy. Heart is mildly enlarged. There is venous congestion and interstitial edema.   LUNGS: There is atelectasis/infiltrate in the right lower lobe seen also on the radiograph of 05/02/2024. No evidence of pneumothorax.   ABDOMEN: No remarkable upper abdominal findings.   BONES: No acute osseous changes.       1. Cardiomegaly with venous congestion and interstitial edema consistent with congestive heart failure. 2. Infiltrate/atelectasis in the right lung base. Rule out pneumonia.       MACRO: None   Signed by: Nandini Bui 5/23/2024 5:20 PM Dictation workstation:   [id number]    ECG 12 Lead    Result Date: 5/22/2024  Sinus rhythm with sinus arrhythmia with occasional Premature ventricular complexes and Fusion complexes Rightward axis Anteroseptal infarct (cited on or before 30-APR-2024) Abnormal ECG When compared with ECG of 01-MAY-2024 05:33, Sinus rhythm has replaced Atrial flutter Vent. rate has decreased BY  79 BPM Serial changes of Anteroseptal infarct Present    NM injection no scan    Result Date: 5/8/2024  Interpreted By:  Frank Gannon and Adjei Effie STUDY: NM INJECTION NO SCAN;  5/2/2024 10:00 am   INDICATION: Signs/Symptoms:decreased EF, arrythmia, elevated troponin.   COMPARISON: None.   ACCESSION NUMBER(S): [id number]   ORDERING CLINICIAN: CAM MOSS   TECHNIQUE: DIVISION OF NUCLEAR MEDICINE PHARMACOLOGIC STRESS MYOCARDIAL PERFUSION SCAN, ONE DAY PROTOCOL   The patient received an intravenous injection of 9.2 mCi of Tc-99m Myoview. However, the patient was unable to tolerate lying down for the study and did not want to continue with the examination. No images were obtained.   FINDINGS: No images were obtained.       Injection no scan. No images were obtained.   I personally reviewed the images/study and I agree with the findings as stated by radiology resident Dr. Lima Fish. This study was interpreted at [facility]  [place], [place].   MACRO: None   Signed by: Frank Gannon 5/8/2024 5:12 PM Dictation workstation:   [id number]    ECG 12 Lead    Result Date: 5/4/2024  Sinus rhythm with occasional Premature ventricular complexes Incomplete left bundle branch block Poor R-wave progression Abnormal ECG Confirmed by Frank Garcia ([id number]) on 5/4/2024 3:25:34 PM    ECG 12 Lead    Result Date: 5/3/2024  Atrial flutter with 2 to 1 block Rightward axis Incomplete left bundle branch block Nonspecific ST abnormality Abnormal ECG When compared with ECG of 02-MAY-2024 21:07, (unconfirmed) Premature ventricular complexes are no longer Present Premature supraventricular complexes are no longer Present Confirmed by Frank Garcia ([id number]) on 5/3/2024 1:30:56 PM    ECG 12 Lead    Result Date: 5/3/2024  Atrial flutter with variable AV block Rightward axis Incomplete left bundle branch block ST & T wave abnormality, consider inferior ischemia Abnormal ECG When compared with ECG of 02-MAY-2024 12:13, (unconfirmed) Significant changes have occurred Confirmed by Frank Garcia ([id number]) on 5/3/2024 1:29:58 PM    XR chest 1 view    Result Date: 5/2/2024  Interpreted By:  Brandon Nazario, STUDY: XR CHEST 1 VIEW   INDICATION: Signs/Symptoms:SOB.   COMPARISON: April 30   ACCESSION NUMBER(S): [id number]   ORDERING CLINICIAN: YANIRA BAXTER   FINDINGS: Interval development of perihilar and basilar interstitial edema with new right effusion and basilar airspace opacity favored to represent more conglomerate edema. Focus of pneumonia not excluded.   Previous cardiomegaly with median sternotomy unchanged.       Interval development of perihilar and basilar interstitial edema with new right effusion and basilar airspace opacity favored to represent more conglomerate edema. Focus of pneumonia not excluded.   Signed by: Brandon Nazario 5/2/2024 6:55 PM Dictation workstation:   [id number]    ECG 12 Lead    Result Date: 5/2/2024  Atrial  flutter with 2:1 AV conduction Right axis deviation Anteroseptal infarct (cited on or before 30-APR-2024) ST & T wave abnormality, consider inferior ischemia Abnormal ECG When compared with ECG of 30-APR-2024 01:56, (unconfirmed) Significant changes have occurred Confirmed by Ernie Diaz ([id number]) on 5/2/2024 1:08:56 PM    ECG 12 lead    Result Date: 5/2/2024  cannot exclude atrial flutter vs sinus tachycardia Left axis deviation Incomplete right bundle branch block Nonspecific ST and T wave abnormality Abnormal ECG When compared with ECG of 08-APR-2023 13:22, Fusion complexes are now Present Incomplete right bundle branch block is now Present Confirmed by Ernie Diaz ([id number]) on 5/2/2024 12:56:18 PM    ECG 12 lead    Result Date: 5/2/2024  atrial fibrillation/flutter Septal infarct , age undetermined ST & T wave abnormality, consider lateral ischemia Abnormal ECG When compared with ECG of 30-APR-2024 00:46, (unconfirmed) Current undetermined rhythm precludes rhythm comparison, needs review Incomplete right bundle branch block is no longer Present Septal infarct is now Present Confirmed by Ernie Diaz ([id number]) on 5/2/2024 12:55:44 PM    Transthoracic Echo (TTE) Complete    Result Date: 4/30/2024            [facility] [address] [address]    Tel [phone] Fax [phone] TRANSTHORACIC ECHOCARDIOGRAM REPORT  Patient Name:      MILVIA Garces Physician:    [id number] Horacio Armijo MD Study Date:        4/30/2024             Ordering Provider:    [id number] YANIRA BAXTER MRN/PID:           [id number]              Fellow: Accession#:        [id number]          Nurse:                Leena AMATO Date of Birth/Age: 1950 / 73 years  Sonographer:          Amalia Brewster RDCS Gender:            F                     Additional Staff: Height:            165.00 cm              Admit Date: Weight:            76.20 kg              Admission Status:     Inpatient -                                                                Priority                                                                discharge BSA / BMI:         1.84 m2 / 27.99 kg/m2 Department Location:  [facility] CCU Blood Pressure: 99 /56 mmHg Study Type:    TRANSTHORACIC ECHO (TTE) COMPLETE Diagnosis/ICD: 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 Diabetes:          Yes Pertinent History: HTN, Hyperlipidemia and COPD. Study Detail: The following Echo studies were performed: 2D, M-Mode, Doppler and               color flow. Technically challenging study due to patient lying in               supine position and prominent lung artifact. Definity used as a               contrast agent for endocardial border definition. Total contrast               used for this procedure was 2 mL via IV push.  PHYSICIAN INTERPRETATION: Left Ventricle: Left ventricular systolic function is moderately decreased, with an estimated ejection fraction of 34 %. There are multiple wall motion abnormalities. The left ventricular cavity size is mild to moderately dilated. The left ventricular septal wall thickness is decreased. There is normal left ventricular posterior wall thickness. Spectral Doppler shows an impaired relaxation pattern of left ventricular diastolic filling. There is an elevated left ventricular end diastolic pressure. LV Wall Scoring: The mid and apical anterior septum, mid and apical inferior septum, apical lateral segment, apical anterior segment, and apex are akinetic. The entire inferior wall, basal and mid anterior wall, basal and mid anterolateral wall, basal and mid inferolateral wall, basal anteroseptal segment, and basal inferoseptal segment  are hypokinetic. Left Atrium: The left atrium is mildly dilated. Right Ventricle: The right ventricle is normal in size. There is mildly reduced right ventricular systolic function. Right Atrium: The right atrium is normal in size. Aortic Valve: The aortic valve is trileaflet. There is evidence of mildly elevated transaortic gradients consistent with sclerosis of the aortic valve. There is no evidence of aortic valve regurgitation. The peak instantaneous gradient of the aortic valve is 7.6 mmHg. Mitral Valve: The mitral valve is normal in structure. There is mild to moderate mitral annular calcification. There is moderate mitral valve regurgitation. EROA by PISA 0.2 moderate, posterior MR skyler type IIIA apical tethering. Tricuspid Valve: The tricuspid valve is structurally normal. There is mild tricuspid regurgitation. The Doppler estimated RVSP is mildly elevated at 43.7 mmHg. Pulmonic Valve: The pulmonic valve is structurally normal. There is trace pulmonic valve regurgitation. Pericardium: There is no pericardial effusion noted. Aorta: The aortic root is normal. There is no dilatation of the ascending aorta. There is no dilatation of the aortic root. Pulmonary Artery: The main pulmonary artery is normal in size, and position, with normal bifurcation into the left and right pulmonary arteries. Systemic Veins: The inferior vena cava appears mildly dilated. The hepatic vein appears dilated. There is less than 50% IVC collapse with inspiration.  CONCLUSIONS:  1. Left ventricular systolic function is moderately decreased with a 34 % estimated ejection fraction.  2. Multiple segmental abnormalities exist. See findings.  3. Spectral Doppler shows an impaired relaxation pattern of left ventricular diastolic filling.  4. There is an elevated left ventricular end diastolic pressure.  5. There is mildly reduced right ventricular systolic function.  6. Moderate mitral valve regurgitation.  7. Mildly elevated RVSP.  8.  Aortic valve sclerosis.  9. There are multiple wall motion abnormalities. QUANTITATIVE DATA SUMMARY: 2D MEASUREMENTS:                           Normal Ranges: LAs:           4.50 cm    (2.7-4.0cm) IVSd:          1.00 cm    (0.6-1.1cm) LVPWd:         0.90 cm    (0.6-1.1cm) LVIDd:         6.50 cm    (3.9-5.9cm) LVIDs:         5.40 cm LV Mass Index: 144.5 g/m2 LV % FS        16.9 % LA VOLUME:                             Normal Ranges: LA Area A4C:     21.0 cm2 LA Area A2C:     21.0 cm2 LA Volume Index: 37.0 ml/m2 M-MODE MEASUREMENTS:                  Normal Ranges: Ao Root: 2.60 cm (2.0-3.7cm) AoV Exc: 1.40 cm (1.5-2.5cm) AORTA MEASUREMENTS:                    Normal Ranges: AoV Exc:   1.40 cm (1.5-2.5cm) Asc Ao, d: 3.30 cm (2.1-3.4cm) LV SYSTOLIC FUNCTION BY 2D PLANIMETRY (MOD):                     Normal Ranges: EF-A4C View: 39.4 % (>=55%) EF-A2C View: 28.7 % EF-Biplane:  34.0 % LV DIASTOLIC FUNCTION:                        Normal Ranges: MV Peak E:    1.38 m/s (0.7-1.2 m/s) MV Peak A:    0.90 m/s (0.42-0.7 m/s) E/A Ratio:    1.54     (1.0-2.2) MV e'         0.06 m/s (>8.0) MV lateral e' 0.08 m/s MV medial e'  0.04 m/s E/e' Ratio:   23.00    (<8.0) MITRAL VALVE:                 Normal Ranges: MV DT: 177 msec (150-240msec) MITRAL INSUFFICIENCY:                           Normal Ranges: PISA Radius:  0.4 cm MR VTI:       142.00 cm MR Vmax:      407.00 cm/s MR Alias Subhash: 84.7 cm/s MR Volume:    29.71 ml MR Flow Rt:   85.15 ml/s MR EROA:      0.21 cm2 AORTIC VALVE:                         Normal Ranges: AoV Vmax:      1.38 m/s (<=1.7m/s) AoV Peak P.6 mmHg (<20mmHg) LVOT Max Subhash:  0.89 m/s (<=1.1m/s) LVOT VTI:      20.00 cm LVOT Diameter: 1.70 cm  (1.8-2.4cm) AoV Area,Vmax: 1.47 cm2 (2.5-4.5cm2)  RIGHT VENTRICLE: RV Basal 3.40 cm RV Mid   1.80 cm RV Major 7.6 cm TAPSE:   14.1 mm RV s'    0.09 m/s TRICUSPID VALVE/RVSP:                             Normal Ranges: Peak TR Velocity: 3.19 m/s RV Syst Pressure: 43.7 mmHg (<  "30mmHg) PULMONIC VALVE:                        Normal Ranges: PV Accel Time: 74 msec (>120ms)  [id number] Horacio Armijo MD Electronically signed on 4/30/2024 at 4:34:01 PM  Wall Scoring  ** Final **     XR chest 1 view    Result Date: 4/30/2024  STUDY: Chest Radiograph;  4/30/2024 at 1:13 AM INDICATION: Chest pain. COMPARISON: XR chest 11/28/2022, XR chest 12/5/2021, XR chest 7/14/2021, XR chest 3/1/2019. ACCESSION NUMBER(S): [id number] ORDERING CLINICIAN: ISIDRO MARSHALL TECHNIQUE:  Frontal chest was obtained at 0113 hours. FINDINGS: CARDIOMEDIASTINAL SILHOUETTE: Cardiomediastinal silhouette is mildly enlarged in size and configuration.  Calcified plaque is seen in the aorta.  LUNGS: Lungs are clear.  Flattening of hemidiaphragms suggests chronic changes of COPD.  ABDOMEN: No remarkable upper abdominal findings.  BONES: No acute osseous changes.    No acute cardia pulmonary disease. Cardiomegaly with suspected mild chronic changes of COPD. Signed by Rojas Lawrence       Assessment/Plan   IMP:  Patient has acute on chronic COPD and heart failure. Patient experienced a bout of atrial flutter and was unable to recover from a respiratory standpoint, which is why she called \"911\". She does now wish heroic interventions however she is not really ready to discuss Hospice as an option but rather was interested in Palliative support upon discharge home. Durein our conversation there were several social needs uncovered. She needs assistance with laundry as it in in her basement and she in unable to do the stairs, she needs help cleaning her stove as she is unable to use these chemicals, she needs an air conditioner as she is unable to tolerate the humidity. I discussed all of these with case management and they placed a Social Work referral to see if there were any programs to assist her.     Symptom Management  Pain: denies  Dyspnea: Patient has a need for portable oxygen. This concern was sent to her PCD so that it can be " addressed on discharge.   Intervention recommended for dyspnea?  yes  Other: several social concerns listed above  Intervention recommended for constipation?  NA    Provider estimate of survival: unknown  Patient Prognostic Awareness: Unknown    Patient/proxy preference for information  Prefers full information    Goals of Care  Assist with connecting patient to programs that may be able to meet some of her social needs.     Is the patient hospice-eligible?   Yes  Was a discussion held re hospice services?   yes  Was a decision made re hospice services?  No    Other Palliative Support  Outpatient Palliative Care through a program such [facility] referral on discharge.   I spent 90 minutes in the review, planning and care of this patient.         Kimber Mcgovern, APRN-CNS

## 2024-05-25 NOTE — NURSING NOTE
Confirmed BP systolic greater than 100 prior to administering Lopressor 5 mg IVP slowly. 0235 110/54 (79) . After IV lopressor VS 99/66 (75). 103/67 (77) HR remains unchanged in the 140's. The patient remains asymptomatic. Dr. Link made aware. Continue to monitor.

## 2024-05-26 LAB
ALBUMIN SERPL BCP-MCNC: 3.3 G/DL (ref 3.4–5)
ANION GAP SERPL CALC-SCNC: 13 MMOL/L (ref 10–20)
BUN SERPL-MCNC: 39 MG/DL (ref 6–23)
CALCIUM SERPL-MCNC: 8.8 MG/DL (ref 8.6–10.3)
CHLORIDE SERPL-SCNC: 98 MMOL/L (ref 98–107)
CO2 SERPL-SCNC: 30 MMOL/L (ref 21–32)
CREAT SERPL-MCNC: 1.14 MG/DL (ref 0.5–1.05)
EGFRCR SERPLBLD CKD-EPI 2021: 50 ML/MIN/1.73M*2
ERYTHROCYTE [DISTWIDTH] IN BLOOD BY AUTOMATED COUNT: 13.6 % (ref 11.5–14.5)
GLUCOSE BLD MANUAL STRIP-MCNC: 159 MG/DL (ref 74–99)
GLUCOSE BLD MANUAL STRIP-MCNC: 329 MG/DL (ref 74–99)
GLUCOSE BLD MANUAL STRIP-MCNC: 363 MG/DL (ref 74–99)
GLUCOSE SERPL-MCNC: 233 MG/DL (ref 74–99)
HCT VFR BLD AUTO: 30.7 % (ref 36–46)
HGB BLD-MCNC: 9.4 G/DL (ref 12–16)
MAGNESIUM SERPL-MCNC: 1.92 MG/DL (ref 1.6–2.4)
MCH RBC QN AUTO: 31 PG (ref 26–34)
MCHC RBC AUTO-ENTMCNC: 30.6 G/DL (ref 32–36)
MCV RBC AUTO: 101 FL (ref 80–100)
NRBC BLD-RTO: 0 /100 WBCS (ref 0–0)
PHOSPHATE SERPL-MCNC: 3.2 MG/DL (ref 2.5–4.9)
PLATELET # BLD AUTO: 202 X10*3/UL (ref 150–450)
POTASSIUM SERPL-SCNC: 4.6 MMOL/L (ref 3.5–5.3)
RBC # BLD AUTO: 3.03 X10*6/UL (ref 4–5.2)
SODIUM SERPL-SCNC: 136 MMOL/L (ref 136–145)
WBC # BLD AUTO: 10 X10*3/UL (ref 4.4–11.3)

## 2024-05-26 PROCEDURE — 99233 SBSQ HOSP IP/OBS HIGH 50: CPT | Performed by: INTERNAL MEDICINE

## 2024-05-26 PROCEDURE — 2500000001 HC RX 250 WO HCPCS SELF ADMINISTERED DRUGS (ALT 637 FOR MEDICARE OP): Performed by: STUDENT IN AN ORGANIZED HEALTH CARE EDUCATION/TRAINING PROGRAM

## 2024-05-26 PROCEDURE — 2500000002 HC RX 250 W HCPCS SELF ADMINISTERED DRUGS (ALT 637 FOR MEDICARE OP, ALT 636 FOR OP/ED): Performed by: INTERNAL MEDICINE

## 2024-05-26 PROCEDURE — 83735 ASSAY OF MAGNESIUM: CPT | Performed by: STUDENT IN AN ORGANIZED HEALTH CARE EDUCATION/TRAINING PROGRAM

## 2024-05-26 PROCEDURE — 2500000005 HC RX 250 GENERAL PHARMACY W/O HCPCS: Performed by: INTERNAL MEDICINE

## 2024-05-26 PROCEDURE — 2500000002 HC RX 250 W HCPCS SELF ADMINISTERED DRUGS (ALT 637 FOR MEDICARE OP, ALT 636 FOR OP/ED): Performed by: STUDENT IN AN ORGANIZED HEALTH CARE EDUCATION/TRAINING PROGRAM

## 2024-05-26 PROCEDURE — 85027 COMPLETE CBC AUTOMATED: CPT | Performed by: STUDENT IN AN ORGANIZED HEALTH CARE EDUCATION/TRAINING PROGRAM

## 2024-05-26 PROCEDURE — 82947 ASSAY GLUCOSE BLOOD QUANT: CPT | Mod: 91

## 2024-05-26 PROCEDURE — 80069 RENAL FUNCTION PANEL: CPT | Performed by: STUDENT IN AN ORGANIZED HEALTH CARE EDUCATION/TRAINING PROGRAM

## 2024-05-26 PROCEDURE — 2500000001 HC RX 250 WO HCPCS SELF ADMINISTERED DRUGS (ALT 637 FOR MEDICARE OP): Performed by: INTERNAL MEDICINE

## 2024-05-26 PROCEDURE — 2500000005 HC RX 250 GENERAL PHARMACY W/O HCPCS: Performed by: STUDENT IN AN ORGANIZED HEALTH CARE EDUCATION/TRAINING PROGRAM

## 2024-05-26 PROCEDURE — 94640 AIRWAY INHALATION TREATMENT: CPT | Mod: MUE

## 2024-05-26 PROCEDURE — 94660 CPAP INITIATION&MGMT: CPT

## 2024-05-26 PROCEDURE — 36415 COLL VENOUS BLD VENIPUNCTURE: CPT | Performed by: STUDENT IN AN ORGANIZED HEALTH CARE EDUCATION/TRAINING PROGRAM

## 2024-05-26 PROCEDURE — 2500000004 HC RX 250 GENERAL PHARMACY W/ HCPCS (ALT 636 FOR OP/ED): Performed by: STUDENT IN AN ORGANIZED HEALTH CARE EDUCATION/TRAINING PROGRAM

## 2024-05-26 PROCEDURE — 2060000001 HC INTERMEDIATE ICU ROOM DAILY

## 2024-05-26 PROCEDURE — 2500000004 HC RX 250 GENERAL PHARMACY W/ HCPCS (ALT 636 FOR OP/ED): Performed by: INTERNAL MEDICINE

## 2024-05-26 RX ORDER — DIGOXIN 0.25 MG/ML
125 INJECTION INTRAMUSCULAR; INTRAVENOUS ONCE
Status: COMPLETED | OUTPATIENT
Start: 2024-05-26 | End: 2024-05-26

## 2024-05-26 RX ADMIN — FORMOTEROL FUMARATE 20 MCG: 20 SOLUTION RESPIRATORY (INHALATION) at 21:05

## 2024-05-26 RX ADMIN — METOPROLOL SUCCINATE 50 MG: 50 TABLET, EXTENDED RELEASE ORAL at 10:14

## 2024-05-26 RX ADMIN — LORATADINE 10 MG: 10 TABLET ORAL at 09:21

## 2024-05-26 RX ADMIN — ICOSAPENT ETHYL 2 G: 1 CAPSULE ORAL at 20:32

## 2024-05-26 RX ADMIN — IPRATROPIUM BROMIDE AND ALBUTEROL SULFATE 3 ML: 2.5; .5 SOLUTION RESPIRATORY (INHALATION) at 12:22

## 2024-05-26 RX ADMIN — OXYBUTYNIN CHLORIDE 5 MG: 5 TABLET ORAL at 09:21

## 2024-05-26 RX ADMIN — INSULIN LISPRO 2 UNITS: 100 INJECTION, SOLUTION INTRAVENOUS; SUBCUTANEOUS at 09:21

## 2024-05-26 RX ADMIN — Medication 30 L/MIN: at 08:00

## 2024-05-26 RX ADMIN — METHYLPREDNISOLONE SODIUM SUCCINATE 40 MG: 40 INJECTION, POWDER, FOR SOLUTION INTRAMUSCULAR; INTRAVENOUS at 09:21

## 2024-05-26 RX ADMIN — Medication 3 MG: at 20:32

## 2024-05-26 RX ADMIN — MIDODRINE HYDROCHLORIDE 10 MG: 10 TABLET ORAL at 17:37

## 2024-05-26 RX ADMIN — DIGOXIN 125 MCG: 0.25 INJECTION INTRAMUSCULAR; INTRAVENOUS at 10:14

## 2024-05-26 RX ADMIN — IPRATROPIUM BROMIDE AND ALBUTEROL SULFATE 3 ML: 2.5; .5 SOLUTION RESPIRATORY (INHALATION) at 17:06

## 2024-05-26 RX ADMIN — APIXABAN 5 MG: 5 TABLET, FILM COATED ORAL at 20:32

## 2024-05-26 RX ADMIN — OXYBUTYNIN CHLORIDE 5 MG: 5 TABLET ORAL at 20:32

## 2024-05-26 RX ADMIN — DOXYCYCLINE 100 MG: 100 INJECTION, POWDER, LYOPHILIZED, FOR SOLUTION INTRAVENOUS at 05:21

## 2024-05-26 RX ADMIN — APIXABAN 5 MG: 5 TABLET, FILM COATED ORAL at 09:21

## 2024-05-26 RX ADMIN — PANTOPRAZOLE SODIUM 40 MG: 40 TABLET, DELAYED RELEASE ORAL at 05:21

## 2024-05-26 RX ADMIN — ASPIRIN 81 MG: 81 TABLET, COATED ORAL at 09:00

## 2024-05-26 RX ADMIN — ATORVASTATIN CALCIUM 40 MG: 40 TABLET, FILM COATED ORAL at 20:32

## 2024-05-26 RX ADMIN — CILOSTAZOL 100 MG: 100 TABLET ORAL at 20:32

## 2024-05-26 RX ADMIN — Medication 30 L/MIN: at 19:24

## 2024-05-26 RX ADMIN — SITAGLIPTIN 50 MG: 50 TABLET, FILM COATED ORAL at 09:21

## 2024-05-26 RX ADMIN — CILOSTAZOL 100 MG: 100 TABLET ORAL at 09:22

## 2024-05-26 RX ADMIN — DOXYCYCLINE 100 MG: 100 INJECTION, POWDER, LYOPHILIZED, FOR SOLUTION INTRAVENOUS at 17:37

## 2024-05-26 RX ADMIN — INSULIN LISPRO 8 UNITS: 100 INJECTION, SOLUTION INTRAVENOUS; SUBCUTANEOUS at 17:37

## 2024-05-26 RX ADMIN — CEFTRIAXONE SODIUM 1 G: 1 INJECTION, SOLUTION INTRAVENOUS at 17:37

## 2024-05-26 RX ADMIN — PIOGLITAZONE 30 MG: 15 TABLET ORAL at 09:22

## 2024-05-26 RX ADMIN — ICOSAPENT ETHYL 2 G: 1 CAPSULE ORAL at 09:21

## 2024-05-26 RX ADMIN — IPRATROPIUM BROMIDE AND ALBUTEROL SULFATE 3 ML: 2.5; .5 SOLUTION RESPIRATORY (INHALATION) at 21:05

## 2024-05-26 RX ADMIN — Medication 30 L/MIN: at 09:14

## 2024-05-26 RX ADMIN — FORMOTEROL FUMARATE 20 MCG: 20 SOLUTION RESPIRATORY (INHALATION) at 08:35

## 2024-05-26 RX ADMIN — MIDODRINE HYDROCHLORIDE 10 MG: 10 TABLET ORAL at 09:23

## 2024-05-26 ASSESSMENT — COGNITIVE AND FUNCTIONAL STATUS - GENERAL
WALKING IN HOSPITAL ROOM: A LITTLE
MOVING TO AND FROM BED TO CHAIR: A LITTLE
MOBILITY SCORE: 19
DAILY ACTIVITIY SCORE: 21
DRESSING REGULAR LOWER BODY CLOTHING: A LITTLE
STANDING UP FROM CHAIR USING ARMS: A LITTLE
DRESSING REGULAR UPPER BODY CLOTHING: A LITTLE
CLIMB 3 TO 5 STEPS WITH RAILING: A LOT
HELP NEEDED FOR BATHING: A LITTLE

## 2024-05-26 ASSESSMENT — PAIN SCALES - GENERAL
PAINLEVEL_OUTOF10: 0 - NO PAIN

## 2024-05-26 ASSESSMENT — PAIN - FUNCTIONAL ASSESSMENT
PAIN_FUNCTIONAL_ASSESSMENT: 0-10

## 2024-05-26 NOTE — CARE PLAN
The patient's goals for the shift include I want to get some sleep    The clinical goals for the shift include patient will not sustain HR <120 BPM this shift    S: Patient admitted 5/23/24 for CHF .  B: History of Afib, HTN, GERD, COPD  A: patient A+Ox 4, denies pain, denies SOB this shift, afib/aflutter RVR on tele, HR 70-150s this shift, does not sustain 150s for long periods of time, asymptomatic, given IV ATB per orders, to have ECHO done, afebrile.   R: Patient from home anticipates discharging to home.

## 2024-05-26 NOTE — CARE PLAN
Problem: Respiratory  Goal: Wean oxygen to maintain O2 saturation per order/standard this shift  Outcome: Progressing     Problem: Arrythmia/Dysrhythmia  Goal: Lab values return to normal range  Outcome: Progressing  Goal: Serial ECG will return to baseline  Outcome: Not Progressing  Goal: Verbalize understanding of procedures/devices  Outcome: Progressing  Goal: Vital signs return to baseline  Outcome: Progressing     Problem: Heart Failure  Goal: Improved gas exchange this shift  Outcome: Progressing  Goal: Improved urinary output this shift  Outcome: Progressing  Goal: Reduction in peripheral edema within 24 hours  Outcome: Progressing  Goal: Report improvement of dyspnea/breathlessness this shift  Outcome: Progressing  Goal: Weight from fluid excess reduced over 2-3 days, then stabilize  Outcome: Progressing  Goal: Increase self care and/or family involvement in 24 hours  Outcome: Progressing     Problem: Skin  Goal: Promote/optimize nutrition  Outcome: Progressing   The patient's goals for the shift include I want to get some sleep    The clinical goals for the shift include Continue to ween patient down Hi flow by 1900 on 5/26/24.    Over the shift, the patient did not make progress toward the following goals. Barriers to progression include Afib RVR. Recommendations to address these barriers include continue with medical managment.    Problem: Arrythmia/Dysrhythmia  Goal: Serial ECG will return to baseline  Outcome: Not Progressing

## 2024-05-26 NOTE — PROGRESS NOTES
"Mitzi Pro is a 73 y.o. female on day 3 of admission presenting with Acute combined systolic and diastolic heart failure (Multi).    Subjective   Patient without symptoms currently, she noted that she had a restful night and as long as she remained in bed she was feeling okay; she was currently on an Airvo, her saturations were 99% and she did not feel short of breath, she was able to tolerate eating breakfast declined having any fevers or chills, overall stating that she feels much better.    Objective     Physical Exam  Constitutional: Awake/alert/oriented x3, no respiratory distress, on Airvo today, calm  Eyes: Clear sclera  Head/Neck: Normocephalic, atraumatic  Respiratory/Thorax: Bilateral wheezing  Cardiovascular: Heart rate 98 at the time of examination by apical auscultation  Gastrointestinal: Non-TTP, soft  Extremities: Bilateral lower extremity pitting edema has improved, trace today  Psychological: Appropriate mood and behavior  Skin: Warm and dry, no jaundice     Last Recorded Vitals  Blood pressure 105/67, pulse (!) 153, temperature 36.4 °C (97.5 °F), temperature source Temporal, resp. rate 24, height 1.651 m (5' 5\"), weight 76.5 kg (168 lb 10.4 oz), SpO2 99%.  Intake/Output last 3 Shifts:  I/O last 3 completed shifts:  In: 1103 (14.4 mL/kg) [P.O.:600; I.V.:3 (0 mL/kg); IV Piggyback:500]  Out: 1175 (15.4 mL/kg) [Urine:1175 (0.4 mL/kg/hr)]  Weight: 76.5 kg     Relevant Results  Scheduled medications  apixaban, 5 mg, oral, BID  aspirin, 81 mg, oral, Daily  atorvastatin, 40 mg, oral, Daily  cefTRIAXone, 1 g, intravenous, q24h  cilostazol, 100 mg, oral, BID  doxycycline, 100 mg, intravenous, q12h  formoterol, 20 mcg, nebulization, q12h  icosapent ethyL, 2 g, oral, BID  insulin lispro, 0-10 Units, subcutaneous, TID  ipratropium-albuteroL, 3 mL, nebulization, 4x daily  loratadine, 10 mg, oral, Daily  melatonin, 3 mg, oral, Nightly  metoprolol succinate XL, 50 mg, oral, Daily  midodrine, 10 mg, oral, " BID  midodrine, 10 mg, oral, Once  oxybutynin, 5 mg, oral, BID  oxygen, , inhalation, Continuous - Inhalation  pantoprazole, 40 mg, oral, Daily before breakfast  pioglitazone, 30 mg, oral, Daily  polyethylene glycol, 17 g, oral, BID  sennosides, 2 tablet, oral, BID  SITagliptin phosphate, 50 mg, oral, Daily      Continuous medications     PRN medications  PRN medications: acetaminophen **OR** acetaminophen **OR** acetaminophen, dextrose, dextrose, fluticasone, glucagon, glucagon, ipratropium-albuteroL, ondansetron ODT **OR** ondansetron, oxygen, sodium chloride    Results for orders placed or performed during the hospital encounter of 05/23/24 (from the past 24 hour(s))   POCT GLUCOSE   Result Value Ref Range    POCT Glucose 275 (H) 74 - 99 mg/dL   Renal Function Panel   Result Value Ref Range    Glucose 233 (H) 74 - 99 mg/dL    Sodium 136 136 - 145 mmol/L    Potassium 4.6 3.5 - 5.3 mmol/L    Chloride 98 98 - 107 mmol/L    Bicarbonate 30 21 - 32 mmol/L    Anion Gap 13 10 - 20 mmol/L    Urea Nitrogen 39 (H) 6 - 23 mg/dL    Creatinine 1.14 (H) 0.50 - 1.05 mg/dL    eGFR 50 (L) >60 mL/min/1.73m*2    Calcium 8.8 8.6 - 10.3 mg/dL    Phosphorus 3.2 2.5 - 4.9 mg/dL    Albumin 3.3 (L) 3.4 - 5.0 g/dL   CBC   Result Value Ref Range    WBC 10.0 4.4 - 11.3 x10*3/uL    nRBC 0.0 0.0 - 0.0 /100 WBCs    RBC 3.03 (L) 4.00 - 5.20 x10*6/uL    Hemoglobin 9.4 (L) 12.0 - 16.0 g/dL    Hematocrit 30.7 (L) 36.0 - 46.0 %     (H) 80 - 100 fL    MCH 31.0 26.0 - 34.0 pg    MCHC 30.6 (L) 32.0 - 36.0 g/dL    RDW 13.6 11.5 - 14.5 %    Platelets 202 150 - 450 x10*3/uL   Magnesium   Result Value Ref Range    Magnesium 1.92 1.60 - 2.40 mg/dL   POCT GLUCOSE   Result Value Ref Range    POCT Glucose 159 (H) 74 - 99 mg/dL     XR chest 2 views    Result Date: 5/24/2024  Interpreted By:  Kristian Abarca, STUDY: XR CHEST 2 VIEWS; ;  5/24/2024 9:37 am   INDICATION: Signs/Symptoms:AECHF vs PNA.   COMPARISON: 05/23/2024 chest radiograph   ACCESSION  NUMBER(S): [id number]   ORDERING CLINICIAN: THIERRY MILLER   TECHNIQUE: PA and lateral views of the chest were obtained.   FINDINGS: Bilateral pleural effusions are present larger on the right. Underlying pneumonia in the right lung base cannot be excluded. In mount of pleural fluid has mildly increased on the right from the prior exam.   Interstitial markings of the lungs are mildly increased similar to the prior exam.   Cardiac silhouette remains borderline enlarged.       Bilateral pleural effusions are present, larger on the right. Underlying pneumonia/atelectasis in the right lung base cannot be excluded.   Continued cardiomegaly and pulmonary vascular congestion.     MACRO: None   Signed by: Kristian Abarca 5/24/2024 10:02 AM Dictation workstation:   [id number]    Electrocardiogram, 12-lead PRN ACS symptoms    Result Date: 5/24/2024  Suspect arm lead reversal, interpretation assumes no reversal Sinus rhythm with Premature atrial complexes and Premature ventricular complexes or Fusion complexes Rightward axis Pulmonary disease pattern Septal infarct , age undetermined ST & T wave abnormality, consider inferior ischemia Abnormal ECG When compared with ECG of 23-MAY-2024 17:24, (unconfirmed) Sinus rhythm has replaced Junctional rhythm Vent. rate has decreased BY  61 BPM Incomplete left bundle branch block is no longer Present    Electrocardiogram, 12-lead PRN ACS symptoms    Result Date: 5/24/2024  Unusual P axis and short ID, probable junctional tachycardia Rightward axis Incomplete left bundle branch block ST elevation, consider lateral injury or acute infarct ** ** ACUTE MI ** ** Abnormal ECG When compared with ECG of 23-MAY-2024 16:08, (unconfirmed) Junctional rhythm has replaced Sinus rhythm    ECG 12 lead    Result Date: 5/24/2024  Sinus tachycardia with Fusion complexes Septal infarct (cited on or before 23-MAY-2024) Lateral injury pattern ** ** ACUTE MI ** ** Abnormal ECG When compared with ECG of  23-MAY-2024 15:56, (unconfirmed) Previous ECG has undetermined rhythm, needs review Serial changes of Septal infarct Present    ECG 12 lead    Result Date: 5/24/2024  Undetermined rhythm Rightward axis Septal infarct (cited on or before 23-MAY-2024) ST & T wave abnormality, consider inferior ischemia ST & T wave abnormality, consider anterolateral ischemia Abnormal ECG When compared with ECG of 03-MAY-2024 14:43, Significant changes have occurred    XR chest 1 view    Result Date: 5/23/2024  Interpreted By:  Nandini Bui, STUDY: XR CHEST 1 VIEW;  5/23/2024 4:57 pm   INDICATION: Signs/Symptoms:Shortness of breath, history of CHF and COPD.   COMPARISON: 05/02/2024.   ACCESSION NUMBER(S): [id number]   ORDERING CLINICIAN: DAVID SAUCEDA   FINDINGS:         CARDIOMEDIASTINAL SILHOUETTE: Status post sternotomy. Heart is mildly enlarged. There is venous congestion and interstitial edema.   LUNGS: There is atelectasis/infiltrate in the right lower lobe seen also on the radiograph of 05/02/2024. No evidence of pneumothorax.   ABDOMEN: No remarkable upper abdominal findings.   BONES: No acute osseous changes.       1. Cardiomegaly with venous congestion and interstitial edema consistent with congestive heart failure. 2. Infiltrate/atelectasis in the right lung base. Rule out pneumonia.       MACRO: None   Signed by: Nandini Bui 5/23/2024 5:20 PM Dictation workstation:   [id number]    ECG 12 Lead    Result Date: 5/22/2024  Sinus rhythm with sinus arrhythmia with occasional Premature ventricular complexes and Fusion complexes Rightward axis Anteroseptal infarct (cited on or before 30-APR-2024) Abnormal ECG When compared with ECG of 01-MAY-2024 05:33, Sinus rhythm has replaced Atrial flutter Vent. rate has decreased BY  79 BPM Serial changes of Anteroseptal infarct Present    NM injection no scan    Result Date: 5/8/2024  Interpreted By:  Frank Gannon,  Petr Amato STUDY: NM INJECTION NO SCAN;  5/2/2024 10:00 am    INDICATION: Signs/Symptoms:decreased EF, arrythmia, elevated troponin.   COMPARISON: None.   ACCESSION NUMBER(S): [id number]   ORDERING CLINICIAN: CAM MOSS   TECHNIQUE: DIVISION OF NUCLEAR MEDICINE PHARMACOLOGIC STRESS MYOCARDIAL PERFUSION SCAN, ONE DAY PROTOCOL   The patient received an intravenous injection of 9.2 mCi of Tc-99m Myoview. However, the patient was unable to tolerate lying down for the study and did not want to continue with the examination. No images were obtained.   FINDINGS: No images were obtained.       Injection no scan. No images were obtained.   I personally reviewed the images/study and I agree with the findings as stated by radiology resident Dr. Lima Fish. This study was interpreted at [place], [place].   MACRO: None   Signed by: Frank Gannon 5/8/2024 5:12 PM Dictation workstation:   [id number]    ECG 12 Lead    Result Date: 5/4/2024  Sinus rhythm with occasional Premature ventricular complexes Incomplete left bundle branch block Poor R-wave progression Abnormal ECG Confirmed by Frank Garcia ([id number]) on 5/4/2024 3:25:34 PM    ECG 12 Lead    Result Date: 5/3/2024  Atrial flutter with 2 to 1 block Rightward axis Incomplete left bundle branch block Nonspecific ST abnormality Abnormal ECG When compared with ECG of 02-MAY-2024 21:07, (unconfirmed) Premature ventricular complexes are no longer Present Premature supraventricular complexes are no longer Present Confirmed by Frank Garcia ([id number]) on 5/3/2024 1:30:56 PM    ECG 12 Lead    Result Date: 5/3/2024  Atrial flutter with variable AV block Rightward axis Incomplete left bundle branch block ST & T wave abnormality, consider inferior ischemia Abnormal ECG When compared with ECG of 02-MAY-2024 12:13, (unconfirmed) Significant changes have occurred Confirmed by Frank Garcia ([id number]) on 5/3/2024 1:29:58 PM    XR chest 1 view    Result Date: 5/2/2024  Interpreted By:  Brandon Nazario,  STUDY: XR CHEST 1 VIEW   INDICATION: Signs/Symptoms:SOB.   COMPARISON: April 30   ACCESSION NUMBER(S): [id number]   ORDERING CLINICIAN: YANIRA BAXTER   FINDINGS: Interval development of perihilar and basilar interstitial edema with new right effusion and basilar airspace opacity favored to represent more conglomerate edema. Focus of pneumonia not excluded.   Previous cardiomegaly with median sternotomy unchanged.       Interval development of perihilar and basilar interstitial edema with new right effusion and basilar airspace opacity favored to represent more conglomerate edema. Focus of pneumonia not excluded.   Signed by: Brandon Nazario 5/2/2024 6:55 PM Dictation workstation:   [id number]    ECG 12 Lead    Result Date: 5/2/2024  Atrial flutter with 2:1 AV conduction Right axis deviation Anteroseptal infarct (cited on or before 30-APR-2024) ST & T wave abnormality, consider inferior ischemia Abnormal ECG When compared with ECG of 30-APR-2024 01:56, (unconfirmed) Significant changes have occurred Confirmed by Ernie Diaz ([id number]) on 5/2/2024 1:08:56 PM    ECG 12 lead    Result Date: 5/2/2024  cannot exclude atrial flutter vs sinus tachycardia Left axis deviation Incomplete right bundle branch block Nonspecific ST and T wave abnormality Abnormal ECG When compared with ECG of 08-APR-2023 13:22, Fusion complexes are now Present Incomplete right bundle branch block is now Present Confirmed by Ernie Diaz ([id number]) on 5/2/2024 12:56:18 PM    ECG 12 lead    Result Date: 5/2/2024  atrial fibrillation/flutter Septal infarct , age undetermined ST & T wave abnormality, consider lateral ischemia Abnormal ECG When compared with ECG of 30-APR-2024 00:46, (unconfirmed) Current undetermined rhythm precludes rhythm comparison, needs review Incomplete right bundle branch block is no longer Present Septal infarct is now Present Confirmed by Ernie Diaz ([id number]) on 5/2/2024 12:55:44 PM    Transthoracic Echo (TTE) Complete    Result Date:  4/30/2024            [facility] [address] [facility], [place] [facility] [address]    Tel [phone] Fax [phone] TRANSTHORACIC ECHOCARDIOGRAM REPORT  Patient Name:      MILVIA ZEINAB Garces Physician:    [id number] Horacio Armijo MD Study Date:        4/30/2024             Ordering Provider:    [id number] YANIRA BAXTER MRN/PID:           [id number]              Fellow: Accession#:        [id number]          Nurse:                Leena AMATO Date of Birth/Age: 1950 / 73 years  Sonographer:          Amalia Brewster RDCS Gender:            F                     Additional Staff: Height:            165.00 cm             Admit Date: Weight:            76.20 kg              Admission Status:     Inpatient -                                                                Priority                                                                discharge BSA / BMI:         1.84 m2 / 27.99 kg/m2 Department Location:  [facility] CCU Blood Pressure: 99 /56 mmHg Study Type:    TRANSTHORACIC ECHO (TTE) COMPLETE Diagnosis/ICD: 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 Diabetes:          Yes Pertinent History: HTN, Hyperlipidemia and COPD. Study Detail: The following Echo studies were performed: 2D, M-Mode, Doppler and               color flow. Technically challenging study due to patient lying in               supine position and prominent lung artifact. Definity used as a               contrast agent for endocardial border definition. Total contrast               used for this procedure was 2 mL via IV push.  PHYSICIAN INTERPRETATION: Left Ventricle: Left ventricular systolic function is moderately decreased, with an  estimated ejection fraction of 34 %. There are multiple wall motion abnormalities. The left ventricular cavity size is mild to moderately dilated. The left ventricular septal wall thickness is decreased. There is normal left ventricular posterior wall thickness. Spectral Doppler shows an impaired relaxation pattern of left ventricular diastolic filling. There is an elevated left ventricular end diastolic pressure. LV Wall Scoring: The mid and apical anterior septum, mid and apical inferior septum, apical lateral segment, apical anterior segment, and apex are akinetic. The entire inferior wall, basal and mid anterior wall, basal and mid anterolateral wall, basal and mid inferolateral wall, basal anteroseptal segment, and basal inferoseptal segment are hypokinetic. Left Atrium: The left atrium is mildly dilated. Right Ventricle: The right ventricle is normal in size. There is mildly reduced right ventricular systolic function. Right Atrium: The right atrium is normal in size. Aortic Valve: The aortic valve is trileaflet. There is evidence of mildly elevated transaortic gradients consistent with sclerosis of the aortic valve. There is no evidence of aortic valve regurgitation. The peak instantaneous gradient of the aortic valve is 7.6 mmHg. Mitral Valve: The mitral valve is normal in structure. There is mild to moderate mitral annular calcification. There is moderate mitral valve regurgitation. EROA by PISA 0.2 moderate, posterior MR skyler type IIIA apical tethering. Tricuspid Valve: The tricuspid valve is structurally normal. There is mild tricuspid regurgitation. The Doppler estimated RVSP is mildly elevated at 43.7 mmHg. Pulmonic Valve: The pulmonic valve is structurally normal. There is trace pulmonic valve regurgitation. Pericardium: There is no pericardial effusion noted. Aorta: The aortic root is normal. There is no dilatation of the ascending aorta. There is no dilatation of the aortic root. Pulmonary  Artery: The main pulmonary artery is normal in size, and position, with normal bifurcation into the left and right pulmonary arteries. Systemic Veins: The inferior vena cava appears mildly dilated. The hepatic vein appears dilated. There is less than 50% IVC collapse with inspiration.  CONCLUSIONS:  1. Left ventricular systolic function is moderately decreased with a 34 % estimated ejection fraction.  2. Multiple segmental abnormalities exist. See findings.  3. Spectral Doppler shows an impaired relaxation pattern of left ventricular diastolic filling.  4. There is an elevated left ventricular end diastolic pressure.  5. There is mildly reduced right ventricular systolic function.  6. Moderate mitral valve regurgitation.  7. Mildly elevated RVSP.  8. Aortic valve sclerosis.  9. There are multiple wall motion abnormalities. QUANTITATIVE DATA SUMMARY: 2D MEASUREMENTS:                           Normal Ranges: LAs:           4.50 cm    (2.7-4.0cm) IVSd:          1.00 cm    (0.6-1.1cm) LVPWd:         0.90 cm    (0.6-1.1cm) LVIDd:         6.50 cm    (3.9-5.9cm) LVIDs:         5.40 cm LV Mass Index: 144.5 g/m2 LV % FS        16.9 % LA VOLUME:                             Normal Ranges: LA Area A4C:     21.0 cm2 LA Area A2C:     21.0 cm2 LA Volume Index: 37.0 ml/m2 M-MODE MEASUREMENTS:                  Normal Ranges: Ao Root: 2.60 cm (2.0-3.7cm) AoV Exc: 1.40 cm (1.5-2.5cm) AORTA MEASUREMENTS:                    Normal Ranges: AoV Exc:   1.40 cm (1.5-2.5cm) Asc Ao, d: 3.30 cm (2.1-3.4cm) LV SYSTOLIC FUNCTION BY 2D PLANIMETRY (MOD):                     Normal Ranges: EF-A4C View: 39.4 % (>=55%) EF-A2C View: 28.7 % EF-Biplane:  34.0 % LV DIASTOLIC FUNCTION:                        Normal Ranges: MV Peak E:    1.38 m/s (0.7-1.2 m/s) MV Peak A:    0.90 m/s (0.42-0.7 m/s) E/A Ratio:    1.54     (1.0-2.2) MV e'         0.06 m/s (>8.0) MV lateral e' 0.08 m/s MV medial e'  0.04 m/s E/e' Ratio:   23.00    (<8.0) MITRAL VALVE:                  Normal Ranges: MV DT: 177 msec (150-240msec) MITRAL INSUFFICIENCY:                           Normal Ranges: PISA Radius:  0.4 cm MR VTI:       142.00 cm MR Vmax:      407.00 cm/s MR Alias Subhash: 84.7 cm/s MR Volume:    29.71 ml MR Flow Rt:   85.15 ml/s MR EROA:      0.21 cm2 AORTIC VALVE:                         Normal Ranges: AoV Vmax:      1.38 m/s (<=1.7m/s) AoV Peak P.6 mmHg (<20mmHg) LVOT Max Subhash:  0.89 m/s (<=1.1m/s) LVOT VTI:      20.00 cm LVOT Diameter: 1.70 cm  (1.8-2.4cm) AoV Area,Vmax: 1.47 cm2 (2.5-4.5cm2)  RIGHT VENTRICLE: RV Basal 3.40 cm RV Mid   1.80 cm RV Major 7.6 cm TAPSE:   14.1 mm RV s'    0.09 m/s TRICUSPID VALVE/RVSP:                             Normal Ranges: Peak TR Velocity: 3.19 m/s RV Syst Pressure: 43.7 mmHg (< 30mmHg) PULMONIC VALVE:                        Normal Ranges: PV Accel Time: 74 msec (>120ms)  [id number] Horacio Armijo MD Electronically signed on 2024 at 4:34:01 PM  Wall Scoring  ** Final **     XR chest 1 view    Result Date: 2024  STUDY: Chest Radiograph;  2024 at 1:13 AM INDICATION: Chest pain. COMPARISON: XR chest 2022, XR chest 2021, XR chest 2021, XR chest 3/1/2019. ACCESSION NUMBER(S): [id number] ORDERING CLINICIAN: ISIDRO MARSHALL TECHNIQUE:  Frontal chest was obtained at 0113 hours. FINDINGS: CARDIOMEDIASTINAL SILHOUETTE: Cardiomediastinal silhouette is mildly enlarged in size and configuration.  Calcified plaque is seen in the aorta.  LUNGS: Lungs are clear.  Flattening of hemidiaphragms suggests chronic changes of COPD.  ABDOMEN: No remarkable upper abdominal findings.  BONES: No acute osseous changes.    No acute cardia pulmonary disease. Cardiomegaly with suspected mild chronic changes of COPD. Signed by Rojas Lawrence       Assessment/Plan   Principal Problem:    Acute combined systolic and diastolic heart failure (Multi)  Active Problems:    Coronary artery disease involving native heart    Stage 3a chronic kidney disease  (Multi)    Acute hypoxic respiratory failure (Multi)    Atrial flutter with rapid ventricular response (Multi)    Lactic acidosis    Hyperkalemia    Acute exacerbation of COPD with asthma (Multi)    Community acquired pneumonia of right lower lobe of lung    NSTEMI (non-ST elevated myocardial infarction) (Multi)    Nonsustained ventricular tachycardia (Multi)    Plan:  - Stable to remain at current level of care on telemetry  - Appreciate cardiology consultation, can give an additional dose of digoxin  - Continue with metoprolol and midodrine  - Continue with current antibiotic treatment for potential pneumonia to finish course  - Home medication reconciliation was completed  - She may need repeat evaluation with coronary angiography  - Cardiac diet  - Eliquis for VTE prophylaxis  - DNR/DNI/no ICU         I spent 45 minutes in the professional and overall care of this patient.    Bertram Rasheed MD

## 2024-05-26 NOTE — PROGRESS NOTES
Subjective Data:  I48.92 Atrial flutter intermittent 2-1 conduction  J44.9 Advanced severe COPD  I42.9 I50.92 Mixed systolic diastolic heart failure  J18.9 Community-acquired pneumonia     Seen in follow-up  Variable heart rate risk currently 110 at rest  Stage III chronic kidney disease  Hypoxic respiratory failure  As per EP possible consideration of cavotricuspid isthmus  Respiratory status improving  Perative care consult reviewed      Well-known to me from evaluation on call 2 weeks ago  Has significant pulmonary disease  Has fairly persistent atrial flutter with 2-1 conduction  Advanced severe COPD  Diastolic dysfunction combined with systolic dysfunction  Echo in 20 2334% EF  Known coronary disease remote CABG right coronary occlusion with left-sided collaterals stent to the ramus  Counterclockwise paroxysmal atrial flutter  [id number] score of 6  Taken off IV amiodarone because of pulmonary disease previously    Today persistent tachycardia given IV digoxin with intermittent improved rate control    4/30/2024 echo        CONCLUSIONS:   1. Left ventricular systolic function is moderately decreased with a 34 % estimated ejection fraction.   2. Multiple segmental abnormalities exist. See findings.   3. Spectral Doppler shows an impaired relaxation pattern of left ventricular diastolic filling.   4. There is an elevated left ventricular end diastolic pressure.   5. There is mildly reduced right ventricular systolic function.   6. Moderate mitral valve regurgitation.   7. Mildly elevated RVSP.   8. Aortic valve sclerosis.   9. There are multiple wall motion abnormalities.     February 22, 2024 cardiac catheterization  Coronary Angiography:  The coronary circulation is right dominant.     Left Main Coronary Artery:  The left main coronary artery is free of atherosclerotic disease.     Left Anterior Descending Coronary Artery Distribution:  The Left Anterior Descending artery is a large vessel. There is 100%  Subjective:      Patient ID: Nicole Dickerson is a 70 y.o. y.o. female. HPI    Nicole Dickerson is here for a physical.  She states that she has been doing aoky   She feel at home about 2 weeks ago when her right leg gave out   Then she feel again at work a week ago for the same thing   She states that her mother had PAD, she has been evaluated for this and was fine. She takes tylenol for pain which does not help. She has pain in her back, knees, and foot. She has planta fascitis in her left foot. She has multiple spider veins and varicose veins. She is interested in vein treatment. No chief complaint on file. Vitals:    19 1559   BP: 116/74   Pulse: 67   SpO2: 95%       Wt Readings from Last 3 Encounters:   19 174 lb (78.9 kg)   19 178 lb 3.2 oz (80.8 kg)   19 177 lb (80.3 kg)       Past Medical History:   Diagnosis Date    Cardiomyopathy ([address] 2014    Dx @ [facility].     Chronic back pain     Colon polyp     DDD (degenerative disc disease), lumbar 3/30/2015    Degenerative arthritis of cervical spine     Depression 2014    Essential hypertension, benign     Hyperlipidemia     Osteopenia     Varicose vein        Past Surgical History:   Procedure Laterality Date    COLONOSCOPY         Family History   Problem Relation Age of Onset    Colon Cancer Brother     Coronary Art Dis Other     Hypertension Other     Cancer Father         oral cancer    Other Other         PVOD       Social History     Tobacco Use    Smoking status: Former Smoker     Packs/day: 0.50     Years: 41.00     Pack years: 20.50     Types: Cigarettes     Start date: 2013     Last attempt to quit: 2013     Years since quittin.7    Smokeless tobacco: Never Used   Substance Use Topics    Alcohol use: No    Drug use: No       Immunization History   Administered Date(s) Administered    Pneumococcal 13-valent Conjugate ([id number]) 2018    Tdap (Boostrix, Adacel) 2016 Health Maintenance   Topic Date Due    Cervical cancer screen  05/25/1974    Shingles Vaccine (1 of 2) 05/25/2003    Colon cancer screen colonoscopy  06/15/2018    Pneumococcal 65+ years Vaccine (2 of 2 - PPSV23) 06/25/2019    Potassium monitoring  06/26/2019    Creatinine monitoring  06/26/2019    Flu vaccine (Season Ended) 09/01/2019    Breast cancer screen  10/21/2019    Lipid screen  06/26/2023    DTaP/Tdap/Td vaccine (2 - Td) 11/04/2026    DEXA (modify frequency per FRAX score)  Completed    Hepatitis C screen  Completed    HIV screen  Completed       Discussed over the counter medication with patient. Cami received counseling on the following healthy behaviors: nutrition and exercise    Patient given educational materials on Nutrition and Exercise    I have instructed Cami to complete a self tracking handout on Weights and instructed them to bring it with them toher next appointment. Discussed use, benefit, and side effects of prescribed medications. Barriers to medication compliance addressed. Allpatient questions answered. Pt voiced understanding. Medications reviewed and patient understands. Questions answered    Review of Systems   Constitutional: Negative for chills, fatigue and fever. HENT: Negative for congestion, ear pain, postnasal drip, rhinorrhea, sinus pressure, sneezing and sore throat. Eyes: Negative for redness and itching. Respiratory: Negative for cough, chest tightness, shortness of breath and wheezing. Cardiovascular: Negative for chest pain and palpitations. Gastrointestinal: Negative for abdominal pain, blood in stool, constipation, diarrhea, nausea and vomiting. Endocrine: Negative for cold intolerance and heat intolerance. Genitourinary: Negative for difficulty urinating, dysuria, flank pain, frequency, hematuria and urgency. Musculoskeletal: Positive for myalgias (bilateral lower extremity pain ).  Negative for arthralgias, back pain stenosis in the mid left anterior descending artery. Hemodynamically significant obstruction is noted in this vessel and fills via collaterals.     Circumflex Coronary Artery Distribution:  The Left Circumflex artery is a large vessel. Presents luminal irregularities and contains patent previously placed stents.     Right Coronary Artery Distribution:     The Right Coronary Artery is a large vessel. Hemodynamically significant obstruction is noted in this vessel and fills via collaterals. There is 100% stenosis in the the proximal Right Coronary Artery.        Left Ventriculography:  The estimated left ventricular ejection fraction is abnormal at 30%. There is anterolateral hypokinesis noted.     MH:  CAD - prior CABG. Last cardiac cath 2007 with occluded LAD, widely patent ramus stent, minimal disease of the LCx, occluded RCA with right-to-right collaterals, occluded SVG to RCA, occluded and thrombotic SVG to LAD. LIMA not used as graft. Last stress  with fixed anterior infarct without ischemia  CHF - LVEF 45-50% by echo 2015, 35-40% here.   COPD / home oxygen therapy  Atrial fibrillation  Carotid vascular disease  DM with CKD, PAD, and peripheral neruopathy  Edema  HTN  GERD  Glaucoma  LBBB and RBBB seen in past  Mixed HLD  Vitamin D deficiency  Ovarian cancer?  - little details     PSH: CABG, BSO  SH: former smoker  FH: CAD in father, sister, colon cancer in sister        Active Problems  Problems    · Abnormal stress ECG with treadmill (794.39) (R94.39)   · Allergic rhinitis (477.9) (J30.9)   · Atopic dermatitis (691.8) (L20.9)   · Atrial fibrillation (427.31) (I48.91)   · Bilateral carotid artery stenosis (433.10,433.30) (I65.23)   · BMI 29.0-29.9,adult (V85.25) (Z68.29)   · CAD (coronary artery disease) (414.00) (I25.10)   · Carotid artery disease (447.9) (I77.9)   · Colon cancer screening (V76.51) (Z12.11)   · COPD (chronic obstructive pulmonary disease) (496) (J44.9)   · Diabetes mellitus with kidney  and joint swelling. Left foot pain      Skin: Negative for color change, pallor, rash and wound. Allergic/Immunologic: Negative for environmental allergies and food allergies. Neurological: Negative for dizziness, seizures, syncope, weakness, light-headedness, numbness and headaches. Hematological: Negative for adenopathy. Does not bruise/bleed easily. Psychiatric/Behavioral: Negative for confusion, sleep disturbance and suicidal ideas. The patient is not nervous/anxious and is not hyperactive. Objective:   Physical Exam   Constitutional: She is oriented to person, place, and time. She appears well-developed and well-nourished. HENT:   Head: Normocephalic. Right Ear: External ear normal.   Left Ear: External ear normal.   Eyes: Pupils are equal, round, and reactive to light. Conjunctivae and EOM are normal.   Neck: Normal range of motion. Neck supple. No JVD present. Cardiovascular: Normal rate and regular rhythm. Exam reveals no gallop and no friction rub. No murmur heard. Pulmonary/Chest: Effort normal and breath sounds normal. No respiratory distress. She has no wheezes. Abdominal: Soft. Bowel sounds are normal. She exhibits no distension and no mass. There is no tenderness. There is no rebound and no guarding. Musculoskeletal: Normal range of motion. She exhibits no tenderness. Neurological: She is alert and oriented to person, place, and time. She has normal reflexes. Skin: Skin is warm and dry. Psychiatric: She has a normal mood and affect.  Her behavior is normal.       Assessment:      See ProblemList assessment and plan       Plan:      Routine general medical examination at a health care facility  She refuses colonoscopy  Will do cologaurd   Due for blood work          Varicose vein  Multiple varicose veins and spider veins   Will refer to [facility]   Suspect this is th cause of her pain      Plantar fasciitis  Left foot pain, advised on exercises complication (250.40) (E11.29)   · Diabetes mellitus with peripheral vascular disease (250.70,443.81) (E11.51)   · Diabetic polyneuropathy associated with type 2 diabetes mellitus (250.60,357.2)  (E11.42)   · Edema (782.3) (R60.9)   · Encounter for immunization (V03.89) (Z23)   · Essential hypertension (401.9) (I10)   · Extremity atherosclerosis with intermittent claudication (440.21) (I70.219)   · Female incontinence (625.6) (R32)   · GERD (gastroesophageal reflux disease) (530.81) (K21.9)   · Glaucoma (365.9) (H40.9)   · Hematuria (599.70) (R31.9)   · HTN (hypertension) (401.9) (I10)   · Hyperlipidemia (272.4) (E78.5)   · LBBB (left bundle branch block) (426.3) (I44.7)   · Mixed hyperlipidemia (272.2) (E78.2)   · Overweight (278.02) (E66.3)   · Peripheral vascular disease (443.9) (I73.9)   · Pulmonary nodules (793.19) (R91.8)   · RBBB (426.4) (I45.10)   · Stage 3a chronic kidney disease (585.3) (N18.31)   · Type 2 diabetes mellitus with hyperglycemia, without long-term current use of insulin  (250.00,790.29) (E11.65)   · Vaginal itching (698.1) (N89.8)   · Vitamin D deficiency (268.9) (E55.9)     Surgical History  Problems    · History of Bypass   · History of Coronary artery bypass graft   · History of Coronary Artery Surgery   · History of Salpingo-oophorectomy     Past Medical History  Problems    · History of ovarian cancer (V10.43) (Z85.43)       Overnight Events:    Asymptomatic with intermittent tachycardia     Objective Data:  Last Recorded Vitals:  Vitals:    05/26/24 0835 05/26/24 0914 05/26/24 1014 05/26/24 1334   BP:  105/67 105/67 122/72   BP Location:  Right arm  Right arm   Patient Position:  Lying  Lying   Pulse:  (!) 156 (!) 153 (!) 120   Resp:  (!) 29  25   Temp:  36.3 °C (97.3 °F)  36.3 °C (97.3 °F)   TempSrc:  Temporal  Temporal   SpO2: 99% 96%  90%   Weight:       Height:           Last Labs:  CBC - 5/26/2024:  4:18 AM  10.0 9.4 202    30.7      CMP - 5/26/2024:  4:18 AM  8.8 7.0 33 --- 0.8  "  3.2 3.3 20 58      PTT - No results in last year.  1.8   19.9 _     TROPHS   Date/Time Value Ref Range Status   05/23/2024 05:32  0 - 13 ng/L Final     Comment:     Previous result verified on 5/23/2024 1652 on specimen/case [id number] called with component [facility] for procedure Troponin I, High Sensitivity, Initial with value 264 ng/L.   05/23/2024 04:12  0 - 13 ng/L Final   05/02/2024 11:08  0 - 13 ng/L Final     BNP   Date/Time Value Ref Range Status   05/23/2024 04:12 PM 1,005 0 - 99 pg/mL Final   05/01/2024 04:21  0 - 99 pg/mL Final     HGBA1C   Date/Time Value Ref Range Status   05/23/2024 04:12 PM 6.6 see below % Final   05/14/2024 10:52 AM 6.3 4.2 - 6.5 % Final   10/04/2023 02:20 PM 6.5 4.2 - 6.5 % Final     VLDL   Date/Time Value Ref Range Status   04/06/2022 10:04 AM 29 0 - 40 mg/dL Final   03/03/2021 09:28 AM 44 0 - 40 mg/dL Final   05/04/2020 09:14 AM 55 0 - 40 mg/dL Final      Last I/O:  I/O last 3 completed shifts:  In: 1103 (14.4 mL/kg) [P.O.:600; I.V.:3 (0 mL/kg); IV Piggyback:500]  Out: 1175 (15.4 mL/kg) [Urine:1175 (0.4 mL/kg/hr)]  Weight: 76.5 kg     Past Cardiology Tests (Last 3 Years):  EKG:  Electrocardiogram, 12-lead PRN ACS symptoms 05/25/2024 (Preliminary)      Electrocardiogram, 12-lead PRN ACS symptoms 05/23/2024 (Preliminary)      ECG 12 lead 05/23/2024 (Preliminary)      Electrocardiogram, 12-lead PRN ACS symptoms 05/23/2024 (Preliminary)      ECG 12 lead 05/23/2024 (Preliminary)      ECG 12 Lead 05/03/2024      ECG 12 Lead 05/02/2024      ECG 12 Lead 05/02/2024      ECG 12 Lead 05/02/2024 (Preliminary)      ECG 12 Lead 05/01/2024      ECG 12 lead 04/30/2024      ECG 12 lead 04/30/2024    Echo:  Transthoracic Echo (TTE) Complete 04/30/2024    Ejection Fractions:  No results found for: \"EF\"  Cath:  No results found for this or any previous visit from the past 1095 days.    Stress Test:  No results found for this or any previous visit from the past 1095 " Colon polyp  Refuses colonoscopy  cologuard ordered      Fall (on) (from) other stairs and steps, subsequent encounter  Mechanical fall at work and home   Advised on fall risks           Patient engaged in shared decision making. Information given to evaluate options of treatment, understand what is needed and discuss importance of following plan. On the basis of positive falls risk screening, assessment and plan is as follows: home safety tips provided. days.    Cardiac Imaging:  No results found for this or any previous visit from the past 1095 days.      Inpatient Medications:  Scheduled medications   Medication Dose Route Frequency    apixaban  5 mg oral BID    aspirin  81 mg oral Daily    atorvastatin  40 mg oral Daily    cefTRIAXone  1 g intravenous q24h    cilostazol  100 mg oral BID    doxycycline  100 mg intravenous q12h    formoterol  20 mcg nebulization q12h    icosapent ethyL  2 g oral BID    insulin lispro  0-10 Units subcutaneous TID    ipratropium-albuteroL  3 mL nebulization 4x daily    loratadine  10 mg oral Daily    melatonin  3 mg oral Nightly    metoprolol succinate XL  50 mg oral Daily    midodrine  10 mg oral BID    midodrine  10 mg oral Once    oxybutynin  5 mg oral BID    oxygen   inhalation Continuous - Inhalation    pantoprazole  40 mg oral Daily before breakfast    pioglitazone  30 mg oral Daily    polyethylene glycol  17 g oral BID    sennosides  2 tablet oral BID    SITagliptin phosphate  50 mg oral Daily     PRN medications   Medication    acetaminophen    Or    acetaminophen    Or    acetaminophen    dextrose    dextrose    fluticasone    glucagon    glucagon    ipratropium-albuteroL    ondansetron ODT    Or    ondansetron    oxygen    sodium chloride     Continuous Medications   Medication Dose Last Rate     Results for orders placed or performed during the hospital encounter of 05/23/24 (from the past 96 hour(s))   ECG 12 lead   Result Value Ref Range    Ventricular Rate 157 BPM    Atrial Rate 157 BPM    AZ Interval 120 ms    QRS Duration 96 ms    QT Interval 322 ms    QTC Calculation(Bazett) 520 ms    P Axis -85 degrees    R Axis 91 degrees    T Axis -89 degrees    QRS Count 26 beats    Q Onset 216 ms    P Onset 156 ms    P Offset 190 ms    T Offset 377 ms    QTC Fredericia 443 ms   Sars-CoV-2 PCR   Result Value Ref Range    Coronavirus 2019, PCR Not Detected Not Detected   BLOOD GAS ARTERIAL FULL PANEL   Result Value Ref Range     POCT pH, Arterial 7.27 (L) 7.38 - 7.42 pH    POCT pCO2, Arterial 64 (H) 38 - 42 mm Hg    POCT pO2, Arterial 73 (L) 85 - 95 mm Hg    POCT SO2, Arterial 94 94 - 100 %    POCT Oxy Hemoglobin, Arterial 91.4 (L) 94.0 - 98.0 %    POCT Hematocrit Calculated, Arterial 37.0 36.0 - 46.0 %    POCT Sodium, Arterial 132 (L) 136 - 145 mmol/L    POCT Potassium, Arterial 4.5 3.5 - 5.3 mmol/L    POCT Chloride, Arterial 99 98 - 107 mmol/L    POCT Ionized Calcium, Arterial 1.19 1.10 - 1.33 mmol/L    POCT Glucose, Arterial 320 (H) 74 - 99 mg/dL    POCT Lactate, Arterial 5.6 (HH) 0.4 - 2.0 mmol/L    POCT Base Excess, Arterial 1.1 -2.0 - 3.0 mmol/L    POCT HCO3 Calculated, Arterial 29.4 (H) 22.0 - 26.0 mmol/L    POCT Hemoglobin, Arterial 12.4 12.0 - 16.0 g/dL    POCT Anion Gap, Arterial 8 (L) 10 - 25 mmo/L    Patient Temperature      FiO2 100 %    Critical Called By DAMON RRT     Critical Called To MD SILVER     Critical Call Time 1612     Critical Read Back Y    CBC and Auto Differential   Result Value Ref Range    WBC 10.5 4.4 - 11.3 x10*3/uL    nRBC 0.0 0.0 - 0.0 /100 WBCs    RBC 3.78 (L) 4.00 - 5.20 x10*6/uL    Hemoglobin 11.9 (L) 12.0 - 16.0 g/dL    Hematocrit 38.1 36.0 - 46.0 %     (H) 80 - 100 fL    MCH 31.5 26.0 - 34.0 pg    MCHC 31.2 (L) 32.0 - 36.0 g/dL    RDW 13.3 11.5 - 14.5 %    Platelets 237 150 - 450 x10*3/uL    Neutrophils % 84.2 40.0 - 80.0 %    Immature Granulocytes %, Automated 0.5 0.0 - 0.9 %    Lymphocytes % 10.1 13.0 - 44.0 %    Monocytes % 4.9 2.0 - 10.0 %    Eosinophils % 0.1 0.0 - 6.0 %    Basophils % 0.2 0.0 - 2.0 %    Neutrophils Absolute 8.84 (H) 1.60 - 5.50 x10*3/uL    Immature Granulocytes Absolute, Automated 0.05 0.00 - 0.50 x10*3/uL    Lymphocytes Absolute 1.06 0.80 - 3.00 x10*3/uL    Monocytes Absolute 0.51 0.05 - 0.80 x10*3/uL    Eosinophils Absolute 0.01 0.00 - 0.40 x10*3/uL    Basophils Absolute 0.02 0.00 - 0.10 x10*3/uL   Comprehensive Metabolic Panel   Result Value Ref Range    Glucose 297 (H) 74 -  99 mg/dL    Sodium 133 (L) 136 - 145 mmol/L    Potassium 5.8 (H) 3.5 - 5.3 mmol/L    Chloride 96 (L) 98 - 107 mmol/L    Bicarbonate 28 21 - 32 mmol/L    Anion Gap 15 10 - 20 mmol/L    Urea Nitrogen 21 6 - 23 mg/dL    Creatinine 1.33 (H) 0.50 - 1.05 mg/dL    eGFR 42 (L) >60 mL/min/1.73m*2    Calcium 9.0 8.6 - 10.3 mg/dL    Albumin 3.7 3.4 - 5.0 g/dL    Alkaline Phosphatase 58 33 - 136 U/L    Total Protein 7.0 6.4 - 8.2 g/dL    AST 33 9 - 39 U/L    Bilirubin, Total 0.8 0.0 - 1.2 mg/dL    ALT 20 7 - 45 U/L   Magnesium   Result Value Ref Range    Magnesium 2.08 1.60 - 2.40 mg/dL   B-type natriuretic peptide   Result Value Ref Range    BNP 1,005 (H) 0 - 99 pg/mL   Troponin I, High Sensitivity, Initial   Result Value Ref Range    Troponin I, High Sensitivity 264 (HH) 0 - 13 ng/L   Hemoglobin A1C   Result Value Ref Range    Hemoglobin A1C 6.6 (H) see below %    Estimated Average Glucose 143 Not Established mg/dL   Electrocardiogram, 12-lead PRN ACS symptoms   Result Value Ref Range    Ventricular Rate 160 BPM    Atrial Rate 160 BPM    MO Interval 114 ms    QRS Duration 106 ms    QT Interval 244 ms    QTC Calculation(Bazett) 398 ms    P Axis -74 degrees    R Axis 92 degrees    T Axis 255 degrees    QRS Count 26 beats    Q Onset 215 ms    P Onset 158 ms    P Offset 207 ms    T Offset 337 ms    QTC Fredericia 338 ms   ECG 12 lead   Result Value Ref Range    Ventricular Rate 158 BPM    Atrial Rate 158 BPM    MO Interval 130 ms    QRS Duration 112 ms    QT Interval 312 ms    QTC Calculation(Bazett) 505 ms    R Axis 80 degrees    T Axis 269 degrees    QRS Count 27 beats    Q Onset 193 ms    P Onset 138 ms    P Offset 174 ms    T Offset 349 ms    QTC Fredericia 431 ms   Troponin, High Sensitivity, 1 Hour   Result Value Ref Range    Troponin I, High Sensitivity 247 (HH) 0 - 13 ng/L   Blood Gas Venous Full Panel Unsolicited   Result Value Ref Range    POCT pH, Venous 7.36 7.33 - 7.43 pH    POCT pCO2, Venous 60 (H) 41 - 51 mm Hg     POCT pO2, Venous 49 (H) 35 - 45 mm Hg    POCT SO2, Venous 71 45 - 75 %    POCT Oxy Hemoglobin, Venous 69.7 45.0 - 75.0 %    POCT Hematocrit Calculated, Venous 35.0 (L) 36.0 - 46.0 %    POCT Sodium, Venous 131 (L) 136 - 145 mmol/L    POCT Potassium, Venous 4.3 3.5 - 5.3 mmol/L    POCT Chloride, Venous 100 98 - 107 mmol/L    POCT Ionized Calicum, Venous 1.17 1.10 - 1.33 mmol/L    POCT Glucose, Venous 262 (H) 74 - 99 mg/dL    POCT Lactate, Venous 2.3 (H) 0.4 - 2.0 mmol/L    POCT Base Excess, Venous 6.8 (H) -2.0 - 3.0 mmol/L    POCT HCO3 Calculated, Venous 33.9 (H) 22.0 - 26.0 mmol/L    POCT Hemoglobin, Venous 11.5 (L) 12.0 - 16.0 g/dL    POCT Anion Gap, Venous 1.0 (L) 10.0 - 25.0 mmol/L    Patient Temperature     Electrocardiogram, 12-lead PRN ACS symptoms   Result Value Ref Range    Ventricular Rate 99 BPM    Atrial Rate 99 BPM    VT Interval 146 ms    QRS Duration 108 ms    QT Interval 338 ms    QTC Calculation(Bazett) 433 ms    P Axis 92 degrees    R Axis 108 degrees    T Axis 192 degrees    QRS Count 16 beats    Q Onset 215 ms    P Onset 142 ms    P Offset 200 ms    T Offset 384 ms    QTC Fredericia 399 ms   Procalcitonin   Result Value Ref Range    Procalcitonin 0.25 (H) <=0.07 ng/mL   Renal function panel   Result Value Ref Range    Glucose 233 (H) 74 - 99 mg/dL    Sodium 136 136 - 145 mmol/L    Potassium 3.9 3.5 - 5.3 mmol/L    Chloride 97 (L) 98 - 107 mmol/L    Bicarbonate 30 21 - 32 mmol/L    Anion Gap 13 10 - 20 mmol/L    Urea Nitrogen 25 (H) 6 - 23 mg/dL    Creatinine 1.30 (H) 0.50 - 1.05 mg/dL    eGFR 43 (L) >60 mL/min/1.73m*2    Calcium 8.3 (L) 8.6 - 10.3 mg/dL    Phosphorus 4.7 2.5 - 4.9 mg/dL    Albumin 3.2 (L) 3.4 - 5.0 g/dL   Magnesium   Result Value Ref Range    Magnesium 1.60 1.60 - 2.40 mg/dL   Lactate   Result Value Ref Range    Lactate 1.1 0.4 - 2.0 mmol/L   Renal Function Panel   Result Value Ref Range    Glucose 223 (H) 74 - 99 mg/dL    Sodium 136 136 - 145 mmol/L    Potassium 3.9 3.5 - 5.3  mmol/L    Chloride 97 (L) 98 - 107 mmol/L    Bicarbonate 30 21 - 32 mmol/L    Anion Gap 13 10 - 20 mmol/L    Urea Nitrogen 25 (H) 6 - 23 mg/dL    Creatinine 1.30 (H) 0.50 - 1.05 mg/dL    eGFR 43 (L) >60 mL/min/1.73m*2    Calcium 8.4 (L) 8.6 - 10.3 mg/dL    Phosphorus 4.7 2.5 - 4.9 mg/dL    Albumin 3.2 (L) 3.4 - 5.0 g/dL   CBC   Result Value Ref Range    WBC 6.7 4.4 - 11.3 x10*3/uL    nRBC 0.0 0.0 - 0.0 /100 WBCs    RBC 3.10 (L) 4.00 - 5.20 x10*6/uL    Hemoglobin 9.8 (L) 12.0 - 16.0 g/dL    Hematocrit 31.2 (L) 36.0 - 46.0 %     (H) 80 - 100 fL    MCH 31.6 26.0 - 34.0 pg    MCHC 31.4 (L) 32.0 - 36.0 g/dL    RDW 13.0 11.5 - 14.5 %    Platelets 168 150 - 450 x10*3/uL   Magnesium   Result Value Ref Range    Magnesium 1.60 1.60 - 2.40 mg/dL   POCT GLUCOSE   Result Value Ref Range    POCT Glucose 174 (H) 74 - 99 mg/dL   POCT GLUCOSE   Result Value Ref Range    POCT Glucose 182 (H) 74 - 99 mg/dL   Legionella Antigen, Urine    Specimen: Urine   Result Value Ref Range    L. pneumophila Urine Ag Negative Negative   Streptococcus pneumoniae Antigen, Urine    Specimen: Urine   Result Value Ref Range    Streptococcus pneumoniae Ag, Urine Negative Negative   POCT GLUCOSE   Result Value Ref Range    POCT Glucose 252 (H) 74 - 99 mg/dL   POCT GLUCOSE   Result Value Ref Range    POCT Glucose 245 (H) 74 - 99 mg/dL   Electrocardiogram, 12-lead PRN ACS symptoms   Result Value Ref Range    Ventricular Rate 148 BPM    Atrial Rate 148 BPM    NH Interval 126 ms    QRS Duration 110 ms    QT Interval 288 ms    QTC Calculation(Bazett) 452 ms    R Axis 50 degrees    T Axis 250 degrees    QRS Count 24 beats    Q Onset 213 ms    P Onset 160 ms    P Offset 201 ms    T Offset 357 ms    QTC Fredericia 389 ms   Renal Function Panel   Result Value Ref Range    Glucose 198 (H) 74 - 99 mg/dL    Sodium 134 (L) 136 - 145 mmol/L    Potassium 4.1 3.5 - 5.3 mmol/L    Chloride 96 (L) 98 - 107 mmol/L    Bicarbonate 29 21 - 32 mmol/L    Anion Gap 13 10 -  20 mmol/L    Urea Nitrogen 39 (H) 6 - 23 mg/dL    Creatinine 1.29 (H) 0.50 - 1.05 mg/dL    eGFR 43 (L) >60 mL/min/1.73m*2    Calcium 8.6 8.6 - 10.3 mg/dL    Phosphorus 3.5 2.5 - 4.9 mg/dL    Albumin 3.2 (L) 3.4 - 5.0 g/dL   CBC   Result Value Ref Range    WBC 13.0 (H) 4.4 - 11.3 x10*3/uL    nRBC 0.0 0.0 - 0.0 /100 WBCs    RBC 3.06 (L) 4.00 - 5.20 x10*6/uL    Hemoglobin 9.6 (L) 12.0 - 16.0 g/dL    Hematocrit 30.8 (L) 36.0 - 46.0 %     (H) 80 - 100 fL    MCH 31.4 26.0 - 34.0 pg    MCHC 31.2 (L) 32.0 - 36.0 g/dL    RDW 13.4 11.5 - 14.5 %    Platelets 193 150 - 450 x10*3/uL   Magnesium   Result Value Ref Range    Magnesium 2.04 1.60 - 2.40 mg/dL   POCT GLUCOSE   Result Value Ref Range    POCT Glucose 161 (H) 74 - 99 mg/dL   POCT GLUCOSE   Result Value Ref Range    POCT Glucose 186 (H) 74 - 99 mg/dL   POCT GLUCOSE   Result Value Ref Range    POCT Glucose 275 (H) 74 - 99 mg/dL   Renal Function Panel   Result Value Ref Range    Glucose 233 (H) 74 - 99 mg/dL    Sodium 136 136 - 145 mmol/L    Potassium 4.6 3.5 - 5.3 mmol/L    Chloride 98 98 - 107 mmol/L    Bicarbonate 30 21 - 32 mmol/L    Anion Gap 13 10 - 20 mmol/L    Urea Nitrogen 39 (H) 6 - 23 mg/dL    Creatinine 1.14 (H) 0.50 - 1.05 mg/dL    eGFR 50 (L) >60 mL/min/1.73m*2    Calcium 8.8 8.6 - 10.3 mg/dL    Phosphorus 3.2 2.5 - 4.9 mg/dL    Albumin 3.3 (L) 3.4 - 5.0 g/dL   CBC   Result Value Ref Range    WBC 10.0 4.4 - 11.3 x10*3/uL    nRBC 0.0 0.0 - 0.0 /100 WBCs    RBC 3.03 (L) 4.00 - 5.20 x10*6/uL    Hemoglobin 9.4 (L) 12.0 - 16.0 g/dL    Hematocrit 30.7 (L) 36.0 - 46.0 %     (H) 80 - 100 fL    MCH 31.0 26.0 - 34.0 pg    MCHC 30.6 (L) 32.0 - 36.0 g/dL    RDW 13.6 11.5 - 14.5 %    Platelets 202 150 - 450 x10*3/uL   Magnesium   Result Value Ref Range    Magnesium 1.92 1.60 - 2.40 mg/dL   POCT GLUCOSE   Result Value Ref Range    POCT Glucose 159 (H) 74 - 99 mg/dL   POCT GLUCOSE   Result Value Ref Range    POCT Glucose 363 (H) 74 - 99 mg/dL       Physical  Exam:  Subjective:   Examination:   General Examination:   General Appearance: Well developed, well nourished, in no acute distress.   Head: normocephalic, atraumatic   Eyes: Anicteric sclera. Pupils are equally round and reactive to light.  Extraocular movements are intact.    Ears: normal   Oral: Cavity: mucosa moist.   Throat: clear.   Neck/Thyroid: neck supple, full range of motion, no cervical lymphadenopathy.   Skin: warm and dry, no suspicious lesions.    Heart: regular rate and rhythm, S1, S2 normal, no murmurs.  Occasional PACs  Lungs: copd but distant breath sounds with no audible wheeze  Abdomen: soft, non-tender, non-distended, bowl sound present, normal.   Extremities: no clubbing, no cyanosis, no edema.   Neuro: non-focal, motor strength normal upper lower extremities, sensory exam intact.       Assessment/Plan     I48.92 Atrial flutter intermittent 2-1 conduction  J44.9 Advanced severe COPD  I42.9 I50.92 Mixed systolic diastolic heart failure  J18.9 Community-acquired pneumonia       Code Status:  DNR and No Intubation and No ICU    I spent 35 minutes in the professional and overall care of this patient.        Horacio Armijo MD

## 2024-05-26 NOTE — CONSULTS
Reason For Consult  Cardiovascular Nurse Navigator Education     History Of Present Illness  Mitzi Pro is a 73 y.o. female with significant history of severe CAD with occluded vein grafts to the LAD and RCA, mixed systolic/diastolic HF with EF 34% in April 2024, severe COPD with home O2 use, and persistent a-flutter presenting 5/23/24 with a relatively sudden-onset increased shortness of breath and increased WOB and called 911. She initially refused bipap for hypercapnia in the ER and was placed on hi-flow. She was in a-flutter with 's. She consented to bipap when medicated with IV Ativan. Subsequently, the decision was made, after cardiology consult, to perform urgent cardioversion in the ER and she converted to NSR after one shock. She was treated with IV Lasix and ATB, Solu-Medrol, and Amiodarone IV bolus and gtt. However, upon admit to [facility] IMCU, she became hypotensive with SBP 70's and somnolent and Amiodarone was discontinued.   CXR RLL infiltrate vs atelectasis, cardiomegaly with venous congestion, and Insterstitial edema. BNP 1005. Trop 247, 264. eGFR 42. .    GDMT: ASA, Eliquis, Atorvastatin, Metoprolol Succinate 50 mg daily, Midodrine 10 mg BID (new), Pletal, Vascepa, Actos, Januvia, and Insulin.  She is not a candidate for SGLT2i due to history of frequent UTI. She is not a candidate for ACE/ARB/ARNI or Spironolactone due to renal function and hypotension. Dr. Garcia's note nicely details the antiarrhythmia meds that she is not a candidate for.    She is not a surgical candidate for revascularization due to severe COPD. She is being considered for a-flutter ablation, see EP note.     Past Medical History  She has a past medical history of Personal history of malignant neoplasm of ovary and Vaginal itching (05/14/2024).    Surgical History  She has a past surgical history that includes Coronary artery bypass graft (11/20/2017); Other surgical history (05/17/2022); Other surgical history  "(05/17/2022); Other surgical history (11/08/2019); and CT angio aorta and bilateral iliofemoral runoff w and or wo IV contrast (12/21/2021).     Social History  She reports that she has been smoking cigarettes. She has never used smokeless tobacco. She reports that she does not currently use alcohol. She reports that she does not use drugs.    Family History  Family History   Problem Relation Name Age of Onset    Other (arteriosclerotic cardiovascular disease) Mother      Other (arteriosclerotic cardiovascular disease) Father      Colon cancer Sister          Allergies  Metformin and Nitrofurantoin       Assessment/Plan     Mitzi is well-known to me from her prior admission. At that time, she was a bit resistive to HF education but was much more willing to discuss during a discharge follow-up call.    She spoke with hospice earlier this morning but her comprehension of services provided was minimal, as she states, \"The takeaway is I might go to SNF on discharge.\" She confirmed her code status on admit to DNR-CCA, DNI, no ICU/pressors. She does not seem quite ready, in the face of severe COPD and CHF, to accept hospice care at this time.   She spent most of this session detailing her difficult living circumstances. She lives with an 80-year-old gentleman who is not a partner but rather a boarder, a friend that she let live in her house. He does help a bit with getting groceries, buying mostly convenience/frozen TV dinners. Patient would love to cook again but cannot tolerate standing at the stove. He does some heavy housework at times but is currently away visiting his daughter in [place] until next month. She is basically unable to do laundry as her machines are in the basement and she struggles to navigate O2, laundry basket, down the stairs with her current debilitation. She would love to be able to go out twice/week to her favorite [employer] group but her current portable O2 is 6# which is too heavy for her in " "combination with her purse. She does not have air conditioning in her home and agrees the recent hot weather precipitated the sob exacerbation. She feels \"stuck\" as she is unable to sell her home and go through and move her belongings, saying, \"I have too much stuff that fills every room.\" She lacks the finances to pay for extended care. She does have someone to clean her house but that is the only outside help she can afford. She is struggling to even afford a room air conditioner.  With her dire SODH, she needs significant social support in this season. She is willing for palliative but not hospice support. She is hopeful for ablation and wants to return to her home, although she is aware she might need SNF while her boarder-friend is away.    I am away this weekend but available via severe chat. I will round on pt Monday and offer additional support/education as she was focused more on her SODH then practical health management. Patient has my contact information for any concerns/questions.       Addendum Monday 5/27: Tachycardic in the am appearing to be a-flutter -160's, noting pattern of early morning tachycardia over the weekend as well. HR settled to 100's 2 hours after am oral Metoprolol.   I rounded on patient in the afternoon and she was A/O, admits to fatigue. Plan of care is still pending recovery. HF education reviewed and pt very aware from past dialogues. Support offered, will round tomorrow.    Addendum Tuesday 5/28: I rounded on pt near the end of a rapid response for tachycardia with -160's, tachypnea, and hypoxia. During the RRT, she was medicated with Metoprolol total of 25 mg IV, Ativan IV, Haldol IV, and Dilaudid IV  with relief and was resting quietly with Airvo at 100%, pt continued to assert her preference for no BIPAP or intubation. Dr. aRsheed, palliative care, bedside nurse, and myself had discussion with pt's son at bedside about goals of care in the face of end-stage " respiratory and cardiac disease. Plan made for a hospice meeting this afternoon. I will round and support during that meeting as well per bedside nurses' request.      Amalia Campos RN

## 2024-05-27 ENCOUNTER — APPOINTMENT (OUTPATIENT)
Dept: CARDIOLOGY | Facility: HOSPITAL | Age: 74
DRG: 280 | End: 2024-05-27
Payer: OTHER MISCELLANEOUS

## 2024-05-27 LAB
ALBUMIN SERPL BCP-MCNC: 3.2 G/DL (ref 3.4–5)
ANION GAP SERPL CALC-SCNC: 13 MMOL/L (ref 10–20)
ATRIAL RATE: 157 BPM
ATRIAL RATE: 158 BPM
BUN SERPL-MCNC: 35 MG/DL (ref 6–23)
CALCIUM SERPL-MCNC: 8.5 MG/DL (ref 8.6–10.3)
CHLORIDE SERPL-SCNC: 99 MMOL/L (ref 98–107)
CO2 SERPL-SCNC: 28 MMOL/L (ref 21–32)
CREAT SERPL-MCNC: 1.08 MG/DL (ref 0.5–1.05)
EGFRCR SERPLBLD CKD-EPI 2021: 54 ML/MIN/1.73M*2
ERYTHROCYTE [DISTWIDTH] IN BLOOD BY AUTOMATED COUNT: 13.4 % (ref 11.5–14.5)
GLUCOSE BLD MANUAL STRIP-MCNC: 160 MG/DL (ref 74–99)
GLUCOSE BLD MANUAL STRIP-MCNC: 160 MG/DL (ref 74–99)
GLUCOSE BLD MANUAL STRIP-MCNC: 215 MG/DL (ref 74–99)
GLUCOSE SERPL-MCNC: 220 MG/DL (ref 74–99)
HCT VFR BLD AUTO: 31.7 % (ref 36–46)
HGB BLD-MCNC: 9.7 G/DL (ref 12–16)
MAGNESIUM SERPL-MCNC: 2.11 MG/DL (ref 1.6–2.4)
MCH RBC QN AUTO: 31.3 PG (ref 26–34)
MCHC RBC AUTO-ENTMCNC: 30.6 G/DL (ref 32–36)
MCV RBC AUTO: 102 FL (ref 80–100)
NRBC BLD-RTO: 0 /100 WBCS (ref 0–0)
P AXIS: -85 DEGREES
P OFFSET: 174 MS
P OFFSET: 190 MS
P ONSET: 138 MS
P ONSET: 156 MS
PHOSPHATE SERPL-MCNC: 2.9 MG/DL (ref 2.5–4.9)
PLATELET # BLD AUTO: 223 X10*3/UL (ref 150–450)
POTASSIUM SERPL-SCNC: 4.7 MMOL/L (ref 3.5–5.3)
PR INTERVAL: 120 MS
PR INTERVAL: 130 MS
Q ONSET: 193 MS
Q ONSET: 216 MS
QRS COUNT: 26 BEATS
QRS COUNT: 27 BEATS
QRS DURATION: 112 MS
QRS DURATION: 96 MS
QT INTERVAL: 312 MS
QT INTERVAL: 322 MS
QTC CALCULATION(BAZETT): 505 MS
QTC CALCULATION(BAZETT): 520 MS
QTC FREDERICIA: 431 MS
QTC FREDERICIA: 443 MS
R AXIS: 80 DEGREES
R AXIS: 91 DEGREES
RBC # BLD AUTO: 3.1 X10*6/UL (ref 4–5.2)
SODIUM SERPL-SCNC: 135 MMOL/L (ref 136–145)
T AXIS: -89 DEGREES
T AXIS: 269 DEGREES
T OFFSET: 349 MS
T OFFSET: 377 MS
VENTRICULAR RATE: 157 BPM
VENTRICULAR RATE: 158 BPM
WBC # BLD AUTO: 8.3 X10*3/UL (ref 4.4–11.3)

## 2024-05-27 PROCEDURE — 84100 ASSAY OF PHOSPHORUS: CPT | Performed by: STUDENT IN AN ORGANIZED HEALTH CARE EDUCATION/TRAINING PROGRAM

## 2024-05-27 PROCEDURE — 2500000004 HC RX 250 GENERAL PHARMACY W/ HCPCS (ALT 636 FOR OP/ED): Performed by: STUDENT IN AN ORGANIZED HEALTH CARE EDUCATION/TRAINING PROGRAM

## 2024-05-27 PROCEDURE — 82947 ASSAY GLUCOSE BLOOD QUANT: CPT | Mod: 91

## 2024-05-27 PROCEDURE — 85027 COMPLETE CBC AUTOMATED: CPT | Performed by: STUDENT IN AN ORGANIZED HEALTH CARE EDUCATION/TRAINING PROGRAM

## 2024-05-27 PROCEDURE — 2500000005 HC RX 250 GENERAL PHARMACY W/O HCPCS: Performed by: INTERNAL MEDICINE

## 2024-05-27 PROCEDURE — 2060000001 HC INTERMEDIATE ICU ROOM DAILY

## 2024-05-27 PROCEDURE — 2500000004 HC RX 250 GENERAL PHARMACY W/ HCPCS (ALT 636 FOR OP/ED): Performed by: INTERNAL MEDICINE

## 2024-05-27 PROCEDURE — 2500000002 HC RX 250 W HCPCS SELF ADMINISTERED DRUGS (ALT 637 FOR MEDICARE OP, ALT 636 FOR OP/ED): Performed by: STUDENT IN AN ORGANIZED HEALTH CARE EDUCATION/TRAINING PROGRAM

## 2024-05-27 PROCEDURE — 93010 ELECTROCARDIOGRAM REPORT: CPT | Performed by: INTERNAL MEDICINE

## 2024-05-27 PROCEDURE — 94660 CPAP INITIATION&MGMT: CPT

## 2024-05-27 PROCEDURE — 99233 SBSQ HOSP IP/OBS HIGH 50: CPT | Performed by: INTERNAL MEDICINE

## 2024-05-27 PROCEDURE — 94640 AIRWAY INHALATION TREATMENT: CPT | Mod: MUE

## 2024-05-27 PROCEDURE — 2500000001 HC RX 250 WO HCPCS SELF ADMINISTERED DRUGS (ALT 637 FOR MEDICARE OP): Performed by: STUDENT IN AN ORGANIZED HEALTH CARE EDUCATION/TRAINING PROGRAM

## 2024-05-27 PROCEDURE — 93005 ELECTROCARDIOGRAM TRACING: CPT

## 2024-05-27 PROCEDURE — 2500000005 HC RX 250 GENERAL PHARMACY W/O HCPCS: Performed by: STUDENT IN AN ORGANIZED HEALTH CARE EDUCATION/TRAINING PROGRAM

## 2024-05-27 PROCEDURE — 2500000002 HC RX 250 W HCPCS SELF ADMINISTERED DRUGS (ALT 637 FOR MEDICARE OP, ALT 636 FOR OP/ED): Performed by: INTERNAL MEDICINE

## 2024-05-27 PROCEDURE — 80069 RENAL FUNCTION PANEL: CPT | Performed by: STUDENT IN AN ORGANIZED HEALTH CARE EDUCATION/TRAINING PROGRAM

## 2024-05-27 PROCEDURE — 2500000004 HC RX 250 GENERAL PHARMACY W/ HCPCS (ALT 636 FOR OP/ED)

## 2024-05-27 PROCEDURE — 2500000001 HC RX 250 WO HCPCS SELF ADMINISTERED DRUGS (ALT 637 FOR MEDICARE OP): Performed by: INTERNAL MEDICINE

## 2024-05-27 PROCEDURE — 36415 COLL VENOUS BLD VENIPUNCTURE: CPT | Performed by: STUDENT IN AN ORGANIZED HEALTH CARE EDUCATION/TRAINING PROGRAM

## 2024-05-27 PROCEDURE — 83735 ASSAY OF MAGNESIUM: CPT | Performed by: STUDENT IN AN ORGANIZED HEALTH CARE EDUCATION/TRAINING PROGRAM

## 2024-05-27 RX ORDER — LORAZEPAM 2 MG/ML
0.5 INJECTION INTRAMUSCULAR ONCE
Status: DISCONTINUED | OUTPATIENT
Start: 2024-05-27 | End: 2024-05-29

## 2024-05-27 RX ORDER — LORAZEPAM 2 MG/ML
INJECTION INTRAMUSCULAR
Status: COMPLETED
Start: 2024-05-27 | End: 2024-05-27

## 2024-05-27 RX ORDER — DIGOXIN 0.25 MG/ML
INJECTION INTRAMUSCULAR; INTRAVENOUS
Status: COMPLETED
Start: 2024-05-27 | End: 2024-05-27

## 2024-05-27 RX ORDER — DIGOXIN 0.25 MG/ML
125 INJECTION INTRAMUSCULAR; INTRAVENOUS DAILY
Status: DISCONTINUED | OUTPATIENT
Start: 2024-05-27 | End: 2024-05-28

## 2024-05-27 RX ADMIN — AMIODARONE HYDROCHLORIDE 150 MG: 1.5 INJECTION, SOLUTION INTRAVENOUS at 10:59

## 2024-05-27 RX ADMIN — DOXYCYCLINE 100 MG: 100 INJECTION, POWDER, LYOPHILIZED, FOR SOLUTION INTRAVENOUS at 05:20

## 2024-05-27 RX ADMIN — CEFTRIAXONE SODIUM 1 G: 1 INJECTION, SOLUTION INTRAVENOUS at 17:18

## 2024-05-27 RX ADMIN — FORMOTEROL FUMARATE 20 MCG: 20 SOLUTION RESPIRATORY (INHALATION) at 08:17

## 2024-05-27 RX ADMIN — IPRATROPIUM BROMIDE AND ALBUTEROL SULFATE 3 ML: 2.5; .5 SOLUTION RESPIRATORY (INHALATION) at 08:17

## 2024-05-27 RX ADMIN — OXYBUTYNIN CHLORIDE 5 MG: 5 TABLET ORAL at 21:23

## 2024-05-27 RX ADMIN — INSULIN LISPRO 4 UNITS: 100 INJECTION, SOLUTION INTRAVENOUS; SUBCUTANEOUS at 18:29

## 2024-05-27 RX ADMIN — IPRATROPIUM BROMIDE AND ALBUTEROL SULFATE 3 ML: 2.5; .5 SOLUTION RESPIRATORY (INHALATION) at 23:14

## 2024-05-27 RX ADMIN — ASPIRIN 81 MG: 81 TABLET, COATED ORAL at 09:24

## 2024-05-27 RX ADMIN — DOXYCYCLINE 100 MG: 100 INJECTION, POWDER, LYOPHILIZED, FOR SOLUTION INTRAVENOUS at 17:50

## 2024-05-27 RX ADMIN — APIXABAN 5 MG: 5 TABLET, FILM COATED ORAL at 09:24

## 2024-05-27 RX ADMIN — IPRATROPIUM BROMIDE AND ALBUTEROL SULFATE 3 ML: 2.5; .5 SOLUTION RESPIRATORY (INHALATION) at 17:00

## 2024-05-27 RX ADMIN — CILOSTAZOL 100 MG: 100 TABLET ORAL at 21:23

## 2024-05-27 RX ADMIN — Medication 30 L/MIN: at 20:00

## 2024-05-27 RX ADMIN — ICOSAPENT ETHYL 2 G: 1 CAPSULE ORAL at 09:24

## 2024-05-27 RX ADMIN — OXYBUTYNIN CHLORIDE 5 MG: 5 TABLET ORAL at 09:24

## 2024-05-27 RX ADMIN — PIOGLITAZONE 30 MG: 15 TABLET ORAL at 09:24

## 2024-05-27 RX ADMIN — LORAZEPAM 0.5 MG: 2 INJECTION, SOLUTION INTRAMUSCULAR; INTRAVENOUS at 09:05

## 2024-05-27 RX ADMIN — ICOSAPENT ETHYL 2 G: 1 CAPSULE ORAL at 21:23

## 2024-05-27 RX ADMIN — LORAZEPAM 0.5 MG: 2 INJECTION INTRAMUSCULAR at 09:05

## 2024-05-27 RX ADMIN — DIGOXIN 0.12 MCG: 0.25 INJECTION INTRAMUSCULAR; INTRAVENOUS at 09:32

## 2024-05-27 RX ADMIN — SITAGLIPTIN 50 MG: 50 TABLET, FILM COATED ORAL at 09:24

## 2024-05-27 RX ADMIN — CILOSTAZOL 100 MG: 100 TABLET ORAL at 09:24

## 2024-05-27 RX ADMIN — Medication 3 MG: at 21:23

## 2024-05-27 RX ADMIN — APIXABAN 5 MG: 5 TABLET, FILM COATED ORAL at 21:23

## 2024-05-27 RX ADMIN — ATORVASTATIN CALCIUM 40 MG: 40 TABLET, FILM COATED ORAL at 21:23

## 2024-05-27 RX ADMIN — IPRATROPIUM BROMIDE AND ALBUTEROL SULFATE 3 ML: 2.5; .5 SOLUTION RESPIRATORY (INHALATION) at 12:41

## 2024-05-27 RX ADMIN — Medication 40 PERCENT: at 23:15

## 2024-05-27 RX ADMIN — METOPROLOL SUCCINATE 50 MG: 50 TABLET, EXTENDED RELEASE ORAL at 09:24

## 2024-05-27 RX ADMIN — LORATADINE 10 MG: 10 TABLET ORAL at 09:24

## 2024-05-27 RX ADMIN — PANTOPRAZOLE SODIUM 40 MG: 40 TABLET, DELAYED RELEASE ORAL at 05:20

## 2024-05-27 RX ADMIN — Medication 30 L/MIN: at 08:00

## 2024-05-27 ASSESSMENT — COGNITIVE AND FUNCTIONAL STATUS - GENERAL
MOVING FROM LYING ON BACK TO SITTING ON SIDE OF FLAT BED WITH BEDRAILS: A LITTLE
CLIMB 3 TO 5 STEPS WITH RAILING: TOTAL
DAILY ACTIVITIY SCORE: 14
DRESSING REGULAR LOWER BODY CLOTHING: A LOT
STANDING UP FROM CHAIR USING ARMS: A LOT
TURNING FROM BACK TO SIDE WHILE IN FLAT BAD: A LITTLE
EATING MEALS: A LITTLE
TOILETING: A LOT
MOVING TO AND FROM BED TO CHAIR: A LOT
WALKING IN HOSPITAL ROOM: TOTAL
HELP NEEDED FOR BATHING: A LOT
PERSONAL GROOMING: A LITTLE
MOBILITY SCORE: 12
DRESSING REGULAR UPPER BODY CLOTHING: A LOT

## 2024-05-27 ASSESSMENT — PAIN - FUNCTIONAL ASSESSMENT
PAIN_FUNCTIONAL_ASSESSMENT: 0-10

## 2024-05-27 ASSESSMENT — PAIN SCALES - GENERAL
PAINLEVEL_OUTOF10: 0 - NO PAIN

## 2024-05-27 NOTE — NURSING NOTE
A&Ox3.  AFIB RVR in the 150's.  Gave Digoxin, Ativan and then a loading dose of Amiodarone. Afib in the 70's-80s.  Denied pain.  Lungs diminished.  Bed alarm on.  Call light in reach.   Never

## 2024-05-27 NOTE — PROGRESS NOTES
"Mitzi Pro is a 73 y.o. female on day 4 of admission presenting with Acute combined systolic and diastolic heart failure (Multi).    Subjective   Patient apparently had a restful night that was uneventful, although this morning shortly after making rounds, the patient was noted to have recurrent tachycardia to the 150s, consistent with her known atrial flutter; she had been given a low-dose of digoxin which had helped in prior days although did not help to rate control her converter, she had to be given another loading dose of amiodarone which did get her comfortably back into her normal rhythm; as she was in the tacky arrhythmia, she was feeling a little short of air and having some mild chest pain that resolved with treatment; prior to the episode she actually noted feeling hopeful for making a recovery and was wanting to go home; we discussed that she would likely not need a recurrent ischemic evaluation although discussed the idea of potentially having an ablation, she still wanted to think about it and have an ongoing discussion with cardiology.    Objective     Physical Exam  Constitutional: Awake/alert/oriented x3, no respiratory distress, on Airvo again today, calm  Eyes: Clear sclera  Head/Neck: Normocephalic, atraumatic  Respiratory/Thorax: Bilateral wheezing  Cardiovascular: Heart rate 88 at the time of examination by apical auscultation on initial examination, during the tacky arrhythmia, the heart rhythm was regular although tachycardic to approximately 150 by auscultation  Gastrointestinal: Non-TTP, soft  Extremities: Bilateral lower extremity pitting edema has improved, trace today  Psychological: Appropriate mood and behavior  Skin: Warm and dry, no jaundice     Last Recorded Vitals  Blood pressure (!) 114/49, pulse 84, temperature 36.1 °C (97 °F), temperature source Temporal, resp. rate (!) 28, height 1.651 m (5' 5\"), weight 77.9 kg (171 lb 11.8 oz), SpO2 95%.  Intake/Output last 3 " Shifts:  I/O last 3 completed shifts:  In: 300 (3.9 mL/kg) [IV Piggyback:300]  Out: 850 (10.9 mL/kg) [Urine:850 (0.3 mL/kg/hr)]  Weight: 77.9 kg     Relevant Results  Scheduled medications  apixaban, 5 mg, oral, BID  aspirin, 81 mg, oral, Daily  atorvastatin, 40 mg, oral, Daily  cefTRIAXone, 1 g, intravenous, q24h  cilostazol, 100 mg, oral, BID  digoxin, 125 mcg, intravenous, Daily  doxycycline, 100 mg, intravenous, q12h  formoterol, 20 mcg, nebulization, q12h  icosapent ethyL, 2 g, oral, BID  insulin lispro, 0-10 Units, subcutaneous, TID  ipratropium-albuteroL, 3 mL, nebulization, 4x daily  loratadine, 10 mg, oral, Daily  LORazepam, 0.5 mg, intravenous, Once  melatonin, 3 mg, oral, Nightly  metoprolol succinate XL, 50 mg, oral, Daily  midodrine, 10 mg, oral, BID  midodrine, 10 mg, oral, Once  oxybutynin, 5 mg, oral, BID  oxygen, , inhalation, Continuous - Inhalation  pantoprazole, 40 mg, oral, Daily before breakfast  pioglitazone, 30 mg, oral, Daily  polyethylene glycol, 17 g, oral, BID  sennosides, 2 tablet, oral, BID  SITagliptin phosphate, 50 mg, oral, Daily      Continuous medications     PRN medications  PRN medications: acetaminophen **OR** acetaminophen **OR** acetaminophen, dextrose, dextrose, fluticasone, glucagon, glucagon, ipratropium-albuteroL, ondansetron ODT **OR** ondansetron, oxygen, sodium chloride    Results for orders placed or performed during the hospital encounter of 05/23/24 (from the past 24 hour(s))   POCT GLUCOSE   Result Value Ref Range    POCT Glucose 363 (H) 74 - 99 mg/dL   POCT GLUCOSE   Result Value Ref Range    POCT Glucose 329 (H) 74 - 99 mg/dL   Renal Function Panel   Result Value Ref Range    Glucose 220 (H) 74 - 99 mg/dL    Sodium 135 (L) 136 - 145 mmol/L    Potassium 4.7 3.5 - 5.3 mmol/L    Chloride 99 98 - 107 mmol/L    Bicarbonate 28 21 - 32 mmol/L    Anion Gap 13 10 - 20 mmol/L    Urea Nitrogen 35 (H) 6 - 23 mg/dL    Creatinine 1.08 (H) 0.50 - 1.05 mg/dL    eGFR 54 (L) >60  mL/min/1.73m*2    Calcium 8.5 (L) 8.6 - 10.3 mg/dL    Phosphorus 2.9 2.5 - 4.9 mg/dL    Albumin 3.2 (L) 3.4 - 5.0 g/dL   CBC   Result Value Ref Range    WBC 8.3 4.4 - 11.3 x10*3/uL    nRBC 0.0 0.0 - 0.0 /100 WBCs    RBC 3.10 (L) 4.00 - 5.20 x10*6/uL    Hemoglobin 9.7 (L) 12.0 - 16.0 g/dL    Hematocrit 31.7 (L) 36.0 - 46.0 %     (H) 80 - 100 fL    MCH 31.3 26.0 - 34.0 pg    MCHC 30.6 (L) 32.0 - 36.0 g/dL    RDW 13.4 11.5 - 14.5 %    Platelets 223 150 - 450 x10*3/uL   Magnesium   Result Value Ref Range    Magnesium 2.11 1.60 - 2.40 mg/dL   POCT GLUCOSE   Result Value Ref Range    POCT Glucose 160 (H) 74 - 99 mg/dL   POCT GLUCOSE   Result Value Ref Range    POCT Glucose 215 (H) 74 - 99 mg/dL     XR chest 2 views    Result Date: 5/24/2024  Interpreted By:  Kristian bAarca, STUDY: XR CHEST 2 VIEWS; ;  5/24/2024 9:37 am   INDICATION: Signs/Symptoms:AECHF vs PNA.   COMPARISON: 05/23/2024 chest radiograph   ACCESSION NUMBER(S): [id number]   ORDERING CLINICIAN: THIERRY MILLER   TECHNIQUE: PA and lateral views of the chest were obtained.   FINDINGS: Bilateral pleural effusions are present larger on the right. Underlying pneumonia in the right lung base cannot be excluded. In mount of pleural fluid has mildly increased on the right from the prior exam.   Interstitial markings of the lungs are mildly increased similar to the prior exam.   Cardiac silhouette remains borderline enlarged.       Bilateral pleural effusions are present, larger on the right. Underlying pneumonia/atelectasis in the right lung base cannot be excluded.   Continued cardiomegaly and pulmonary vascular congestion.     MACRO: None   Signed by: Kristian Abarca 5/24/2024 10:02 AM Dictation workstation:   [id number]    Electrocardiogram, 12-lead PRN ACS symptoms    Result Date: 5/24/2024  Suspect arm lead reversal, interpretation assumes no reversal Sinus rhythm with Premature atrial complexes and Premature ventricular complexes or Fusion complexes  Rightward axis Pulmonary disease pattern Septal infarct , age undetermined ST & T wave abnormality, consider inferior ischemia Abnormal ECG When compared with ECG of 23-MAY-2024 17:24, (unconfirmed) Sinus rhythm has replaced Junctional rhythm Vent. rate has decreased BY  61 BPM Incomplete left bundle branch block is no longer Present    Electrocardiogram, 12-lead PRN ACS symptoms    Result Date: 5/24/2024  Unusual P axis and short KS, probable junctional tachycardia Rightward axis Incomplete left bundle branch block ST elevation, consider lateral injury or acute infarct ** ** ACUTE MI ** ** Abnormal ECG When compared with ECG of 23-MAY-2024 16:08, (unconfirmed) Junctional rhythm has replaced Sinus rhythm    ECG 12 lead    Result Date: 5/24/2024  Sinus tachycardia with Fusion complexes Septal infarct (cited on or before 23-MAY-2024) Lateral injury pattern ** ** ACUTE MI ** ** Abnormal ECG When compared with ECG of 23-MAY-2024 15:56, (unconfirmed) Previous ECG has undetermined rhythm, needs review Serial changes of Septal infarct Present    ECG 12 lead    Result Date: 5/24/2024  Undetermined rhythm Rightward axis Septal infarct (cited on or before 23-MAY-2024) ST & T wave abnormality, consider inferior ischemia ST & T wave abnormality, consider anterolateral ischemia Abnormal ECG When compared with ECG of 03-MAY-2024 14:43, Significant changes have occurred    XR chest 1 view    Result Date: 5/23/2024  Interpreted By:  Nandini Bui, STUDY: XR CHEST 1 VIEW;  5/23/2024 4:57 pm   INDICATION: Signs/Symptoms:Shortness of breath, history of CHF and COPD.   COMPARISON: 05/02/2024.   ACCESSION NUMBER(S): [id number]   ORDERING CLINICIAN: DAVID SAUCEDA   FINDINGS:         CARDIOMEDIASTINAL SILHOUETTE: Status post sternotomy. Heart is mildly enlarged. There is venous congestion and interstitial edema.   LUNGS: There is atelectasis/infiltrate in the right lower lobe seen also on the radiograph of 05/02/2024. No evidence of  pneumothorax.   ABDOMEN: No remarkable upper abdominal findings.   BONES: No acute osseous changes.       1. Cardiomegaly with venous congestion and interstitial edema consistent with congestive heart failure. 2. Infiltrate/atelectasis in the right lung base. Rule out pneumonia.       MACRO: None   Signed by: Nandini Bui 5/23/2024 5:20 PM Dictation workstation:   [id number]    ECG 12 Lead    Result Date: 5/22/2024  Sinus rhythm with sinus arrhythmia with occasional Premature ventricular complexes and Fusion complexes Rightward axis Anteroseptal infarct (cited on or before 30-APR-2024) Abnormal ECG When compared with ECG of 01-MAY-2024 05:33, Sinus rhythm has replaced Atrial flutter Vent. rate has decreased BY  79 BPM Serial changes of Anteroseptal infarct Present    NM injection no scan    Result Date: 5/8/2024  Interpreted By:  Frank Gannon and Adjei Effie STUDY: NM INJECTION NO SCAN;  5/2/2024 10:00 am   INDICATION: Signs/Symptoms:decreased EF, arrythmia, elevated troponin.   COMPARISON: None.   ACCESSION NUMBER(S): [id number]   ORDERING CLINICIAN: CAM MOSS   TECHNIQUE: DIVISION OF NUCLEAR MEDICINE PHARMACOLOGIC STRESS MYOCARDIAL PERFUSION SCAN, ONE DAY PROTOCOL   The patient received an intravenous injection of 9.2 mCi of Tc-99m Myoview. However, the patient was unable to tolerate lying down for the study and did not want to continue with the examination. No images were obtained.   FINDINGS: No images were obtained.       Injection no scan. No images were obtained.   I personally reviewed the images/study and I agree with the findings as stated by radiology resident Dr. Lima Fish. This study was interpreted at [facility], [place], [place].   MACRO: None   Signed by: Frank Gannon 5/8/2024 5:12 PM Dictation workstation:   [id number]    ECG 12 Lead    Result Date: 5/4/2024  Sinus rhythm with occasional Premature ventricular complexes Incomplete left bundle branch  block Poor R-wave progression Abnormal ECG Confirmed by Frnak Garcia ([id number]) on 5/4/2024 3:25:34 PM    ECG 12 Lead    Result Date: 5/3/2024  Atrial flutter with 2 to 1 block Rightward axis Incomplete left bundle branch block Nonspecific ST abnormality Abnormal ECG When compared with ECG of 02-MAY-2024 21:07, (unconfirmed) Premature ventricular complexes are no longer Present Premature supraventricular complexes are no longer Present Confirmed by Frank Garcia ([id number]) on 5/3/2024 1:30:56 PM    ECG 12 Lead    Result Date: 5/3/2024  Atrial flutter with variable AV block Rightward axis Incomplete left bundle branch block ST & T wave abnormality, consider inferior ischemia Abnormal ECG When compared with ECG of 02-MAY-2024 12:13, (unconfirmed) Significant changes have occurred Confirmed by Frank Garcia ([id number]) on 5/3/2024 1:29:58 PM    XR chest 1 view    Result Date: 5/2/2024  Interpreted By:  Brandon Nazario, STUDY: XR CHEST 1 VIEW   INDICATION: Signs/Symptoms:SOB.   COMPARISON: April 30   ACCESSION NUMBER(S): [id number]   ORDERING CLINICIAN: YANIRA BAXTER   FINDINGS: Interval development of perihilar and basilar interstitial edema with new right effusion and basilar airspace opacity favored to represent more conglomerate edema. Focus of pneumonia not excluded.   Previous cardiomegaly with median sternotomy unchanged.       Interval development of perihilar and basilar interstitial edema with new right effusion and basilar airspace opacity favored to represent more conglomerate edema. Focus of pneumonia not excluded.   Signed by: Brandon Nazario 5/2/2024 6:55 PM Dictation workstation:   [id number]    ECG 12 Lead    Result Date: 5/2/2024  Atrial flutter with 2:1 AV conduction Right axis deviation Anteroseptal infarct (cited on or before 30-APR-2024) ST & T wave abnormality, consider inferior ischemia Abnormal ECG When compared with ECG of 30-APR-2024 01:56, (unconfirmed) Significant changes have occurred Confirmed  by Ernie Diaz ([id number]) on 5/2/2024 1:08:56 PM    ECG 12 lead    Result Date: 5/2/2024  cannot exclude atrial flutter vs sinus tachycardia Left axis deviation Incomplete right bundle branch block Nonspecific ST and T wave abnormality Abnormal ECG When compared with ECG of 08-APR-2023 13:22, Fusion complexes are now Present Incomplete right bundle branch block is now Present Confirmed by Ernie Diaz ([id number]) on 5/2/2024 12:56:18 PM    ECG 12 lead    Result Date: 5/2/2024  atrial fibrillation/flutter Septal infarct , age undetermined ST & T wave abnormality, consider lateral ischemia Abnormal ECG When compared with ECG of 30-APR-2024 00:46, (unconfirmed) Current undetermined rhythm precludes rhythm comparison, needs review Incomplete right bundle branch block is no longer Present Septal infarct is now Present Confirmed by Ernie Diaz ([id number]) on 5/2/2024 12:55:44 PM    Transthoracic Echo (TTE) Complete    Result Date: 4/30/2024            [facility] [id number] [facility] [address]    Tel [phone] Fax [phone] TRANSTHORACIC ECHOCARDIOGRAM REPORT  Patient Name:      MILVIA Garces Physician:    [id number] Horacio Armijo MD Study Date:        4/30/2024             Ordering Provider:    [id number] YANIRA BAXTER MRN/PID:           [id number]              Fellow: Accession#:        [id number]          Nurse:                Leena AMATO Date of Birth/Age: 1950 / 73 years  Sonographer:          Amalia Brewster RD Gender:            F                     Additional Staff: Height:            165.00 cm             Admit Date: Weight:            76.20 kg              Admission Status:     Inpatient -                                                                Priority                                                                discharge BSA / BMI:         1.84 m2 / 27.99  kg/m2 Department Location:  [facility] CCU Blood Pressure: 99 /56 mmHg Study Type:    TRANSTHORACIC ECHO (TTE) COMPLETE Diagnosis/ICD: 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 Diabetes:          Yes Pertinent History: HTN, Hyperlipidemia and COPD. Study Detail: The following Echo studies were performed: 2D, M-Mode, Doppler and               color flow. Technically challenging study due to patient lying in               supine position and prominent lung artifact. Definity used as a               contrast agent for endocardial border definition. Total contrast               used for this procedure was 2 mL via IV push.  PHYSICIAN INTERPRETATION: Left Ventricle: Left ventricular systolic function is moderately decreased, with an estimated ejection fraction of 34 %. There are multiple wall motion abnormalities. The left ventricular cavity size is mild to moderately dilated. The left ventricular septal wall thickness is decreased. There is normal left ventricular posterior wall thickness. Spectral Doppler shows an impaired relaxation pattern of left ventricular diastolic filling. There is an elevated left ventricular end diastolic pressure. LV Wall Scoring: The mid and apical anterior septum, mid and apical inferior septum, apical lateral segment, apical anterior segment, and apex are akinetic. The entire inferior wall, basal and mid anterior wall, basal and mid anterolateral wall, basal and mid inferolateral wall, basal anteroseptal segment, and basal inferoseptal segment are hypokinetic. Left Atrium: The left atrium is mildly dilated. Right Ventricle: The right ventricle is normal in size. There is mildly reduced right ventricular systolic function. Right Atrium: The right atrium is normal in size. Aortic Valve: The aortic valve is  trileaflet. There is evidence of mildly elevated transaortic gradients consistent with sclerosis of the aortic valve. There is no evidence of aortic valve regurgitation. The peak instantaneous gradient of the aortic valve is 7.6 mmHg. Mitral Valve: The mitral valve is normal in structure. There is mild to moderate mitral annular calcification. There is moderate mitral valve regurgitation. EROA by PISA 0.2 moderate, posterior MR skyler type IIIA apical tethering. Tricuspid Valve: The tricuspid valve is structurally normal. There is mild tricuspid regurgitation. The Doppler estimated RVSP is mildly elevated at 43.7 mmHg. Pulmonic Valve: The pulmonic valve is structurally normal. There is trace pulmonic valve regurgitation. Pericardium: There is no pericardial effusion noted. Aorta: The aortic root is normal. There is no dilatation of the ascending aorta. There is no dilatation of the aortic root. Pulmonary Artery: The main pulmonary artery is normal in size, and position, with normal bifurcation into the left and right pulmonary arteries. Systemic Veins: The inferior vena cava appears mildly dilated. The hepatic vein appears dilated. There is less than 50% IVC collapse with inspiration.  CONCLUSIONS:  1. Left ventricular systolic function is moderately decreased with a 34 % estimated ejection fraction.  2. Multiple segmental abnormalities exist. See findings.  3. Spectral Doppler shows an impaired relaxation pattern of left ventricular diastolic filling.  4. There is an elevated left ventricular end diastolic pressure.  5. There is mildly reduced right ventricular systolic function.  6. Moderate mitral valve regurgitation.  7. Mildly elevated RVSP.  8. Aortic valve sclerosis.  9. There are multiple wall motion abnormalities. QUANTITATIVE DATA SUMMARY: 2D MEASUREMENTS:                           Normal Ranges: LAs:           4.50 cm    (2.7-4.0cm) IVSd:          1.00 cm    (0.6-1.1cm) LVPWd:         0.90 cm     (0.6-1.1cm) LVIDd:         6.50 cm    (3.9-5.9cm) LVIDs:         5.40 cm LV Mass Index: 144.5 g/m2 LV % FS        16.9 % LA VOLUME:                             Normal Ranges: LA Area A4C:     21.0 cm2 LA Area A2C:     21.0 cm2 LA Volume Index: 37.0 ml/m2 M-MODE MEASUREMENTS:                  Normal Ranges: Ao Root: 2.60 cm (2.0-3.7cm) AoV Exc: 1.40 cm (1.5-2.5cm) AORTA MEASUREMENTS:                    Normal Ranges: AoV Exc:   1.40 cm (1.5-2.5cm) Asc Ao, d: 3.30 cm (2.1-3.4cm) LV SYSTOLIC FUNCTION BY 2D PLANIMETRY (MOD):                     Normal Ranges: EF-A4C View: 39.4 % (>=55%) EF-A2C View: 28.7 % EF-Biplane:  34.0 % LV DIASTOLIC FUNCTION:                        Normal Ranges: MV Peak E:    1.38 m/s (0.7-1.2 m/s) MV Peak A:    0.90 m/s (0.42-0.7 m/s) E/A Ratio:    1.54     (1.0-2.2) MV e'         0.06 m/s (>8.0) MV lateral e' 0.08 m/s MV medial e'  0.04 m/s E/e' Ratio:   23.00    (<8.0) MITRAL VALVE:                 Normal Ranges: MV DT: 177 msec (150-240msec) MITRAL INSUFFICIENCY:                           Normal Ranges: PISA Radius:  0.4 cm MR VTI:       142.00 cm MR Vmax:      407.00 cm/s MR Alias Subhash: 84.7 cm/s MR Volume:    29.71 ml MR Flow Rt:   85.15 ml/s MR EROA:      0.21 cm2 AORTIC VALVE:                         Normal Ranges: AoV Vmax:      1.38 m/s (<=1.7m/s) AoV Peak P.6 mmHg (<20mmHg) LVOT Max Subhash:  0.89 m/s (<=1.1m/s) LVOT VTI:      20.00 cm LVOT Diameter: 1.70 cm  (1.8-2.4cm) AoV Area,Vmax: 1.47 cm2 (2.5-4.5cm2)  RIGHT VENTRICLE: RV Basal 3.40 cm RV Mid   1.80 cm RV Major 7.6 cm TAPSE:   14.1 mm RV s'    0.09 m/s TRICUSPID VALVE/RVSP:                             Normal Ranges: Peak TR Velocity: 3.19 m/s RV Syst Pressure: 43.7 mmHg (< 30mmHg) PULMONIC VALVE:                        Normal Ranges: PV Accel Time: 74 msec (>120ms)  [id number] Horacio Armijo MD Electronically signed on 2024 at 4:34:01 PM  Wall Scoring  ** Final **     XR chest 1 view    Result Date: 2024  STUDY: Chest  Radiograph;  4/30/2024 at 1:13 AM INDICATION: Chest pain. COMPARISON: XR chest 11/28/2022, XR chest 12/5/2021, XR chest 7/14/2021, XR chest 3/1/2019. ACCESSION NUMBER(S): [id number] ORDERING CLINICIAN: ISIDRO MARSHALL TECHNIQUE:  Frontal chest was obtained at 0113 hours. FINDINGS: CARDIOMEDIASTINAL SILHOUETTE: Cardiomediastinal silhouette is mildly enlarged in size and configuration.  Calcified plaque is seen in the aorta.  LUNGS: Lungs are clear.  Flattening of hemidiaphragms suggests chronic changes of COPD.  ABDOMEN: No remarkable upper abdominal findings.  BONES: No acute osseous changes.    No acute cardia pulmonary disease. Cardiomegaly with suspected mild chronic changes of COPD. Signed by Rojas Lawrence       Assessment/Plan   Principal Problem:    Acute combined systolic and diastolic heart failure (Multi)  Active Problems:    Coronary artery disease involving native heart    Stage 3a chronic kidney disease (Multi)    Acute hypoxic respiratory failure (Multi)    Atrial flutter with rapid ventricular response (Multi)    Lactic acidosis    Hyperkalemia    Acute exacerbation of COPD with asthma (Multi)    Community acquired pneumonia of right lower lobe of lung    NSTEMI (non-ST elevated myocardial infarction) (Multi)    Nonsustained ventricular tachycardia (Multi)    Plan:  - Stable to remain at current level of care on telemetry  - Appreciate cardiology consultation, can give an additional dose of digoxin again today, received 1 more dose of amiodarone, she may ultimately be a good candidate for ablation  - Continue with metoprolol and midodrine  - Continue with current antibiotic treatment for potential pneumonia to finish course of therapy, will finish doxycycline today, will continue ceftriaxone for another day  - Home medication reconciliation was completed  - She may need repeat evaluation with coronary angiography  - Cardiac diet  - Eliquis for VTE prophylaxis  - DNR/DNI/no ICU         I spent 45 minutes  in the professional and overall care of this patient.    Bertram Rasheed MD

## 2024-05-27 NOTE — CARE PLAN
The patient's goals for the shift include I want to get some sleep    The clinical goals for the shift include patient will be able to maintain pulse ox >92% with less oxygen by 05/27/24 0600    S: Patient admitted 5/23/24 for CHF .  B: History of Afib, HTN, GERD, COPD  A: patient A+Ox 4, denies pain, denies SOB this shift, afib on tele with controlled heart rate, remains on highflow nasal cannula tonight, afebrile, given IV ATB per orders.  R: Patient from home anticipates discharging to home.

## 2024-05-27 NOTE — PROGRESS NOTES
Subjective Data:  I48.92 Atrial flutter intermittent 2-1 conduction  J44.9 Advanced severe COPD  I42.9 I50.92 Mixed systolic diastolic heart failure  J18.9 Community-acquired pneumonia     Covering For Luis Antonio, they resume care tomorrow 5/28  Seen in follow-up  Variable heart rate risk currently 110 at rest  Stage III chronic kidney disease  Hypoxic respiratory failure  As per EP possible consideration of cavotricuspid isthmus  Respiratory status improving  Perative care consult reviewed      As per Bertram Rasheed  Extra dose of digoxin amiodarone given  Rate control about the same respiratory status improved  Palliative care consult and possible ablation caval isthmus  The patient's regular cardiologist returns tomorrow      Principal Problem:    Acute combined systolic and diastolic heart failure (Multi)  Active Problems:    Coronary artery disease involving native heart    Stage 3a chronic kidney disease (Multi)    Acute hypoxic respiratory failure (Multi)    Atrial flutter with rapid ventricular response (Multi)    Lactic acidosis    Hyperkalemia    Acute exacerbation of COPD with asthma (Multi)    Community acquired pneumonia of right lower lobe of lung    NSTEMI (non-ST elevated myocardial infarction) (Multi)    Nonsustained ventricular tachycardia (Multi)      Well-known to me from evaluation on call 2 weeks ago  Has significant pulmonary disease  Has fairly persistent atrial flutter with 2-1 conduction  Advanced severe COPD  Diastolic dysfunction combined with systolic dysfunction  Echo in 20 2334% EF  Known coronary disease remote CABG right coronary occlusion with left-sided collaterals stent to the ramus  Counterclockwise paroxysmal atrial flutter  [id number] score of 6  Taken off IV amiodarone because of pulmonary disease previously    Today persistent tachycardia given IV digoxin with intermittent improved rate control    4/30/2024 echo        CONCLUSIONS:   1. Left ventricular  systolic function is moderately decreased with a 34 % estimated ejection fraction.   2. Multiple segmental abnormalities exist. See findings.   3. Spectral Doppler shows an impaired relaxation pattern of left ventricular diastolic filling.   4. There is an elevated left ventricular end diastolic pressure.   5. There is mildly reduced right ventricular systolic function.   6. Moderate mitral valve regurgitation.   7. Mildly elevated RVSP.   8. Aortic valve sclerosis.   9. There are multiple wall motion abnormalities.     February 22, 2024 cardiac catheterization  Coronary Angiography:  The coronary circulation is right dominant.     Left Main Coronary Artery:  The left main coronary artery is free of atherosclerotic disease.     Left Anterior Descending Coronary Artery Distribution:  The Left Anterior Descending artery is a large vessel. There is 100% stenosis in the mid left anterior descending artery. Hemodynamically significant obstruction is noted in this vessel and fills via collaterals.     Circumflex Coronary Artery Distribution:  The Left Circumflex artery is a large vessel. Presents luminal irregularities and contains patent previously placed stents.     Right Coronary Artery Distribution:     The Right Coronary Artery is a large vessel. Hemodynamically significant obstruction is noted in this vessel and fills via collaterals. There is 100% stenosis in the the proximal Right Coronary Artery.        Left Ventriculography:  The estimated left ventricular ejection fraction is abnormal at 30%. There is anterolateral hypokinesis noted.     MH:  CAD - prior CABG. Last cardiac cath 2007 with occluded LAD, widely patent ramus stent, minimal disease of the LCx, occluded RCA with right-to-right collaterals, occluded SVG to RCA, occluded and thrombotic SVG to LAD. LIMA not used as graft. Last stress  with fixed anterior infarct without ischemia  CHF - LVEF 45-50% by echo 2015, 35-40% here.   COPD / home oxygen  therapy  Atrial fibrillation  Carotid vascular disease  DM with CKD, PAD, and peripheral neruopathy  Edema  HTN  GERD  Glaucoma  LBBB and RBBB seen in past  Mixed HLD  Vitamin D deficiency  Ovarian cancer?  - little details     PSH: CABG, BSO  SH: former smoker  FH: CAD in father, sister, colon cancer in sister        Active Problems  Problems    · Abnormal stress ECG with treadmill (794.39) (R94.39)   · Allergic rhinitis (477.9) (J30.9)   · Atopic dermatitis (691.8) (L20.9)   · Atrial fibrillation (427.31) (I48.91)   · Bilateral carotid artery stenosis (433.10,433.30) (I65.23)   · BMI 29.0-29.9,adult (V85.25) (Z68.29)   · CAD (coronary artery disease) (414.00) (I25.10)   · Carotid artery disease (447.9) (I77.9)   · Colon cancer screening (V76.51) (Z12.11)   · COPD (chronic obstructive pulmonary disease) (496) (J44.9)   · Diabetes mellitus with kidney complication (250.40) (E11.29)   · Diabetes mellitus with peripheral vascular disease (250.70,443.81) (E11.51)   · Diabetic polyneuropathy associated with type 2 diabetes mellitus (250.60,357.2)  (E11.42)   · Edema (782.3) (R60.9)   · Encounter for immunization (V03.89) (Z23)   · Essential hypertension (401.9) (I10)   · Extremity atherosclerosis with intermittent claudication (440.21) (I70.219)   · Female incontinence (625.6) (R32)   · GERD (gastroesophageal reflux disease) (530.81) (K21.9)   · Glaucoma (365.9) (H40.9)   · Hematuria (599.70) (R31.9)   · HTN (hypertension) (401.9) (I10)   · Hyperlipidemia (272.4) (E78.5)   · LBBB (left bundle branch block) (426.3) (I44.7)   · Mixed hyperlipidemia (272.2) (E78.2)   · Overweight (278.02) (E66.3)   · Peripheral vascular disease (443.9) (I73.9)   · Pulmonary nodules (793.19) (R91.8)   · RBBB (426.4) (I45.10)   · Stage 3a chronic kidney disease (585.3) (N18.31)   · Type 2 diabetes mellitus with hyperglycemia, without long-term current use of insulin  (250.00,790.29) (E11.65)   · Vaginal itching (698.1) (N89.8)   · Vitamin D  deficiency (268.9) (E55.9)     Surgical History  Problems    · History of Bypass   · History of Coronary artery bypass graft   · History of Coronary Artery Surgery   · History of Salpingo-oophorectomy     Past Medical History  Problems    · History of ovarian cancer (V10.43) (Z85.43)       Overnight Events:    Asymptomatic with intermittent tachycardia     Objective Data:  Last Recorded Vitals:  Vitals:    05/27/24 0902 05/27/24 0911 05/27/24 1108 05/27/24 1126   BP: 142/82   (!) 114/49   BP Location:       Patient Position:       Pulse: (!) 158 (!) 158 (!) 150 84   Resp: (!) 33 (!) 30 24 (!) 28   Temp:    36.1 °C (97 °F)   TempSrc:    Temporal   SpO2: (!) 87% 90% 100% 95%   Weight:       Height:           Last Labs:  CBC - 5/27/2024:  4:37 AM  8.3 9.7 223    31.7      CMP - 5/27/2024:  4:37 AM  8.5 7.0 33 --- 0.8   2.9 3.2 20 58      PTT - No results in last year.  1.8   19.9 _     TROPHS   Date/Time Value Ref Range Status   05/23/2024 05:32  0 - 13 ng/L Final     Comment:     Previous result verified on 5/23/2024 1652 on specimen/case [id number] called with component [facility] for procedure Troponin I, High Sensitivity, Initial with value 264 ng/L.   05/23/2024 04:12  0 - 13 ng/L Final   05/02/2024 11:08  0 - 13 ng/L Final     BNP   Date/Time Value Ref Range Status   05/23/2024 04:12 PM 1,005 0 - 99 pg/mL Final   05/01/2024 04:21  0 - 99 pg/mL Final     HGBA1C   Date/Time Value Ref Range Status   05/23/2024 04:12 PM 6.6 see below % Final   05/14/2024 10:52 AM 6.3 4.2 - 6.5 % Final   10/04/2023 02:20 PM 6.5 4.2 - 6.5 % Final     VLDL   Date/Time Value Ref Range Status   04/06/2022 10:04 AM 29 0 - 40 mg/dL Final   03/03/2021 09:28 AM 44 0 - 40 mg/dL Final   05/04/2020 09:14 AM 55 0 - 40 mg/dL Final      Last I/O:  I/O last 3 completed shifts:  In: 300 (3.9 mL/kg) [IV Piggyback:300]  Out: 850 (10.9 mL/kg) [Urine:850 (0.3 mL/kg/hr)]  Weight: 77.9 kg     Past Cardiology Tests (Last 3  "Years):  EKG:  Electrocardiogram, 12-lead PRN ACS symptoms 05/25/2024 (Preliminary)      Electrocardiogram, 12-lead PRN ACS symptoms 05/23/2024 (Preliminary)      ECG 12 lead 05/23/2024 (Preliminary)      Electrocardiogram, 12-lead PRN ACS symptoms 05/23/2024 (Preliminary)      ECG 12 lead 05/23/2024 (Preliminary)      ECG 12 Lead 05/03/2024      ECG 12 Lead 05/02/2024      ECG 12 Lead 05/02/2024      ECG 12 Lead 05/02/2024 (Preliminary)      ECG 12 Lead 05/01/2024      ECG 12 lead 04/30/2024      ECG 12 lead 04/30/2024    Echo:  Transthoracic Echo (TTE) Complete 04/30/2024    Ejection Fractions:  No results found for: \"EF\"  Cath:  No results found for this or any previous visit from the past 1095 days.    Stress Test:  No results found for this or any previous visit from the past 1095 days.    Cardiac Imaging:  No results found for this or any previous visit from the past 1095 days.      Inpatient Medications:  Scheduled medications   Medication Dose Route Frequency    apixaban  5 mg oral BID    aspirin  81 mg oral Daily    atorvastatin  40 mg oral Daily    cefTRIAXone  1 g intravenous q24h    cilostazol  100 mg oral BID    digoxin  125 mcg intravenous Daily    doxycycline  100 mg intravenous q12h    formoterol  20 mcg nebulization q12h    icosapent ethyL  2 g oral BID    insulin lispro  0-10 Units subcutaneous TID    ipratropium-albuteroL  3 mL nebulization 4x daily    loratadine  10 mg oral Daily    LORazepam  0.5 mg intravenous Once    melatonin  3 mg oral Nightly    metoprolol succinate XL  50 mg oral Daily    midodrine  10 mg oral BID    midodrine  10 mg oral Once    oxybutynin  5 mg oral BID    oxygen   inhalation Continuous - Inhalation    pantoprazole  40 mg oral Daily before breakfast    pioglitazone  30 mg oral Daily    polyethylene glycol  17 g oral BID    sennosides  2 tablet oral BID    SITagliptin phosphate  50 mg oral Daily     PRN medications   Medication    acetaminophen    Or    acetaminophen    " Or    acetaminophen    dextrose    dextrose    fluticasone    glucagon    glucagon    ipratropium-albuteroL    ondansetron ODT    Or    ondansetron    oxygen    sodium chloride     Continuous Medications   Medication Dose Last Rate     Results for orders placed or performed during the hospital encounter of 05/23/24 (from the past 96 hour(s))   ECG 12 lead   Result Value Ref Range    Ventricular Rate 157 BPM    Atrial Rate 157 BPM    [place] Interval 120 ms    QRS Duration 96 ms    QT Interval 322 ms    QTC Calculation(Bazett) 520 ms    P Axis -85 degrees    R Axis 91 degrees    T Axis -89 degrees    QRS Count 26 beats    Q Onset 216 ms    P Onset 156 ms    P Offset 190 ms    T Offset 377 ms    QTC Fredericia 443 ms   Sars-CoV-2 PCR   Result Value Ref Range    Coronavirus 2019, PCR Not Detected Not Detected   BLOOD GAS ARTERIAL FULL PANEL   Result Value Ref Range    POCT pH, Arterial 7.27 (L) 7.38 - 7.42 pH    POCT pCO2, Arterial 64 (H) 38 - 42 mm Hg    POCT pO2, Arterial 73 (L) 85 - 95 mm Hg    POCT SO2, Arterial 94 94 - 100 %    POCT Oxy Hemoglobin, Arterial 91.4 (L) 94.0 - 98.0 %    POCT Hematocrit Calculated, Arterial 37.0 36.0 - 46.0 %    POCT Sodium, Arterial 132 (L) 136 - 145 mmol/L    POCT Potassium, Arterial 4.5 3.5 - 5.3 mmol/L    POCT Chloride, Arterial 99 98 - 107 mmol/L    POCT Ionized Calcium, Arterial 1.19 1.10 - 1.33 mmol/L    POCT Glucose, Arterial 320 (H) 74 - 99 mg/dL    POCT Lactate, Arterial 5.6 (HH) 0.4 - 2.0 mmol/L    POCT Base Excess, Arterial 1.1 -2.0 - 3.0 mmol/L    POCT HCO3 Calculated, Arterial 29.4 (H) 22.0 - 26.0 mmol/L    POCT Hemoglobin, Arterial 12.4 12.0 - 16.0 g/dL    POCT Anion Gap, Arterial 8 (L) 10 - 25 mmo/L    Patient Temperature      FiO2 100 %    Critical Called By DAMON RRT     Critical Called To MD SILVER     Critical Call Time 1612     Critical Read Back Y    CBC and Auto Differential   Result Value Ref Range    WBC 10.5 4.4 - 11.3 x10*3/uL    nRBC 0.0 0.0 - 0.0 /100 WBCs    RBC  3.78 (L) 4.00 - 5.20 x10*6/uL    Hemoglobin 11.9 (L) 12.0 - 16.0 g/dL    Hematocrit 38.1 36.0 - 46.0 %     (H) 80 - 100 fL    MCH 31.5 26.0 - 34.0 pg    MCHC 31.2 (L) 32.0 - 36.0 g/dL    RDW 13.3 11.5 - 14.5 %    Platelets 237 150 - 450 x10*3/uL    Neutrophils % 84.2 40.0 - 80.0 %    Immature Granulocytes %, Automated 0.5 0.0 - 0.9 %    Lymphocytes % 10.1 13.0 - 44.0 %    Monocytes % 4.9 2.0 - 10.0 %    Eosinophils % 0.1 0.0 - 6.0 %    Basophils % 0.2 0.0 - 2.0 %    Neutrophils Absolute 8.84 (H) 1.60 - 5.50 x10*3/uL    Immature Granulocytes Absolute, Automated 0.05 0.00 - 0.50 x10*3/uL    Lymphocytes Absolute 1.06 0.80 - 3.00 x10*3/uL    Monocytes Absolute 0.51 0.05 - 0.80 x10*3/uL    Eosinophils Absolute 0.01 0.00 - 0.40 x10*3/uL    Basophils Absolute 0.02 0.00 - 0.10 x10*3/uL   Comprehensive Metabolic Panel   Result Value Ref Range    Glucose 297 (H) 74 - 99 mg/dL    Sodium 133 (L) 136 - 145 mmol/L    Potassium 5.8 (H) 3.5 - 5.3 mmol/L    Chloride 96 (L) 98 - 107 mmol/L    Bicarbonate 28 21 - 32 mmol/L    Anion Gap 15 10 - 20 mmol/L    Urea Nitrogen 21 6 - 23 mg/dL    Creatinine 1.33 (H) 0.50 - 1.05 mg/dL    eGFR 42 (L) >60 mL/min/1.73m*2    Calcium 9.0 8.6 - 10.3 mg/dL    Albumin 3.7 3.4 - 5.0 g/dL    Alkaline Phosphatase 58 33 - 136 U/L    Total Protein 7.0 6.4 - 8.2 g/dL    AST 33 9 - 39 U/L    Bilirubin, Total 0.8 0.0 - 1.2 mg/dL    ALT 20 7 - 45 U/L   Magnesium   Result Value Ref Range    Magnesium 2.08 1.60 - 2.40 mg/dL   B-type natriuretic peptide   Result Value Ref Range    BNP 1,005 (H) 0 - 99 pg/mL   Troponin I, High Sensitivity, Initial   Result Value Ref Range    Troponin I, High Sensitivity 264 (HH) 0 - 13 ng/L   Hemoglobin A1C   Result Value Ref Range    Hemoglobin A1C 6.6 (H) see below %    Estimated Average Glucose 143 Not Established mg/dL   Electrocardiogram, 12-lead PRN ACS symptoms   Result Value Ref Range    Ventricular Rate 160 BPM    Atrial Rate 160 BPM    IA Interval 114 ms    QRS  Duration 106 ms    QT Interval 244 ms    QTC Calculation(Bazett) 398 ms    P Axis -74 degrees    R Axis 92 degrees    T Axis 255 degrees    QRS Count 26 beats    Q Onset 215 ms    P Onset 158 ms    P Offset 207 ms    T Offset 337 ms    QTC Fredericia 338 ms   ECG 12 lead   Result Value Ref Range    Ventricular Rate 158 BPM    Atrial Rate 158 BPM    KS Interval 130 ms    QRS Duration 112 ms    QT Interval 312 ms    QTC Calculation(Bazett) 505 ms    R Axis 80 degrees    T Axis 269 degrees    QRS Count 27 beats    Q Onset 193 ms    P Onset 138 ms    P Offset 174 ms    T Offset 349 ms    QTC Fredericia 431 ms   Troponin, High Sensitivity, 1 Hour   Result Value Ref Range    Troponin I, High Sensitivity 247 (HH) 0 - 13 ng/L   Blood Gas Venous Full Panel Unsolicited   Result Value Ref Range    POCT pH, Venous 7.36 7.33 - 7.43 pH    POCT pCO2, Venous 60 (H) 41 - 51 mm Hg    POCT pO2, Venous 49 (H) 35 - 45 mm Hg    POCT SO2, Venous 71 45 - 75 %    POCT Oxy Hemoglobin, Venous 69.7 45.0 - 75.0 %    POCT Hematocrit Calculated, Venous 35.0 (L) 36.0 - 46.0 %    POCT Sodium, Venous 131 (L) 136 - 145 mmol/L    POCT Potassium, Venous 4.3 3.5 - 5.3 mmol/L    POCT Chloride, Venous 100 98 - 107 mmol/L    POCT Ionized Calicum, Venous 1.17 1.10 - 1.33 mmol/L    POCT Glucose, Venous 262 (H) 74 - 99 mg/dL    POCT Lactate, Venous 2.3 (H) 0.4 - 2.0 mmol/L    POCT Base Excess, Venous 6.8 (H) -2.0 - 3.0 mmol/L    POCT HCO3 Calculated, Venous 33.9 (H) 22.0 - 26.0 mmol/L    POCT Hemoglobin, Venous 11.5 (L) 12.0 - 16.0 g/dL    POCT Anion Gap, Venous 1.0 (L) 10.0 - 25.0 mmol/L    Patient Temperature     Electrocardiogram, 12-lead PRN ACS symptoms   Result Value Ref Range    Ventricular Rate 99 BPM    Atrial Rate 99 BPM    KS Interval 146 ms    QRS Duration 108 ms    QT Interval 338 ms    QTC Calculation(Bazett) 433 ms    P Axis 92 degrees    R Axis 108 degrees    T Axis 192 degrees    QRS Count 16 beats    Q Onset 215 ms    P Onset 142 ms    P  Offset 200 ms    T Offset 384 ms    QTC Fredericia 399 ms   Procalcitonin   Result Value Ref Range    Procalcitonin 0.25 (H) <=0.07 ng/mL   Renal function panel   Result Value Ref Range    Glucose 233 (H) 74 - 99 mg/dL    Sodium 136 136 - 145 mmol/L    Potassium 3.9 3.5 - 5.3 mmol/L    Chloride 97 (L) 98 - 107 mmol/L    Bicarbonate 30 21 - 32 mmol/L    Anion Gap 13 10 - 20 mmol/L    Urea Nitrogen 25 (H) 6 - 23 mg/dL    Creatinine 1.30 (H) 0.50 - 1.05 mg/dL    eGFR 43 (L) >60 mL/min/1.73m*2    Calcium 8.3 (L) 8.6 - 10.3 mg/dL    Phosphorus 4.7 2.5 - 4.9 mg/dL    Albumin 3.2 (L) 3.4 - 5.0 g/dL   Magnesium   Result Value Ref Range    Magnesium 1.60 1.60 - 2.40 mg/dL   Lactate   Result Value Ref Range    Lactate 1.1 0.4 - 2.0 mmol/L   Renal Function Panel   Result Value Ref Range    Glucose 223 (H) 74 - 99 mg/dL    Sodium 136 136 - 145 mmol/L    Potassium 3.9 3.5 - 5.3 mmol/L    Chloride 97 (L) 98 - 107 mmol/L    Bicarbonate 30 21 - 32 mmol/L    Anion Gap 13 10 - 20 mmol/L    Urea Nitrogen 25 (H) 6 - 23 mg/dL    Creatinine 1.30 (H) 0.50 - 1.05 mg/dL    eGFR 43 (L) >60 mL/min/1.73m*2    Calcium 8.4 (L) 8.6 - 10.3 mg/dL    Phosphorus 4.7 2.5 - 4.9 mg/dL    Albumin 3.2 (L) 3.4 - 5.0 g/dL   CBC   Result Value Ref Range    WBC 6.7 4.4 - 11.3 x10*3/uL    nRBC 0.0 0.0 - 0.0 /100 WBCs    RBC 3.10 (L) 4.00 - 5.20 x10*6/uL    Hemoglobin 9.8 (L) 12.0 - 16.0 g/dL    Hematocrit 31.2 (L) 36.0 - 46.0 %     (H) 80 - 100 fL    MCH 31.6 26.0 - 34.0 pg    MCHC 31.4 (L) 32.0 - 36.0 g/dL    RDW 13.0 11.5 - 14.5 %    Platelets 168 150 - 450 x10*3/uL   Magnesium   Result Value Ref Range    Magnesium 1.60 1.60 - 2.40 mg/dL   POCT GLUCOSE   Result Value Ref Range    POCT Glucose 174 (H) 74 - 99 mg/dL   POCT GLUCOSE   Result Value Ref Range    POCT Glucose 182 (H) 74 - 99 mg/dL   Legionella Antigen, Urine    Specimen: Urine   Result Value Ref Range    L. pneumophila Urine Ag Negative Negative   Streptococcus pneumoniae Antigen, Urine     Specimen: Urine   Result Value Ref Range    Streptococcus pneumoniae Ag, Urine Negative Negative   POCT GLUCOSE   Result Value Ref Range    POCT Glucose 252 (H) 74 - 99 mg/dL   POCT GLUCOSE   Result Value Ref Range    POCT Glucose 245 (H) 74 - 99 mg/dL   Electrocardiogram, 12-lead PRN ACS symptoms   Result Value Ref Range    Ventricular Rate 148 BPM    Atrial Rate 148 BPM    RI Interval 126 ms    QRS Duration 110 ms    QT Interval 288 ms    QTC Calculation(Bazett) 452 ms    R Axis 50 degrees    T Axis 250 degrees    QRS Count 24 beats    Q Onset 213 ms    P Onset 160 ms    P Offset 201 ms    T Offset 357 ms    QTC Fredericia 389 ms   Renal Function Panel   Result Value Ref Range    Glucose 198 (H) 74 - 99 mg/dL    Sodium 134 (L) 136 - 145 mmol/L    Potassium 4.1 3.5 - 5.3 mmol/L    Chloride 96 (L) 98 - 107 mmol/L    Bicarbonate 29 21 - 32 mmol/L    Anion Gap 13 10 - 20 mmol/L    Urea Nitrogen 39 (H) 6 - 23 mg/dL    Creatinine 1.29 (H) 0.50 - 1.05 mg/dL    eGFR 43 (L) >60 mL/min/1.73m*2    Calcium 8.6 8.6 - 10.3 mg/dL    Phosphorus 3.5 2.5 - 4.9 mg/dL    Albumin 3.2 (L) 3.4 - 5.0 g/dL   CBC   Result Value Ref Range    WBC 13.0 (H) 4.4 - 11.3 x10*3/uL    nRBC 0.0 0.0 - 0.0 /100 WBCs    RBC 3.06 (L) 4.00 - 5.20 x10*6/uL    Hemoglobin 9.6 (L) 12.0 - 16.0 g/dL    Hematocrit 30.8 (L) 36.0 - 46.0 %     (H) 80 - 100 fL    MCH 31.4 26.0 - 34.0 pg    MCHC 31.2 (L) 32.0 - 36.0 g/dL    RDW 13.4 11.5 - 14.5 %    Platelets 193 150 - 450 x10*3/uL   Magnesium   Result Value Ref Range    Magnesium 2.04 1.60 - 2.40 mg/dL   POCT GLUCOSE   Result Value Ref Range    POCT Glucose 161 (H) 74 - 99 mg/dL   POCT GLUCOSE   Result Value Ref Range    POCT Glucose 186 (H) 74 - 99 mg/dL   POCT GLUCOSE   Result Value Ref Range    POCT Glucose 275 (H) 74 - 99 mg/dL   Renal Function Panel   Result Value Ref Range    Glucose 233 (H) 74 - 99 mg/dL    Sodium 136 136 - 145 mmol/L    Potassium 4.6 3.5 - 5.3 mmol/L    Chloride 98 98 - 107 mmol/L     Bicarbonate 30 21 - 32 mmol/L    Anion Gap 13 10 - 20 mmol/L    Urea Nitrogen 39 (H) 6 - 23 mg/dL    Creatinine 1.14 (H) 0.50 - 1.05 mg/dL    eGFR 50 (L) >60 mL/min/1.73m*2    Calcium 8.8 8.6 - 10.3 mg/dL    Phosphorus 3.2 2.5 - 4.9 mg/dL    Albumin 3.3 (L) 3.4 - 5.0 g/dL   CBC   Result Value Ref Range    WBC 10.0 4.4 - 11.3 x10*3/uL    nRBC 0.0 0.0 - 0.0 /100 WBCs    RBC 3.03 (L) 4.00 - 5.20 x10*6/uL    Hemoglobin 9.4 (L) 12.0 - 16.0 g/dL    Hematocrit 30.7 (L) 36.0 - 46.0 %     (H) 80 - 100 fL    MCH 31.0 26.0 - 34.0 pg    MCHC 30.6 (L) 32.0 - 36.0 g/dL    RDW 13.6 11.5 - 14.5 %    Platelets 202 150 - 450 x10*3/uL   Magnesium   Result Value Ref Range    Magnesium 1.92 1.60 - 2.40 mg/dL   POCT GLUCOSE   Result Value Ref Range    POCT Glucose 159 (H) 74 - 99 mg/dL   POCT GLUCOSE   Result Value Ref Range    POCT Glucose 363 (H) 74 - 99 mg/dL   POCT GLUCOSE   Result Value Ref Range    POCT Glucose 329 (H) 74 - 99 mg/dL   Renal Function Panel   Result Value Ref Range    Glucose 220 (H) 74 - 99 mg/dL    Sodium 135 (L) 136 - 145 mmol/L    Potassium 4.7 3.5 - 5.3 mmol/L    Chloride 99 98 - 107 mmol/L    Bicarbonate 28 21 - 32 mmol/L    Anion Gap 13 10 - 20 mmol/L    Urea Nitrogen 35 (H) 6 - 23 mg/dL    Creatinine 1.08 (H) 0.50 - 1.05 mg/dL    eGFR 54 (L) >60 mL/min/1.73m*2    Calcium 8.5 (L) 8.6 - 10.3 mg/dL    Phosphorus 2.9 2.5 - 4.9 mg/dL    Albumin 3.2 (L) 3.4 - 5.0 g/dL   CBC   Result Value Ref Range    WBC 8.3 4.4 - 11.3 x10*3/uL    nRBC 0.0 0.0 - 0.0 /100 WBCs    RBC 3.10 (L) 4.00 - 5.20 x10*6/uL    Hemoglobin 9.7 (L) 12.0 - 16.0 g/dL    Hematocrit 31.7 (L) 36.0 - 46.0 %     (H) 80 - 100 fL    MCH 31.3 26.0 - 34.0 pg    MCHC 30.6 (L) 32.0 - 36.0 g/dL    RDW 13.4 11.5 - 14.5 %    Platelets 223 150 - 450 x10*3/uL   Magnesium   Result Value Ref Range    Magnesium 2.11 1.60 - 2.40 mg/dL   POCT GLUCOSE   Result Value Ref Range    POCT Glucose 160 (H) 74 - 99 mg/dL   POCT GLUCOSE   Result Value Ref Range     POCT Glucose 215 (H) 74 - 99 mg/dL       Physical Exam:  Subjective:   Examination:   General Examination:   General Appearance: Well developed, well nourished, in no acute distress.   Head: normocephalic, atraumatic   Eyes: Anicteric sclera. Pupils are equally round and reactive to light.  Extraocular movements are intact.    Ears: normal   Oral: Cavity: mucosa moist.   Throat: clear.   Neck/Thyroid: neck supple, full range of motion, no cervical lymphadenopathy.   Skin: warm and dry, no suspicious lesions.    Heart: regular rate and rhythm, S1, S2 normal, no murmurs.  Occasional PACs  Lungs: copd but distant breath sounds with no audible wheeze  Abdomen: soft, non-tender, non-distended, bowl sound present, normal.   Extremities: no clubbing, no cyanosis, no edema.   Neuro: non-focal, motor strength normal upper lower extremities, sensory exam intact.       Assessment/Plan     I48.92 Atrial flutter intermittent 2-1 conduction  J44.9 Advanced severe COPD  I42.9 I50.92 Mixed systolic diastolic heart failure  J18.9 Community-acquired pneumonia       Code Status:  DNR and No Intubation and No ICU    I spent 35 minutes in the professional and overall care of this patient.        Horacio Armijo MD

## 2024-05-28 ENCOUNTER — APPOINTMENT (OUTPATIENT)
Dept: CARDIOLOGY | Facility: HOSPITAL | Age: 74
DRG: 280 | End: 2024-05-28
Payer: OTHER MISCELLANEOUS

## 2024-05-28 LAB
ALBUMIN SERPL BCP-MCNC: 3.7 G/DL (ref 3.4–5)
ANION GAP SERPL CALC-SCNC: 12 MMOL/L (ref 10–20)
BUN SERPL-MCNC: 33 MG/DL (ref 6–23)
CALCIUM SERPL-MCNC: 9.4 MG/DL (ref 8.6–10.3)
CHLORIDE SERPL-SCNC: 98 MMOL/L (ref 98–107)
CO2 SERPL-SCNC: 33 MMOL/L (ref 21–32)
CREAT SERPL-MCNC: 1.17 MG/DL (ref 0.5–1.05)
EGFRCR SERPLBLD CKD-EPI 2021: 49 ML/MIN/1.73M*2
ERYTHROCYTE [DISTWIDTH] IN BLOOD BY AUTOMATED COUNT: 13.6 % (ref 11.5–14.5)
GLUCOSE BLD MANUAL STRIP-MCNC: 149 MG/DL (ref 74–99)
GLUCOSE BLD MANUAL STRIP-MCNC: 183 MG/DL (ref 74–99)
GLUCOSE BLD MANUAL STRIP-MCNC: 184 MG/DL (ref 74–99)
GLUCOSE BLD MANUAL STRIP-MCNC: 226 MG/DL (ref 74–99)
GLUCOSE SERPL-MCNC: 159 MG/DL (ref 74–99)
HCT VFR BLD AUTO: 40.6 % (ref 36–46)
HGB BLD-MCNC: 12.2 G/DL (ref 12–16)
HOLD SPECIMEN: NORMAL
MAGNESIUM SERPL-MCNC: 1.96 MG/DL (ref 1.6–2.4)
MCH RBC QN AUTO: 31 PG (ref 26–34)
MCHC RBC AUTO-ENTMCNC: 30 G/DL (ref 32–36)
MCV RBC AUTO: 103 FL (ref 80–100)
NRBC BLD-RTO: 0 /100 WBCS (ref 0–0)
PHOSPHATE SERPL-MCNC: 3.7 MG/DL (ref 2.5–4.9)
PLATELET # BLD AUTO: 253 X10*3/UL (ref 150–450)
POTASSIUM SERPL-SCNC: 4.8 MMOL/L (ref 3.5–5.3)
RBC # BLD AUTO: 3.94 X10*6/UL (ref 4–5.2)
SODIUM SERPL-SCNC: 138 MMOL/L (ref 136–145)
WBC # BLD AUTO: 13.8 X10*3/UL (ref 4.4–11.3)

## 2024-05-28 PROCEDURE — 99232 SBSQ HOSP IP/OBS MODERATE 35: CPT | Performed by: INTERNAL MEDICINE

## 2024-05-28 PROCEDURE — 93010 ELECTROCARDIOGRAM REPORT: CPT | Performed by: INTERNAL MEDICINE

## 2024-05-28 PROCEDURE — 2500000005 HC RX 250 GENERAL PHARMACY W/O HCPCS

## 2024-05-28 PROCEDURE — 80069 RENAL FUNCTION PANEL: CPT | Performed by: STUDENT IN AN ORGANIZED HEALTH CARE EDUCATION/TRAINING PROGRAM

## 2024-05-28 PROCEDURE — 85027 COMPLETE CBC AUTOMATED: CPT | Performed by: STUDENT IN AN ORGANIZED HEALTH CARE EDUCATION/TRAINING PROGRAM

## 2024-05-28 PROCEDURE — 2500000001 HC RX 250 WO HCPCS SELF ADMINISTERED DRUGS (ALT 637 FOR MEDICARE OP): Performed by: INTERNAL MEDICINE

## 2024-05-28 PROCEDURE — 2500000002 HC RX 250 W HCPCS SELF ADMINISTERED DRUGS (ALT 637 FOR MEDICARE OP, ALT 636 FOR OP/ED): Performed by: INTERNAL MEDICINE

## 2024-05-28 PROCEDURE — 2500000004 HC RX 250 GENERAL PHARMACY W/ HCPCS (ALT 636 FOR OP/ED)

## 2024-05-28 PROCEDURE — 93005 ELECTROCARDIOGRAM TRACING: CPT

## 2024-05-28 PROCEDURE — 2500000004 HC RX 250 GENERAL PHARMACY W/ HCPCS (ALT 636 FOR OP/ED): Performed by: INTERNAL MEDICINE

## 2024-05-28 PROCEDURE — 94640 AIRWAY INHALATION TREATMENT: CPT | Mod: MUE

## 2024-05-28 PROCEDURE — 2500000005 HC RX 250 GENERAL PHARMACY W/O HCPCS: Performed by: INTERNAL MEDICINE

## 2024-05-28 PROCEDURE — 2500000005 HC RX 250 GENERAL PHARMACY W/O HCPCS: Performed by: STUDENT IN AN ORGANIZED HEALTH CARE EDUCATION/TRAINING PROGRAM

## 2024-05-28 PROCEDURE — 83735 ASSAY OF MAGNESIUM: CPT | Performed by: STUDENT IN AN ORGANIZED HEALTH CARE EDUCATION/TRAINING PROGRAM

## 2024-05-28 PROCEDURE — 2500000001 HC RX 250 WO HCPCS SELF ADMINISTERED DRUGS (ALT 637 FOR MEDICARE OP): Performed by: STUDENT IN AN ORGANIZED HEALTH CARE EDUCATION/TRAINING PROGRAM

## 2024-05-28 PROCEDURE — 99291 CRITICAL CARE FIRST HOUR: CPT | Performed by: INTERNAL MEDICINE

## 2024-05-28 PROCEDURE — 2500000004 HC RX 250 GENERAL PHARMACY W/ HCPCS (ALT 636 FOR OP/ED): Performed by: STUDENT IN AN ORGANIZED HEALTH CARE EDUCATION/TRAINING PROGRAM

## 2024-05-28 PROCEDURE — 36415 COLL VENOUS BLD VENIPUNCTURE: CPT | Performed by: STUDENT IN AN ORGANIZED HEALTH CARE EDUCATION/TRAINING PROGRAM

## 2024-05-28 PROCEDURE — 82947 ASSAY GLUCOSE BLOOD QUANT: CPT | Mod: 91

## 2024-05-28 PROCEDURE — 94660 CPAP INITIATION&MGMT: CPT

## 2024-05-28 PROCEDURE — 2060000001 HC INTERMEDIATE ICU ROOM DAILY

## 2024-05-28 RX ORDER — METOPROLOL TARTRATE 1 MG/ML
5 INJECTION, SOLUTION INTRAVENOUS ONCE
Status: COMPLETED | OUTPATIENT
Start: 2024-05-28 | End: 2024-05-28

## 2024-05-28 RX ORDER — HYDROMORPHONE HYDROCHLORIDE 1 MG/ML
2 INJECTION, SOLUTION INTRAMUSCULAR; INTRAVENOUS; SUBCUTANEOUS ONCE
Status: COMPLETED | OUTPATIENT
Start: 2024-05-28 | End: 2024-05-28

## 2024-05-28 RX ORDER — HALOPERIDOL 5 MG/ML
INJECTION INTRAMUSCULAR
Status: COMPLETED
Start: 2024-05-28 | End: 2024-05-28

## 2024-05-28 RX ORDER — DIGOXIN 125 MCG
125 TABLET ORAL DAILY
Status: DISCONTINUED | OUTPATIENT
Start: 2024-05-29 | End: 2024-05-29 | Stop reason: HOSPADM

## 2024-05-28 RX ORDER — HALOPERIDOL 5 MG/ML
2 INJECTION INTRAMUSCULAR ONCE
Status: COMPLETED | OUTPATIENT
Start: 2024-05-28 | End: 2024-05-28

## 2024-05-28 RX ORDER — LORAZEPAM 2 MG/ML
1 INJECTION INTRAMUSCULAR ONCE
Status: COMPLETED | OUTPATIENT
Start: 2024-05-28 | End: 2024-05-27

## 2024-05-28 RX ORDER — METOPROLOL SUCCINATE 25 MG/1
25 TABLET, EXTENDED RELEASE ORAL NIGHTLY
Status: DISCONTINUED | OUTPATIENT
Start: 2024-05-28 | End: 2024-05-29 | Stop reason: HOSPADM

## 2024-05-28 RX ORDER — METOPROLOL TARTRATE 1 MG/ML
INJECTION, SOLUTION INTRAVENOUS
Status: COMPLETED
Start: 2024-05-28 | End: 2024-05-28

## 2024-05-28 RX ORDER — FORMOTEROL FUMARATE DIHYDRATE 20 UG/2ML
20 SOLUTION RESPIRATORY (INHALATION)
Status: CANCELLED | OUTPATIENT
Start: 2024-05-29

## 2024-05-28 RX ADMIN — Medication 70 PERCENT: at 16:56

## 2024-05-28 RX ADMIN — ATORVASTATIN CALCIUM 40 MG: 40 TABLET, FILM COATED ORAL at 20:44

## 2024-05-28 RX ADMIN — DIGOXIN 125 MCG: 0.25 INJECTION INTRAMUSCULAR; INTRAVENOUS at 06:33

## 2024-05-28 RX ADMIN — Medication 3 MG: at 20:44

## 2024-05-28 RX ADMIN — Medication 70 PERCENT: at 20:40

## 2024-05-28 RX ADMIN — APIXABAN 5 MG: 5 TABLET, FILM COATED ORAL at 20:44

## 2024-05-28 RX ADMIN — METOPROLOL TARTRATE 5 MG: 1 INJECTION, SOLUTION INTRAVENOUS at 08:00

## 2024-05-28 RX ADMIN — Medication 60 PERCENT: at 06:29

## 2024-05-28 RX ADMIN — METOPROLOL TARTRATE 5 MG: 5 INJECTION INTRAVENOUS at 08:00

## 2024-05-28 RX ADMIN — HALOPERIDOL LACTATE 2 MG: 5 INJECTION, SOLUTION INTRAMUSCULAR at 08:20

## 2024-05-28 RX ADMIN — CILOSTAZOL 100 MG: 100 TABLET ORAL at 20:44

## 2024-05-28 RX ADMIN — METOPROLOL SUCCINATE 25 MG: 25 TABLET, EXTENDED RELEASE ORAL at 20:44

## 2024-05-28 RX ADMIN — METOPROLOL SUCCINATE 50 MG: 50 TABLET, EXTENDED RELEASE ORAL at 07:17

## 2024-05-28 RX ADMIN — METOPROLOL TARTRATE 5 MG: 5 INJECTION, SOLUTION INTRAVENOUS at 08:00

## 2024-05-28 RX ADMIN — METOPROLOL TARTRATE 5 MG: 5 INJECTION INTRAVENOUS at 06:47

## 2024-05-28 RX ADMIN — IPRATROPIUM BROMIDE AND ALBUTEROL SULFATE 3 ML: 2.5; .5 SOLUTION RESPIRATORY (INHALATION) at 12:24

## 2024-05-28 RX ADMIN — IPRATROPIUM BROMIDE AND ALBUTEROL SULFATE 3 ML: 2.5; .5 SOLUTION RESPIRATORY (INHALATION) at 16:53

## 2024-05-28 RX ADMIN — IPRATROPIUM BROMIDE AND ALBUTEROL SULFATE 3 ML: 2.5; .5 SOLUTION RESPIRATORY (INHALATION) at 21:12

## 2024-05-28 RX ADMIN — HYDROMORPHONE HYDROCHLORIDE 2 MG: 1 INJECTION, SOLUTION INTRAMUSCULAR; INTRAVENOUS; SUBCUTANEOUS at 08:29

## 2024-05-28 RX ADMIN — HALOPERIDOL 2 MG: 5 INJECTION INTRAMUSCULAR at 08:20

## 2024-05-28 RX ADMIN — METOPROLOL TARTRATE 5 MG: 5 INJECTION INTRAVENOUS at 07:11

## 2024-05-28 RX ADMIN — METOPROLOL TARTRATE 5 MG: 5 INJECTION INTRAVENOUS at 08:15

## 2024-05-28 RX ADMIN — PANTOPRAZOLE SODIUM 40 MG: 40 TABLET, DELAYED RELEASE ORAL at 06:00

## 2024-05-28 RX ADMIN — ICOSAPENT ETHYL 2 G: 1 CAPSULE ORAL at 20:44

## 2024-05-28 RX ADMIN — OXYBUTYNIN CHLORIDE 5 MG: 5 TABLET ORAL at 20:44

## 2024-05-28 RX ADMIN — Medication 90 PERCENT: at 07:34

## 2024-05-28 RX ADMIN — METOPROLOL TARTRATE 5 MG: 5 INJECTION, SOLUTION INTRAVENOUS at 07:30

## 2024-05-28 ASSESSMENT — COGNITIVE AND FUNCTIONAL STATUS - GENERAL
CLIMB 3 TO 5 STEPS WITH RAILING: A LOT
MOBILITY SCORE: 12
WALKING IN HOSPITAL ROOM: A LOT
HELP NEEDED FOR BATHING: A LOT
TURNING FROM BACK TO SIDE WHILE IN FLAT BAD: A LOT
EATING MEALS: A LOT
TOILETING: A LOT
DRESSING REGULAR UPPER BODY CLOTHING: A LOT
MOVING FROM LYING ON BACK TO SITTING ON SIDE OF FLAT BED WITH BEDRAILS: A LOT
PERSONAL GROOMING: A LOT
DRESSING REGULAR LOWER BODY CLOTHING: A LOT
MOVING TO AND FROM BED TO CHAIR: A LOT
STANDING UP FROM CHAIR USING ARMS: A LOT
DAILY ACTIVITIY SCORE: 12

## 2024-05-28 ASSESSMENT — PAIN SCALES - GENERAL
PAINLEVEL_OUTOF10: 0 - NO PAIN

## 2024-05-28 ASSESSMENT — PAIN SCALES - PAIN ASSESSMENT IN ADVANCED DEMENTIA (PAINAD)
CONSOLABILITY: DISTRACTED OR REASSURED BY VOICE/TOUCH
FACIALEXPRESSION: FACIAL GRIMACING
TOTALSCORE: 8
BODYLANGUAGE: TENSE, DISTRESSED PACING, FIDGETING
NEGVOCALIZATION: REPEATED TROUBLED CALLING OUT, LOUD MOANING/GROANING, CRYING
BREATHING: NOISY LABORED BREATHING, LONG PERIODS OF HYPERVENTILATION, CHEYNE-STOKES RESPIRATIONS

## 2024-05-28 ASSESSMENT — PAIN - FUNCTIONAL ASSESSMENT
PAIN_FUNCTIONAL_ASSESSMENT: 0-10

## 2024-05-28 NOTE — NURSING NOTE
0620 - patient noted to be in afib rvr with rates in the 160s. Dr Link notified. EKG obtained. Per Dr Link, AM digoxin dose given now. Orders also placed for 5mg IV lopressor - given  0730 - rapid response called due to sustained tachycardia and shortness of breath

## 2024-05-28 NOTE — PROGRESS NOTES
"Mitzi Pro is a 73 y.o. female on day 5 of admission presenting with Acute combined systolic and diastolic heart failure (Multi).    Subjective   Patient had rapid response this AM with SOB. She refused bipap and clearly communicated with staff that she would like to pursue comfort and become DNRCC code status. She is currently on 60 lpm 80% FiO2. Patient given dilaudid and ativan, so she is not alert for conversation this morning, but son, Stepan is at bedside. Family meeting held with patient's son, Dr. Whitaker, RN, pastoral care and palliative medicine SW. Discussed at length patient's limited treatment options and limited expected effect of options. Discussed comfort care as the patient's wishes and what that means. Patient's son is very surprised by the mention of comfort care and is having a difficult time understanding and emotionally coping with this conversation. We discussed patient's capacity for decision making and that she clearly communicated her desires. He is now open to further comfort care discussion, but would like hospice meeting to occur when patient is awake and alert this afternoon to participate as she is very certain about her desires for comfort care and he would prefer for her to make that decision. Referral sent to [facility] at this time. Awaiting meeting time.    Update 1317:  [facility] to schedule hospice meeting for tomorrow afternoon so patient can participate.       Objective     Physical Exam    Last Recorded Vitals  Blood pressure 124/59, pulse 92, temperature 36.2 °C (97.2 °F), temperature source Temporal, resp. rate 11, height 1.651 m (5' 5\"), weight 79.8 kg (175 lb 14.8 oz), SpO2 91%.  Intake/Output last 3 Shifts:  I/O last 3 completed shifts:  In: 100 (1.3 mL/kg) [IV Piggyback:100]  Out: 1000 (12.5 mL/kg) [Urine:1000 (0.3 mL/kg/hr)]  Weight: 79.8 kg     Assessment/Plan   Principal Problem:    Acute combined systolic and diastolic heart failure (Multi)  Active Problems:    " Coronary artery disease involving native heart    Stage 3a chronic kidney disease (Multi)    Acute hypoxic respiratory failure (Multi)    Atrial flutter with rapid ventricular response (Multi)    Lactic acidosis    Hyperkalemia    Acute exacerbation of COPD with asthma (Multi)    Community acquired pneumonia of right lower lobe of lung    NSTEMI (non-ST elevated myocardial infarction) (Multi)    Nonsustained ventricular tachycardia (Multi)    Update 1317:  [facility] to schedule hospice meeting for tomorrow afternoon so patient can participate.    Yana Yi, \[facility]\""W

## 2024-05-28 NOTE — PROGRESS NOTES
Physical Therapy                 Therapy Communication Note    Patient Name: Mitzi Pro  MRN: [id number]  Today's Date: 5/28/2024     Discipline: Physical Therapy    Missed Visit Reason: PT order received, chart reviewed. Attempted PT evaluation at 1020. Pt had RRT called this morning and hospice consult is pending. Will hold PT evaluation and complete as appropriate after goals of care are established.     Missed Time: Attempt

## 2024-05-28 NOTE — SIGNIFICANT EVENT
"Rapid response called for A-fib RVR    Patient in visible signs of distress with vitals 150 bpm 143/70 cm mmhhg and maintaining saturation on high flow nasal cannula 60 L/min 90% FiO2.  Air entry decreased bilaterally with rhonchi/wheeze.  Goals of care discussion took place by attending and she is tired as her heart rate keeps accelerating on and off causing her to breathe fast.      Initially we tried Lopressor 5 mg x 3 with no results at which point Haldol IM 2 mg was tried along with Ativan 1 mg with no benefit.  We tried amiodarone bolus and she still remained in A-fib RVR.      At this point patient wished to change her goals of care to DNR CC.  Her son was updated with regards to her critical condition and he endorsed that he was going to see her soon.      After CODE STATUS was changed to Dilaudid 2 mg IV was provided and the rapid response was called off    Kelsea Walker MD   PGY 3 [facility]     This note was partially created using voice recognition software and is inherently subject to errors including those of syntax and \"sound-alike\" substitutions which may escape proofreading. In such instances, original meaning may be extrapolated by contextual derivation     "

## 2024-05-28 NOTE — CARE PLAN
The clinical goals for the shift include patient's SpO2 will remain 92% or greater throughout shift. The patient met this goal    S: Patient admitted 5/23 for CHF exacerbation   B: History of: afib, COPD, diabetes, HTN, GERD, PVD  A: Patient A&Ox4, denies complaints of chest pain but does endorse shortness of breath on exertion. Remains in afib with controlled rates and on HFNC 30 L/40%. Medicated per orders throughout shift. Patient refusing AM labs, Dr Link notified.   R: Patient to have O2 weaned as appropriate. Call light left within reach

## 2024-05-28 NOTE — PROGRESS NOTES
Spiritual Care Visit    Clinical Encounter Type  Visited With: Patient and family together  Routine Visit: Introduction  Continue Visiting: Yes                                            Taxonomy  Intended Effects: Preserve dignity and respect, Promote sense of peace, Helping someone feel comforted, Journeying with someone in the grief process  Methods: Offer spiritual/Pentecostal support  Interventions: Share words of hope and inspiration, Provide Grief Processing Session    Patient was asleep.   stood next to her son as he grieved and listened to the doctor and nurse share.   was a supportive presence and showed empathy.

## 2024-05-28 NOTE — PROGRESS NOTES
Occupational Therapy                 Therapy Communication Note    Patient Name: Mitzi Pro  MRN: [id number]  Today's Date: 5/28/2024     Discipline: Occupational Therapy    Missed Visit Reason:  Chart review completed. Attempted OT evaluation at 1023.  Patient had RRT called this morning.  Hospice consult pending. Will hold OT evaluation and complete only as appropriate.     Missed Time: Attempt

## 2024-05-28 NOTE — PROGRESS NOTES
Cardiology Progress Note           Rounding Cardiologist:  Dr. Polly Valentin  Primary Cardiologist: Dr. Diego Duncan    Date:  5/28/2024  Patient:  Mitzi Pro  YOB: 1950  MRN:  [id number]   Admit Date:  5/23/2024      SUBJECTIVE    Mitzi Pro was seen and examined today at bedside.     Chart reviewed. Events of this weekend and this morning noted. Spoke with HF navigator and patients son at the bedside. Patients HR of a flutter usually controlled but elevated every morning as Toprol wears off. Responded to IV medications this morning.     Patient finally resting quietly and not disturbed for interview. Patient has made herself DNRCC. Son is meeting with Hospice tomorrow. Patient remains adamant on DNI, DNR, no pressor support or higher levels of support.     Review of Systems   Unable to perform ROS: Other        VITALS     Vitals:    05/28/24 0830 05/28/24 0845 05/28/24 0900 05/28/24 0915   BP:  131/60  124/59   BP Location:       Patient Position:       Pulse: (!) 130 100 96 92   Resp: (!) 40 10 12 11   Temp:       TempSrc:       SpO2: 92% 90% 91% 91%   Weight:       Height:           Intake/Output Summary (Last 24 hours) at 5/28/2024 1322  Last data filed at 5/28/2024 0600  Gross per 24 hour   Intake --   Output 450 ml   Net -450 ml       Vitals:    05/28/24 0600   Weight: 79.8 kg (175 lb 14.8 oz)       CURRENT MEDICATIONS    amiodarone, , ,   apixaban, 5 mg, oral, BID  aspirin, 81 mg, oral, Daily  atorvastatin, 40 mg, oral, Daily  cilostazol, 100 mg, oral, BID  [START ON 5/29/2024] digoxin, 125 mcg, oral, Daily  formoterol, 20 mcg, nebulization, q12h  icosapent ethyL, 2 g, oral, BID  insulin lispro, 0-10 Units, subcutaneous, TID  ipratropium-albuteroL, 3 mL, nebulization, 4x daily  loratadine, 10 mg, oral, Daily  LORazepam, 0.5 mg, intravenous, Once  melatonin, 3 mg, oral, Nightly  metoprolol succinate XL, 25 mg, oral, Nightly  metoprolol succinate XL, 50 mg, oral,  Daily  midodrine, 10 mg, oral, BID  midodrine, 10 mg, oral, Once  oxybutynin, 5 mg, oral, BID  pantoprazole, 40 mg, oral, Daily before breakfast  pioglitazone, 30 mg, oral, Daily  polyethylene glycol, 17 g, oral, BID  sennosides, 2 tablet, oral, BID  SITagliptin phosphate, 50 mg, oral, Daily         Current Outpatient Medications   Medication Instructions    albuterol 90 mcg/actuation inhaler INHALE 2 PUFFS EVERY 4 HOURS AS NEEDED FOR WHEEZING (FILL WITH THESE DIRECTIONS.)    atorvastatin (LIPITOR) 40 mg, oral, Daily    blood sugar diagnostic strip 1 strip, miscellaneous, Daily    cholecalciferol (Vitamin D-3) 5,000 Units tablet 1 tablet, oral, Daily    cilostazol (PLETAL) 100 mg, oral, 2 times daily    Dexcom G7  misc Use as instructed    Dexcom G7 Sensor device As directed, 1 kit    Eliquis 5 mg, oral, 2 times daily    fluticasone (Flonase) 50 mcg/actuation nasal spray 1 spray, Each Nostril, Daily PRN    FreeStyle lancets 28 gauge 1 daily    furosemide (LASIX) 20 mg, oral, Daily PRN    glyBURIDE (DIABETA) 10 mg, oral, 2 times daily    icosapent ethyL (VASCEPA) 2 g, oral, 2 times daily    ipratropium-albuteroL (Duo-Neb) 0.5-2.5 mg/3 mL nebulizer solution 3 mL, nebulization, 4 times daily RT    loratadine (CLARITIN) 10 mg, oral, Daily    metoprolol succinate XL (TOPROL-XL) 100 mg, oral, Daily, Do not crush or chew.    OneTouch Verio Flex meter misc 1 EACH IF NEED TO TEST GLUCOSE ONCE DAILY    oxybutynin XL (DITROPAN-XL) 10 mg, oral, Daily    oxygen (O2) 3 L/min, inhalation, Continuous    pantoprazole (PROTONIX) 40 mg, oral, Daily before breakfast    pioglitazone (ACTOS) 30 mg, oral, Daily    SITagliptin phosphate (JANUVIA) 50 mg, oral, Daily    sodium chloride (Ocean) 0.65 % nasal spray 1 spray, Each Nostril, 4 times daily PRN    umeclidinium-vilanteroL (Anoro Ellipta) 62.5-25 mcg/actuation blister with device 1 puff, inhalation, Daily        PHYSICAL EXAMINATION   GENERAL:  Well developed, well nourished,  "in no acute distress. Wearing 100% FIO2 via oximizer  CHEST:  Symmetric and nontender.  NECK:  no JVD, no bruit.  LUNGS:  Normal respiratory effort on high flow supplemental oxygen. Diminished air entry all lung fields. Coarse breath sounds, no clear rales  HEART: PMI nondisplaced, RR, normal S1 and S2, no appreciable S3.   ABDOMEN: Soft, NT, ND without palpable organomegaly, normoactive bowel sounds.   EXTREMITIES:  Warm with good color, no clubbing or cyanosis.  There is no edema noted.  PERIPHERAL VASCULAR:  pedal pulses diminished   NEURO/PSYCH:  Somnolent - not aroused for testing.   INTEGUMENT: No rashes    LAB DATA     CBC:   Results from last 7 days   Lab Units 05/28/24  0728   WBC AUTO x10*3/uL 13.8*   RBC AUTO x10*6/uL 3.94*   HEMOGLOBIN g/dL 12.2   HEMATOCRIT % 40.6   PLATELETS AUTO x10*3/uL 253        CMP:    Results from last 7 days   Lab Units 05/28/24  0728   SODIUM mmol/L 138   POTASSIUM mmol/L 4.8   CHLORIDE mmol/L 98   CO2 mmol/L 33*   BUN mg/dL 33*   CREATININE mg/dL 1.17*   GLUCOSE mg/dL 159*   CALCIUM mg/dL 9.4       Cardiac Enzymes:    Lab Results   Component Value Date    TROPHS 247 () 05/23/2024    TROPHS 264 () 05/23/2024    TROPHS 119 () 05/02/2024       Magnesium:    Lab Results   Component Value Date    MG 1.96 05/28/2024       Lipid Profile:  No results found for: \"CHLPL\", \"TRIG\", \"HDL\", \"LDLCALC\", \"LDLDIRECT\"    TSH:  No results found for: \"TSH\"    BNP:   Lab Results   Component Value Date    BNP 1,005 (H) 05/23/2024        PT/INR:  No results found for: \"PROTIME\", \"INR\"    HgBA1c:    Lab Results   Component Value Date    HGBA1C 6.6 (H) 05/23/2024       CBC:  Lab Results   Component Value Date    WBC 13.8 (H) 05/28/2024    WBC 8.3 05/27/2024    WBC 10.0 05/26/2024    RBC 3.94 (L) 05/28/2024    RBC 3.10 (L) 05/27/2024    RBC 3.03 (L) 05/26/2024    HGB 12.2 05/28/2024    HGB 9.7 (L) 05/27/2024    HGB 9.4 (L) 05/26/2024    HCT 40.6 05/28/2024    HCT 31.7 (L) 05/27/2024    HCT 30.7 " "(L) 05/26/2024     (H) 05/28/2024     (H) 05/27/2024     (H) 05/26/2024    MCH 31.0 05/28/2024    MCH 31.3 05/27/2024    MCH 31.0 05/26/2024    MCHC 30.0 (L) 05/28/2024    MCHC 30.6 (L) 05/27/2024    MCHC 30.6 (L) 05/26/2024    RDW 13.6 05/28/2024    RDW 13.4 05/27/2024    RDW 13.6 05/26/2024     05/28/2024     05/27/2024     05/26/2024       BMP:  Lab Results   Component Value Date     05/28/2024     (L) 05/27/2024     05/26/2024    K 4.8 05/28/2024    K 4.7 05/27/2024    K 4.6 05/26/2024    CL 98 05/28/2024    CL 99 05/27/2024    CL 98 05/26/2024    CO2 33 (H) 05/28/2024    CO2 28 05/27/2024    CO2 30 05/26/2024    BUN 33 (H) 05/28/2024    BUN 35 (H) 05/27/2024    BUN 39 (H) 05/26/2024    CREATININE 1.17 (H) 05/28/2024    CREATININE 1.08 (H) 05/27/2024    CREATININE 1.14 (H) 05/26/2024       Hepatic Function Panel:  No results found for: \"ALKPHOS\", \"ALT\", \"AST\", \"PROT\", \"BILITOT\", \"BILIDIR\"      DIAGNOSTIC TESTING RESULTS     ECG 12 lead    Result Date: 5/27/2024  possible atrial flutter with 2:1 conduction Septal infarct (cited on or before 23-MAY-2024) Abnormal ECG When compared with ECG of 23-MAY-2024 15:56, (unconfirmed) Poor data quality excludes serial comparison. Confirmed by Polly Valentin ([id number]) on 5/27/2024 10:27:13 PM    ECG 12 lead    Result Date: 5/27/2024   Poor data quality, interpretation may be adversely affected Undetermined rhythm Septal infarct (cited on or before 23-MAY-2024) ST & T wave abnormality, consider inferior ischemia Abnormal ECG When compared with ECG of 03-MAY-2024 14:43, Poor data quality in current ECG precludes serial comparison Confirmed by Polly Valentin ([id number]) on 5/27/2024 10:25:11 PM    XR chest 2 views    Result Date: 5/24/2024  Interpreted By:  Kristian Abarca, STUDY: XR CHEST 2 VIEWS; ;  5/24/2024 9:37 am   INDICATION: Signs/Symptoms:AECHF vs PNA.   COMPARISON: 05/23/2024 chest radiograph   ACCESSION " NUMBER(S): [id number]   ORDERING CLINICIAN: THIERRY MILLER   TECHNIQUE: PA and lateral views of the chest were obtained.   FINDINGS: Bilateral pleural effusions are present larger on the right. Underlying pneumonia in the right lung base cannot be excluded. In mount of pleural fluid has mildly increased on the right from the prior exam.   Interstitial markings of the lungs are mildly increased similar to the prior exam.   Cardiac silhouette remains borderline enlarged.       Bilateral pleural effusions are present, larger on the right. Underlying pneumonia/atelectasis in the right lung base cannot be excluded.   Continued cardiomegaly and pulmonary vascular congestion.     MACRO: None   Signed by: Kristian Abarca 5/24/2024 10:02 AM Dictation workstation:   [id number]    Electrocardiogram, 12-lead PRN ACS symptoms    Result Date: 5/24/2024   Suspect arm lead reversal, interpretation assumes no reversal Sinus rhythm with Premature atrial complexes and Premature ventricular complexes or Fusion complexes Rightward axis Pulmonary disease pattern Septal infarct , age undetermined ST & T wave abnormality, consider inferior ischemia Abnormal ECG When compared with ECG of 23-MAY-2024 17:24, (unconfirmed) Sinus rhythm has replaced Junctional rhythm Vent. rate has decreased BY  61 BPM Incomplete left bundle branch block is no longer Present    Electrocardiogram, 12-lead PRN ACS symptoms    Result Date: 5/24/2024  Unusual P axis and short PA, probable junctional tachycardia Rightward axis Incomplete left bundle branch block ST elevation, consider lateral injury or acute infarct ** ** ACUTE MI ** ** Abnormal ECG When compared with ECG of 23-MAY-2024 16:08, (unconfirmed) Junctional rhythm has replaced Sinus rhythm    XR chest 1 view    Result Date: 5/23/2024  Interpreted By:  Nandini Bui, STUDY: XR CHEST 1 VIEW;  5/23/2024 4:57 pm   INDICATION: Signs/Symptoms:Shortness of breath, history of CHF and COPD.   COMPARISON:  05/02/2024.   ACCESSION NUMBER(S): [id number]   ORDERING CLINICIAN: DAVID SAUCEDA   FINDINGS:         CARDIOMEDIASTINAL SILHOUETTE: Status post sternotomy. Heart is mildly enlarged. There is venous congestion and interstitial edema.   LUNGS: There is atelectasis/infiltrate in the right lower lobe seen also on the radiograph of 05/02/2024. No evidence of pneumothorax.   ABDOMEN: No remarkable upper abdominal findings.   BONES: No acute osseous changes.       1. Cardiomegaly with venous congestion and interstitial edema consistent with congestive heart failure. 2. Infiltrate/atelectasis in the right lung base. Rule out pneumonia.       MACRO: None   Signed by: Nandini Bui 5/23/2024 5:20 PM Dictation workstation:   [id number]         RADIOLOGY     XR chest 2 views   Final Result   Bilateral pleural effusions are present, larger on the right.   Underlying pneumonia/atelectasis in the right lung base cannot be   excluded.        Continued cardiomegaly and pulmonary vascular congestion.             MACRO:   None        Signed by: Kristian Abarca 5/24/2024 10:02 AM   Dictation workstation:   [id number]      XR chest 1 view   Final Result   1. Cardiomegaly with venous congestion and interstitial edema   consistent with congestive heart failure.   2. Infiltrate/atelectasis in the right lung base. Rule out pneumonia.                  MACRO:   None        Signed by: Nandini Bui 5/23/2024 5:20 PM   Dictation workstation:   [id number]            ASSESSMENT     Problem List Items Addressed This Visit    None  Visit Diagnoses       Acute on chronic heart failure, unspecified heart failure type (Multi)    -  Primary    Relevant Medications    midodrine (Proamatine) tablet 10 mg    midodrine (Proamatine) tablet 10 mg (Completed)    cilostazol (Pletal) tablet 100 mg    metoprolol succinate XL (Toprol-XL) 24 hr tablet 50 mg    midodrine (Proamatine) tablet 10 mg    metoprolol tartrate (Lopressor) injection 5 mg (Completed)     digoxin (Lanoxin) injection 250 mcg (Completed)    digoxin (Lanoxin) injection 250 mcg (Completed)    digoxin (Lanoxin) injection 125 mcg (Completed)    digoxin (Lanoxin) injection  - Omnicell Override Pull (Completed)    metoprolol tartrate (Lopressor) injection 5 mg (Completed)    metoprolol tartrate (Lopressor) injection 5 mg (Completed)    metoprolol tartrate (Lopressor) injection 5 mg (Completed)    metoprolol tartrate (Lopressor) injection 5 mg (Completed)    metoprolol tartrate (Lopressor) injection 5 mg (Completed)    metoprolol succinate XL (Toprol-XL) 24 hr tablet 25 mg (Start on 5/28/2024  9:00 PM)    digoxin (Lanoxin) tablet 125 mcg (Start on 5/29/2024  9:00 AM)    Other Relevant Orders    Electrical Cardioversion (Completed)    Critical Care (Completed)    Acute on chronic respiratory failure with hypoxia and hypercapnia (Multi)        Relevant Orders    Critical Care (Completed)    Pneumonia of right lower lobe due to infectious organism        Relevant Orders    Critical Care (Completed)    Atrial flutter, unspecified type (Multi)        Relevant Medications    midodrine (Proamatine) tablet 10 mg    midodrine (Proamatine) tablet 10 mg (Completed)    cilostazol (Pletal) tablet 100 mg    metoprolol succinate XL (Toprol-XL) 24 hr tablet 50 mg    midodrine (Proamatine) tablet 10 mg    metoprolol tartrate (Lopressor) injection 5 mg (Completed)    digoxin (Lanoxin) injection 250 mcg (Completed)    digoxin (Lanoxin) injection 250 mcg (Completed)    digoxin (Lanoxin) injection 125 mcg (Completed)    digoxin (Lanoxin) injection  - Omnicell Override Pull (Completed)    metoprolol tartrate (Lopressor) injection 5 mg (Completed)    metoprolol tartrate (Lopressor) injection 5 mg (Completed)    metoprolol tartrate (Lopressor) injection 5 mg (Completed)    metoprolol tartrate (Lopressor) injection 5 mg (Completed)    metoprolol tartrate (Lopressor) injection 5 mg (Completed)    metoprolol succinate XL (Toprol-XL) 24  hr tablet 25 mg (Start on 5/28/2024  9:00 PM)    digoxin (Lanoxin) tablet 125 mcg (Start on 5/29/2024  9:00 AM)    Other Relevant Orders    Electrical Cardioversion (Completed)    Critical Care (Completed)            Patient Active Problem List   Diagnosis    Abnormal stress ECG with treadmill    Atopic dermatitis    Atrial fibrillation (Multi)    Bilateral carotid artery stenosis    Coronary artery disease involving native heart    Carotid artery disease (CMS-[facility])    COPD (chronic obstructive pulmonary disease) (Multi)    Diabetic polyneuropathy associated with type 2 diabetes mellitus (Multi)    Edema    Essential hypertension    Extremity atherosclerosis with intermittent claudication (CMS-[facility])    Female incontinence    GERD (gastroesophageal reflux disease)    Glaucoma    Hematuria    Mixed hyperlipidemia    RBBB    Overweight    Peripheral vascular disease (CMS-HCC)    Pulmonary nodules    Right leg swelling    Stage 3a chronic kidney disease (Multi)    Diabetes mellitus with peripheral vascular disease (Multi)    Type 2 diabetes mellitus with hyperglycemia, without long-term current use of insulin (Multi)    Vaginal itching    Vitamin D deficiency    Allergic rhinitis    Current every day smoker    Diabetes mellitus with kidney complication (Multi)    Hypertension    LBBB (left bundle branch block)    Hyperlipidemia    Pruritus    Seborrheic keratoses, inflamed    Xerosis of skin    Chronic combined systolic (congestive) and diastolic (congestive) heart failure (Multi)    Acute and chronic respiratory failure with hypercapnia (Multi)    Hemorrhagic disorder due to circulating anticoagulants (Multi)    Atrial fibrillation with RVR (Multi)    Cystitis    Idiopathic hypotension    Cortical age-related cataract of both eyes    Astigmatism of both eyes    Mild nonproliferative diabetic retinopathy of both eyes without macular edema associated with type 2 diabetes mellitus (Multi)    Renal lesion    Respiratory  failure with hypercapnia (Multi)    SOB (shortness of breath)    Pancreatic lesion (HHS-HCC)    Acute hypoxic respiratory failure (Multi)    Atrial flutter with rapid ventricular response (Multi)    Lactic acidosis    Hyperkalemia    Acute combined systolic and diastolic heart failure (Multi)    Acute exacerbation of COPD with asthma (Multi)    Community acquired pneumonia of right lower lobe of lung    NSTEMI (non-ST elevated myocardial infarction) (Multi)    Nonsustained ventricular tachycardia (Multi)       PLAN     Acute on chronic CHF - limited options given patients clinical condition. Continue supportive and palliative care.   A flutter - CHF decompensates further during periods of tachycardia. Will write for evening dose of metoprolol. To avoid toxicity will change IV digoxin to PO.   COPD - continue Rx.  CAD - no complaints of angina to staff.      Will continue to follow while arrangements are possibly made with hospice and provide supportive care.     Please do not hesitate to call with questions.  Electronically signed by Polly Valentin MD, on 5/28/2024 at 1:22 PM

## 2024-05-28 NOTE — NURSING NOTE
Called to assess patient for SOB. Upon assessment patient is displaying dyspnea. Patient is refusing bipap. Increased HFNC to 60 lpm 90% Fi02. MD at bedside.

## 2024-05-29 ENCOUNTER — HOSPITAL ENCOUNTER (INPATIENT)
Facility: HOSPITAL | Age: 74
LOS: 1 days | Discharge: HOSPICE | DRG: 193 | End: 2024-05-30
Attending: INTERNAL MEDICINE | Admitting: INTERNAL MEDICINE
Payer: OTHER MISCELLANEOUS

## 2024-05-29 ENCOUNTER — APPOINTMENT (OUTPATIENT)
Dept: CARDIOLOGY | Facility: HOSPITAL | Age: 74
DRG: 280 | End: 2024-05-29
Payer: OTHER MISCELLANEOUS

## 2024-05-29 VITALS
HEART RATE: 104 BPM | WEIGHT: 175.27 LBS | BODY MASS INDEX: 29.2 KG/M2 | HEIGHT: 65 IN | TEMPERATURE: 97.2 F | DIASTOLIC BLOOD PRESSURE: 53 MMHG | SYSTOLIC BLOOD PRESSURE: 106 MMHG | RESPIRATION RATE: 22 BRPM | OXYGEN SATURATION: 98 %

## 2024-05-29 DIAGNOSIS — I47.29 NONSUSTAINED VENTRICULAR TACHYCARDIA (MULTI): Primary | ICD-10-CM

## 2024-05-29 LAB
ALBUMIN SERPL BCP-MCNC: 2.9 G/DL (ref 3.4–5)
ANION GAP SERPL CALC-SCNC: 12 MMOL/L (ref 10–20)
BUN SERPL-MCNC: 39 MG/DL (ref 6–23)
CALCIUM SERPL-MCNC: 8.4 MG/DL (ref 8.6–10.3)
CHLORIDE SERPL-SCNC: 100 MMOL/L (ref 98–107)
CO2 SERPL-SCNC: 30 MMOL/L (ref 21–32)
CREAT SERPL-MCNC: 1.07 MG/DL (ref 0.5–1.05)
EGFRCR SERPLBLD CKD-EPI 2021: 54 ML/MIN/1.73M*2
ERYTHROCYTE [DISTWIDTH] IN BLOOD BY AUTOMATED COUNT: 13.4 % (ref 11.5–14.5)
GLUCOSE BLD MANUAL STRIP-MCNC: 155 MG/DL (ref 74–99)
GLUCOSE BLD MANUAL STRIP-MCNC: 235 MG/DL (ref 74–99)
GLUCOSE BLD MANUAL STRIP-MCNC: 293 MG/DL (ref 74–99)
GLUCOSE SERPL-MCNC: 123 MG/DL (ref 74–99)
HCT VFR BLD AUTO: 32 % (ref 36–46)
HGB BLD-MCNC: 9.5 G/DL (ref 12–16)
MAGNESIUM SERPL-MCNC: 1.98 MG/DL (ref 1.6–2.4)
MCH RBC QN AUTO: 31.4 PG (ref 26–34)
MCHC RBC AUTO-ENTMCNC: 29.7 G/DL (ref 32–36)
MCV RBC AUTO: 106 FL (ref 80–100)
NRBC BLD-RTO: 0.3 /100 WBCS (ref 0–0)
PHOSPHATE SERPL-MCNC: 4.1 MG/DL (ref 2.5–4.9)
PLATELET # BLD AUTO: 209 X10*3/UL (ref 150–450)
POTASSIUM SERPL-SCNC: 4.7 MMOL/L (ref 3.5–5.3)
RBC # BLD AUTO: 3.03 X10*6/UL (ref 4–5.2)
SODIUM SERPL-SCNC: 137 MMOL/L (ref 136–145)
WBC # BLD AUTO: 7.8 X10*3/UL (ref 4.4–11.3)

## 2024-05-29 PROCEDURE — 2500000002 HC RX 250 W HCPCS SELF ADMINISTERED DRUGS (ALT 637 FOR MEDICARE OP, ALT 636 FOR OP/ED): Performed by: INTERNAL MEDICINE

## 2024-05-29 PROCEDURE — 82947 ASSAY GLUCOSE BLOOD QUANT: CPT

## 2024-05-29 PROCEDURE — 2500000004 HC RX 250 GENERAL PHARMACY W/ HCPCS (ALT 636 FOR OP/ED): Performed by: INTERNAL MEDICINE

## 2024-05-29 PROCEDURE — 93010 ELECTROCARDIOGRAM REPORT: CPT | Performed by: INTERNAL MEDICINE

## 2024-05-29 PROCEDURE — 1150000001 HC HOSPICE PRIVATE ROOM DAILY

## 2024-05-29 PROCEDURE — 2500000001 HC RX 250 WO HCPCS SELF ADMINISTERED DRUGS (ALT 637 FOR MEDICARE OP): Performed by: INTERNAL MEDICINE

## 2024-05-29 PROCEDURE — 5A0935A ASSISTANCE WITH RESPIRATORY VENTILATION, LESS THAN 24 CONSECUTIVE HOURS, HIGH NASAL FLOW/VELOCITY: ICD-10-PCS | Performed by: INTERNAL MEDICINE

## 2024-05-29 PROCEDURE — 83735 ASSAY OF MAGNESIUM: CPT | Performed by: STUDENT IN AN ORGANIZED HEALTH CARE EDUCATION/TRAINING PROGRAM

## 2024-05-29 PROCEDURE — 85027 COMPLETE CBC AUTOMATED: CPT | Performed by: STUDENT IN AN ORGANIZED HEALTH CARE EDUCATION/TRAINING PROGRAM

## 2024-05-29 PROCEDURE — 82947 ASSAY GLUCOSE BLOOD QUANT: CPT | Mod: 91

## 2024-05-29 PROCEDURE — 2500000002 HC RX 250 W HCPCS SELF ADMINISTERED DRUGS (ALT 637 FOR MEDICARE OP, ALT 636 FOR OP/ED): Performed by: STUDENT IN AN ORGANIZED HEALTH CARE EDUCATION/TRAINING PROGRAM

## 2024-05-29 PROCEDURE — 2500000001 HC RX 250 WO HCPCS SELF ADMINISTERED DRUGS (ALT 637 FOR MEDICARE OP): Performed by: STUDENT IN AN ORGANIZED HEALTH CARE EDUCATION/TRAINING PROGRAM

## 2024-05-29 PROCEDURE — 80069 RENAL FUNCTION PANEL: CPT | Performed by: STUDENT IN AN ORGANIZED HEALTH CARE EDUCATION/TRAINING PROGRAM

## 2024-05-29 PROCEDURE — 99222 1ST HOSP IP/OBS MODERATE 55: CPT | Performed by: INTERNAL MEDICINE

## 2024-05-29 PROCEDURE — 94660 CPAP INITIATION&MGMT: CPT

## 2024-05-29 PROCEDURE — 2500000004 HC RX 250 GENERAL PHARMACY W/ HCPCS (ALT 636 FOR OP/ED): Performed by: STUDENT IN AN ORGANIZED HEALTH CARE EDUCATION/TRAINING PROGRAM

## 2024-05-29 PROCEDURE — 2500000005 HC RX 250 GENERAL PHARMACY W/O HCPCS: Performed by: INTERNAL MEDICINE

## 2024-05-29 PROCEDURE — 93005 ELECTROCARDIOGRAM TRACING: CPT

## 2024-05-29 PROCEDURE — 36415 COLL VENOUS BLD VENIPUNCTURE: CPT | Performed by: STUDENT IN AN ORGANIZED HEALTH CARE EDUCATION/TRAINING PROGRAM

## 2024-05-29 PROCEDURE — 99239 HOSP IP/OBS DSCHRG MGMT >30: CPT | Performed by: INTERNAL MEDICINE

## 2024-05-29 PROCEDURE — 94640 AIRWAY INHALATION TREATMENT: CPT | Mod: MUE

## 2024-05-29 RX ORDER — PANTOPRAZOLE SODIUM 40 MG/1
40 TABLET, DELAYED RELEASE ORAL
Status: DISCONTINUED | OUTPATIENT
Start: 2024-05-30 | End: 2024-05-30 | Stop reason: HOSPADM

## 2024-05-29 RX ORDER — DIGOXIN 125 MCG
125 TABLET ORAL DAILY
Start: 2024-05-30

## 2024-05-29 RX ORDER — METOPROLOL SUCCINATE 50 MG/1
50 TABLET, EXTENDED RELEASE ORAL DAILY
Start: 2024-05-30

## 2024-05-29 RX ORDER — POLYETHYLENE GLYCOL 3350 17 G/17G
17 POWDER, FOR SOLUTION ORAL 2 TIMES DAILY
Status: DISCONTINUED | OUTPATIENT
Start: 2024-05-29 | End: 2024-05-30 | Stop reason: HOSPADM

## 2024-05-29 RX ORDER — CILOSTAZOL 100 MG/1
100 TABLET ORAL 2 TIMES DAILY
Status: DISCONTINUED | OUTPATIENT
Start: 2024-05-29 | End: 2024-05-30 | Stop reason: HOSPADM

## 2024-05-29 RX ORDER — LORATADINE 10 MG/1
10 TABLET ORAL DAILY
Status: DISCONTINUED | OUTPATIENT
Start: 2024-05-30 | End: 2024-05-30 | Stop reason: HOSPADM

## 2024-05-29 RX ORDER — METOPROLOL SUCCINATE 25 MG/1
25 TABLET, EXTENDED RELEASE ORAL NIGHTLY
Start: 2024-05-29

## 2024-05-29 RX ORDER — MIDODRINE HYDROCHLORIDE 10 MG/1
10 TABLET ORAL
Status: DISCONTINUED | OUTPATIENT
Start: 2024-05-29 | End: 2024-05-30 | Stop reason: HOSPADM

## 2024-05-29 RX ORDER — GLYCOPYRROLATE 0.2 MG/ML
0.2 INJECTION INTRAMUSCULAR; INTRAVENOUS EVERY 4 HOURS PRN
Status: DISCONTINUED | OUTPATIENT
Start: 2024-05-29 | End: 2024-05-30 | Stop reason: HOSPADM

## 2024-05-29 RX ORDER — HALOPERIDOL 5 MG/ML
1 INJECTION INTRAMUSCULAR EVERY 4 HOURS PRN
Status: DISCONTINUED | OUTPATIENT
Start: 2024-05-29 | End: 2024-05-30 | Stop reason: HOSPADM

## 2024-05-29 RX ORDER — LORAZEPAM 2 MG/ML
0.5 INJECTION INTRAMUSCULAR EVERY 4 HOURS PRN
Status: DISCONTINUED | OUTPATIENT
Start: 2024-05-29 | End: 2024-05-30 | Stop reason: HOSPADM

## 2024-05-29 RX ORDER — SENNOSIDES 8.6 MG/1
2 TABLET ORAL 2 TIMES DAILY
Status: DISCONTINUED | OUTPATIENT
Start: 2024-05-29 | End: 2024-05-30 | Stop reason: HOSPADM

## 2024-05-29 RX ORDER — OXYBUTYNIN CHLORIDE 5 MG/1
5 TABLET ORAL 2 TIMES DAILY
Status: DISCONTINUED | OUTPATIENT
Start: 2024-05-29 | End: 2024-05-30 | Stop reason: HOSPADM

## 2024-05-29 RX ORDER — KETOROLAC TROMETHAMINE 15 MG/ML
15 INJECTION, SOLUTION INTRAMUSCULAR; INTRAVENOUS EVERY 6 HOURS PRN
Status: DISCONTINUED | OUTPATIENT
Start: 2024-05-29 | End: 2024-05-30 | Stop reason: HOSPADM

## 2024-05-29 RX ORDER — LORAZEPAM 2 MG/ML
2 INJECTION INTRAMUSCULAR EVERY 10 MIN PRN
Status: DISCONTINUED | OUTPATIENT
Start: 2024-05-29 | End: 2024-05-30 | Stop reason: HOSPADM

## 2024-05-29 RX ORDER — MIDODRINE HYDROCHLORIDE 10 MG/1
10 TABLET ORAL
Start: 2024-05-29

## 2024-05-29 RX ORDER — TALC
3 POWDER (GRAM) TOPICAL NIGHTLY
Status: DISCONTINUED | OUTPATIENT
Start: 2024-05-29 | End: 2024-05-30 | Stop reason: HOSPADM

## 2024-05-29 RX ORDER — DIGOXIN 125 MCG
125 TABLET ORAL DAILY
Status: DISCONTINUED | OUTPATIENT
Start: 2024-05-30 | End: 2024-05-30 | Stop reason: HOSPADM

## 2024-05-29 RX ORDER — METOPROLOL SUCCINATE 25 MG/1
25 TABLET, EXTENDED RELEASE ORAL NIGHTLY
Status: DISCONTINUED | OUTPATIENT
Start: 2024-05-29 | End: 2024-05-30 | Stop reason: HOSPADM

## 2024-05-29 RX ORDER — TALC
3 POWDER (GRAM) TOPICAL NIGHTLY
Start: 2024-05-29

## 2024-05-29 RX ORDER — ALBUTEROL SULFATE 0.83 MG/ML
2.5 SOLUTION RESPIRATORY (INHALATION) EVERY 4 HOURS PRN
Status: DISCONTINUED | OUTPATIENT
Start: 2024-05-29 | End: 2024-05-30 | Stop reason: HOSPADM

## 2024-05-29 RX ORDER — LORAZEPAM 2 MG/ML
0.5 INJECTION INTRAMUSCULAR ONCE
Status: COMPLETED | OUTPATIENT
Start: 2024-05-29 | End: 2024-05-29

## 2024-05-29 RX ORDER — METOPROLOL SUCCINATE 50 MG/1
50 TABLET, EXTENDED RELEASE ORAL DAILY
Status: DISCONTINUED | OUTPATIENT
Start: 2024-05-30 | End: 2024-05-30 | Stop reason: HOSPADM

## 2024-05-29 RX ORDER — MORPHINE SULFATE 4 MG/ML
4 INJECTION, SOLUTION INTRAMUSCULAR; INTRAVENOUS
Status: DISCONTINUED | OUTPATIENT
Start: 2024-05-29 | End: 2024-05-30 | Stop reason: HOSPADM

## 2024-05-29 RX ORDER — MORPHINE SULFATE 2 MG/ML
2 INJECTION, SOLUTION INTRAMUSCULAR; INTRAVENOUS
Status: DISCONTINUED | OUTPATIENT
Start: 2024-05-29 | End: 2024-05-30 | Stop reason: HOSPADM

## 2024-05-29 RX ADMIN — METOPROLOL SUCCINATE 50 MG: 50 TABLET, EXTENDED RELEASE ORAL at 08:00

## 2024-05-29 RX ADMIN — POLYETHYLENE GLYCOL 3350 17 G: 17 POWDER, FOR SOLUTION ORAL at 08:00

## 2024-05-29 RX ADMIN — MORPHINE SULFATE 2 MG: 2 INJECTION, SOLUTION INTRAMUSCULAR; INTRAVENOUS at 17:04

## 2024-05-29 RX ADMIN — LORAZEPAM 0.5 MG: 2 INJECTION INTRAMUSCULAR; INTRAVENOUS at 17:04

## 2024-05-29 RX ADMIN — Medication 70 PERCENT: at 04:04

## 2024-05-29 RX ADMIN — Medication 50 L/MIN: at 16:00

## 2024-05-29 RX ADMIN — SITAGLIPTIN 50 MG: 50 TABLET, FILM COATED ORAL at 08:00

## 2024-05-29 RX ADMIN — LORATADINE 10 MG: 10 TABLET ORAL at 08:05

## 2024-05-29 RX ADMIN — SENNOSIDES 17.2 MG: 8.6 TABLET, FILM COATED ORAL at 08:00

## 2024-05-29 RX ADMIN — MIDODRINE HYDROCHLORIDE 10 MG: 10 TABLET ORAL at 16:48

## 2024-05-29 RX ADMIN — OXYBUTYNIN CHLORIDE 5 MG: 5 TABLET ORAL at 08:00

## 2024-05-29 RX ADMIN — MIDODRINE HYDROCHLORIDE 10 MG: 10 TABLET ORAL at 08:00

## 2024-05-29 RX ADMIN — IPRATROPIUM BROMIDE AND ALBUTEROL SULFATE 3 ML: 2.5; .5 SOLUTION RESPIRATORY (INHALATION) at 08:43

## 2024-05-29 RX ADMIN — CILOSTAZOL 100 MG: 100 TABLET ORAL at 08:00

## 2024-05-29 RX ADMIN — OXYBUTYNIN CHLORIDE 5 MG: 5 TABLET ORAL at 21:04

## 2024-05-29 RX ADMIN — POLYETHYLENE GLYCOL 3350 17 G: 17 POWDER, FOR SOLUTION ORAL at 21:04

## 2024-05-29 RX ADMIN — Medication 92 PERCENT: at 06:45

## 2024-05-29 RX ADMIN — APIXABAN 5 MG: 5 TABLET, FILM COATED ORAL at 21:03

## 2024-05-29 RX ADMIN — LORAZEPAM 0.5 MG: 2 INJECTION INTRAMUSCULAR; INTRAVENOUS at 07:03

## 2024-05-29 RX ADMIN — ASPIRIN 81 MG: 81 TABLET, COATED ORAL at 08:00

## 2024-05-29 RX ADMIN — Medication 3 MG: at 21:10

## 2024-05-29 RX ADMIN — DIGOXIN 125 MCG: 125 TABLET ORAL at 08:00

## 2024-05-29 RX ADMIN — INSULIN LISPRO 2 UNITS: 100 INJECTION, SOLUTION INTRAVENOUS; SUBCUTANEOUS at 08:05

## 2024-05-29 RX ADMIN — FORMOTEROL FUMARATE 20 MCG: 20 SOLUTION RESPIRATORY (INHALATION) at 08:42

## 2024-05-29 RX ADMIN — IPRATROPIUM BROMIDE AND ALBUTEROL SULFATE 3 ML: 2.5; .5 SOLUTION RESPIRATORY (INHALATION) at 12:42

## 2024-05-29 RX ADMIN — SENNOSIDES 17.2 MG: 8.6 TABLET, FILM COATED ORAL at 21:03

## 2024-05-29 RX ADMIN — METOPROLOL SUCCINATE 25 MG: 25 TABLET, EXTENDED RELEASE ORAL at 21:04

## 2024-05-29 RX ADMIN — ICOSAPENT ETHYL 2 G: 1 CAPSULE ORAL at 08:00

## 2024-05-29 RX ADMIN — INSULIN LISPRO 6 UNITS: 100 INJECTION, SOLUTION INTRAVENOUS; SUBCUTANEOUS at 12:50

## 2024-05-29 RX ADMIN — APIXABAN 5 MG: 5 TABLET, FILM COATED ORAL at 08:00

## 2024-05-29 RX ADMIN — PIOGLITAZONE 30 MG: 15 TABLET ORAL at 08:01

## 2024-05-29 RX ADMIN — PANTOPRAZOLE SODIUM 40 MG: 40 TABLET, DELAYED RELEASE ORAL at 06:48

## 2024-05-29 RX ADMIN — Medication 40 L/MIN: at 20:00

## 2024-05-29 ASSESSMENT — COGNITIVE AND FUNCTIONAL STATUS - GENERAL
MOBILITY SCORE: 18
PERSONAL GROOMING: A LITTLE
DRESSING REGULAR LOWER BODY CLOTHING: A LITTLE
TOILETING: A LITTLE
CLIMB 3 TO 5 STEPS WITH RAILING: A LITTLE
DAILY ACTIVITIY SCORE: 18
MOVING TO AND FROM BED TO CHAIR: A LITTLE
DAILY ACTIVITIY SCORE: 18
EATING MEALS: A LITTLE
CLIMB 3 TO 5 STEPS WITH RAILING: A LITTLE
STANDING UP FROM CHAIR USING ARMS: A LITTLE
PERSONAL GROOMING: A LITTLE
TOILETING: A LITTLE
EATING MEALS: A LITTLE
DRESSING REGULAR LOWER BODY CLOTHING: A LITTLE
WALKING IN HOSPITAL ROOM: A LITTLE
STANDING UP FROM CHAIR USING ARMS: A LITTLE
MOVING FROM LYING ON BACK TO SITTING ON SIDE OF FLAT BED WITH BEDRAILS: A LITTLE
MOVING FROM LYING ON BACK TO SITTING ON SIDE OF FLAT BED WITH BEDRAILS: A LITTLE
HELP NEEDED FOR BATHING: A LITTLE
DRESSING REGULAR UPPER BODY CLOTHING: A LITTLE
HELP NEEDED FOR BATHING: A LITTLE
MOVING TO AND FROM BED TO CHAIR: A LITTLE
DRESSING REGULAR UPPER BODY CLOTHING: A LITTLE
WALKING IN HOSPITAL ROOM: A LITTLE
TURNING FROM BACK TO SIDE WHILE IN FLAT BAD: A LITTLE
MOBILITY SCORE: 18
TURNING FROM BACK TO SIDE WHILE IN FLAT BAD: A LITTLE

## 2024-05-29 ASSESSMENT — PAIN SCALES - WONG BAKER: WONGBAKER_NUMERICALRESPONSE: NO HURT

## 2024-05-29 ASSESSMENT — PAIN SCALES - GENERAL
PAINLEVEL_OUTOF10: 0 - NO PAIN
PAINLEVEL_OUTOF10: 3
PAINLEVEL_OUTOF10: 0 - NO PAIN

## 2024-05-29 ASSESSMENT — PAIN - FUNCTIONAL ASSESSMENT
PAIN_FUNCTIONAL_ASSESSMENT: 0-10

## 2024-05-29 NOTE — DISCHARGE SUMMARY
Discharge Diagnosis  Acute combined systolic and diastolic heart failure (Multi)    Issues Requiring Follow-Up  You will have your remaining care needs with [facility]    Discharge Meds     Your medication list        START taking these medications        Instructions Last Dose Given Next Dose Due   digoxin 125 MCG tablet  Commonly known as: Lanoxin  Start taking on: May 30, 2024      Take 1 tablet (125 mcg) by mouth once daily.       melatonin 3 mg tablet      Take 1 tablet (3 mg) by mouth once daily at bedtime.       midodrine 10 mg tablet  Commonly known as: Proamatine      Take 1 tablet (10 mg) by mouth 2 times daily (morning and late afternoon).              CHANGE how you take these medications        Instructions Last Dose Given Next Dose Due   metoprolol succinate XL 25 mg 24 hr tablet  Commonly known as: Toprol-XL  What changed: You were already taking a medication with the same name, and this prescription was added. Make sure you understand how and when to take each.      Take 1 tablet (25 mg) by mouth once daily at bedtime. Do not crush or chew.       metoprolol succinate XL 50 mg 24 hr tablet  Commonly known as: Toprol-XL  Start taking on: May 30, 2024  What changed:   medication strength  how much to take      Take 1 tablet (50 mg) by mouth once daily. Do not crush or chew.              CONTINUE taking these medications        Instructions Last Dose Given Next Dose Due   albuterol 90 mcg/actuation inhaler      INHALE 2 PUFFS EVERY 4 HOURS AS NEEDED FOR WHEEZING (FILL WITH THESE DIRECTIONS.)       Anoro Ellipta 62.5-25 mcg/actuation blister with device  Generic drug: umeclidinium-vilanteroL      Inhale 1 puff once daily.       cilostazol 100 mg tablet  Commonly known as: Pletal      Take 1 tablet (100 mg) by mouth 2 times a day.       Dexcom G7  misc  Generic drug: blood-glucose meter,continuous      Use as instructed       Dexcom G7 Sensor device  Generic drug: blood-glucose  sensor      As directed, 1 kit       Eliquis 5 mg tablet  Generic drug: apixaban      Take 1 tablet (5 mg) by mouth 2 times a day.       fluticasone 50 mcg/actuation nasal spray  Commonly known as: Flonase           FreeStyle lancets 28 gauge      1 daily       ipratropium-albuteroL 0.5-2.5 mg/3 mL nebulizer solution  Commonly known as: Duo-Neb      Take 3 mL by nebulization 4 times a day.       loratadine 10 mg tablet  Commonly known as: Claritin      Take 1 tablet (10 mg) by mouth once daily.       OneTouch Verio Flex meter misc  Generic drug: blood-glucose meter      1 EACH IF NEED TO TEST GLUCOSE ONCE DAILY       oxybutynin XL 10 mg 24 hr tablet  Commonly known as: Ditropan-XL      Take 1 tablet (10 mg) by mouth once daily.       oxygen gas therapy  Commonly known as: O2           pantoprazole 40 mg EC tablet  Commonly known as: ProtoNix           sodium chloride 0.65 % nasal spray  Commonly known as: Ben Bolt      Administer 1 spray into each nostril 4 times a day as needed for congestion.              STOP taking these medications      atorvastatin 40 mg tablet  Commonly known as: Lipitor        blood sugar diagnostic strip        cephalexin 500 mg capsule  Commonly known as: Keflex        cholecalciferol 5,000 Units tablet  Commonly known as: Vitamin D-3        furosemide 20 mg tablet  Commonly known as: Lasix        glyBURIDE 5 mg tablet  Commonly known as: Diabeta        icosapent ethyL 1 gram capsule  Commonly known as: Vascepa        pioglitazone 30 mg tablet  Commonly known as: Actos        SITagliptin phosphate 50 mg tablet  Commonly known as: Januvia                  Where to Get Your Medications        Information about where to get these medications is not yet available    Ask your nurse or doctor about these medications  digoxin 125 MCG tablet  melatonin 3 mg tablet  metoprolol succinate XL 25 mg 24 hr tablet  metoprolol succinate XL 50 mg 24 hr tablet  midodrine 10 mg tablet         Test Results  Pending At Discharge  Pending Labs       No current pending labs.            Hospital Course  Mitzi Pro is a 73 year old lady presenting with extensive cardiac history including atrial fibrillation/flutter see below as well as COPD on 2l of O2 at night, presenting to the emergency department with respiratory distress from home, she called EMS when they arrived she was 88% on her baseline 2 L, they placed her on 6 L of oxygen with appropriate saturations however she was still complaining of severe dyspnea she was subsequently brought to the emergency room.  She reports around 2 days of increasing shortness of breath, she denies chest pain, fevers, chills, sweats.  She endorses a cough that is nonproductive.     She was tachycardic and normotensive on arrival in respiratory distress, was attempted to place the patient on BiPAP however she was unable to tolerate this, she was in atrial flutter with a heart rate in the 150s, initially normotensive however subsequently her blood pressure did drop with systolics in the 80s and 90s, she is already anticoagulated on Eliquis and she was cardioverted in the ED with return to normal sinus rhythm and was subsequently normotensive, discussed with cardiology in the ED and she was admitted for acute hypoxic respiratory failure secondary to pneumonia versus acute exacerbation of heart failure, management of recurrent atrial flutter and hyperkalemia.  Received doxycycline, Rocephin, Solu-Medrol 125, 50 mcg of fentanyl, 1 mg of Ativan and Lasix 40 mg IV in the ED    Following cardioversion she did have runs of nonsustained V. tach on telemetry and she was started on amiodarone infusion with resolution. Post cardioversion EKG showing sinus rhythm with PVCs, T wave flattening in the inferior leads, no romulo ST segment depressions, no elevations, troponin slightly elevated but likely consistent with at 264/247, patient denying chest pain and troponin elevation likely due to rapid  rates in the in the setting of known coronary artery disease.    She was treated for a course of pneumonia, she was able to be taken off BiPAP, although she continued to have intermittent tachycardia and required several doses of IV metoprolol, digoxin as well as amiodarone for control; she ultimately opted not to have cardiac isthmus ablation or further ischemic workup; she was already enrolled in Navigator program through hospice and and familiar with the palliative philosophy of care; after meeting with her son and hospice, she opted to pursue hospice care and was discharged and flipped into [facility] through hospice of [facility], working on weaning oxygen and plan to get her to the [facility] if her respiratory status can be improved; with the change in philosophy to comfort, she seemed relieved and work was initiated on weaning her oxygen level.  Her son was at the bedside as well as several of her friends who agreed with the plan of care as outlined.    Greater than 30 minutes was spent facilitating this patients discharge from the hospital which included examining the patient, reconciling medications, and making arrangements for future care.    Bertram Rasheed MD  [facility]  Internal Medicine    This document was generated in whole or in part using the Dragon One medical voice recognition software and there may be some incorrect words/wording, spelling, or punctuation errors that were not corrected prior to finalization in the medical record.    Pertinent Physical Exam At Time of Discharge  Physical Exam  Constitutional: Awake/alert/oriented x3, no respiratory distress, on Airvo again today, calm  Eyes: Clear sclera  Head/Neck: Normocephalic, atraumatic  Respiratory/Thorax: Bilateral wheezing  Cardiovascular: Heart rate 88 at the time of examination by apical auscultation on initial examination, during the tacky arrhythmia, the heart rhythm was regular although tachycardic to  approximately 150 by auscultation  Gastrointestinal: Non-TTP, soft  Extremities: Bilateral lower extremity pitting edema has improved, trace today  Psychological: Appropriate mood and behavior  Skin: Warm and dry, no jaundice     Outpatient Follow-Up  Future Appointments   Date Time Provider Department Center   11/19/2024 11:40 AM Amalia Obrien, APRN-CNP [id number] Felix Rasheed MD

## 2024-05-29 NOTE — NURSING NOTE
0745: Patient's heart rate sustaining in the 160's. Dr. Rasheed made aware and will be bedside to assess shortly.     0800: All PO meds administered    0805: Dr. Rasheed bedside. Patient is a DNRCC and will have hospice meeting this afternoon. Tele monitoring dc'd.

## 2024-05-29 NOTE — NURSING NOTE
Discussed [facility] services with patient and her son Stepan. Patient wishes to wean down off high flow oxygen and possibly Dc to [facility] tomorrow 5/30/24. Patient now GIP with comfort meds and orders obtained by Dr Rasheed. Hospice chart in place.

## 2024-05-29 NOTE — PROGRESS NOTES
"Mitzi Pro is a 73 y.o. female on day 6 of admission presenting with Acute combined systolic and diastolic heart failure (Multi).    Subjective   Palliative medicine SW visited with patient and patient's son at bedside today to discuss upcoming hospice meeting and to ensure that is still the plan. Patient is still sure about pursuing comfort care rather than treatment. She is in agreement with the hospice meeting this afternoon. Meeting time with [facility] is 130p.       Objective     Physical Exam    Last Recorded Vitals  Blood pressure (!) 115/48, pulse (!) 130, temperature 36.7 °C (98.1 °F), temperature source Temporal, resp. rate (!) 28, height 1.651 m (5' 5\"), weight 79.5 kg (175 lb 4.3 oz), SpO2 99%.  Intake/Output last 3 Shifts:  I/O last 3 completed shifts:  In: - (0 mL/kg)   Out: 850 (10.7 mL/kg) [Urine:850 (0.3 mL/kg/hr)]  Weight: 79.5 kg       Assessment/Plan   Principal Problem:    Acute combined systolic and diastolic heart failure (Multi)  Active Problems:    Coronary artery disease involving native heart    Stage 3a chronic kidney disease (Multi)    Acute hypoxic respiratory failure (Multi)    Atrial flutter with rapid ventricular response (Multi)    Lactic acidosis    Hyperkalemia    Acute exacerbation of COPD with asthma (Multi)    Community acquired pneumonia of right lower lobe of lung    NSTEMI (non-ST elevated myocardial infarction) (Multi)    Nonsustained ventricular tachycardia (Multi)    Await outcome of hospice meeting at 130p.    Yana Yi LCSW      "

## 2024-05-29 NOTE — CARE PLAN
Pt alert and oriented. No complaints of pain, denies SOB. Remains aflutter on tele, HR controlled in 80s-90s for majority of the night. On high flow 60L 90%. Pt to meet with hospice this afternoon. Safety maintained, will continue to monitor     0630 pt HR in 160s, EKG done showing sinus tachycardia. MD notified, one time dose ativan ordered and given.     Problem: Psychosocial Needs  Goal: Collaborate with me, my family, and caregiver to identify my specific goals  Outcome: Progressing     Problem: Discharge Barriers  Goal: My discharge needs are met  Outcome: Progressing     Problem: Respiratory  Goal: Wean oxygen to maintain O2 saturation per order/standard this shift  Outcome: Progressing     Problem: Arrythmia/Dysrhythmia  Goal: Lab values return to normal range  Outcome: Progressing  Goal: Promote self management  Outcome: Progressing  Goal: Serial ECG will return to baseline  Outcome: Progressing  Goal: Vital signs return to baseline  Outcome: Progressing     Problem: Skin  Goal: Prevent/manage excess moisture  Outcome: Progressing  Goal: Prevent/minimize sheer/friction injuries  Outcome: Progressing  Goal: Promote/optimize nutrition  Outcome: Progressing  Goal: Promote skin healing  Recent Flowsheet Documentation  Taken 5/28/2024 2207 by Lorenza Cisse, RN  Promote skin healing:   Assess skin/pad under line(s)/device(s)   Protective dressings over bony prominences   Turn/reposition every 2 hours/use positioning/transfer devices   Ensure correct size (line/device) and apply per  instructions   Rotate device position/do not position patient on device  Goal: Participates in plan/prevention/treatment measures  Outcome: Progressing    Problem: Discharge Planning  Goal: Discharge to home or other facility with appropriate resources  Outcome: Progressing     Problem: Chronic Conditions and Co-morbidities  Goal: Patient's chronic conditions and co-morbidity symptoms are monitored and maintained or  improved  Outcome: Progressing

## 2024-05-29 NOTE — H&P
History Of Present Illness    Mitzi Pro is a 73 year old lady presenting with extensive cardiac history including atrial fibrillation/flutter see below as well as COPD on 2l of O2 at night, presenting to the emergency department with respiratory distress from home, she called EMS when they arrived she was 88% on her baseline 2 L, they placed her on 6 L of oxygen with appropriate saturations however she was still complaining of severe dyspnea she was subsequently brought to the emergency room.  She reports around 2 days of increasing shortness of breath, she denies chest pain, fevers, chills, sweats.  She endorses a cough that is nonproductive.     She was tachycardic and normotensive on arrival in respiratory distress, was attempted to place the patient on BiPAP however she was unable to tolerate this, she was in atrial flutter with a heart rate in the 150s, initially normotensive however subsequently her blood pressure did drop with systolics in the 80s and 90s, she is already anticoagulated on Eliquis and she was cardioverted in the ED with return to normal sinus rhythm and was subsequently normotensive, discussed with cardiology in the ED and she was admitted for acute hypoxic respiratory failure secondary to pneumonia versus acute exacerbation of heart failure, management of recurrent atrial flutter and hyperkalemia. Received doxycycline, Rocephin, Solu-Medrol 125, 50 mcg of fentanyl, 1 mg of Ativan and Lasix 40 mg IV in the ED.     Following cardioversion she did have runs of nonsustained V. tach on telemetry and she was started on amiodarone infusion with resolution. Post cardioversion EKG showing sinus rhythm with PVCs, T wave flattening in the inferior leads, no romulo ST segment depressions, no elevations, troponin slightly elevated but likely consistent with at 264/247, patient denying chest pain and troponin elevation likely due to rapid rates in the in the setting of known coronary artery  disease.     She was treated for a course of pneumonia, she was able to be taken off BiPAP, although she continued to have intermittent tachycardia and required several doses of IV metoprolol, digoxin as well as amiodarone for control; she ultimately opted not to have cardiac isthmus ablation or further ischemic workup; she was already enrolled in Navigator program through hospice and and familiar with the palliative philosophy of care; after meeting with her son and hospice, she opted to pursue hospice care and was discharged and flipped into [facility] through hospice of [facility], working on weaning oxygen and plan to get her to the [facility] if her respiratory status can be improved; with the change in philosophy to comfort, she seemed relieved and work was initiated on weaning her oxygen level.  Her son was at the bedside as well as several of her friends who agreed with the plan of care as outlined.    Patient endorsed some shortness of breath, although stated that she no longer had chest pain, she otherwise denied any vision change or headache, did not have any nausea or vomiting was able to tolerate a p.o. diet, did not endorse having lower extremity edema.    12 point review of systems was elicited from the patient and negative except as otherwise documented above in the HPI.     Past Medical History  She has a past medical history of Personal history of malignant neoplasm of ovary and Vaginal itching (05/14/2024).    Surgical History  She has a past surgical history that includes Coronary artery bypass graft (11/20/2017); Other surgical history (05/17/2022); Other surgical history (05/17/2022); Other surgical history (11/08/2019); and CT angio aorta and bilateral iliofemoral runoff w and or wo IV contrast (12/21/2021).     Social History  She reports that she has been smoking cigarettes. She has never used smokeless tobacco. She reports that she does not currently use alcohol. She reports that  she does not use drugs.    Family History  Family History   Problem Relation Name Age of Onset    Other (arteriosclerotic cardiovascular disease) Mother      Other (arteriosclerotic cardiovascular disease) Father      Colon cancer Sister          Allergies  Metformin and Nitrofurantoin      Physical Exam  Constitutional: Awake/alert/oriented x3, no respiratory distress, on Airvo, calm  Eyes: Clear sclera  Head/Neck: Normocephalic, atraumatic  Respiratory/Thorax: Bilateral wheezing  Cardiovascular: Heart rate approximately 110 by apical auscultation  Gastrointestinal: Non-TTP, soft  Extremities: Bilateral lower extremity pitting edema has improved, trace today  Psychological: Appropriate mood and behavior  Skin: Warm and dry, no jaundice     Last Recorded Vitals  There were no vitals taken for this visit.    Relevant Results  Results for orders placed or performed during the hospital encounter of 05/23/24 (from the past 24 hour(s))   POCT GLUCOSE   Result Value Ref Range    POCT Glucose 149 (H) 74 - 99 mg/dL   POCT GLUCOSE   Result Value Ref Range    POCT Glucose 183 (H) 74 - 99 mg/dL   Renal Function Panel   Result Value Ref Range    Glucose 123 (H) 74 - 99 mg/dL    Sodium 137 136 - 145 mmol/L    Potassium 4.7 3.5 - 5.3 mmol/L    Chloride 100 98 - 107 mmol/L    Bicarbonate 30 21 - 32 mmol/L    Anion Gap 12 10 - 20 mmol/L    Urea Nitrogen 39 (H) 6 - 23 mg/dL    Creatinine 1.07 (H) 0.50 - 1.05 mg/dL    eGFR 54 (L) >60 mL/min/1.73m*2    Calcium 8.4 (L) 8.6 - 10.3 mg/dL    Phosphorus 4.1 2.5 - 4.9 mg/dL    Albumin 2.9 (L) 3.4 - 5.0 g/dL   CBC   Result Value Ref Range    WBC 7.8 4.4 - 11.3 x10*3/uL    nRBC 0.3 (H) 0.0 - 0.0 /100 WBCs    RBC 3.03 (L) 4.00 - 5.20 x10*6/uL    Hemoglobin 9.5 (L) 12.0 - 16.0 g/dL    Hematocrit 32.0 (L) 36.0 - 46.0 %     (H) 80 - 100 fL    MCH 31.4 26.0 - 34.0 pg    MCHC 29.7 (L) 32.0 - 36.0 g/dL    RDW 13.4 11.5 - 14.5 %    Platelets 209 150 - 450 x10*3/uL   Magnesium   Result Value  Ref Range    Magnesium 1.98 1.60 - 2.40 mg/dL   Electrocardiogram, 12-lead PRN ACS symptoms   Result Value Ref Range    Ventricular Rate 164 BPM    Atrial Rate 164 BPM    IA Interval 118 ms    QRS Duration 108 ms    QT Interval 232 ms    QTC Calculation(Bazett) 383 ms    R Axis 56 degrees    T Axis 204 degrees    QRS Count 27 beats    Q Onset 211 ms    P Onset 152 ms    P Offset 193 ms    T Offset 327 ms    QTC Fredericia 324 ms   POCT GLUCOSE   Result Value Ref Range    POCT Glucose 155 (H) 74 - 99 mg/dL   POCT GLUCOSE   Result Value Ref Range    POCT Glucose 293 (H) 74 - 99 mg/dL     Scheduled medications  apixaban, 5 mg, oral, BID  cilostazol, 100 mg, oral, BID  [START ON 5/30/2024] digoxin, 125 mcg, oral, Daily  [START ON 5/30/2024] loratadine, 10 mg, oral, Daily  melatonin, 3 mg, oral, Nightly  metoprolol succinate XL, 25 mg, oral, Nightly  [START ON 5/30/2024] metoprolol succinate XL, 50 mg, oral, Daily  midodrine, 10 mg, oral, BID  oxybutynin, 5 mg, oral, BID  oxygen, , inhalation, Continuous - Inhalation  [START ON 5/30/2024] pantoprazole, 40 mg, oral, Daily before breakfast  polyethylene glycol, 17 g, oral, BID  sennosides, 2 tablet, oral, BID      Continuous medications     PRN medications  PRN medications: albuterol, glycopyrrolate, haloperidol lactate, haloperidol lactate, ketorolac, LORazepam, LORazepam, morphine, morphine, sodium chloride    Electrocardiogram, 12-lead PRN ACS symptoms    Result Date: 5/29/2024  Sinus tachycardia with Fusion complexes Low voltage QRS Septal infarct , age undetermined ST & T wave abnormality, consider lateral ischemia Abnormal ECG When compared with ECG of 28-MAY-2024 06:24, (unconfirmed) Fusion complexes are now Present Premature ventricular complexes are no longer Present ST less depressed in Anterior leads    Electrocardiogram, 12-lead PRN ACS symptoms    Result Date: 5/28/2024  Sinus tachycardia with Fusion complexes Anterior infarct (cited on or before 27-MAY-2024)  ST & T wave abnormality, consider inferolateral ischemia Abnormal ECG When compared with ECG of 27-MAY-2024 09:02, (unconfirmed) Fusion complexes are now Present Serial changes of evolving Anterior infarct Present    Electrocardiogram, 12-lead PRN ACS symptoms    Result Date: 5/28/2024  Sinus tachycardia with short NV with occasional Premature ventricular complexes Rightward axis Incomplete left bundle branch block ST & T wave abnormality, consider inferolateral ischemia Abnormal ECG When compared with ECG of 28-MAY-2024 06:23, (unconfirmed) Fusion complexes are no longer Present Premature ventricular complexes are now Present    Electrocardiogram, 12-lead PRN ACS symptoms    Result Date: 5/28/2024  Sinus tachycardia Possible Anterior infarct , age undetermined ST & T wave abnormality, consider inferolateral ischemia Abnormal ECG When compared with ECG of 25-MAY-2024 02:48, (unconfirmed) T wave inversion more evident in Lateral leads    ECG 12 lead    Result Date: 5/27/2024  possible atrial flutter with 2:1 conduction Septal infarct (cited on or before 23-MAY-2024) Abnormal ECG When compared with ECG of 23-MAY-2024 15:56, (unconfirmed) Poor data quality excludes serial comparison. Confirmed by Polly Valentin ([id number]) on 5/27/2024 10:27:13 PM    ECG 12 lead    Result Date: 5/27/2024  Poor data quality, interpretation may be adversely affected Undetermined rhythm Septal infarct (cited on or before 23-MAY-2024) ST & T wave abnormality, consider inferior ischemia Abnormal ECG When compared with ECG of 03-MAY-2024 14:43, Poor data quality in current ECG precludes serial comparison Confirmed by Polly Valentin ([id number]) on 5/27/2024 10:25:11 PM    Electrocardiogram, 12-lead PRN ACS symptoms    Result Date: 5/25/2024  Sinus tachycardia Incomplete left bundle branch block Marked ST abnormality, possible inferior subendocardial injury Abnormal ECG When compared with ECG of 23-MAY-2024 17:57, (unconfirmed) Significant changes  have occurred    XR chest 2 views    Result Date: 5/24/2024  Interpreted By:  Kristian Abarca, STUDY: XR CHEST 2 VIEWS; ;  5/24/2024 9:37 am   INDICATION: Signs/Symptoms:AECHF vs PNA.   COMPARISON: 05/23/2024 chest radiograph   ACCESSION NUMBER(S): [id number]   ORDERING CLINICIAN: THIERRY MILLER   TECHNIQUE: PA and lateral views of the chest were obtained.   FINDINGS: Bilateral pleural effusions are present larger on the right. Underlying pneumonia in the right lung base cannot be excluded. In mount of pleural fluid has mildly increased on the right from the prior exam.   Interstitial markings of the lungs are mildly increased similar to the prior exam.   Cardiac silhouette remains borderline enlarged.       Bilateral pleural effusions are present, larger on the right. Underlying pneumonia/atelectasis in the right lung base cannot be excluded.   Continued cardiomegaly and pulmonary vascular congestion.     MACRO: None   Signed by: Kristian Abarca 5/24/2024 10:02 AM Dictation workstation:   [id number]    Electrocardiogram, 12-lead PRN ACS symptoms    Result Date: 5/24/2024  Suspect arm lead reversal, interpretation assumes no reversal Sinus rhythm with Premature atrial complexes and Premature ventricular complexes or Fusion complexes Rightward axis Pulmonary disease pattern Septal infarct , age undetermined ST & T wave abnormality, consider inferior ischemia Abnormal ECG When compared with ECG of 23-MAY-2024 17:24, (unconfirmed) Sinus rhythm has replaced Junctional rhythm Vent. rate has decreased BY  61 BPM Incomplete left bundle branch block is no longer Present    Electrocardiogram, 12-lead PRN ACS symptoms    Result Date: 5/24/2024  Unusual P axis and short NH, probable junctional tachycardia Rightward axis Incomplete left bundle branch block ST elevation, consider lateral injury or acute infarct ** ** ACUTE MI ** ** Abnormal ECG When compared with ECG of 23-MAY-2024 16:08, (unconfirmed) Junctional rhythm has  replaced Sinus rhythm    XR chest 1 view    Result Date: 5/23/2024  Interpreted By:  Nandini Bui, STUDY: XR CHEST 1 VIEW;  5/23/2024 4:57 pm   INDICATION: Signs/Symptoms:Shortness of breath, history of CHF and COPD.   COMPARISON: 05/02/2024.   ACCESSION NUMBER(S): [id number]   ORDERING CLINICIAN: DAVID SAUCEDA   FINDINGS:         CARDIOMEDIASTINAL SILHOUETTE: Status post sternotomy. Heart is mildly enlarged. There is venous congestion and interstitial edema.   LUNGS: There is atelectasis/infiltrate in the right lower lobe seen also on the radiograph of 05/02/2024. No evidence of pneumothorax.   ABDOMEN: No remarkable upper abdominal findings.   BONES: No acute osseous changes.       1. Cardiomegaly with venous congestion and interstitial edema consistent with congestive heart failure. 2. Infiltrate/atelectasis in the right lung base. Rule out pneumonia.       MACRO: None   Signed by: Nandini Bui 5/23/2024 5:20 PM Dictation workstation:   [id number]    ECG 12 Lead    Result Date: 5/22/2024  Sinus rhythm with sinus arrhythmia with occasional Premature ventricular complexes and Fusion complexes Rightward axis Anteroseptal infarct (cited on or before 30-APR-2024) Abnormal ECG When compared with ECG of 01-MAY-2024 05:33, Sinus rhythm has replaced Atrial flutter Vent. rate has decreased BY  79 BPM Serial changes of Anteroseptal infarct Present    NM injection no scan    Result Date: 5/8/2024  Interpreted By:  Frank Gannon and Adjei Effie STUDY: NM INJECTION NO SCAN;  5/2/2024 10:00 am   INDICATION: Signs/Symptoms:decreased EF, arrythmia, elevated troponin.   COMPARISON: None.   ACCESSION NUMBER(S): [id number]   ORDERING CLINICIAN: CAM MOSS   TECHNIQUE: DIVISION OF NUCLEAR MEDICINE PHARMACOLOGIC STRESS MYOCARDIAL PERFUSION SCAN, ONE DAY PROTOCOL   The patient received an intravenous injection of 9.2 mCi of Tc-99m Myoview. However, the patient was unable to tolerate lying down for the study and did not want to  continue with the examination. No images were obtained.   FINDINGS: No images were obtained.       Injection no scan. No images were obtained.   I personally reviewed the images/study and I agree with the findings as stated by radiology resident Dr. Lima Fish. This study was interpreted at [place], [place].   MACRO: None   Signed by: Frank Gannon 5/8/2024 5:12 PM Dictation workstation:   [id number]    ECG 12 Lead    Result Date: 5/4/2024  Sinus rhythm with occasional Premature ventricular complexes Incomplete left bundle branch block Poor R-wave progression Abnormal ECG Confirmed by Frank Garcia ([id number]) on 5/4/2024 3:25:34 PM    ECG 12 Lead    Result Date: 5/3/2024  Atrial flutter with 2 to 1 block Rightward axis Incomplete left bundle branch block Nonspecific ST abnormality Abnormal ECG When compared with ECG of 02-MAY-2024 21:07, (unconfirmed) Premature ventricular complexes are no longer Present Premature supraventricular complexes are no longer Present Confirmed by Frank Garcia ([id number]) on 5/3/2024 1:30:56 PM    ECG 12 Lead    Result Date: 5/3/2024  Atrial flutter with variable AV block Rightward axis Incomplete left bundle branch block ST & T wave abnormality, consider inferior ischemia Abnormal ECG When compared with ECG of 02-MAY-2024 12:13, (unconfirmed) Significant changes have occurred Confirmed by Frank Garcia ([id number]) on 5/3/2024 1:29:58 PM    XR chest 1 view    Result Date: 5/2/2024  Interpreted By:  Brandon Nazario, STUDY: XR CHEST 1 VIEW   INDICATION: Signs/Symptoms:SOB.   COMPARISON: April 30   ACCESSION NUMBER(S): [id number]   ORDERING CLINICIAN: YANIRA BAXTER   FINDINGS: Interval development of perihilar and basilar interstitial edema with new right effusion and basilar airspace opacity favored to represent more conglomerate edema. Focus of pneumonia not excluded.   Previous cardiomegaly with median sternotomy unchanged.       Interval  development of perihilar and basilar interstitial edema with new right effusion and basilar airspace opacity favored to represent more conglomerate edema. Focus of pneumonia not excluded.   Signed by: Brandon Nazario 5/2/2024 6:55 PM Dictation workstation:   [id number]    ECG 12 Lead    Result Date: 5/2/2024  Atrial flutter with 2:1 AV conduction Right axis deviation Anteroseptal infarct (cited on or before 30-APR-2024) ST & T wave abnormality, consider inferior ischemia Abnormal ECG When compared with ECG of 30-APR-2024 01:56, (unconfirmed) Significant changes have occurred Confirmed by Ernie Diaz ([id number]) on 5/2/2024 1:08:56 PM    ECG 12 lead    Result Date: 5/2/2024  cannot exclude atrial flutter vs sinus tachycardia Left axis deviation Incomplete right bundle branch block Nonspecific ST and T wave abnormality Abnormal ECG When compared with ECG of 08-APR-2023 13:22, Fusion complexes are now Present Incomplete right bundle branch block is now Present Confirmed by Ernie Diaz ([id number]) on 5/2/2024 12:56:18 PM    ECG 12 lead    Result Date: 5/2/2024  atrial fibrillation/flutter Septal infarct , age undetermined ST & T wave abnormality, consider lateral ischemia Abnormal ECG When compared with ECG of 30-APR-2024 00:46, (unconfirmed) Current undetermined rhythm precludes rhythm comparison, needs review Incomplete right bundle branch block is no longer Present Septal infarct is now Present Confirmed by Ernie Diaz ([id number]) on 5/2/2024 12:55:44 PM    Transthoracic Echo (TTE) Complete    Result Date: 4/30/2024            [facility] [address] [facility] [address]    Tel [phone] Fax [phone] TRANSTHORACIC ECHOCARDIOGRAM REPORT  Patient Name:      MILVIA Garces Physician:    [id number] Horacio Armijo MD Study Date:        4/30/2024             Ordering Provider:    [id number] BAXTER MRN/PID:           [id number]               Fellow: Accession#:        [id number]          Nurse:                Leena AMATO Date of Birth/Age: 1950 / 73 years  Sonographer:          Amalia Brewster RDCS Gender:            F                     Additional Staff: Height:            165.00 cm             Admit Date: Weight:            76.20 kg              Admission Status:     Inpatient -                                                                Priority                                                                discharge BSA / BMI:         1.84 m2 / 27.99 kg/m2 Department Location:  [facility] CCU Blood Pressure: 99 /56 mmHg Study Type:    TRANSTHORACIC ECHO (TTE) COMPLETE Diagnosis/ICD: 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 Diabetes:          Yes Pertinent History: HTN, Hyperlipidemia and COPD. Study Detail: The following Echo studies were performed: 2D, M-Mode, Doppler and               color flow. Technically challenging study due to patient lying in               supine position and prominent lung artifact. Definity used as a               contrast agent for endocardial border definition. Total contrast               used for this procedure was 2 mL via IV push.  PHYSICIAN INTERPRETATION: Left Ventricle: Left ventricular systolic function is moderately decreased, with an estimated ejection fraction of 34 %. There are multiple wall motion abnormalities. The left ventricular cavity size is mild to moderately dilated. The left ventricular septal wall thickness is decreased. There is normal left ventricular posterior wall thickness. Spectral Doppler shows an impaired relaxation pattern of left ventricular diastolic filling. There is an elevated left ventricular end diastolic pressure. LV Wall Scoring:  The mid and apical anterior septum, mid and apical inferior septum, apical lateral segment, apical anterior segment, and apex are akinetic. The entire inferior wall, basal and mid anterior wall, basal and mid anterolateral wall, basal and mid inferolateral wall, basal anteroseptal segment, and basal inferoseptal segment are hypokinetic. Left Atrium: The left atrium is mildly dilated. Right Ventricle: The right ventricle is normal in size. There is mildly reduced right ventricular systolic function. Right Atrium: The right atrium is normal in size. Aortic Valve: The aortic valve is trileaflet. There is evidence of mildly elevated transaortic gradients consistent with sclerosis of the aortic valve. There is no evidence of aortic valve regurgitation. The peak instantaneous gradient of the aortic valve is 7.6 mmHg. Mitral Valve: The mitral valve is normal in structure. There is mild to moderate mitral annular calcification. There is moderate mitral valve regurgitation. EROA by PISA 0.2 moderate, posterior MR skyler type IIIA apical tethering. Tricuspid Valve: The tricuspid valve is structurally normal. There is mild tricuspid regurgitation. The Doppler estimated RVSP is mildly elevated at 43.7 mmHg. Pulmonic Valve: The pulmonic valve is structurally normal. There is trace pulmonic valve regurgitation. Pericardium: There is no pericardial effusion noted. Aorta: The aortic root is normal. There is no dilatation of the ascending aorta. There is no dilatation of the aortic root. Pulmonary Artery: The main pulmonary artery is normal in size, and position, with normal bifurcation into the left and right pulmonary arteries. Systemic Veins: The inferior vena cava appears mildly dilated. The hepatic vein appears dilated. There is less than 50% IVC collapse with inspiration.  CONCLUSIONS:  1. Left ventricular systolic function is moderately decreased with a 34 % estimated ejection fraction.  2. Multiple segmental  abnormalities exist. See findings.  3. Spectral Doppler shows an impaired relaxation pattern of left ventricular diastolic filling.  4. There is an elevated left ventricular end diastolic pressure.  5. There is mildly reduced right ventricular systolic function.  6. Moderate mitral valve regurgitation.  7. Mildly elevated RVSP.  8. Aortic valve sclerosis.  9. There are multiple wall motion abnormalities. QUANTITATIVE DATA SUMMARY: 2D MEASUREMENTS:                           Normal Ranges: LAs:           4.50 cm    (2.7-4.0cm) IVSd:          1.00 cm    (0.6-1.1cm) LVPWd:         0.90 cm    (0.6-1.1cm) LVIDd:         6.50 cm    (3.9-5.9cm) LVIDs:         5.40 cm LV Mass Index: 144.5 g/m2 LV % FS        16.9 % LA VOLUME:                             Normal Ranges: LA Area A4C:     21.0 cm2 LA Area A2C:     21.0 cm2 LA Volume Index: 37.0 ml/m2 M-MODE MEASUREMENTS:                  Normal Ranges: Ao Root: 2.60 cm (2.0-3.7cm) AoV Exc: 1.40 cm (1.5-2.5cm) AORTA MEASUREMENTS:                    Normal Ranges: AoV Exc:   1.40 cm (1.5-2.5cm) Asc Ao, d: 3.30 cm (2.1-3.4cm) LV SYSTOLIC FUNCTION BY 2D PLANIMETRY (MOD):                     Normal Ranges: EF-A4C View: 39.4 % (>=55%) EF-A2C View: 28.7 % EF-Biplane:  34.0 % LV DIASTOLIC FUNCTION:                        Normal Ranges: MV Peak E:    1.38 m/s (0.7-1.2 m/s) MV Peak A:    0.90 m/s (0.42-0.7 m/s) E/A Ratio:    1.54     (1.0-2.2) MV e'         0.06 m/s (>8.0) MV lateral e' 0.08 m/s MV medial e'  0.04 m/s E/e' Ratio:   23.00    (<8.0) MITRAL VALVE:                 Normal Ranges: MV DT: 177 msec (150-240msec) MITRAL INSUFFICIENCY:                           Normal Ranges: PISA Radius:  0.4 cm MR VTI:       142.00 cm MR Vmax:      407.00 cm/s MR Alias Subhash: 84.7 cm/s MR Volume:    29.71 ml MR Flow Rt:   85.15 ml/s MR EROA:      0.21 cm2 AORTIC VALVE:                         Normal Ranges: AoV Vmax:      1.38 m/s (<=1.7m/s) AoV Peak P.6 mmHg (<20mmHg) LVOT Max Subhash:  0.89 m/s  (<=1.1m/s) LVOT VTI:      20.00 cm LVOT Diameter: 1.70 cm  (1.8-2.4cm) AoV Area,Vmax: 1.47 cm2 (2.5-4.5cm2)  RIGHT VENTRICLE: RV Basal 3.40 cm RV Mid   1.80 cm RV Major 7.6 cm TAPSE:   14.1 mm RV s'    0.09 m/s TRICUSPID VALVE/RVSP:                             Normal Ranges: Peak TR Velocity: 3.19 m/s RV Syst Pressure: 43.7 mmHg (< 30mmHg) PULMONIC VALVE:                        Normal Ranges: PV Accel Time: 74 msec (>120ms)  [id number] Horacio Armijo MD Electronically signed on 4/30/2024 at 4:34:01 PM  Wall Scoring  ** Final **     XR chest 1 view    Result Date: 4/30/2024  STUDY: Chest Radiograph;  4/30/2024 at 1:13 AM INDICATION: Chest pain. COMPARISON: XR chest 11/28/2022, XR chest 12/5/2021, XR chest 7/14/2021, XR chest 3/1/2019. ACCESSION NUMBER(S): [id number] ORDERING CLINICIAN: ISIDRO MARSHALL TECHNIQUE:  Frontal chest was obtained at 0113 hours. FINDINGS: CARDIOMEDIASTINAL SILHOUETTE: Cardiomediastinal silhouette is mildly enlarged in size and configuration.  Calcified plaque is seen in the aorta.  LUNGS: Lungs are clear.  Flattening of hemidiaphragms suggests chronic changes of COPD.  ABDOMEN: No remarkable upper abdominal findings.  BONES: No acute osseous changes.    No acute cardia pulmonary disease. Cardiomegaly with suspected mild chronic changes of COPD. Signed by Rojas Lawrence        Assessment/Plan   Active Problems:    COPD (chronic obstructive pulmonary disease) (Multi)    Peripheral vascular disease (CMS-HCC)    Stage 3a chronic kidney disease (Multi)    Type 2 diabetes mellitus with hyperglycemia, without long-term current use of insulin (Multi)    Hypertension    LBBB (left bundle branch block)    Hyperlipidemia    Chronic combined systolic (congestive) and diastolic (congestive) heart failure (Multi)    Idiopathic hypotension    Respiratory failure with hypercapnia (Multi)    Pneumonia    Nonsustained ventricular tachycardia (Multi)    Plan:  - GIP, may remove telemetry  - End-of-life order set was  used, may use the conditional orders such as Haldol, Ativan and morphine above  - May continue the home and hospital medications as ordered including metoprolol, low-dose p.o. digoxin, Eliquis and Pletal, may discontinue statin and Vascepa  - Regular diet  - Already on DOAC, otherwise would not require VTE prophylaxis  - DNR-CC, will wean oxygen as appropriate and plan for discharge to [facility] when meeting oxygen goal    Bertram Rasheed MD

## 2024-05-30 VITALS
SYSTOLIC BLOOD PRESSURE: 111 MMHG | BODY MASS INDEX: 29.2 KG/M2 | TEMPERATURE: 97 F | HEART RATE: 108 BPM | HEIGHT: 65 IN | OXYGEN SATURATION: 93 % | DIASTOLIC BLOOD PRESSURE: 55 MMHG | WEIGHT: 175.27 LBS | RESPIRATION RATE: 20 BRPM

## 2024-05-30 LAB
ATRIAL RATE: 160 BPM
ATRIAL RATE: 162 BPM
ATRIAL RATE: 162 BPM
ATRIAL RATE: 328 BPM
GLUCOSE BLD MANUAL STRIP-MCNC: 103 MG/DL (ref 74–99)
P AXIS: -16 DEGREES
P OFFSET: 193 MS
P OFFSET: 208 MS
P OFFSET: 214 MS
P ONSET: 152 MS
P ONSET: 155 MS
P ONSET: 159 MS
PR INTERVAL: 104 MS
PR INTERVAL: 114 MS
PR INTERVAL: 122 MS
Q ONSET: 211 MS
Q ONSET: 216 MS
QRS COUNT: 26 BEATS
QRS COUNT: 27 BEATS
QRS DURATION: 106 MS
QRS DURATION: 108 MS
QT INTERVAL: 232 MS
QT INTERVAL: 240 MS
QT INTERVAL: 302 MS
QT INTERVAL: 314 MS
QTC CALCULATION(BAZETT): 383 MS
QTC CALCULATION(BAZETT): 391 MS
QTC CALCULATION(BAZETT): 495 MS
QTC CALCULATION(BAZETT): 515 MS
QTC FREDERICIA: 324 MS
QTC FREDERICIA: 332 MS
QTC FREDERICIA: 420 MS
QTC FREDERICIA: 437 MS
R AXIS: 55 DEGREES
R AXIS: 56 DEGREES
R AXIS: 77 DEGREES
R AXIS: 95 DEGREES
T AXIS: 204 DEGREES
T AXIS: 217 DEGREES
T AXIS: 224 DEGREES
T AXIS: 228 DEGREES
T OFFSET: 327 MS
T OFFSET: 336 MS
T OFFSET: 367 MS
T OFFSET: 373 MS
VENTRICULAR RATE: 160 BPM
VENTRICULAR RATE: 162 BPM
VENTRICULAR RATE: 162 BPM
VENTRICULAR RATE: 164 BPM

## 2024-05-30 PROCEDURE — 2500000001 HC RX 250 WO HCPCS SELF ADMINISTERED DRUGS (ALT 637 FOR MEDICARE OP): Performed by: INTERNAL MEDICINE

## 2024-05-30 PROCEDURE — 94660 CPAP INITIATION&MGMT: CPT

## 2024-05-30 PROCEDURE — 2500000004 HC RX 250 GENERAL PHARMACY W/ HCPCS (ALT 636 FOR OP/ED): Performed by: INTERNAL MEDICINE

## 2024-05-30 PROCEDURE — 2500000002 HC RX 250 W HCPCS SELF ADMINISTERED DRUGS (ALT 637 FOR MEDICARE OP, ALT 636 FOR OP/ED): Performed by: INTERNAL MEDICINE

## 2024-05-30 PROCEDURE — 2500000005 HC RX 250 GENERAL PHARMACY W/O HCPCS: Performed by: INTERNAL MEDICINE

## 2024-05-30 PROCEDURE — 99239 HOSP IP/OBS DSCHRG MGMT >30: CPT | Performed by: INTERNAL MEDICINE

## 2024-05-30 PROCEDURE — 94640 AIRWAY INHALATION TREATMENT: CPT

## 2024-05-30 PROCEDURE — 82947 ASSAY GLUCOSE BLOOD QUANT: CPT

## 2024-05-30 RX ORDER — METOPROLOL SUCCINATE 25 MG/1
25 TABLET, EXTENDED RELEASE ORAL NIGHTLY
Start: 2024-05-30

## 2024-05-30 RX ORDER — KETOROLAC TROMETHAMINE 15 MG/ML
15 INJECTION, SOLUTION INTRAMUSCULAR; INTRAVENOUS EVERY 6 HOURS PRN
Start: 2024-05-30

## 2024-05-30 RX ORDER — METOPROLOL SUCCINATE 50 MG/1
50 TABLET, EXTENDED RELEASE ORAL DAILY
Start: 2024-05-31

## 2024-05-30 RX ORDER — POLYETHYLENE GLYCOL 3350 17 G/17G
17 POWDER, FOR SOLUTION ORAL 2 TIMES DAILY
Start: 2024-05-30

## 2024-05-30 RX ORDER — LORAZEPAM 2 MG/ML
2 INJECTION INTRAMUSCULAR EVERY 10 MIN PRN
Start: 2024-05-30

## 2024-05-30 RX ORDER — MORPHINE SULFATE 4 MG/ML
4 INJECTION, SOLUTION INTRAMUSCULAR; INTRAVENOUS
Start: 2024-05-30

## 2024-05-30 RX ORDER — HALOPERIDOL 5 MG/ML
1 INJECTION INTRAMUSCULAR EVERY 4 HOURS PRN
Start: 2024-05-30

## 2024-05-30 RX ORDER — LORAZEPAM 2 MG/ML
0.5 INJECTION INTRAMUSCULAR EVERY 4 HOURS PRN
Start: 2024-05-30

## 2024-05-30 RX ORDER — GLYCOPYRROLATE 0.2 MG/ML
0.2 INJECTION INTRAMUSCULAR; INTRAVENOUS EVERY 4 HOURS PRN
Start: 2024-05-30

## 2024-05-30 RX ORDER — SENNOSIDES 8.6 MG/1
2 TABLET ORAL 2 TIMES DAILY
Start: 2024-05-30

## 2024-05-30 RX ORDER — MORPHINE SULFATE 2 MG/ML
2 INJECTION, SOLUTION INTRAMUSCULAR; INTRAVENOUS
Start: 2024-05-30

## 2024-05-30 RX ADMIN — MORPHINE SULFATE 2 MG: 2 INJECTION, SOLUTION INTRAMUSCULAR; INTRAVENOUS at 14:11

## 2024-05-30 RX ADMIN — CILOSTAZOL 100 MG: 100 TABLET ORAL at 08:26

## 2024-05-30 RX ADMIN — LORATADINE 10 MG: 10 TABLET ORAL at 08:26

## 2024-05-30 RX ADMIN — METOPROLOL SUCCINATE 50 MG: 50 TABLET, EXTENDED RELEASE ORAL at 08:26

## 2024-05-30 RX ADMIN — APIXABAN 5 MG: 5 TABLET, FILM COATED ORAL at 08:27

## 2024-05-30 RX ADMIN — LORAZEPAM 0.5 MG: 2 INJECTION INTRAMUSCULAR; INTRAVENOUS at 14:02

## 2024-05-30 RX ADMIN — Medication 8 L/MIN: at 10:09

## 2024-05-30 RX ADMIN — ALBUTEROL SULFATE 2.5 MG: 2.5 SOLUTION RESPIRATORY (INHALATION) at 08:51

## 2024-05-30 RX ADMIN — Medication 40 L/MIN: at 08:00

## 2024-05-30 RX ADMIN — Medication 50 PERCENT: at 08:40

## 2024-05-30 RX ADMIN — MIDODRINE HYDROCHLORIDE 10 MG: 10 TABLET ORAL at 08:26

## 2024-05-30 RX ADMIN — OXYBUTYNIN CHLORIDE 5 MG: 5 TABLET ORAL at 08:26

## 2024-05-30 RX ADMIN — DIGOXIN 125 MCG: 125 TABLET ORAL at 08:27

## 2024-05-30 RX ADMIN — MORPHINE SULFATE 2 MG: 2 INJECTION, SOLUTION INTRAMUSCULAR; INTRAVENOUS at 03:09

## 2024-05-30 RX ADMIN — Medication 50 PERCENT: at 02:56

## 2024-05-30 RX ADMIN — PANTOPRAZOLE SODIUM 40 MG: 40 TABLET, DELAYED RELEASE ORAL at 05:33

## 2024-05-30 SDOH — SOCIAL STABILITY: SOCIAL INSECURITY: DO YOU FEEL ANYONE HAS EXPLOITED OR TAKEN ADVANTAGE OF YOU FINANCIALLY OR OF YOUR PERSONAL PROPERTY?: NO

## 2024-05-30 SDOH — SOCIAL STABILITY: SOCIAL INSECURITY: HAS ANYONE EVER THREATENED TO HURT YOUR FAMILY OR YOUR PETS?: NO

## 2024-05-30 SDOH — SOCIAL STABILITY: SOCIAL INSECURITY: HAVE YOU HAD THOUGHTS OF HARMING ANYONE ELSE?: NO

## 2024-05-30 SDOH — SOCIAL STABILITY: SOCIAL INSECURITY: DO YOU FEEL UNSAFE GOING BACK TO THE PLACE WHERE YOU ARE LIVING?: NO

## 2024-05-30 SDOH — SOCIAL STABILITY: SOCIAL INSECURITY: ABUSE: ADULT

## 2024-05-30 SDOH — SOCIAL STABILITY: SOCIAL INSECURITY: HAVE YOU HAD ANY THOUGHTS OF HARMING ANYONE ELSE?: NO

## 2024-05-30 SDOH — SOCIAL STABILITY: SOCIAL INSECURITY: WERE YOU ABLE TO COMPLETE ALL THE BEHAVIORAL HEALTH SCREENINGS?: YES

## 2024-05-30 SDOH — SOCIAL STABILITY: SOCIAL INSECURITY: ARE THERE ANY APPARENT SIGNS OF INJURIES/BEHAVIORS THAT COULD BE RELATED TO ABUSE/NEGLECT?: NO

## 2024-05-30 SDOH — SOCIAL STABILITY: SOCIAL INSECURITY: DOES ANYONE TRY TO KEEP YOU FROM HAVING/CONTACTING OTHER FRIENDS OR DOING THINGS OUTSIDE YOUR HOME?: NO

## 2024-05-30 SDOH — SOCIAL STABILITY: SOCIAL INSECURITY: ARE YOU OR HAVE YOU BEEN THREATENED OR ABUSED PHYSICALLY, EMOTIONALLY, OR SEXUALLY BY ANYONE?: NO

## 2024-05-30 ASSESSMENT — ACTIVITIES OF DAILY LIVING (ADL)
LACK_OF_TRANSPORTATION: NO
ADEQUATE_TO_COMPLETE_ADL: YES
ASSISTIVE_DEVICE: WALKER
JUDGMENT_ADEQUATE_SAFELY_COMPLETE_DAILY_ACTIVITIES: YES
WALKS IN HOME: NEEDS ASSISTANCE
GROOMING: NEEDS ASSISTANCE
FEEDING YOURSELF: NEEDS ASSISTANCE
DRESSING YOURSELF: NEEDS ASSISTANCE
BATHING: NEEDS ASSISTANCE
HEARING - LEFT EAR: FUNCTIONAL
PATIENT'S MEMORY ADEQUATE TO SAFELY COMPLETE DAILY ACTIVITIES?: YES
TOILETING: NEEDS ASSISTANCE
HEARING - RIGHT EAR: FUNCTIONAL

## 2024-05-30 ASSESSMENT — LIFESTYLE VARIABLES
AUDIT-C TOTAL SCORE: 0
HOW OFTEN DO YOU HAVE A DRINK CONTAINING ALCOHOL: NEVER
HOW OFTEN DO YOU HAVE 6 OR MORE DRINKS ON ONE OCCASION: NEVER
AUDIT-C TOTAL SCORE: 0
HOW MANY STANDARD DRINKS CONTAINING ALCOHOL DO YOU HAVE ON A TYPICAL DAY: PATIENT DOES NOT DRINK
SKIP TO QUESTIONS 9-10: 1

## 2024-05-30 ASSESSMENT — PAIN SCALES - GENERAL
PAINLEVEL_OUTOF10: 7
PAINLEVEL_OUTOF10: 5 - MODERATE PAIN

## 2024-05-30 ASSESSMENT — COGNITIVE AND FUNCTIONAL STATUS - GENERAL
DRESSING REGULAR UPPER BODY CLOTHING: A LITTLE
STANDING UP FROM CHAIR USING ARMS: A LITTLE
MOVING TO AND FROM BED TO CHAIR: A LITTLE
PATIENT BASELINE BEDBOUND: NO
MOVING FROM LYING ON BACK TO SITTING ON SIDE OF FLAT BED WITH BEDRAILS: A LITTLE
HELP NEEDED FOR BATHING: A LITTLE
TURNING FROM BACK TO SIDE WHILE IN FLAT BAD: A LITTLE
DAILY ACTIVITIY SCORE: 19
PERSONAL GROOMING: A LITTLE
TOILETING: A LITTLE
CLIMB 3 TO 5 STEPS WITH RAILING: A LOT
WALKING IN HOSPITAL ROOM: A LITTLE
DRESSING REGULAR LOWER BODY CLOTHING: A LITTLE
MOBILITY SCORE: 17

## 2024-05-30 ASSESSMENT — PAIN SCALES - PAIN ASSESSMENT IN ADVANCED DEMENTIA (PAINAD)
BREATHING: OCCASIONAL LABORED BREATHING, SHORT PERIOD OF HYPERVENTILATION
BREATHING: OCCASIONAL LABORED BREATHING, SHORT PERIOD OF HYPERVENTILATION
TOTALSCORE: 5
BODYLANGUAGE: TENSE, DISTRESSED PACING, FIDGETING
TOTALSCORE: 2
FACIALEXPRESSION: SAD, FRIGHTENED, FROWN
FACIALEXPRESSION: SMILING OR INEXPRESSIVE
CONSOLABILITY: DISTRACTED OR REASSURED BY VOICE/TOUCH
BODYLANGUAGE: RELAXED
NEGVOCALIZATION: OCCASIONAL MOAN/GROAN, LOW SPEECH, NEGATIVE/DISAPPROVING QUALITY
CONSOLABILITY: DISTRACTED OR REASSURED BY VOICE/TOUCH

## 2024-05-30 NOTE — CARE PLAN
The patient's goals for the shift include      The clinical goals for the shift include patient will be kept comfortable during shift

## 2024-05-30 NOTE — NURSING NOTE
1300 - Pt sitting up in chair and has been switched to an oximyzer. Pt tolerating oxygen delivery with stats in the mid to high 90s. Pt has been washed up and bathed. She tolerated getting up to the chair fairly well and needed a few minutes to catch her breath. Hospice nurse has been in and transport time set for [place] around 1500. Pt and family are aware.     1430 - Pt with increased work of breathing. She had sat up in the chair for lunch and it took a lot out of her. Pt voiced anxiety and pain. HR up to over 150 with PVCs on the monitor. Ativan given with little relief. Pt medicated for pain with morphine and appears more comfortable. Heart rate down to the 80s and pulse ox up to 100% on the oximyzer. Pt back in bed and awaiting transfer to [place]. Medlist printed for [place] packet. Pt asked about a loco but does not wish to have it placed at this time. Hospice nurse notified.     1525 - Pt picked up at this time for transport to [place]. Pt calm, voices no pain and is stating 99% On the oximyzer. Belongings packed up including cell and glasses. No dentures found in room search. Pt aware and charge nurse notified. Report given to transport and no questions at this time.

## 2024-05-30 NOTE — CARE PLAN
The patient's goals for the shift include      The clinical goals for the shift include Pt will have discharge planning with hospice by end of shift 5/30/24    Pt met goals - and is discharged at this time

## 2024-05-30 NOTE — NURSING NOTE
Pt in GIP with [facility]. Plan for possible transfer to [facility] today pending bed availability.     Pt set to transfer to [facility] at 3p today.

## 2024-05-30 NOTE — DISCHARGE SUMMARY
Discharge Diagnosis  End stage COPD, Pneumonia, non-sustained VT, atrial flutter    Issues Requiring Follow-Up  [facility] will take care of your ongoing care needs    Discharge Meds     Your medication list        START taking these medications        Instructions Last Dose Given Next Dose Due   glycopyrrolate 200 mcg/mL injection  Commonly known as: Robinul      Infuse 1 mL (0.2 mg) into a venous catheter every 4 hours if needed (excess secretions).       haloperidol lactate 5 mg/mL injection  Commonly known as: Haldol      Infuse 0.2 mL (1 mg) into a venous catheter every 4 hours if needed (nausea, vomiting).       haloperidol lactate 5 mg/mL injection  Commonly known as: Haldol      Infuse 0.2 mL (1 mg) into a venous catheter every 4 hours if needed for agitation (delirium).       ketorolac 15 mg/mL injection  Commonly known as: Toradol      Infuse 1 mL (15 mg) into a venous catheter every 6 hours if needed for moderate pain (4 - 6).       LORazepam 2 mg/mL injection  Commonly known as: Ativan      Infuse 0.25 mL (0.5 mg) over 5 minutes into a venous catheter every 4 hours if needed (anxiety, restlessness, insomnia).       LORazepam 2 mg/mL injection  Commonly known as: Ativan      Infuse 1 mL (2 mg) over 5 minutes into a venous catheter every 10 minutes if needed for seizures for up to 2 doses.       morphine 2 mg/mL injection      Infuse 1 mL (2 mg) into a venous catheter every 15 minutes if needed for moderate pain (4 - 6).       morphine 4 mg/mL injection      Infuse 1 mL (4 mg) into a venous catheter every 15 minutes if needed for severe pain (7 - 10).       polyethylene glycol 17 gram packet  Commonly known as: Glycolax, Miralax      Take 17 g by mouth 2 times a day.       sennosides 8.6 mg tablet  Commonly known as: Senokot      Take 2 tablets (17.2 mg) by mouth 2 times a day.              CHANGE how you take these medications        Instructions Last Dose Given Next Dose Due    metoprolol succinate XL 25 mg 24 hr tablet  Commonly known as: Toprol-XL  What changed:   Another medication with the same name was added. Make sure you understand how and when to take each.  Another medication with the same name was changed. Make sure you understand how and when to take each.      Take 1 tablet (25 mg) by mouth once daily at bedtime. Do not crush or chew.       metoprolol succinate XL 50 mg 24 hr tablet  Commonly known as: Toprol-XL  What changed:   Another medication with the same name was added. Make sure you understand how and when to take each.  Another medication with the same name was changed. Make sure you understand how and when to take each.      Take 1 tablet (50 mg) by mouth once daily. Do not crush or chew.       metoprolol succinate XL 25 mg 24 hr tablet  Commonly known as: Toprol-XL  What changed: You were already taking a medication with the same name, and this prescription was added. Make sure you understand how and when to take each.      Take 1 tablet (25 mg) by mouth once daily at bedtime. Do not crush or chew.       metoprolol succinate XL 50 mg 24 hr tablet  Commonly known as: Toprol-XL  Start taking on: May 31, 2024  What changed:   medication strength  how much to take      Take 1 tablet (50 mg) by mouth once daily. Do not crush or chew.       oxygen gas therapy  Commonly known as: O2  What changed:   how much to take  when to take this      Inhale 1 each once every 24 hours.              CONTINUE taking these medications        Instructions Last Dose Given Next Dose Due   Dexcom G7  misc  Generic drug: blood-glucose meter,continuous      Use as instructed       Dexcom G7 Sensor device  Generic drug: blood-glucose sensor      As directed, 1 kit       digoxin 125 MCG tablet  Commonly known as: Lanoxin      Take 1 tablet (125 mcg) by mouth once daily.       Eliquis 5 mg tablet  Generic drug: apixaban      Take 1 tablet (5 mg) by mouth 2 times a day.       FreeStyle  lancets 28 gauge      1 daily       loratadine 10 mg tablet  Commonly known as: Claritin      Take 1 tablet (10 mg) by mouth once daily.       melatonin 3 mg tablet      Take 1 tablet (3 mg) by mouth once daily at bedtime.       midodrine 10 mg tablet  Commonly known as: Proamatine      Take 1 tablet (10 mg) by mouth 2 times daily (morning and late afternoon).       OneTouch Verio Flex meter misc  Generic drug: blood-glucose meter      1 EACH IF NEED TO TEST GLUCOSE ONCE DAILY       oxybutynin XL 10 mg 24 hr tablet  Commonly known as: Ditropan-XL      Take 1 tablet (10 mg) by mouth once daily.       pantoprazole 40 mg EC tablet  Commonly known as: ProtoNix           sodium chloride 0.65 % nasal spray  Commonly known as: [place]      Administer 1 spray into each nostril 4 times a day as needed for congestion.              STOP taking these medications      albuterol 90 mcg/actuation inhaler        Anoro Ellipta 62.5-25 mcg/actuation blister with device  Generic drug: umeclidinium-vilanteroL        cilostazol 100 mg tablet  Commonly known as: Pletal        fluticasone 50 mcg/actuation nasal spray  Commonly known as: Flonase        ipratropium-albuteroL 0.5-2.5 mg/3 mL nebulizer solution  Commonly known as: Duo-Neb                  Where to Get Your Medications        Information about where to get these medications is not yet available    Ask your nurse or doctor about these medications  glycopyrrolate 200 mcg/mL injection  haloperidol lactate 5 mg/mL injection  haloperidol lactate 5 mg/mL injection  ketorolac 15 mg/mL injection  LORazepam 2 mg/mL injection  LORazepam 2 mg/mL injection  metoprolol succinate XL 25 mg 24 hr tablet  metoprolol succinate XL 50 mg 24 hr tablet  morphine 2 mg/mL injection  morphine 4 mg/mL injection  oxygen gas therapy  polyethylene glycol 17 gram packet  sennosides 8.6 mg tablet         Test Results Pending At Discharge  Pending Labs       No current pending labs.            Hospital  Be Pro is a 73 year old lady presenting with extensive cardiac history including atrial fibrillation/flutter see below as well as COPD on 2l of O2 at night, presenting to the emergency department with respiratory distress from home, she called EMS when they arrived she was 88% on her baseline 2 L, they placed her on 6 L of oxygen with appropriate saturations however she was still complaining of severe dyspnea she was subsequently brought to the emergency room.  She reports around 2 days of increasing shortness of breath, she denies chest pain, fevers, chills, sweats.  She endorses a cough that is nonproductive.     She was tachycardic and normotensive on arrival in respiratory distress, was attempted to place the patient on BiPAP however she was unable to tolerate this, she was in atrial flutter with a heart rate in the 150s, initially normotensive however subsequently her blood pressure did drop with systolics in the 80s and 90s, she is already anticoagulated on Eliquis and she was cardioverted in the ED with return to normal sinus rhythm and was subsequently normotensive, discussed with cardiology in the ED and she was admitted for acute hypoxic respiratory failure secondary to pneumonia versus acute exacerbation of heart failure, management of recurrent atrial flutter and hyperkalemia. Received doxycycline, Rocephin, Solu-Medrol 125, 50 mcg of fentanyl, 1 mg of Ativan and Lasix 40 mg IV in the ED.     Following cardioversion she did have runs of nonsustained V. tach on telemetry and she was started on amiodarone infusion with resolution. Post cardioversion EKG showing sinus rhythm with PVCs, T wave flattening in the inferior leads, no romulo ST segment depressions, no elevations, troponin slightly elevated but likely consistent with at 264/247, patient denying chest pain and troponin elevation likely due to rapid rates in the in the setting of known coronary artery disease.     She was treated  for a course of pneumonia, she was able to be taken off BiPAP, although she continued to have intermittent tachycardia and required several doses of IV metoprolol, digoxin as well as amiodarone for control; she ultimately opted not to have cardiac isthmus ablation or further ischemic workup; she was already enrolled in Navigator program through hospice and and familiar with the palliative philosophy of care; after meeting with her son and hospice, she opted to pursue hospice care and was discharged and flipped into [facility] through hospice of [facility], working on weaning oxygen and plan to get her to the [facility] if her respiratory status can be improved; with the change in philosophy to comfort, she seemed relieved and work was initiated on weaning her oxygen level.  Her son was at the bedside as well as several of her friends who agreed with the plan of care as outlined.    She will go to [facility] today.    >30 minutes was spent on discharge    Pertinent Physical Exam At Time of Discharge  Physical Exam  Constitutional: Awake/alert/oriented x3, no respiratory distress, on Oxymizer, calm  Eyes: Clear sclera  Head/Neck: Normocephalic, atraumatic  Respiratory/Thorax: Comfortable effort today  Gastrointestinal: Non-TTP, soft  Extremities: Bilateral lower extremity pitting edema has improved, trace today  Psychological: Appropriate mood and behavior  Skin: Warm and dry, no jaundice     Outpatient Follow-Up  Future Appointments   Date Time Provider Department Center   11/19/2024 11:40 AM Amalia Obrien, APRN-[facility] [id number] Felix Rasheed MD

## 2024-06-01 LAB
ATRIAL RATE: 148 BPM
P OFFSET: 201 MS
P ONSET: 160 MS
PR INTERVAL: 126 MS
Q ONSET: 213 MS
QRS COUNT: 24 BEATS
QRS DURATION: 110 MS
QT INTERVAL: 288 MS
QTC CALCULATION(BAZETT): 452 MS
QTC FREDERICIA: 389 MS
R AXIS: 50 DEGREES
T AXIS: 250 DEGREES
T OFFSET: 357 MS
VENTRICULAR RATE: 148 BPM

## 2024-06-03 LAB
ATRIAL RATE: 85 BPM
P AXIS: 75 DEGREES
P OFFSET: 191 MS
P ONSET: 134 MS
PR INTERVAL: 162 MS
Q ONSET: 215 MS
QRS COUNT: 14 BEATS
QRS DURATION: 104 MS
QT INTERVAL: 350 MS
QTC CALCULATION(BAZETT): 416 MS
QTC FREDERICIA: 393 MS
R AXIS: 104 DEGREES
T AXIS: -16 DEGREES
T OFFSET: 390 MS
VENTRICULAR RATE: 85 BPM

## 2024-06-15 LAB
ATRIAL RATE: 160 BPM
ATRIAL RATE: 99 BPM
P AXIS: -74 DEGREES
P AXIS: 92 DEGREES
P OFFSET: 200 MS
P OFFSET: 207 MS
P ONSET: 142 MS
P ONSET: 158 MS
PR INTERVAL: 114 MS
PR INTERVAL: 146 MS
Q ONSET: 215 MS
Q ONSET: 215 MS
QRS COUNT: 16 BEATS
QRS COUNT: 26 BEATS
QRS DURATION: 106 MS
QRS DURATION: 108 MS
QT INTERVAL: 244 MS
QT INTERVAL: 338 MS
QTC CALCULATION(BAZETT): 398 MS
QTC CALCULATION(BAZETT): 433 MS
QTC FREDERICIA: 338 MS
QTC FREDERICIA: 399 MS
R AXIS: 108 DEGREES
R AXIS: 92 DEGREES
T AXIS: 192 DEGREES
T AXIS: 255 DEGREES
T OFFSET: 337 MS
T OFFSET: 384 MS
VENTRICULAR RATE: 160 BPM
VENTRICULAR RATE: 99 BPM

## 2024-11-19 ENCOUNTER — APPOINTMENT (OUTPATIENT)
Dept: PRIMARY CARE | Facility: CLINIC | Age: 74
End: 2024-11-19
Payer: OTHER MISCELLANEOUS